# Patient Record
Sex: FEMALE | Race: ASIAN | NOT HISPANIC OR LATINO | Employment: FULL TIME | URBAN - METROPOLITAN AREA
[De-identification: names, ages, dates, MRNs, and addresses within clinical notes are randomized per-mention and may not be internally consistent; named-entity substitution may affect disease eponyms.]

---

## 2017-03-02 ENCOUNTER — OFFICE VISIT (OUTPATIENT)
Dept: PEDIATRICS | Facility: CLINIC | Age: 42
End: 2017-03-02
Payer: COMMERCIAL

## 2017-03-02 VITALS
HEART RATE: 68 BPM | HEIGHT: 63 IN | TEMPERATURE: 97.6 F | WEIGHT: 160.4 LBS | DIASTOLIC BLOOD PRESSURE: 65 MMHG | SYSTOLIC BLOOD PRESSURE: 120 MMHG | BODY MASS INDEX: 28.42 KG/M2 | OXYGEN SATURATION: 100 %

## 2017-03-02 DIAGNOSIS — M21.611 BILATERAL BUNIONS: ICD-10-CM

## 2017-03-02 DIAGNOSIS — L30.9 ECZEMA, UNSPECIFIED TYPE: Primary | ICD-10-CM

## 2017-03-02 DIAGNOSIS — L08.9 SUPERFICIAL SKIN INFECTION: ICD-10-CM

## 2017-03-02 DIAGNOSIS — M21.612 BILATERAL BUNIONS: ICD-10-CM

## 2017-03-02 PROCEDURE — 99213 OFFICE O/P EST LOW 20 MIN: CPT | Performed by: NURSE PRACTITIONER

## 2017-03-02 RX ORDER — CEPHALEXIN 500 MG/1
500 CAPSULE ORAL 2 TIMES DAILY
Qty: 14 CAPSULE | Refills: 0 | Status: SHIPPED | OUTPATIENT
Start: 2017-03-02 | End: 2018-01-05

## 2017-03-02 NOTE — LETTER
March 2, 2017      RE: Natasha Lee  [address] [facility] [address]       To whom it may concern:    Natasha Lee was seen in our clinic today.  Sincerely,      Sandy Garcia RN, CNP

## 2017-03-02 NOTE — NURSING NOTE
"Chief Complaint   Patient presents with     Eczema     on neck x 1 week       Initial /65 (BP Location: Right arm, Patient Position: Chair, Cuff Size: Adult Regular)  Pulse 68  Temp 97.6  F (36.4  C) (Temporal)  Ht 5' 2.5\" (1.588 m)  Wt 160 lb 6.4 oz (72.8 kg)  LMP 02/28/2017  SpO2 100%  Breastfeeding? No  BMI 28.87 kg/m2 Estimated body mass index is 28.87 kg/(m^2) as calculated from the following:    Height as of this encounter: 5' 2.5\" (1.588 m).    Weight as of this encounter: 160 lb 6.4 oz (72.8 kg).  Medication Reconciliation: complete      JESUSITA Gudino      "

## 2017-03-02 NOTE — PROGRESS NOTES
SUBJECTIVE:                                                    Natasha Lee is a 41 year old female who presents to clinic today for the following health issues:    Rash     Onset: 1 week    Description:   Location: left side of neck  Character: blotchy, flakey  Itching (Pruritis): YES    Progression of Symptoms:  same    Accompanying Signs & Symptoms:  Fever: no   Body aches or joint pain: no   Sore throat symptoms: no   Recent cold symptoms: no    History:   Previous similar rash: YES    Precipitating factors:   Exposure to similar rash: no   New exposures: None   Recent travel: no     Alleviating factors:  none     Therapies Tried and outcome: dry air makes it worst, kenalog, lotion  Saw about 2 months ago and we did antibiotic and steroid cream and it cleared  Then now spot has showed up on the left side of the neck   Tried moisterizer and the steroid cream but still will not go away     2. Has bunions on both feet left worse than right   Now having difficulty wearing shoes now    Problem list and histories reviewed & adjusted, as indicated.  Additional history: as documented    Patient Active Problem List   Diagnosis     Chronic hepatitis B (H)     Lupus (systemic lupus erythematosus) (H)     CARDIOVASCULAR SCREENING; LDL GOAL LESS THAN 160     Health Care Home     Dysmenorrhea     Menorrhagia     Allergic rhinitis     Other nonspecific finding on examination of urine     Urinary frequency     Vitamin B12 deficiency (non anemic)     Heart murmur     S/P laparoscopic cholecystectomy     Past Surgical History   Procedure Laterality Date     Cl aff surgical pathology  2007     Anesth,skin surgery,knee  1993, 1990     orthoscopic     C appendectomy  4/2008     Laparoscopic tubal ligation  12/15/2011     Procedure:LAPAROSCOPIC TUBAL LIGATION; Laparoscopic tubal ligation; Surgeon:AMY WYNN; Location:[facility] OR     Laparoscopic cholecystectomy N/A 5/18/2016     Procedure: LAPAROSCOPIC CHOLECYSTECTOMY;   Surgeon: Radha Cline MD;  Location:  OR       Social History   Substance Use Topics     Smoking status: Never Smoker     Smokeless tobacco: Never Used     Alcohol use No     Family History   Problem Relation Age of Onset     Unknown/Adopted Mother      Unknown/Adopted Father          Current Outpatient Prescriptions   Medication Sig Dispense Refill     DiphenhydrAMINE HCl (BENADRYL PO)        triamcinolone (KENALOG) 0.1 % cream Apply sparingly to affected area three times daily prn. 30 g 0     norethindrone-ethinyl estradiol (ORTHO-NOVUM 1-35 TAB,NORTREL 1-35 TAB) 1-35 MG-MCG per tablet Take 1 tablet by mouth daily 84 tablet 4     albuterol (PROAIR HFA, PROVENTIL HFA, VENTOLIN HFA) 108 (90 BASE) MCG/ACT inhaler Inhale 2 puffs into the lungs every 4 hours as needed for shortness of breath / dyspnea or wheezing 1 Inhaler 1     senna-docusate (SENOKOT-S;PERICOLACE) 8.6-50 MG per tablet Take 1-2 tablets by mouth 2 times daily 30 tablet 0     predniSONE (DELTASONE) 5 MG tablet 20 mg for 1 week, taper by 5 mg weekly 70 tablet 1     Cyanocobalamin (B-12) 1000 MCG TBCR Take 1,000 mcg by mouth daily 100 tablet 1     fluticasone (FLONASE) 50 MCG/ACT nasal spray Spray 2 sprays into both nostrils daily 16 g 3     clobetasol (TEMOVATE) 0.05 % cream Apply sparingly to affected area twice a week. 60 g 2     multivitamin, therapeutic with minerals (MULTI-VITAMIN) TABS Take 1 tablet by mouth daily       Cranberry 1000 MG CAPS Take 1 capsule by mouth 4 times daily       DiphenhydrAMINE HCl, Sleep, (UNISOM SLEEPGELS) 50 MG CAPS Take 50 mg by mouth At Bedtime       omeprazole 20 MG tablet Take 1 tablet by mouth daily. Take 30-60 minutes before a meal. 30 tablet 1     Allergies   Allergen Reactions     Carbamazepine      Fever, rash       Reviewed and updated as needed this visit by clinical staff  Tobacco  Allergies  Meds  Med Hx  Surg Hx  Fam Hx  Soc Hx      Reviewed and updated as needed this visit by Provider      "    ROS:  CONSTITUTIONAL:NEGATIVE for fever, chills, change in weight  ENT/MOUTH: NEGATIVE for ear, mouth and throat problems  RESP:NEGATIVE for significant cough or SOB  CV: NEGATIVE for chest pain, palpitations or peripheral edema  GI: NEGATIVE for nausea, poor appetite and vomiting  MUSCULOSKELETAL: NEGATIVE for significant arthralgias or myalgia  HEME/ALLERGY/IMMUNE: POSITIVE  for allergies and NEGATIVE for bleeding disorder    OBJECTIVE:                                                    /65 (BP Location: Right arm, Patient Position: Chair, Cuff Size: Adult Regular)  Pulse 68  Temp 97.6  F (36.4  C) (Temporal)  Ht 5' 2.5\" (1.588 m)  Wt 160 lb 6.4 oz (72.8 kg)  LMP 02/28/2017  SpO2 100%  Breastfeeding? No  BMI 28.87 kg/m2  Body mass index is 28.87 kg/(m^2).  GENERAL APPEARANCE: healthy, alert and no distress  HENT: ear canals and TM's normal and nose and mouth without ulcers or lesions  RESP: lungs clear to auscultation - no rales, rhonchi or wheezes  CV: regular rates and rhythm and no murmur, click or rub  MS: extremities normal- no gross deformities noted  Examination of the feet reveals   Inspection:  no swelling medial , midfoot  Tender::none  Non-tender:proximal 5th metatarsal, midshaft 5th metatarsal  FEET: Bilateral bunion deformity with bunionette.  Mild erythema laterally.  Range of Motion:flexion of toes:  full, extension of toes  full  SKIN: CHRONIC ECZEMATOUS RASH:  Rash 3 in size; Located left side of neck .  Has redness, irritation, some thickening, exoriation, with parched and flakey scaled appearance--consistent with chronic eczematous inflammation.  NEURO: mentation intact and speech normal  PSYCH: mentation appears normal and affect normal/bright    Diagnostic Test Results:  none      ASSESSMENT/PLAN:                                                      Natasha was seen today for eczema.    Diagnoses and all orders for this visit:    Eczema, unspecified type    Superficial skin infection " neck   -     cephALEXin (KEFLEX) 500 MG capsule; Take 1 capsule (500 mg) by mouth 2 times daily    Bilateral bunions  -     PODIATRY/FOOT & ANKLE SURGERY REFERRAL    PLAN:   Symptomatic therapy suggested:   Use Dove or Aveeno lotion or soap  Use Aquaphor, lubrider, cetaphil or eucerin lotion daily to two times a day  As needed   Use the steroid cream two times a day  On the dry patches until better and then use as needed after   2.  Orders Placed This Encounter   Medications     cephALEXin (KEFLEX) 500 MG capsule     Sig: Take 1 capsule (500 mg) by mouth 2 times daily     Dispense:  14 capsule     Refill:  0     Orders Placed This Encounter   Procedures     PODIATRY/FOOT & ANKLE SURGERY REFERRAL     3. Patient needs to follow up in if no improvement,or sooner if worsening of symptoms or other symptoms develop.        See Patient Instructions    ELIAS Guerra CNP  [facility]

## 2017-03-02 NOTE — PATIENT INSTRUCTIONS
It was a pleasure seeing you today at the [facility]. Thank you for allowing us to care for you today. We truly hope we provided you with the excellent service you deserve. Please let us know if there is anything else we can do for you so we can be sure you are leaving completley satisfied with your care experience.       General information about your clinic   Clinic Hours Lab Hours (Appointments are required)   Mon-Thurs: 7:30 AM - 7 PM Mon-Thurs: 7:30 AM - 7 PM   Fri: 7:30 AM - 5 PM Fri: 7:30 AM - 5 PM        After Hours Nurse Advise & Appts:  [facility] Advisors: [phone]  [facility] On Call: to make appointments anytime: [phone] On Call Physician: call [phone] and answering service will page the on call physician.        For urgent appointments, please call [phone] and ask for the triage nurse or your care team clinic nurse.  How to contact my care team:  [facility]: [email or web address]   Phone: [phone]   Fax: [phone]       [facility]:   Phone: [phone]  Hours: 8:00 AM - 6:00 PM  Medication Refills:  Call your pharmacy and they will forward the refill to us. Please allow 3 business days for your refills to be completed.       Normal or non-critical lab and imaging results will be communicated to you by MyChart, letter or phone within 7 days.  If you do not hear from us within 10 days, please call the clinic. If you have a critical or abnormal lab result, we will notify you by phone as soon as possible.       We now have PWIC (Pediatric Walk in Care)  Monday-Friday from 7:30-4. Simply walk in and be seen for your urgent needs like cough, fever, rash, diarrhea or vomiting, pink eye, UTI. No appointments needed. Ask one of the team for more information      -Your Care Team:    Dr. Johann Patino - Internal Medicine/Pediatrics   Dr. Jake Pierce - Family Medicine  Dr. Jamila Purcell - Pediatrics  Sandy Garcia CNP - Family Practice Nurse  Practitioner  Dr. Susan Hand - Pediatrics  Dr. Harpreet Ly - Internal Medicine        PLAN:   1.   Symptomatic therapy suggested:   Use Dove or Aveeno lotion or soap  Use Aquaphor, lubrider, cetaphil or eucerin lotion daily to two times a day  As needed   Use the steroid cream two times a day  On the dry patches until better and then use as needed after   2.  Orders Placed This Encounter   Medications     cephALEXin (KEFLEX) 500 MG capsule     Sig: Take 1 capsule (500 mg) by mouth 2 times daily     Dispense:  14 capsule     Refill:  0     Orders Placed This Encounter   Procedures     PODIATRY/FOOT & ANKLE SURGERY REFERRAL     3. Patient needs to follow up in if no improvement,or sooner if worsening of symptoms or other symptoms develop.

## 2017-03-02 NOTE — MR AVS SNAPSHOT
After Visit Summary   3/2/2017    Natasha Lee    MRN: [id number]           Patient Information     Date Of Birth          1975        Visit Information        Provider Department      3/2/2017 1:40 PM Sandy Garcia APRN CNP [facility]        Today's Diagnoses     Eczema, unspecified type    -  1    Superficial skin infection neck         Bilateral bunions          Care Instructions    It was a pleasure seeing you today at the [facility]. Thank you for allowing us to care for you today. We truly hope we provided you with the excellent service you deserve. Please let us know if there is anything else we can do for you so we can be sure you are leaving completley satisfied with your care experience.       General information about your clinic   Clinic Hours Lab Hours (Appointments are required)   Mon-Thurs: 7:30 AM - 7 PM Mon-Thurs: 7:30 AM - 7 PM   Fri: 7:30 AM - 5 PM Fri: 7:30 AM - 5 PM        After Hours Nurse Advise & Appts:  [facility] Nurse Advisors: [phone]  [facility] On Call: to make appointments anytime: [phone] On Call Physician: call [phone] and answering service will page the on call physician.        For urgent appointments, please call [phone] and ask for the triage nurse or your care team clinic nurse.  How to contact my care team:  MyChart: [email or web address]   Phone: [phone]   Fax: [phone]       [facility]:   Phone: [phone]  Hours: 8:00 AM - 6:00 PM  Medication Refills:  Call your pharmacy and they will forward the refill to us. Please allow 3 business days for your refills to be completed.       Normal or non-critical lab and imaging results will be communicated to you by MyChart, letter or phone within 7 days.  If you do not hear from us within 10 days, please call the clinic. If you have a critical or abnormal lab result, we will notify you by phone as soon as possible.        We now have PWIC (Pediatric Walk in Care)  Monday-Friday from 7:30-4. Simply walk in and be seen for your urgent needs like cough, fever, rash, diarrhea or vomiting, pink eye, UTI. No appointments needed. Ask one of the team for more information      -Your Care Team:    Dr. Johann Patino - Internal Medicine/Pediatrics   Dr. Jake Pierce - Family Medicine  Dr. Jamila Purcell - Pediatrics  Sandy Garcia CNP - Family Practice Nurse Practitioner  Dr. Susan Hand - Pediatrics  Dr. Harpreet Ly - Internal Medicine        PLAN:   1.   Symptomatic therapy suggested:   Use Dove or Aveeno lotion or soap  Use Aquaphor, lubrider, cetaphil or eucerin lotion daily to two times a day  As needed   Use the steroid cream two times a day  On the dry patches until better and then use as needed after   2.  Orders Placed This Encounter   Medications     cephALEXin (KEFLEX) 500 MG capsule     Sig: Take 1 capsule (500 mg) by mouth 2 times daily     Dispense:  14 capsule     Refill:  0     Orders Placed This Encounter   Procedures     PODIATRY/FOOT & ANKLE SURGERY REFERRAL     3. Patient needs to follow up in if no improvement,or sooner if worsening of symptoms or other symptoms develop.                Follow-ups after your visit        Additional Services     PODIATRY/FOOT & ANKLE SURGERY REFERRAL       Your provider has referred you to: [facility] [place] [facility]: [id number] [place] [address] Patient Clinic Line: [phone]     Please be aware that coverage of these services is subject to the terms and limitations of your health insurance plan.  Call member services at your health plan with any benefit or coverage questions.      Please bring the following to your appointment:  >>   Any x-rays, CTs or MRIs which have been performed.  Contact the facility where they were done to arrange for  prior to your scheduled appointment.    >>   List of current medications   >>   This referral request   >>   Any documents/labs  "given to you for this referral                  Who to contact     If you have questions or need follow up information about today's clinic visit or your schedule please contact [facility] directly at [phone].  Normal or non-critical lab and imaging results will be communicated to you by MyChart, letter or phone within 4 business days after the clinic has received the results. If you do not hear from us within 7 days, please contact the clinic through [facility] or phone. If you have a critical or abnormal lab result, we will notify you by phone as soon as possible.  Submit refill requests through [employer] or call your pharmacy and they will forward the refill request to us. Please allow 3 business days for your refill to be completed.          Additional Information About Your Visit        [employer] Information     [employer] gives you secure access to your electronic health record. If you see a primary care provider, you can also send messages to your care team and make appointments. If you have questions, please call your primary care clinic.  If you do not have a primary care provider, please call [phone] and they will assist you.      [employer] is an electronic gateway that provides easy, online access to your medical records. With [employer], you can request a clinic appointment, read your test results, renew a prescription or communicate with your care team.     To access your existing account, please contact your [facility] or call [phone] for assistance.        Care EveryWhere ID     This is your Care EveryWhere ID. This could be used by other organizations to access your [facility] medical records  [id number]        Your Vitals Were     Pulse Temperature Height Last Period Pulse Oximetry Breastfeeding?    68 97.6  F (36.4  C) (Temporal) 5' 2.5\" (1.588 m) 02/28/2017 100% No    BMI (Body Mass Index)                   28.87 kg/m2            Blood Pressure from " Last 3 Encounters:   03/02/17 120/65   12/19/16 120/82   10/11/16 110/70    Weight from Last 3 Encounters:   03/02/17 160 lb 6.4 oz (72.8 kg)   12/19/16 153 lb 14.4 oz (69.8 kg)   10/11/16 151 lb (68.5 kg)              We Performed the Following     PODIATRY/FOOT & ANKLE SURGERY REFERRAL          Today's Medication Changes          These changes are accurate as of: 3/2/17  2:12 PM.  If you have any questions, ask your nurse or doctor.               Start taking these medicines.        Dose/Directions    cephALEXin 500 MG capsule   Commonly known as:  KEFLEX   Used for:  Superficial skin infection   Started by:  Sandy Garcia APRN CNP        Dose:  500 mg   Take 1 capsule (500 mg) by mouth 2 times daily   Quantity:  14 capsule   Refills:  0            Where to get your medicines      These medications were sent to [facility] [address] [place] [address] [address] [facility] [address] [facility]  [address] [facility] [facility] [address]     Phone:  [phone]     cephALEXin 500 MG capsule                Primary Care Provider Office Phone # Fax #    ELIAS Guerra [phone]       [facility] [address]  [place] [address]        Thank you!     Thank you for choosing [facility]  for your care. Our goal is always to provide you with excellent care. Hearing back from our patients is one way we can continue to improve our services. Please take a few minutes to complete the written survey that you may receive in the mail after your visit with us. Thank you!             Your Updated Medication List - Protect others around you: Learn how to safely use, store and throw away your medicines at www.disposemymeds.org.          This list is accurate as of: 3/2/17  2:12 PM.  Always use your most recent med list.                   Brand Name Dispense Instructions for use    albuterol 108 (90 BASE) MCG/ACT Inhaler    PROAIR  HFA/PROVENTIL HFA/VENTOLIN HFA    1 Inhaler    Inhale 2 puffs into the lungs every 4 hours as needed for shortness of breath / dyspnea or wheezing       B-12 1000 MCG Tbcr     100 tablet    Take 1,000 mcg by mouth daily       BENADRYL PO          cephALEXin 500 MG capsule    KEFLEX    14 capsule    Take 1 capsule (500 mg) by mouth 2 times daily       clobetasol 0.05 % cream    TEMOVATE    60 g    Apply sparingly to affected area twice a week.       Cranberry 1000 MG Caps      Take 1 capsule by mouth 4 times daily       fluticasone 50 MCG/ACT spray    FLONASE    16 g    Spray 2 sprays into both nostrils daily       Multi-vitamin Tabs tablet      Take 1 tablet by mouth daily       norethindrone-ethinyl estradiol 1-35 MG-MCG per tablet    ORTHO-NOVUM 1-35 TAB,NORTREL 1-35 TAB    84 tablet    Take 1 tablet by mouth daily       omeprazole 20 MG tablet     30 tablet    Take 1 tablet by mouth daily. Take 30-60 minutes before a meal.       predniSONE 5 MG tablet    DELTASONE    70 tablet    20 mg for 1 week, taper by 5 mg weekly       senna-docusate 8.6-50 MG per tablet    SENOKOT-S;PERICOLACE    30 tablet    Take 1-2 tablets by mouth 2 times daily       triamcinolone 0.1 % cream    KENALOG    30 g    Apply sparingly to affected area three times daily prn.       UNISOM SLEEPGELS 50 MG Caps   Generic drug:  DiphenhydrAMINE HCl (Sleep)      Take 50 mg by mouth At Bedtime

## 2017-03-05 ENCOUNTER — MYC MEDICAL ADVICE (OUTPATIENT)
Dept: PEDIATRICS | Facility: CLINIC | Age: 42
End: 2017-03-05

## 2017-03-05 DIAGNOSIS — L30.9 ECZEMA, UNSPECIFIED TYPE: Primary | ICD-10-CM

## 2017-03-06 ENCOUNTER — OFFICE VISIT (OUTPATIENT)
Dept: PODIATRY | Facility: CLINIC | Age: 42
End: 2017-03-06
Attending: NURSE PRACTITIONER
Payer: COMMERCIAL

## 2017-03-06 VITALS
SYSTOLIC BLOOD PRESSURE: 104 MMHG | BODY MASS INDEX: 29.44 KG/M2 | DIASTOLIC BLOOD PRESSURE: 72 MMHG | HEART RATE: 73 BPM | OXYGEN SATURATION: 98 % | WEIGHT: 160 LBS | HEIGHT: 62 IN

## 2017-03-06 DIAGNOSIS — M20.12 HALLUX ABDUCTO VALGUS, LEFT: Primary | ICD-10-CM

## 2017-03-06 PROCEDURE — 99202 OFFICE O/P NEW SF 15 MIN: CPT | Performed by: PODIATRIST

## 2017-03-06 RX ORDER — BETAMETHASONE DIPROPIONATE 0.5 MG/G
CREAM TOPICAL
Qty: 45 G | Refills: 0 | Status: SHIPPED | OUTPATIENT
Start: 2017-03-06 | End: 2019-01-22

## 2017-03-06 ASSESSMENT — PAIN SCALES - GENERAL: PAINLEVEL: NO PAIN (0)

## 2017-03-06 NOTE — NURSING NOTE
"Natasha Lee's goals for this visit include: Evaluate bilateral bunsions.  She requests these members of her care team be copied on today's visit information: yes    PCP: Sandy Garcia    Referring Provider:  ELIAS Guerra [facility]  [address]  [place] [address]    Chief Complaint   Patient presents with     Consult     Bilateral bunions        Initial /72  Pulse 73  Ht 1.581 m (5' 2.25\")  Wt 72.6 kg (160 lb)  LMP 02/28/2017  SpO2 98%  BMI 29.03 kg/m2 Estimated body mass index is 29.03 kg/(m^2) as calculated from the following:    Height as of this encounter: 1.581 m (5' 2.25\").    Weight as of this encounter: 72.6 kg (160 lb).  Medication Reconciliation: complete    "

## 2017-03-06 NOTE — PATIENT INSTRUCTIONS
Dr. Jackie Kaur has recommended you see Dr. Blaine Flores or Dr. Brett Chavez at [facility].    Please call the number below to schedule.    [phone]    [address] [facility]                  Thanks for coming today.  Ortho[facility] Medicine Clinic  [address] [place] [address]    To schedule future appointments in Ortho Clinic, you may call [phone].    To schedule ordered imaging by your provider:   Call Central Imaging [phone]    To schedule an injection ordered by your provider:  Call Central Imaging Injection scheduling line: [phone]  [employer] available online at:  [email or web address]    Please call if any further questions or concerns ([phone]).  Clinic hours 8 am to 5 pm.    Return to clinic (call) if symptoms worsen or fail to improve.

## 2017-03-06 NOTE — MR AVS SNAPSHOT
After Visit Summary   3/6/2017    Natasha Lee    MRN: [id number]           Patient Information     Date Of Birth          1975        Visit Information        Provider Department      3/6/2017 3:00 PM Anatoly Mejia DPM [facility]        Today's Diagnoses     Hallux abducto valgus, left    -  1      Care Instructions    Dr. Jackie Kaur has recommended you see Dr. Blaine Flores or Dr. Brett Chavez at [facility].    Please call the number below to schedule.    [phone]    [address] [facility]                  Thanks for coming today.  Ortho/Sports Medicine Clinic  [address] [place] [address]    To schedule future appointments in Ortho Clinic, you may call [phone].    To schedule ordered imaging by your provider:   Call Central Imaging [phone]    To schedule an injection ordered by your provider:  Call Central Imaging Injection scheduling line: [phone]  [email or web address] available online at:  [email or web address]    Please call if any further questions or concerns ([phone]).  Clinic hours 8 am to 5 pm.    Return to clinic (call) if symptoms worsen or fail to improve.          Follow-ups after your visit        Who to contact     If you have questions or need follow up information about today's clinic visit or your schedule please contact [facility] directly at [phone].  Normal or non-critical lab and imaging results will be communicated to you by MyChart, letter or phone within 4 business days after the clinic has received the results. If you do not hear from us within 7 days, please contact the clinic through Mezeo Softwarehart or phone. If you have a critical or abnormal lab result, we will notify you by phone as soon as possible.  Submit refill requests through [email or web address] or call your pharmacy and they will forward the refill request to us. Please allow 3 business days for your refill to be  "completed.          Additional Information About Your Visit        WebEventsharINWEBTURE Limited Information     Imagistx gives you secure access to your electronic health record. If you see a primary care provider, you can also send messages to your care team and make appointments. If you have questions, please call your primary care clinic.  If you do not have a primary care provider, please call [phone] and they will assist you.      Imagistx is an electronic gateway that provides easy, online access to your medical records. With Imagistx, you can request a clinic appointment, read your test results, renew a prescription or communicate with your care team.     To access your existing account, please contact your [facility] or call [phone] for assistance.        Care EveryWhere ID     This is your Care EveryWhere ID. This could be used by other organizations to access your [place] medical records  [id number]        Your Vitals Were     Pulse Height Last Period Pulse Oximetry BMI (Body Mass Index)       73 1.581 m (5' 2.25\") 02/28/2017 98% 29.03 kg/m2        Blood Pressure from Last 3 Encounters:   03/06/17 104/72   03/02/17 120/65   12/19/16 120/82    Weight from Last 3 Encounters:   03/06/17 72.6 kg (160 lb)   03/02/17 72.8 kg (160 lb 6.4 oz)   12/19/16 69.8 kg (153 lb 14.4 oz)              Today, you had the following     No orders found for display       Primary Care Provider Office Phone # Fax #    Sandy ELIAS Mendez [facility] [phone]       [facility] [address]  [place] [address]        Thank you!     Thank you for choosing [facility]  for your care. Our goal is always to provide you with excellent care. Hearing back from our patients is one way we can continue to improve our services. Please take a few minutes to complete the written survey that you may receive in the mail after your visit with us. Thank you!             Your " Updated Medication List - Protect others around you: Learn how to safely use, store and throw away your medicines at www.disposemymeds.org.          This list is accurate as of: 3/6/17  3:09 PM.  Always use your most recent med list.                   Brand Name Dispense Instructions for use    albuterol 108 (90 BASE) MCG/ACT Inhaler    PROAIR HFA/PROVENTIL HFA/VENTOLIN HFA    1 Inhaler    Inhale 2 puffs into the lungs every 4 hours as needed for shortness of breath / dyspnea or wheezing       B-12 1000 MCG Tbcr     100 tablet    Take 1,000 mcg by mouth daily       BENADRYL PO          betamethasone dipropionate 0.05 % cream    DIPROSONE    45 g    Apply sparingly to affected area twice daily as needed.  Do not apply to face.       cephALEXin 500 MG capsule    KEFLEX    14 capsule    Take 1 capsule (500 mg) by mouth 2 times daily       clobetasol 0.05 % cream    TEMOVATE    60 g    Apply sparingly to affected area twice a week.       Cranberry 1000 MG Caps      Take 1 capsule by mouth 4 times daily       fluticasone 50 MCG/ACT spray    FLONASE    16 g    Spray 2 sprays into both nostrils daily       Multi-vitamin Tabs tablet      Take 1 tablet by mouth daily       norethindrone-ethinyl estradiol 1-35 MG-MCG per tablet    ORTHO-NOVUM 1-35 TAB,NORTREL 1-35 TAB    84 tablet    Take 1 tablet by mouth daily       omeprazole 20 MG tablet     30 tablet    Take 1 tablet by mouth daily. Take 30-60 minutes before a meal.       predniSONE 5 MG tablet    DELTASONE    70 tablet    20 mg for 1 week, taper by 5 mg weekly       senna-docusate 8.6-50 MG per tablet    SENOKOT-S;PERICOLACE    30 tablet    Take 1-2 tablets by mouth 2 times daily       triamcinolone 0.1 % cream    KENALOG    30 g    Apply sparingly to affected area three times daily prn.       UNISOM SLEEPGELS 50 MG Caps   Generic drug:  DiphenhydrAMINE HCl (Sleep)      Take 50 mg by mouth At Bedtime

## 2017-03-06 NOTE — PROGRESS NOTES
Past Medical History   Diagnosis Date     Diabetes (H)      Pre-Diabetic     Heart murmur 1997     Lupus (Systemic Lupus Erythematosus) 12/10/2009     Lupus (systemic lupus erythematosus) (H) 1988     Dr. Prather     Lupus (systemic lupus erythematosus) (H) 12/10/2009     Lupus (systemic lupus erythematosus) (H) 12/10/2009     Normal delivery 9/5/09     boy forceps     PONV (postoperative nausea and vomiting)      Mild Nausea postop     Patient Active Problem List   Diagnosis     Chronic hepatitis B (H)     Lupus (systemic lupus erythematosus) (H)     CARDIOVASCULAR SCREENING; LDL GOAL LESS THAN 160     Health Care Home     Dysmenorrhea     Menorrhagia     Allergic rhinitis     Other nonspecific finding on examination of urine     Urinary frequency     Vitamin B12 deficiency (non anemic)     Heart murmur     S/P laparoscopic cholecystectomy     Past Surgical History   Procedure Laterality Date     Cl aff surgical pathology  2007     Anesth,skin surgery,knee  1993, 1990     orthoscopic     C appendectomy  4/2008     Laparoscopic tubal ligation  12/15/2011     Procedure:LAPAROSCOPIC TUBAL LIGATION; Laparoscopic tubal ligation; Surgeon:AMY WYNN; Location: OR     Laparoscopic cholecystectomy N/A 5/18/2016     Procedure: LAPAROSCOPIC CHOLECYSTECTOMY;  Surgeon: Radha Cline MD;  Location:  OR     Social History     Social History     Marital status:      Spouse name: N/A     Number of children: 1     Years of education: N/A     Occupational History     addiction therapy [employer]     Social History Main Topics     Smoking status: Never Smoker     Smokeless tobacco: Never Used     Alcohol use No     Drug use: No     Sexual activity: Yes     Partners: Male     Birth control/ protection: Pill      Comment: tubal ligation, uses OCPs for menstrual and mood regulation     Other Topics Concern     Parent/Sibling W/ Cabg, Mi Or Angioplasty Before 65f 55m? 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  She presents from Sandy Garcia MD.  She relates that it has been present for about 2 years and gradually getting worse.  Relates no injuries.  The big toe is drifting over.  It does not really hurt except at the end of the day.  If her shoes are rubbing, it sometimes hurts.  It gets up to a pain of about 3/10.  She relates no treatment, no injuries.  She relates that the biggest problem she has is that she cannot fit into shoes.      OBJECTIVE:  DP and PT are 2/4 bilaterally.  She has laterally deviated hallux with dorsomedial first MPJ prominence, left greater than right.  Dorsally-contracted digits 2 through 5 bilaterally.  There is no erythema, no drainage, no odor, no calor bilaterally.  She has hyperkeratotic tissue buildup plantar medial hallux and first MPJ and 2 through 4 MPJs bilaterally.      ASSESSMENT:  Hallux abductovalgus, left.  Diagnosis and treatment options discussed with the patient.  She has hallux abductovalgus bilaterally, but the left one is the one that is bothering her.  I advised her on stretching, shoe gear.  Spenco over-the-counter insoles advised and use discussed with her.  Referral to Dr. Flores or Scott for surgical options given.   She will return to clinic to see me as needed.

## 2018-01-05 ENCOUNTER — OFFICE VISIT (OUTPATIENT)
Dept: PEDIATRICS | Facility: CLINIC | Age: 43
End: 2018-01-05
Payer: COMMERCIAL

## 2018-01-05 VITALS
SYSTOLIC BLOOD PRESSURE: 120 MMHG | HEART RATE: 73 BPM | TEMPERATURE: 96.8 F | OXYGEN SATURATION: 100 % | DIASTOLIC BLOOD PRESSURE: 80 MMHG

## 2018-01-05 DIAGNOSIS — K31.9 DYSPEPSIA AND DISORDER OF FUNCTION OF STOMACH: Primary | ICD-10-CM

## 2018-01-05 DIAGNOSIS — M21.612 BUNION, LEFT FOOT: ICD-10-CM

## 2018-01-05 DIAGNOSIS — R10.13 DYSPEPSIA AND DISORDER OF FUNCTION OF STOMACH: Primary | ICD-10-CM

## 2018-01-05 DIAGNOSIS — M32.9 SYSTEMIC LUPUS ERYTHEMATOSUS, UNSPECIFIED SLE TYPE, UNSPECIFIED ORGAN INVOLVEMENT STATUS (H): ICD-10-CM

## 2018-01-05 PROCEDURE — 86003 ALLG SPEC IGE CRUDE XTRC EA: CPT | Performed by: NURSE PRACTITIONER

## 2018-01-05 PROCEDURE — 83516 IMMUNOASSAY NONANTIBODY: CPT | Mod: 59 | Performed by: NURSE PRACTITIONER

## 2018-01-05 PROCEDURE — 36415 COLL VENOUS BLD VENIPUNCTURE: CPT | Performed by: NURSE PRACTITIONER

## 2018-01-05 PROCEDURE — 99214 OFFICE O/P EST MOD 30 MIN: CPT | Performed by: NURSE PRACTITIONER

## 2018-01-05 PROCEDURE — 83516 IMMUNOASSAY NONANTIBODY: CPT | Performed by: NURSE PRACTITIONER

## 2018-01-05 NOTE — LETTER
January 5, 2018      RE: Natasha Lee  [address] [facility] [address]       To whom it may concern:    Natasha Lee was seen in our clinic today. She had to be out of work on 1/3/2017 due to illness.     Sincerely,      Sandy Garcia RN, CNP

## 2018-01-05 NOTE — PATIENT INSTRUCTIONS
PLAN:   1.   Symptomatic therapy suggested: Continue current medications.  2.  Orders Placed This Encounter   Procedures     Allergy adult food panel     Tissue transglutaminase gerson IgA and IgG     RHEUMATOLOGY REFERRAL       3. Patient needs to follow up in if no improvement,or sooner if worsening of symptoms or other symptoms develop.  CONSULTATION/REFERRAL to Rheumatology( Dr. Zaman)  Will make referral to foot surgeon in future  Will follow up and/or notify patient of  results via My Chart to determine further need for followup      It was a pleasure seeing you today at the [facility]. Thank you for allowing us to care for you today. We truly hope we provided you with the excellent service you deserve. Please let us know if there is anything else we can do for you so we can be sure you are leaving completley satisfied with your care experience.       General information about your clinic   Clinic Hours Lab Hours (Appointments are required)   Mon-Thurs: 7:30 AM - 7 PM Mon-Thurs: 7:30 AM - 7 PM   Fri: 7:30 AM - 5 PM Fri: 7:30 AM - 5 PM        After Hours Nurse Advise & Appts:  [facility] Nurse Advisors: [phone]  [facility] On Call: to make appointments anytime: [phone] On Call Physician: call [phone] and answering service will page the on call physician.        For urgent appointments, please call [phone] and ask for the triage nurse or your care team clinic nurse.  How to contact my care team:  MyChart: [email or web address]   Phone: [phone]   Fax: [phone]       [facility]:   Phone: [phone]  Hours: 8:00 AM - 6:00 PM  Medication Refills:  Call your pharmacy and they will forward the refill to us. Please allow 3 business days for your refills to be completed.       Normal or non-critical lab and imaging results will be communicated to you by MyChart, letter or phone within 7 days.  If you do not hear from us within 10 days, please call the  clinic. If you have a critical or abnormal lab result, we will notify you by phone as soon as possible.       We now have PWIC (Pediatric Walk in Care)  Monday-Friday from 7:30-4. Simply walk in and be seen for your urgent needs like cough, fever, rash, diarrhea or vomiting, pink eye, UTI. No appointments needed. Ask one of the team for more information      -Your Care Team:    Dr. Johann Patino - Internal Medicine/Pediatrics   Dr. Jake Pierce - Family Medicine  Dr. Jamila Purcell - Pediatrics  Dr. Susan Hand - Pediatrics  Sandy Garcia CNP - Family Practice Nurse Practitioner

## 2018-01-05 NOTE — NURSING NOTE
"Chief Complaint   Patient presents with     Bunion     follow-up on left sided bunion     Allergy Consult     would like to be tested for celiac     Forms     would like to discuss about FMLA       Initial /80 (BP Location: Right arm, Patient Position: Sitting, Cuff Size: Adult Regular)  Pulse 73  Temp 96.8  F (36  C) (Temporal)  LMP 12/27/2017  SpO2 100%  Breastfeeding? No Estimated body mass index is 29.03 kg/(m^2) as calculated from the following:    Height as of 3/6/17: 5' 2.25\" (1.581 m).    Weight as of 3/6/17: 160 lb (72.6 kg).  Medication Reconciliation: complete      JESUSITA Gudino      "

## 2018-01-05 NOTE — Clinical Note
Ruben Kaur Shanna Jesus is here and having more discomfort with her foot. It looks like you referred her to Dr Argueta at [facility] but unfortunately was not the best experience there for her  Do you have any suggestions for someone else you would recommend to refer her to now that she is more symptomatic  Thanks  Sandy Garcia, NIDIA, APRN CNP

## 2018-01-05 NOTE — MR AVS SNAPSHOT
After Visit Summary   1/5/2018    Natasha Lee    MRN: [id number]           Patient Information     Date Of Birth          1975        Visit Information        Provider Department      1/5/2018 2:10 PM Sandy Garcia APRN CNP [facility]        Today's Diagnoses     Dyspepsia and disorder of function of stomach    -  1    Systemic lupus erythematosus, unspecified SLE type, unspecified organ involvement status (H)        Bunion, left foot          Care Instructions    PLAN:   1.   Symptomatic therapy suggested: Continue current medications.  2.  Orders Placed This Encounter   Procedures     Allergy adult food panel     Tissue transglutaminase gerson IgA and IgG     RHEUMATOLOGY REFERRAL       3. Patient needs to follow up in if no improvement,or sooner if worsening of symptoms or other symptoms develop.  CONSULTATION/REFERRAL to Rheumatology( Dr. Zaman)  Will make referral to foot surgeon in future  Will follow up and/or notify patient of  results via My Chart to determine further need for followup      It was a pleasure seeing you today at the [facility]. Thank you for allowing us to care for you today. We truly hope we provided you with the excellent service you deserve. Please let us know if there is anything else we can do for you so we can be sure you are leaving completley satisfied with your care experience.       General information about your clinic   Clinic Hours Lab Hours (Appointments are required)   Mon-Thurs: 7:30 AM - 7 PM Mon-Thurs: 7:30 AM - 7 PM   Fri: 7:30 AM - 5 PM Fri: 7:30 AM - 5 PM        After Hours Nurse Advise & Appts:  [facility] Nurse Advisors: [phone]  [facility] On Call: to make appointments anytime: [phone] On Call Physician: call [phone] and answering service will page the on call physician.        For urgent appointments, please call [phone] and ask for the triage nurse or your care team  clinic nurse.  How to contact my care team:  Kari: [email or web address]   Phone: [phone]   Fax: [phone]       [facility]:   Phone: [phone]  Hours: 8:00 AM - 6:00 PM  Medication Refills:  Call your pharmacy and they will forward the refill to us. Please allow 3 business days for your refills to be completed.       Normal or non-critical lab and imaging results will be communicated to you by MyChart, letter or phone within 7 days.  If you do not hear from us within 10 days, please call the clinic. If you have a critical or abnormal lab result, we will notify you by phone as soon as possible.       We now have PWIC (Pediatric Walk in Care)  Monday-Friday from 7:30-4. Simply walk in and be seen for your urgent needs like cough, fever, rash, diarrhea or vomiting, pink eye, UTI. No appointments needed. Ask one of the team for more information      -Your Care Team:    Dr. Johann Patino - Internal Medicine/Pediatrics   Dr. Jake Pierce - Family Medicine  Dr. Jamila Purcell - Pediatrics  Dr. Susan Hand - Pediatrics  Sandy Garcia CNP - Family Practice Nurse Practitioner                           Follow-ups after your visit        Additional Services     RHEUMATOLOGY REFERRAL       Your provider has referred you to: [facility]: [facility] [phone]   [email or web address]/    Please be aware that coverage of these services is subject to the terms and limitations of your health insurance plan.  Call member services at your health plan with any benefit or coverage questions.      Please bring the following with you to your appointment:    (1) Any X-Rays, CTs or MRIs which have been performed.  Contact the facility where they were done to arrange for  prior to your scheduled appointment.    (2) List of current medications   (3) This referral request   (4) Any documents/labs given to you for this referral                  Who to contact      If you have questions or need follow up information about today's clinic visit or your schedule please contact [facility] directly at [phone].  Normal or non-critical lab and imaging results will be communicated to you by SteadyFarehart, letter or phone within 4 business days after the clinic has received the results. If you do not hear from us within 7 days, please contact the clinic through SteadyFarehart or phone. If you have a critical or abnormal lab result, we will notify you by phone as soon as possible.  Submit refill requests through SmartCup or call your pharmacy and they will forward the refill request to us. Please allow 3 business days for your refill to be completed.          Additional Information About Your Visit        SteadyFareharPursuit Management Information     SmartCup gives you secure access to your electronic health record. If you see a primary care provider, you can also send messages to your care team and make appointments. If you have questions, please call your primary care clinic.  If you do not have a primary care provider, please call [phone] and they will assist you.      SmartCup is an electronic gateway that provides easy, online access to your medical records. With SmartCup, you can request a clinic appointment, read your test results, renew a prescription or communicate with your care team.     To access your existing account, please contact your [facility] or call [phone] for assistance.        Care EveryWhere ID     This is your Care EveryWhere ID. This could be used by other organizations to access your [place] medical records  [id number]        Your Vitals Were     Pulse Temperature Last Period Pulse Oximetry Breastfeeding?       73 96.8  F (36  C) (Temporal) 12/27/2017 100% No        Blood Pressure from Last 3 Encounters:   01/05/18 120/80   03/06/17 104/72   03/02/17 120/65    Weight from Last 3 Encounters:   03/06/17 160 lb (72.6 kg)   03/02/17  160 lb 6.4 oz (72.8 kg)   12/19/16 153 lb 14.4 oz (69.8 kg)              We Performed the Following     Allergy adult food panel     RHEUMATOLOGY REFERRAL     Tissue transglutaminase gerson IgA and IgG        Primary Care Provider Office Phone # Fax #    ELIAS Guerra [facility] [phone]       [address]  [place] [address]        Equal Access to Services     IDA MAHONEY : Hadii aad ku hadasho Soomaali, waaxda luqadaha, qaybta kaalmada adeegyada, waxay idiin hayaan adeeg kharash la'aan . So [facility] [phone].    ATENCIÓN: Si porfiriola espvincent, tiene a schaffer disposición servicios gratuitos de asistencia lingüística. Llame al [phone].    We comply with applicable federal civil rights laws and [place] laws. We do not discriminate on the basis of race, color, national origin, age, disability, sex, sexual orientation, or gender identity.            Thank you!     Thank you for choosing [facility]  for your care. Our goal is always to provide you with excellent care. Hearing back from our patients is one way we can continue to improve our services. Please take a few minutes to complete the written survey that you may receive in the mail after your visit with us. Thank you!             Your Updated Medication List - Protect others around you: Learn how to safely use, store and throw away your medicines at www.disposemymeds.org.          This list is accurate as of: 1/5/18  2:43 PM.  Always use your most recent med list.                   Brand Name Dispense Instructions for use Diagnosis    albuterol 108 (90 BASE) MCG/ACT Inhaler    PROAIR HFA/PROVENTIL HFA/VENTOLIN HFA    1 Inhaler    Inhale 2 puffs into the lungs every 4 hours as needed for shortness of breath / dyspnea or wheezing    Acute bronchitis, unspecified organism       B-12 1000 MCG Tbcr     100 tablet    Take 1,000 mcg by mouth daily    Vitamin B12 deficiency (non anemic)       BENADRYL PO            betamethasone dipropionate 0.05 % cream    DIPROSONE    45 g    Apply sparingly to affected area twice daily as needed.  Do not apply to face.    Eczema, unspecified type       clobetasol 0.05 % cream    TEMOVATE    60 g    Apply sparingly to affected area twice a week.    Vulvar discomfort       Cranberry 1000 MG Caps      Take 1 capsule by mouth 4 times daily    Lupus (systemic lupus erythematosus) (H), Chronic hepatitis B (H)       fluticasone 50 MCG/ACT spray    FLONASE    16 g    Spray 2 sprays into both nostrils daily    Allergic rhinitis, unspecified allergic rhinitis type       Multi-vitamin Tabs tablet      Take 1 tablet by mouth daily    Other abnormal glucose       norethindrone-ethinyl estradiol 1-35 MG-MCG per tablet    ORTHO-NOVUM 1-35 TAB,NORTREL 1-35 TAB    84 tablet    Take 1 tablet by mouth daily    Dysmenorrhea, Menorrhagia with regular cycle       omeprazole 20 MG tablet     30 tablet    Take 1 tablet by mouth daily. Take 30-60 minutes before a meal.    Abdominal pain, epigastric       predniSONE 5 MG tablet    DELTASONE    70 tablet    20 mg for 1 week, taper by 5 mg weekly    Lupus (systemic lupus erythematosus) (H)       senna-docusate 8.6-50 MG per tablet    SENOKOT-S;PERICOLACE    30 tablet    Take 1-2 tablets by mouth 2 times daily    Thickening of wall of gallbladder       triamcinolone 0.1 % cream    KENALOG    30 g    Apply sparingly to affected area three times daily prn.    Eczema, unspecified type       UNISOM SLEEPGELS 50 MG Caps   Generic drug:  DiphenhydrAMINE HCl (Sleep)      Take 50 mg by mouth At Bedtime

## 2018-01-09 LAB
CLAM IGE QN: <0.1 KU(A)/L
CODFISH IGE QN: <0.1 KU(A)/L
CORN IGE QN: <0.1 KU(A)/L
COW MILK IGE QN: <0.1 KU/L
EGG WHITE IGE QN: <0.1 KU(A)/L
PEANUT IGE QN: <0.1 KU(A)/L
SCALLOP IGE QN: <0.1 KU(A)/L
SHRIMP IGE QN: <0.1 KU(A)/L
SOYBEAN IGE QN: <0.1 KU(A)/L
WALNUT IGE QN: <0.1 KU(A)/L
WHEAT IGE QN: <0.1 KU(A)/L

## 2018-01-09 NOTE — PROGRESS NOTES
Ky Lee,    Attached are your test results.  Food allergy panel is negative    Please contact us if you have any questions.    Sandy Garcia, CNP

## 2018-01-10 LAB
TTG IGA SER-ACNC: 1 U/ML
TTG IGG SER-ACNC: 1 U/ML

## 2018-01-11 NOTE — PROGRESS NOTES
Ky Lee,    Attached are your test results.  Celiac screen is negative    Please contact us if you have any questions.    Sandy Garcia, CNP

## 2018-02-08 ENCOUNTER — OFFICE VISIT (OUTPATIENT)
Dept: OBGYN | Facility: CLINIC | Age: 43
End: 2018-02-08
Payer: COMMERCIAL

## 2018-02-08 VITALS
WEIGHT: 155.4 LBS | HEIGHT: 62 IN | DIASTOLIC BLOOD PRESSURE: 76 MMHG | SYSTOLIC BLOOD PRESSURE: 121 MMHG | OXYGEN SATURATION: 99 % | HEART RATE: 80 BPM | BODY MASS INDEX: 28.6 KG/M2

## 2018-02-08 DIAGNOSIS — N94.6 DYSMENORRHEA: ICD-10-CM

## 2018-02-08 DIAGNOSIS — Z01.419 ENCOUNTER FOR GYNECOLOGICAL EXAMINATION WITHOUT ABNORMAL FINDING: Primary | ICD-10-CM

## 2018-02-08 DIAGNOSIS — Z12.31 ENCOUNTER FOR SCREENING MAMMOGRAM FOR BREAST CANCER: ICD-10-CM

## 2018-02-08 DIAGNOSIS — N94.818 VULVAR DISCOMFORT: ICD-10-CM

## 2018-02-08 DIAGNOSIS — N92.0 MENORRHAGIA WITH REGULAR CYCLE: ICD-10-CM

## 2018-02-08 PROCEDURE — 99396 PREV VISIT EST AGE 40-64: CPT | Performed by: OBSTETRICS & GYNECOLOGY

## 2018-02-08 RX ORDER — CLOBETASOL PROPIONATE 0.5 MG/G
CREAM TOPICAL
Qty: 60 G | Refills: 2 | Status: CANCELLED | OUTPATIENT
Start: 2018-02-08

## 2018-02-08 NOTE — LETTER
[address] [address]  Phone: [phone]    02/08/18    Natasha Lee  [address] [place] [address]          To whom it may concern:      Natasha was seen in our office today.    Sincerely,      Alberto Duran MD

## 2018-02-08 NOTE — PROGRESS NOTES
Natasha Lee is a 42 year old female , who presents for annual exam.   No unusual bleeding, no incontinence, or unusual discharge.   She is currently using OCPs for menstrual regulation and for contraception.  She does not have any apparent contraindications to use.  She has not had any apparent complications with it's use.    Last cholesterol:   Recent Labs   Lab Test  02/15/16   0827  14   0716   CHOL  143  145   HDL  47*  49*   LDL  82  74   TRIG  71  110   CHOLHDLRATIO   --   3.0     Past Medical History:   Diagnosis Date     Diabetes (H)     Pre-Diabetic     Heart murmur      Lupus (Systemic Lupus Erythematosus) 12/10/2009     Lupus (systemic lupus erythematosus) (H)     Dr. Prather     Lupus (systemic lupus erythematosus) (H) 12/10/2009     Lupus (systemic lupus erythematosus) (H) 12/10/2009     Lupus (systemic lupus erythematosus) (H)      Normal delivery 09    boy forceps     PONV (postoperative nausea and vomiting)     Mild Nausea postop       Past Surgical History:   Procedure Laterality Date     ANESTH,SKIN SURGERY,KNEE  ,     orthoscopic     C APPENDECTOMY  2008     CHOLECYSTECTOMY       [facility]       LAPAROSCOPIC CHOLECYSTECTOMY N/A 2016    Procedure: LAPAROSCOPIC CHOLECYSTECTOMY;  Surgeon: Radha Cline MD;  Location: [facility]     LAPAROSCOPIC TUBAL LIGATION  12/15/2011    Procedure:LAPAROSCOPIC TUBAL LIGATION; Laparoscopic tubal ligation; Surgeon:AMY WYNN; Location:[facility] OR       Obstetric History       T0      L1     SAB0   TAB0   Ectopic0   Multiple0   Live Births1       # Outcome Date GA Lbr Juilo Cesar/2nd Weight Sex Delivery Anes PTL Lv   1 Para 09    M    PHILOMENA          Gyn History:  Gynecological History         Patient's last menstrual period was 2017.     no STD /no PID/no IUD      history of abnormal pap smear:  no  Last pap:   Lab Results   Component Value Date    PAP NIL 2016            Current Outpatient Prescriptions   Medication Sig Dispense Refill     betamethasone dipropionate (DIPROSONE) 0.05 % cream Apply sparingly to affected area twice daily as needed.  Do not apply to face. 45 g 0     DiphenhydrAMINE HCl (BENADRYL PO)        triamcinolone (KENALOG) 0.1 % cream Apply sparingly to affected area three times daily prn. 30 g 0     norethindrone-ethinyl estradiol (ORTHO-NOVUM 1-35 TAB,NORTREL 1-35 TAB) 1-35 MG-MCG per tablet Take 1 tablet by mouth daily 84 tablet 4     albuterol (PROAIR HFA, PROVENTIL HFA, VENTOLIN HFA) 108 (90 BASE) MCG/ACT inhaler Inhale 2 puffs into the lungs every 4 hours as needed for shortness of breath / dyspnea or wheezing 1 Inhaler 1     senna-docusate (SENOKOT-S;PERICOLACE) 8.6-50 MG per tablet Take 1-2 tablets by mouth 2 times daily 30 tablet 0     predniSONE (DELTASONE) 5 MG tablet 20 mg for 1 week, taper by 5 mg weekly 70 tablet 1     Cyanocobalamin (B-12) 1000 MCG TBCR Take 1,000 mcg by mouth daily 100 tablet 1     fluticasone (FLONASE) 50 MCG/ACT nasal spray Spray 2 sprays into both nostrils daily 16 g 3     clobetasol (TEMOVATE) 0.05 % cream Apply sparingly to affected area twice a week. 60 g 2     multivitamin, therapeutic with minerals (MULTI-VITAMIN) TABS Take 1 tablet by mouth daily       Cranberry 1000 MG CAPS Take 1 capsule by mouth 4 times daily       DiphenhydrAMINE HCl, Sleep, (UNISOM SLEEPGELS) 50 MG CAPS Take 50 mg by mouth At Bedtime       omeprazole 20 MG tablet Take 1 tablet by mouth daily. Take 30-60 minutes before a meal. 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 No      Service No     Blood Transfusions No     Caffeine Concern No     Occupational Exposure Yes     chemical dependency counselor in a [employer]     Hobby Hazards No     Sleep Concern No     Stress Concern No     Weight Concern No     Special Diet No     Exercise Yes     Bike Helmet Yes     Seat Belt Yes     Self-Exams Yes     Social History Narrative       Family History   Problem Relation Age of Onset     Unknown/Adopted Mother      Unknown/Adopted Father          ROS:  All negative except as above.      EXAM:  /76 (BP Location: Right arm, Cuff Size: Adult Regular)  Pulse 80  Ht 5' 1.95\" (1.574 m)  Wt 155 lb 6.4 oz (70.5 kg)  LMP 12/08/2017  SpO2 99%  Breastfeeding? No  BMI 28.47 kg/m2  General:  WNWD female, NAD  Alert  Oriented x 3  Gait:  Normal  Skin:  Normal skin turgor  Neurologic:  CN grossly intact, good sensation.    HEENT:  NC/AT, EOMI  Neck:  No masses palpated, symmetrical, carotids +2/4, no bruits heard  Heart:  RRR  Lungs:  Clear   Breasts:  Symmetrical, no dimpling noted, no masses palpated, no discharge expressed  Abdomen:  Non-tender, non-distended.  Vulva: No external lesions, normal hair distribution, no adenopathy  BUS:  Normal, no masses noted  Urethra:  No hypermobility noted  Urethral meatus:  No masses noted  Vagina: Moist, pink, no abnormal discharge, well rugated, no lesions  Cervix: Smooth, pink, no visible lesions  Uterus: Normal size, anteverted, non-tender, mobile  Ovaries: No mass, non-tender, mobile  Perianal:  No masses noted.    Extremities:  No clubbing, cyanosis, or edema      ASSESSMENT/PLAN   Annual examination   Schedule mammogram   Low fat diet, weight bearing exercises " and self breast exams on a monthly basis have been recommended.  I have discussed with patient the risks, benefits, medications, treatment options and modalities.   I have instructed the patient to call or schedule a follow-up appointment if any problems.

## 2018-02-08 NOTE — MR AVS SNAPSHOT
After Visit Summary   2/8/2018    Natasha Lee    MRN: [id number]           Patient Information     Date Of Birth          1975        Visit Information        Provider Department      2/8/2018 1:15 PM Alberto Duran MD [facility]        Today's Diagnoses     Encounter for screening mammogram for breast cancer    -  1    Dysmenorrhea        Menorrhagia with regular cycle        Vulvar discomfort           Follow-ups after your visit        Your next 10 appointments already scheduled     Mar 07, 2018  3:00 PM CST   [facility]    [address] [facility] [address]   [phone]              Future tests that were ordered for you today     Open Future Orders        Priority Expected Expires Ordered    *MA Screening Digital Bilateral Routine  2/8/2019 2/8/2018            Who to contact     If you have questions or need follow up information about today's clinic visit or your schedule please contact [facility] directly at [phone].  Normal or non-critical lab and imaging results will be communicated to you by [employer], letter or phone within 4 business days after the clinic has received the results. If you do not hear from us within 7 days, please contact the clinic through [employer] or phone. If you have a critical or abnormal lab result, we will notify you by phone as soon as possible.  Submit refill requests through [employer] or call your pharmacy and they will forward the refill request to us. Please allow 3 business days for your refill to be completed.          Additional Information About Your Visit        MyChart Information     [employer] gives you secure access to your electronic health record. If you see a primary care provider, you can also send messages to your care team and make appointments. If you have questions, please call your primary care clinic.  If  "you do not have a primary care provider, please call [phone] and they will assist you.        Care EveryWhere ID     This is your Care EveryWhere ID. This could be used by other organizations to access your [place] medical records  [id number]        Your Vitals Were     Pulse Height Last Period Pulse Oximetry Breastfeeding? BMI (Body Mass Index)    80 5' 1.95\" (1.574 m) 12/08/2017 99% No 28.47 kg/m2       Blood Pressure from Last 3 Encounters:   02/08/18 121/76   01/05/18 120/80   03/06/17 104/72    Weight from Last 3 Encounters:   02/08/18 155 lb 6.4 oz (70.5 kg)   03/06/17 160 lb (72.6 kg)   03/02/17 160 lb 6.4 oz (72.8 kg)                 Where to get your medicines      These medications were sent to [facility] [address] [place] [address] [address] [place] [address]  [address], [facility] [address]     Phone:  [phone]     norethindrone-ethinyl estradiol 1-35 MG-MCG per tablet          Primary Care Provider Office Phone # Fax #    Sandy ELIAS Mendez [facility] [phone]       [address]  [place] [address]        Equal Access to Services     [facility] : Hadii aad ku hadasho Soomaali, waaxda luqadaha, qaybta kaalmada adeegyada, miki forrest . So [facility] [phone].    ATENCIÓN: Si habla español, tiene a schaffer disposición servicios gratuitos de asistencia lingüística. Radha chance [phone].    We comply with applicable federal civil rights laws and [place] laws. We do not discriminate on the basis of race, color, national origin, age, disability, sex, sexual orientation, or gender identity.            Thank you!     Thank you for choosing [facility]  for your care. Our goal is always to provide you with excellent care. Hearing back from our patients is one way we can continue to improve our services. Please take a few minutes to complete the written survey that you may receive in the " mail after your visit with us. Thank you!             Your Updated Medication List - Protect others around you: Learn how to safely use, store and throw away your medicines at www.disposemymeds.org.          This list is accurate as of 2/8/18  1:42 PM.  Always use your most recent med list.                   Brand Name Dispense Instructions for use Diagnosis    albuterol 108 (90 BASE) MCG/ACT Inhaler    PROAIR HFA/PROVENTIL HFA/VENTOLIN HFA    1 Inhaler    Inhale 2 puffs into the lungs every 4 hours as needed for shortness of breath / dyspnea or wheezing    Acute bronchitis, unspecified organism       B-12 1000 MCG Tbcr     100 tablet    Take 1,000 mcg by mouth daily    Vitamin B12 deficiency (non anemic)       BENADRYL PO           betamethasone dipropionate 0.05 % cream    DIPROSONE    45 g    Apply sparingly to affected area twice daily as needed.  Do not apply to face.    Eczema, unspecified type       clobetasol 0.05 % cream    TEMOVATE    60 g    Apply sparingly to affected area twice a week.    Vulvar discomfort       Cranberry 1000 MG Caps      Take 1 capsule by mouth 4 times daily    Lupus (systemic lupus erythematosus) (H), Chronic hepatitis B (H)       fluticasone 50 MCG/ACT spray    FLONASE    16 g    Spray 2 sprays into both nostrils daily    Allergic rhinitis, unspecified allergic rhinitis type       Multi-vitamin Tabs tablet      Take 1 tablet by mouth daily    Other abnormal glucose       norethindrone-ethinyl estradiol 1-35 MG-MCG per tablet    ORTHO-NOVUM 1-35 TAB,NORTREL 1-35 TAB    84 tablet    Take 1 tablet by mouth daily    Dysmenorrhea, Menorrhagia with regular cycle       omeprazole 20 MG tablet     30 tablet    Take 1 tablet by mouth daily. Take 30-60 minutes before a meal.    Abdominal pain, epigastric       predniSONE 5 MG tablet    DELTASONE    70 tablet    20 mg for 1 week, taper by 5 mg weekly    Lupus (systemic lupus erythematosus) (H)       senna-docusate 8.6-50 MG per tablet     SENOKOT-S;PERICOLACE    30 tablet    Take 1-2 tablets by mouth 2 times daily    Thickening of wall of gallbladder       triamcinolone 0.1 % cream    KENALOG    30 g    Apply sparingly to affected area three times daily prn.    Eczema, unspecified type       UNISOM SLEEPGELS 50 MG Caps   Generic drug:  DiphenhydrAMINE HCl (Sleep)      Take 50 mg by mouth At Bedtime

## 2018-02-08 NOTE — NURSING NOTE
"Chief Complaint   Patient presents with     Physical     Annual Female       Initial /76 (BP Location: Right arm, Cuff Size: Adult Regular)  Pulse 80  Ht 5' 1.95\" (1.574 m)  Wt 155 lb 6.4 oz (70.5 kg)  LMP 12/08/2017  SpO2 99%  Breastfeeding? No  BMI 28.47 kg/m2 Estimated body mass index is 28.47 kg/(m^2) as calculated from the following:    Height as of this encounter: 5' 1.95\" (1.574 m).    Weight as of this encounter: 155 lb 6.4 oz (70.5 kg).  Medication Reconciliation: complete   RUPAL Merlos 2/8/2018         "

## 2018-02-09 ENCOUNTER — HEALTH MAINTENANCE LETTER (OUTPATIENT)
Age: 43
End: 2018-02-09

## 2018-03-07 ENCOUNTER — OFFICE VISIT (OUTPATIENT)
Dept: RHEUMATOLOGY | Facility: CLINIC | Age: 43
End: 2018-03-07
Attending: NURSE PRACTITIONER
Payer: COMMERCIAL

## 2018-03-07 VITALS — HEART RATE: 99 BPM | DIASTOLIC BLOOD PRESSURE: 72 MMHG | SYSTOLIC BLOOD PRESSURE: 126 MMHG | OXYGEN SATURATION: 100 %

## 2018-03-07 DIAGNOSIS — B18.1 CHRONIC HEPATITIS B (H): ICD-10-CM

## 2018-03-07 DIAGNOSIS — M32.9 SYSTEMIC LUPUS ERYTHEMATOSUS, UNSPECIFIED SLE TYPE, UNSPECIFIED ORGAN INVOLVEMENT STATUS (H): Primary | ICD-10-CM

## 2018-03-07 DIAGNOSIS — Z79.899 HIGH RISK MEDICATION USE: ICD-10-CM

## 2018-03-07 LAB
ALBUMIN UR-MCNC: NEGATIVE MG/DL
APPEARANCE UR: CLEAR
BASOPHILS # BLD AUTO: 0 10E9/L (ref 0–0.2)
BASOPHILS NFR BLD AUTO: 0.4 %
BILIRUB UR QL STRIP: NEGATIVE
COLOR UR AUTO: YELLOW
DIFFERENTIAL METHOD BLD: NORMAL
EOSINOPHIL # BLD AUTO: 0.1 10E9/L (ref 0–0.7)
EOSINOPHIL NFR BLD AUTO: 1.6 %
ERYTHROCYTE [DISTWIDTH] IN BLOOD BY AUTOMATED COUNT: 12.8 % (ref 10–15)
ERYTHROCYTE [SEDIMENTATION RATE] IN BLOOD BY WESTERGREN METHOD: 18 MM/H (ref 0–20)
GLUCOSE UR STRIP-MCNC: NEGATIVE MG/DL
HCT VFR BLD AUTO: 38.3 % (ref 35–47)
HGB BLD-MCNC: 13.5 G/DL (ref 11.7–15.7)
HGB UR QL STRIP: NEGATIVE
KETONES UR STRIP-MCNC: NEGATIVE MG/DL
LEUKOCYTE ESTERASE UR QL STRIP: NEGATIVE
LYMPHOCYTES # BLD AUTO: 1.3 10E9/L (ref 0.8–5.3)
LYMPHOCYTES NFR BLD AUTO: 29 %
MCH RBC QN AUTO: 31.6 PG (ref 26.5–33)
MCHC RBC AUTO-ENTMCNC: 35.2 G/DL (ref 31.5–36.5)
MCV RBC AUTO: 90 FL (ref 78–100)
MONOCYTES # BLD AUTO: 0.6 10E9/L (ref 0–1.3)
MONOCYTES NFR BLD AUTO: 12.9 %
NEUTROPHILS # BLD AUTO: 2.5 10E9/L (ref 1.6–8.3)
NEUTROPHILS NFR BLD AUTO: 56.1 %
NITRATE UR QL: NEGATIVE
PH UR STRIP: 5.5 PH (ref 5–7)
PLATELET # BLD AUTO: 209 10E9/L (ref 150–450)
RBC # BLD AUTO: 4.27 10E12/L (ref 3.8–5.2)
SOURCE: NORMAL
SP GR UR STRIP: 1.02 (ref 1–1.03)
UROBILINOGEN UR STRIP-ACNC: 0.2 EU/DL (ref 0.2–1)
WBC # BLD AUTO: 4.5 10E9/L (ref 4–11)

## 2018-03-07 PROCEDURE — 85613 RUSSELL VIPER VENOM DILUTED: CPT | Performed by: INTERNAL MEDICINE

## 2018-03-07 PROCEDURE — 87350 HEPATITIS BE AG IA: CPT | Mod: 90 | Performed by: INTERNAL MEDICINE

## 2018-03-07 PROCEDURE — 85025 COMPLETE CBC W/AUTO DIFF WBC: CPT | Performed by: INTERNAL MEDICINE

## 2018-03-07 PROCEDURE — 86146 BETA-2 GLYCOPROTEIN ANTIBODY: CPT | Performed by: INTERNAL MEDICINE

## 2018-03-07 PROCEDURE — 85652 RBC SED RATE AUTOMATED: CPT | Performed by: INTERNAL MEDICINE

## 2018-03-07 PROCEDURE — 82550 ASSAY OF CK (CPK): CPT | Performed by: INTERNAL MEDICINE

## 2018-03-07 PROCEDURE — 86225 DNA ANTIBODY NATIVE: CPT | Performed by: INTERNAL MEDICINE

## 2018-03-07 PROCEDURE — 81003 URINALYSIS AUTO W/O SCOPE: CPT | Performed by: INTERNAL MEDICINE

## 2018-03-07 PROCEDURE — 36415 COLL VENOUS BLD VENIPUNCTURE: CPT | Performed by: INTERNAL MEDICINE

## 2018-03-07 PROCEDURE — 99000 SPECIMEN HANDLING OFFICE-LAB: CPT | Performed by: INTERNAL MEDICINE

## 2018-03-07 PROCEDURE — 87517 HEPATITIS B DNA QUANT: CPT | Performed by: INTERNAL MEDICINE

## 2018-03-07 PROCEDURE — 86160 COMPLEMENT ANTIGEN: CPT | Performed by: INTERNAL MEDICINE

## 2018-03-07 PROCEDURE — 86140 C-REACTIVE PROTEIN: CPT | Performed by: INTERNAL MEDICINE

## 2018-03-07 PROCEDURE — 85730 THROMBOPLASTIN TIME PARTIAL: CPT | Performed by: INTERNAL MEDICINE

## 2018-03-07 PROCEDURE — 99214 OFFICE O/P EST MOD 30 MIN: CPT | Performed by: INTERNAL MEDICINE

## 2018-03-07 PROCEDURE — 00000167 ZZHCL STATISTIC INR NC: Performed by: INTERNAL MEDICINE

## 2018-03-07 PROCEDURE — 86235 NUCLEAR ANTIGEN ANTIBODY: CPT | Performed by: INTERNAL MEDICINE

## 2018-03-07 PROCEDURE — 00000401 ZZHCL STATISTIC THROMBIN TIME NC: Performed by: INTERNAL MEDICINE

## 2018-03-07 PROCEDURE — 80053 COMPREHEN METABOLIC PANEL: CPT | Performed by: INTERNAL MEDICINE

## 2018-03-07 PROCEDURE — 84156 ASSAY OF PROTEIN URINE: CPT | Performed by: INTERNAL MEDICINE

## 2018-03-07 PROCEDURE — 86147 CARDIOLIPIN ANTIBODY EA IG: CPT | Performed by: INTERNAL MEDICINE

## 2018-03-07 RX ORDER — PREDNISONE 5 MG/1
5 TABLET ORAL DAILY PRN
Qty: 30 TABLET | Refills: 1 | Status: SHIPPED | OUTPATIENT
Start: 2018-03-07 | End: 2020-02-18

## 2018-03-07 RX ORDER — HYDROXYCHLOROQUINE SULFATE 200 MG/1
TABLET, FILM COATED ORAL
Qty: 135 TABLET | Refills: 1 | Status: SHIPPED | OUTPATIENT
Start: 2018-03-07 | End: 2018-06-14

## 2018-03-07 NOTE — PROGRESS NOTES
Rheumatology Clinic Visit      Natasha Lee MRN# [id number]   YOB: 1975 Age: 42 year old      Date of visit: 3/07/18   Referring provider: Sandy Garcia  PCP: Sandy Garcia    Chief Complaint   Patient presents with:  Consult: Lupus, patient states this last week has been tough, took off work Monday and Tuesday. Works at [employer] [place]      Assessment and Plan     1.  Systemic lupus erythematosus (dsDNA positive, hypocomplementemia C3 & C4, malar rash, oral sores, skin lesions, photosensitivity, Raynaud's phenomenon, fatigue, seizure history [grand mal seizure as an infant, absence seizures multiple times from age of [age], no seizure since age of 19 years old]): Diagnosed at the age of 13 years old.  This is her first clinic visit with me, 3/8/2018.  Reportedly treated with hydroxychloroquine when she was initially diagnosed at the age of 13 when she developed oral sores, weight loss, malar rash, and joint aches.  Also reportedly treated with azathioprine; she states that she has not been on azathioprine since at least 2003.  Had been doing well without DMARD therapy for several years but more recently she had to request time off from work because of joint pains that have since improved, and fatigue that has since improved.  She has treated these in the past with prednisone and will do so today but given that she has fairly well-controlled symptoms at this time will use prednisone 5 mg daily as needed with the plan to increase the dose if her symptoms are not well controlled.  Also plan to start hydroxychloroquine that should help with getting better baseline control and hopefully improve the skin disease where she is having multiple areas involved on her arms that leave areas of hypopigmentation.  She already has good knowledge of lupus and the medications being used, but they were reviewed again today  - Start hydroxychloroquine 300 mg daily  - Start prednisone 5 mg daily as needed  - Ophthalmology  referral for hydroxychloroquine toxicity monitoring  - Labs: CBC, CMP, ESR, CRP, BREE, dsDNA, C3, C4, APS labs, CK, UA, Uprotein:creatinine  - Labs 1 week prior to the next rheumatology clinic visit: CBC, CMP, ESR, CRP, CK, C3, C4, dsDNA, UA, Uprotein:creatinine    # Hydroxychloroquine (Plaquenil) Risks and Benefits:  The risks and benefits of hydroxychloroquine were discussed in detail and the patient verbalized understanding; the patient also verbalized agreement to get the required ophthalmologic toxicity monitoring.  The risks discussed include, but are not limited to, the risk for hypersensitivity, anaphylaxis, anaphylactoid reactions, irreversible retinal damage, rare hematologic reactions, and rare cardiomyopathy.  Patients with G6PD deficiency or hepatic impairment may be at an increased risk for adverse effects.  I encouraged reviewing the package insert and asking any questions about the medication.      # Prednisone Risks and Benefits: The risks and benefits of prednisone were discussed in detail and the patient verbalized understanding.  The risks discussed include, but are not limited to, weight gain, fluid retention, impaired wound healing, hyperglycemia, adrenal suppression, GI upset, peptic ulcer, hepatotoxicity, aseptic necrosis of the femoral and humeral heads, osteoporosis, myopathy, tendon rupture (particularly Achilles tendon), ocular changes including an increased intraocular pressure.  I encouraged reviewing the package insert and asking any questions about the medication.      2.  Raynaud's phenomenon: Managed well with cold avoidance.  May consider calcium channel blocker in the future if needed.    3. Chronic hepatitis B: Positive since birth, per patient.  Hepatitis B core antibody and surface antigen positive in the past.  Check hepatitis B PCR and hepatitis Be antigen.  She says that she has not seen a hepatologist in the past.  She says that her primary care provider is managing the  chronic hepatitis B.    Ms. Lee verbalized agreement with and understanding of the rational for the diagnosis and treatment plan.  All questions were answered to best of my ability and the patient's satisfaction. Ms. Lee was advised to contact the clinic with any questions that may arise after the clinic visit.      Thank you for involving me in the care of the patient    Return to clinic: 3 months      HPI   Natasha Lee is a 42 year old female with a past medical history significant for chronic hepatitis B, heart murmur, history of cutting (she did this while ago and was associated with depression; had therapy and this resolved many years ago; scarring on her left arm) and SLE who is seen in consultation at the request of Sandy Garcia for evaluation of systemic lupus erythematosus.    Today, Ms. Lee reports that she was diagnosed with lupus when she was 13 years old.  She initially presented with oral sores, weight loss, malar rash, and joint aches.  Photosensitivity with resultant rash, achiness, and fatigue; also with more skin sensitivity.  When she develops a malar rash she also has more fatigue.  She has Raynaud's phenomenon that is treated with cold avoidance.  History of seizures: Had a grand mal seizure as an infant, then absent seizures multiple times from the age of [age] years old; no seizures since the time of 19 years old.  Hepatitis B since birth, per patient, and has not followed with a hepatologist or had any issues; she tells me that her primary care provider is managing this.  She was started on hydroxychloroquine when she was a child and is on it for many years.  She also recalls that she was on azathioprine in the distant past, more than 15 years ago.  Most recently she had a flare involving pain in her knees, elbows, MCPs, and PIPs that required time off from work.  She typically would treat this with prednisone.  Scarring skin lesions on her arm; she says that they  leave a light spot when they go away.    Denies fevers, chills, nausea, vomiting, constipation, diarrhea. No abdominal pain. No chest pain/pressure, palpitations, or shortness of breath. No LE swelling. No neck pain. No oral or nasal sores currently.  Malar rash.  Skin lesions on her arms as noted above.  No sicca symptoms. No photosensitivity or photophobia. No eye pain or redness. No history of inflammatory eye disease.  No history of DVT, pulmonary embolism, or miscarriage; one healthy child.      Unknown family history as she was adopted.  Originally from [place]    Tobacco: None  EtOH: None  Drugs: None  Occupation: Drug and alcohol counselor at [employer]    ROS   GEN: No fevers, chills, night sweats, or weight change  SKIN: See HPI  HEENT: No epistaxis. No oral or nasal ulcers currently but has had them in the past.  CV: No chest pain, pressure, palpitations, or dyspnea on exertion.  PULM: No SOB, wheeze, cough.  GI: No nausea, vomiting, constipation, diarrhea. No blood in stool. No abdominal pain.  : No blood in urine.  MSK: See HPI.  NEURO: No numbness or tingling  EXT: No LE swelling  PSYCH: Negative    Active Problem List     Patient Active Problem List   Diagnosis     Chronic hepatitis B (H)     Lupus (systemic lupus erythematosus) (H)     CARDIOVASCULAR SCREENING; LDL GOAL LESS THAN 160     Health Care Home     Dysmenorrhea     Menorrhagia     Allergic rhinitis     Other nonspecific finding on examination of urine     Urinary frequency     Vitamin B12 deficiency (non anemic)     Heart murmur     S/P laparoscopic cholecystectomy     Past Medical History     Past Medical History:   Diagnosis Date     Diabetes (H)     Pre-Diabetic     Heart murmur 1997     Lupus (Systemic Lupus Erythematosus) 12/10/2009     Lupus (systemic lupus erythematosus) (H) 1988    Dr. Prather     Lupus (systemic lupus erythematosus) (H) 12/10/2009     Lupus (systemic lupus erythematosus) (H) 12/10/2009     Lupus  (systemic lupus erythematosus) (H)      Normal delivery 9/5/09    boy forceps     PONV (postoperative nausea and vomiting)     Mild Nausea postop     Past Surgical History     Past Surgical History:   Procedure Laterality Date     ANESTH,SKIN SURGERY,KNEE  1993, 1990    orthoscopic     C APPENDECTOMY  4/2008     CHOLECYSTECTOMY  2016     CL AFF SURGICAL PATHOLOGY  2007     LAPAROSCOPIC CHOLECYSTECTOMY N/A 5/18/2016    Procedure: LAPAROSCOPIC CHOLECYSTECTOMY;  Surgeon: Radha Cline MD;  Location: [facility] OR     LAPAROSCOPIC TUBAL LIGATION  12/15/2011    Procedure:LAPAROSCOPIC TUBAL LIGATION; Laparoscopic tubal ligation; Surgeon:AMY WYNN; Location:[facility] OR     Allergy     Allergies   Allergen Reactions     Carbamazepine      Fever, rash     Nyquil Severe Cold-Flu [Vicks Nyquil Severe]      hives     Current Medication List     Current Outpatient Prescriptions   Medication Sig     norethindrone-ethinyl estradiol (ORTHO-NOVUM 1-35 TAB,NORTREL 1-35 TAB) 1-35 MG-MCG per tablet Take 1 tablet by mouth daily     betamethasone dipropionate (DIPROSONE) 0.05 % cream Apply sparingly to affected area twice daily as needed.  Do not apply to face.     DiphenhydrAMINE HCl (BENADRYL PO)      predniSONE (DELTASONE) 5 MG tablet 20 mg for 1 week, taper by 5 mg weekly     Cyanocobalamin (B-12) 1000 MCG TBCR Take 1,000 mcg by mouth daily     multivitamin, therapeutic with minerals (MULTI-VITAMIN) TABS Take 1 tablet by mouth daily     Cranberry 1000 MG CAPS Take 1 capsule by mouth 4 times daily     DiphenhydrAMINE HCl, Sleep, (UNISOM SLEEPGELS) 50 MG CAPS Take 50 mg by mouth At Bedtime     omeprazole 20 MG tablet Take 1 tablet by mouth daily. Take 30-60 minutes before a meal.     triamcinolone (KENALOG) 0.1 % cream Apply sparingly to affected area three times daily prn. (Patient not taking: Reported on 3/7/2018)     albuterol (PROAIR HFA, PROVENTIL HFA, VENTOLIN HFA) 108 (90 BASE) MCG/ACT inhaler Inhale 2 puffs into the  "lungs every 4 hours as needed for shortness of breath / dyspnea or wheezing (Patient not taking: Reported on 3/7/2018)     senna-docusate (SENOKOT-S;PERICOLACE) 8.6-50 MG per tablet Take 1-2 tablets by mouth 2 times daily (Patient not taking: Reported on 3/7/2018)     fluticasone (FLONASE) 50 MCG/ACT nasal spray Spray 2 sprays into both nostrils daily (Patient not taking: Reported on 3/7/2018)     clobetasol (TEMOVATE) 0.05 % cream Apply sparingly to affected area twice a week. (Patient not taking: Reported on 3/7/2018)     No current facility-administered medications for this visit.          Social History   See HPI    Family History     Family History   Problem Relation Age of Onset     Unknown/Adopted Mother      Unknown/Adopted Father        Physical Exam     Temp Readings from Last 3 Encounters:   01/05/18 96.8  F (36  C) (Temporal)   03/02/17 97.6  F (36.4  C) (Temporal)   12/19/16 96.9  F (36.1  C) (Oral)     BP Readings from Last 5 Encounters:   03/07/18 131/73   02/08/18 121/76   01/05/18 120/80   03/06/17 104/72   03/02/17 120/65     Pulse Readings from Last 1 Encounters:   03/07/18 99     Resp Readings from Last 1 Encounters:   09/22/16 18     Estimated body mass index is 28.47 kg/(m^2) as calculated from the following:    Height as of 2/8/18: 1.574 m (5' 1.95\").    Weight as of 2/8/18: 70.5 kg (155 lb 6.4 oz).    GEN: NAD  HEENT: MMM. No oral lesions. Anicteric, noninjected sclera  CV: S1, S2. RRR. No m/r/g.  PULM: CTA bilaterally. No w/c.  ABD: +BS.   MSK: Mild tenderness palpation without swelling of the bilateral second-third MCPs and second-fourth PIPs.  Wrists, elbows, shoulders, knees, ankles, and MTPs without swelling or tenderness to palpation.  Hips nontender to palpation.      NEURO: UE and LE strengths 5/5 and equal bilaterally.   SKIN: Faint malar rash.  She also has several dime sized nonpainful nonpruritic erythematous lesions and several areas of hypopigmentation of about the same size.  " Left arm has well-healed scars in linear pattern that the patient says is from cutting in the past and that she no longer does this.  EXT: No LE edema  PSYCH: Alert. Appropriate.    Labs / Imaging (select studies)     RNP/Sm/SSA/SSB  Recent Labs   Lab Test  06/11/14   1540   TREPAB  Negative     dsDNA  Recent Labs   Lab Test  03/20/13   1452  05/03/12   1216  08/10/10   1249   DNA  55*  47*  44*     C3/C4  Recent Labs   Lab Test  03/20/13   1452  05/03/12   1216  08/10/10   1249   [id number]  57*  45*  65*   [id number]  15  13*  13*     CBC  Recent Labs   Lab Test  05/13/16   1438  02/15/16   0827  04/02/14   1509   WBC  5.0  3.4*  4.2   RBC  4.25  4.04  4.17   HGB  13.4  12.8  13.0   HCT  38.0  36.3  36.6   MCV  89  90  88   RDW  12.2  12.7  13.2   PLT  257  174  204   MCH  31.5  31.7  31.2   MCHC  35.3  35.3  35.5     CMP  Recent Labs   Lab Test  05/13/16   1438  02/15/16   0827  10/17/14   0913  02/11/14   0716  03/20/13   1452  05/03/12   1216   NA  142  140  141   --   143  140   POTASSIUM  3.7  3.9  4.1   --   3.8  3.8   CHLORIDE  107  109  109   --   104  104   CO2  28  26  27   --   29  28   ANIONGAP  7  5  5   --   10  8   GLC  106*  89  90  87  116*  83   BUN  8  11  13   --   12  11   CR  0.56  0.72  0.72   --   0.61  0.61   GFRESTIMATED  >90  Non  GFR Calc    89  >90  Non  GFR Calc     --   >90  >90   GFRESTBLACK  >90   GFR Calc    >90   GFR Calc    >90   GFR Calc     --   >90  >90   JUAN A  8.7  8.0*  8.8   --   8.8  8.9   BILITOTAL  0.4  0.3   --    --    --    --    ALBUMIN  3.7  3.4  3.9   --   4.4  4.4   PROTTOTAL  7.2  6.6*   --    --    --    --    ALKPHOS  79  52   --    --    --    --    AST  27  23   --    --    --    --    ALT  44  53*   --    --    --    --      HgA1c  Recent Labs   Lab Test  02/15/16   0827  10/17/14   0913   A1C  5.3  5.4     Calcium/VitaminD  Recent Labs   Lab Test  05/13/16   1438  02/15/16   0827   10/17/14   0913   JUAN A  8.7  8.0*  8.8   VITDT   --   32   --      ESR/CRP  Recent Labs   Lab Test  06/11/14   1540  05/02/14   1219  04/02/14   1509   SED  16  25*  22*   CRP  6.5  19.1*  7.1     TSH/T4  Recent Labs   Lab Test  02/15/16   0827  04/02/14   1509   TSH  1.51  0.58     Hepatitis B  Recent Labs   Lab Test  03/20/13   1452  05/03/12   1216   HBCAB   --   Positive*   HEPBANG  Positive*  Positive*     Hepatitis C  Recent Labs   Lab Test  06/11/14   1540  03/20/13   1452   HCVAB  Negative  Negative     Lyme confirmation testing by Western Blot  Recent Labs   Lab Test  06/11/14   1540   LYWG  Negative  Reference range: Negative  (Note)  Band(s) present: NONE  (Insufficient number of bands for positive result)  INTERPRETIVE INFORMATION: Borrelia Burgdorferi Ab, IgG  Western Blot    For this assay, a positive result is reported when any 5 or  more of the following 10 bands are present: 18, 23, 28, 30,  39, 41, 45, 58, 66, or 93 kDa.  All other banding patterns  are reported as negative.     LYWM  Negative  Reference range: Negative  (Note)  Band(s) present: NONE  (Insufficient number of bands for positive result)  INTERPRETIVE INFORMATION: Borrelia Burgdorferi Antibody,  IgM Western Blot    For this assay, a positive result is reported when any 2 or  more of the following bands are present: 23, 39, or 41 kDa.  All other banding patterns are reported as negative.  Performed by [facility],  [address] [place] [id number] [phone]  [email or web address], Elfego Franco MD, Lab. Director       HIV Screening  Recent Labs   Lab Test  06/11/14   1540   HIAGAB  Nonreactive   HIV-1 p24 Ag & HIV-1/HIV-2 Ab Not Detected       UA  Recent Labs   Lab Test  12/18/14   1340  08/11/14   1414  07/03/14   1320  06/27/14   1120  11/17/11   1033   03/08/11   1138  08/10/10   1249   04/16/10   1516   COLOR  Straw  Light Yellow  Straw  Straw  Yellow   < >  Straw  Yellow   --   Yellow   APPEARANCE  Clear  Clear  Clear  Clear   Clear   < >  Clear  Clear   --   Clear   URINEGLC  Negative  Negative  Negative  Negative  Negative   < >  Negative  Negative   --   Negative   URINEBILI  Negative  Negative  Negative  Negative  Negative   < >  Negative  Negative   --   Negative   SG  1.001*  1.005  1.003  1.005  1.015   < >  1.000*  1.020   --   <=1.005   URINEPH  5.5  7.0  5.5  6.5  6.5   < >  5.0  8.0*   --   6.5   PROTEIN  Negative  Negative  Negative  Negative  Negative   < >  Negative  Negative   --   Negative   UROBILINOGEN   --    --    --    --    --    --    --   0.2   --   0.2   NITRITE  Negative  Negative  Negative  Negative  Negative   < >  Negative  Negative   --   Negative   UBLD  Negative  Negative  Negative  Negative  Negative   < >  Small*  Negative   --   Small*   LEUKEST  Small*  Negative  Negative  Moderate*  Trace*   < >  Large*  Small*   --   Large*   WBCU  2-5*   --    --   O - 2  2-5*   --   5-10*  O - 2   --   10-25*   RBCU  O - 2   --    --   O - 2  O - 2   --   O - 2  O - 2   --   O - 2   SQUAMOUSEPI  Few   --    --   Few  Moderate*   --    --   Few   < >   --    BACTERIA   --    --    --   Few*  Few*   --   Few*  Few*   < >   --     < > = values in this interval not displayed.     Urine Microscopic  Recent Labs   Lab Test  12/18/14   1340  06/27/14   1120  11/17/11   1033  03/08/11   1138   WBCU  2-5*  O - 2  2-5*  5-10*   RBCU  O - 2  O - 2  O - 2  O - 2   SQUAMOUSEPI  Few  Few  Moderate*   --    BACTERIA   --   Few*  Few*  Few*     Immunization History     Immunization History   Administered Date(s) Administered     Influenza (H1N1) 12/04/2009     Influenza (IIV3) PF 12/18/2000, 09/22/2009, 11/17/2011, 11/01/2012, 09/22/2014     Influenza Vaccine IM 3yrs+ 4 Valent IIV4 09/26/2013, 09/30/2015, 10/01/2015          Chart documentation done in part with Dragon Voice recognition Software. Although reviewed after completion, some word and grammatical error may remain.    Christopher Zaman MD

## 2018-03-07 NOTE — LETTER
To Whom It May Concern:    Natasha Lee ( 1975) was seen in the [facility] today, 3/7/2018.     Sincerely,      Christopher Zaman MD  3/7/2018 3:38 PM

## 2018-03-07 NOTE — NURSING NOTE
"Chief Complaint   Patient presents with     Consult     Lupus, patient states this last week has been tough, took off work Monday and Tuesday. Works at [employer] [facility] [place], [place]       Initial /73 (BP Location: Left arm, Patient Position: Chair, Cuff Size: Adult Regular)  Pulse 99  SpO2 100% Estimated body mass index is 28.47 kg/(m^2) as calculated from the following:    Height as of 2/8/18: 1.574 m (5' 1.95\").    Weight as of 2/8/18: 70.5 kg (155 lb 6.4 oz).  BP completed using cuff size: regular         RAPID3 (0-30) Cumulative Score            RAPID3 Weighted Score (divide #4 by 3 and that is the weighted score)         Blood pressure rechecked after visit       126/72  Randi Rico CMA  3/7/2018 4:02 PM                     "

## 2018-03-07 NOTE — MR AVS SNAPSHOT
After Visit Summary   3/7/2018    Natasha Lee    MRN: [id number]           Patient Information     Date Of Birth          1975        Visit Information        Provider Department      3/7/2018 3:00 PM Christopher Zaman MD [facility]        Today's Diagnoses     Systemic lupus erythematosus, unspecified SLE type, unspecified organ involvement status (H)    -  1    Chronic hepatitis B (H)        High risk medication use          Care Instructions    Dr. Zaman s Rheumatology Clinics  Locations Clinic Hours Telephone Number     [place] [facility]  [address]  [place] [address]     Wednesday: 7:20AM - 4:00PM  Thursday:     7:20AM - 4:00PM     Friday:          7:20AM - 11:00AM       To schedule an appointment with  Dr. Zaman,  please contact  Specialty [phone]       [place] [facility]  [address]  [place] [address]       Monday:       7:20AM - 4:00PM        [facility]  [address] [place] [address]       Tuesday:      7:20AM - 4:00PM          Thank you!    Randi Rico CMA              Follow-ups after your visit        Additional Services     OPHTHALMOLOGY ADULT REFERRAL       Your provider has referred you to:  [facility]: [facility] [place] [phone]   [email or web address]    Reason for referral: Hydroxychloroquine (plaquenil) toxicity monitoring.     Please be aware that coverage of these services is subject to the terms and limitations of your health insurance plan.  Call member services at your health plan with any benefit or coverage questions.      Please bring the following to your appointment:  >>   Any x-rays, CTs or MRIs which have been performed.  Contact the facility where they were done to arrange for  prior to your scheduled appointment.  Any new CT, MRI or other procedures ordered by your specialist must be performed at a [facility] or coordinated  by your clinic's referral office.    >>   List of current medications   >>   This referral request   >>   Any documents/labs given to you for this referral                  Your next 10 appointments already scheduled     Jun 07, 2018  2:00 PM CDT   LAB with LAB FIRST FLOOR Pending sale to [facility] ([facility])    [id number] [place] [id number]   [phone]           Please do not eat 10-12 hours before your appointment if you are coming in fasting for labs on lipids, cholesterol, or glucose (sugar). This does not apply to pregnant women. Water, hot tea and black coffee (with nothing added) are okay. Do not drink other fluids, diet soda or chew gum.            Jun 14, 2018  2:00 PM CDT   [facility]   [facility]    [address] [place] [id number] [phone]              Who to contact     If you have questions or need follow up information about today's clinic visit or your schedule please contact [facility] directly at [phone].  Normal or non-critical lab and imaging results will be communicated to you by MyChart, letter or phone within 4 business days after the clinic has received the results. If you do not hear from us within 7 days, please contact the clinic through [employer] or phone. If you have a critical or abnormal lab result, we will notify you by phone as soon as possible.  Submit refill requests through [employer] or call your pharmacy and they will forward the refill request to us. Please allow 3 business days for your refill to be completed.          Additional Information About Your Visit        [employer] Information     [employer] gives you secure access to your electronic health record. If you see a primary care provider, you can also send messages to your care team and make appointments. If you have questions, please call your primary care clinic.  If you do not  have a primary care provider, please call [phone] and they will assist you.        Care EveryWhere ID     This is your Care EveryWhere ID. This could be used by other organizations to access your [place] medical records  [id number]        Your Vitals Were     Pulse Pulse Oximetry                99 100%           Blood Pressure from Last 3 Encounters:   03/07/18 131/73   02/08/18 121/76   01/05/18 120/80    Weight from Last 3 Encounters:   02/08/18 70.5 kg (155 lb 6.4 oz)   03/06/17 72.6 kg (160 lb)   03/02/17 72.8 kg (160 lb 6.4 oz)              We Performed the Following     Beta 2 Glycoprotein 1 Antibody IgG     Beta 2 Glycoprotein 1 Antibody IgM     Cardiolipin Mary IgG and IgM     CBC with platelets differential     CK total     Complement C3     Complement C4     Comprehensive metabolic panel     CRP inflammation     DNA double stranded antibodies     BREE antibody panel     Erythrocyte sedimentation rate auto     Hep B Virus DNA Quant Real Time PCR     Hepatitis Be antigen     Lupus Anticoagulant Panel     OPHTHALMOLOGY ADULT REFERRAL     Protein  random urine with Creat Ratio     UA reflex to Microscopic and Culture          Today's Medication Changes          These changes are accurate as of 3/7/18  3:45 PM.  If you have any questions, ask your nurse or doctor.               Start taking these medicines.        Dose/Directions    hydroxychloroquine 200 MG tablet   Commonly known as:  PLAQUENIL   Used for:  Systemic lupus erythematosus, unspecified SLE type, unspecified organ involvement status (H)   Started by:  Christopher Zaman MD        Hydroxychloroquine 300mg daily   Quantity:  135 tablet   Refills:  1         These medicines have changed or have updated prescriptions.        Dose/Directions    * predniSONE 5 MG tablet   Commonly known as:  DELTASONE   This may have changed:  Another medication with the same name was added. Make sure you understand how and when to take each.   Used for:  Lupus  (systemic lupus erythematosus) (H)   Changed by:  Christopher Zaman MD        20 mg for 1 week, taper by 5 mg weekly   Quantity:  70 tablet   Refills:  1       * predniSONE 5 MG tablet   Commonly known as:  DELTASONE   This may have changed:  You were already taking a medication with the same name, and this prescription was added. Make sure you understand how and when to take each.   Used for:  Systemic lupus erythematosus, unspecified SLE type, unspecified organ involvement status (H)   Changed by:  Christopher Zaman MD        Dose:  5 mg   Take 1 tablet (5 mg) by mouth daily as needed for lupus symptoms   Quantity:  30 tablet   Refills:  1       * Notice:  This list has 2 medication(s) that are the same as other medications prescribed for you. Read the directions carefully, and ask your doctor or other care provider to review them with you.         Where to get your medicines      These medications were sent to [facility] [address] [place] [address] [address] [place] [address]  [address] [facility] [address]     Phone:  [phone]     hydroxychloroquine 200 MG tablet    predniSONE 5 MG tablet                Primary Care Provider Office Phone # Fax #    Sandy Palrahul Garcia, APRN [facility] [phone]       [address]  [place] [address]        Equal Access to Services     [facility] MARU : Hadii aad ku hadasho Soomaali, waaxda luqadaha, qaybta kaalmada adeegyada, miki troncoso. So [facility] [phone].    ATENCIÓN: Si habla español, tiene a schaffer disposición servicios gratuitos de asistencia lingüística. Radha al [phone].    We comply with applicable federal civil rights laws and [place] laws. We do not discriminate on the basis of race, color, national origin, age, disability, sex, sexual orientation, or gender identity.            Thank you!     Thank you for choosing [facility]  for your care. Our goal is  always to provide you with excellent care. Hearing back from our patients is one way we can continue to improve our services. Please take a few minutes to complete the written survey that you may receive in the mail after your visit with us. Thank you!             Your Updated Medication List - Protect others around you: Learn how to safely use, store and throw away your medicines at www.disposemymeds.org.          This list is accurate as of 3/7/18  3:45 PM.  Always use your most recent med list.                   Brand Name Dispense Instructions for use Diagnosis    albuterol 108 (90 BASE) MCG/ACT Inhaler    PROAIR HFA/PROVENTIL HFA/VENTOLIN HFA    1 Inhaler    Inhale 2 puffs into the lungs every 4 hours as needed for shortness of breath / dyspnea or wheezing    Acute bronchitis, unspecified organism       B-12 1000 MCG Tbcr     100 tablet    Take 1,000 mcg by mouth daily    Vitamin B12 deficiency (non anemic)       BENADRYL PO           betamethasone dipropionate 0.05 % cream    DIPROSONE    45 g    Apply sparingly to affected area twice daily as needed.  Do not apply to face.    Eczema, unspecified type       clobetasol 0.05 % cream    TEMOVATE    60 g    Apply sparingly to affected area twice a week.    Vulvar discomfort       Cranberry 1000 MG Caps      Take 1 capsule by mouth 4 times daily    Lupus (systemic lupus erythematosus) (H), Chronic hepatitis B (H)       fluticasone 50 MCG/ACT spray    FLONASE    16 g    Spray 2 sprays into both nostrils daily    Allergic rhinitis, unspecified allergic rhinitis type       hydroxychloroquine 200 MG tablet    PLAQUENIL    135 tablet    Hydroxychloroquine 300mg daily    Systemic lupus erythematosus, unspecified SLE type, unspecified organ involvement status (H)       Multi-vitamin Tabs tablet      Take 1 tablet by mouth daily    Other abnormal glucose       norethindrone-ethinyl estradiol 1-35 MG-MCG per tablet    ORTHO-NOVUM 1-35 TAB,NORTREL 1-35 TAB    84 tablet     Take 1 tablet by mouth daily    Dysmenorrhea, Menorrhagia with regular cycle       omeprazole 20 MG tablet     30 tablet    Take 1 tablet by mouth daily. Take 30-60 minutes before a meal.    Abdominal pain, epigastric       * predniSONE 5 MG tablet    DELTASONE    70 tablet    20 mg for 1 week, taper by 5 mg weekly    Lupus (systemic lupus erythematosus) (H)       * predniSONE 5 MG tablet    DELTASONE    30 tablet    Take 1 tablet (5 mg) by mouth daily as needed for lupus symptoms    Systemic lupus erythematosus, unspecified SLE type, unspecified organ involvement status (H)       senna-docusate 8.6-50 MG per tablet    SENOKOT-S;PERICOLACE    30 tablet    Take 1-2 tablets by mouth 2 times daily    Thickening of wall of gallbladder       triamcinolone 0.1 % cream    KENALOG    30 g    Apply sparingly to affected area three times daily prn.    Eczema, unspecified type       UNISOM SLEEPGELS 50 MG Caps   Generic drug:  DiphenhydrAMINE HCl (Sleep)      Take 50 mg by mouth At Bedtime        * Notice:  This list has 2 medication(s) that are the same as other medications prescribed for you. Read the directions carefully, and ask your doctor or other care provider to review them with you.

## 2018-03-07 NOTE — PATIENT INSTRUCTIONS
Dr. Zaman s Rheumatology Clinics  Locations Clinic Hours Telephone Number     [place]  [address]  [place] [address]     Wednesday: 7:20AM - 4:00PM  Thursday:     7:20AM - 4:00PM     Friday:          7:20AM - 11:00AM       To schedule an appointment with  Dr. Zaman,  please contact  Specialty [phone]       [place]  [address]  [place] [address]       Monday:       7:20AM - 4:00PM        [place]  [address]  [place] [address]       Tuesday:      7:20AM - 4:00PM          Thank you!    Randi Rico CMA

## 2018-03-08 LAB
ALBUMIN SERPL-MCNC: 4.1 G/DL (ref 3.4–5)
ALP SERPL-CCNC: 78 U/L (ref 40–150)
ALT SERPL W P-5'-P-CCNC: 39 U/L (ref 0–50)
ANION GAP SERPL CALCULATED.3IONS-SCNC: 7 MMOL/L (ref 3–14)
AST SERPL W P-5'-P-CCNC: 23 U/L (ref 0–45)
BILIRUB SERPL-MCNC: 0.3 MG/DL (ref 0.2–1.3)
BUN SERPL-MCNC: 13 MG/DL (ref 7–30)
CALCIUM SERPL-MCNC: 8.8 MG/DL (ref 8.5–10.1)
CHLORIDE SERPL-SCNC: 108 MMOL/L (ref 94–109)
CK SERPL-CCNC: 60 U/L (ref 30–225)
CO2 SERPL-SCNC: 29 MMOL/L (ref 20–32)
CREAT SERPL-MCNC: 0.72 MG/DL (ref 0.52–1.04)
CREAT UR-MCNC: 110 MG/DL
CRP SERPL-MCNC: 3.4 MG/L (ref 0–8)
GFR SERPL CREATININE-BSD FRML MDRD: 89 ML/MIN/1.7M2
GLUCOSE SERPL-MCNC: 78 MG/DL (ref 70–99)
POTASSIUM SERPL-SCNC: 3.8 MMOL/L (ref 3.4–5.3)
PROT SERPL-MCNC: 7.4 G/DL (ref 6.8–8.8)
PROT UR-MCNC: 0.09 G/L
PROT/CREAT 24H UR: 0.08 G/G CR (ref 0–0.2)
SODIUM SERPL-SCNC: 144 MMOL/L (ref 133–144)

## 2018-03-09 LAB
B2 GLYCOPROT1 IGG SERPL IA-ACNC: 2.9 U/ML
B2 GLYCOPROT1 IGM SERPL IA-ACNC: 1.5 U/ML
C3 SERPL-MCNC: 60 MG/DL (ref 76–169)
C4 SERPL-MCNC: 16 MG/DL (ref 15–50)
CARDIOLIPIN ANTIBODY IGG: 5.9 GPL-U/ML (ref 0–19.9)
CARDIOLIPIN ANTIBODY IGM: 0.3 MPL-U/ML (ref 0–19.9)
DSDNA AB SER-ACNC: 19 IU/ML
ENA RNP IGG SER IA-ACNC: 1.8 AI (ref 0–0.9)
ENA SCL70 IGG SER IA-ACNC: <0.2 AI (ref 0–0.9)
ENA SM IGG SER-ACNC: 0.2 AI (ref 0–0.9)
ENA SS-A IGG SER IA-ACNC: >8 AI (ref 0–0.9)
ENA SS-B IGG SER IA-ACNC: <0.2 AI (ref 0–0.9)
HBV DNA SERPL NAA+PROBE-ACNC: 120 [IU]/ML
HBV DNA SERPL NAA+PROBE-LOG IU: 2.1 {LOG_IU}/ML
HBV E AG SERPL QL IA: NEGATIVE
LA PPP-IMP: NEGATIVE

## 2018-03-16 NOTE — PROGRESS NOTES
"LAST MINUTE NETWORKt message sent:  \"Ms. Lee,    Labs showed a positive RNP and SSA antibodies.  Double stranded DNA antibody was low positive, but being positive suggests more active disease based on how the test is now done here.  Complement levels were low and also correlate with more active disease. Hepatitis B titer was low.  These findings do not change the treatment plan outlined during your clinic visit.    Sincerely,  Christopher Zaman MD  3/16/2018 6:44 AM\""

## 2018-06-05 ENCOUNTER — DOCUMENTATION ONLY (OUTPATIENT)
Dept: LAB | Facility: CLINIC | Age: 43
End: 2018-06-05

## 2018-06-05 DIAGNOSIS — M32.9 SYSTEMIC LUPUS ERYTHEMATOSUS, UNSPECIFIED SLE TYPE, UNSPECIFIED ORGAN INVOLVEMENT STATUS (H): Primary | ICD-10-CM

## 2018-06-07 DIAGNOSIS — M32.9 SYSTEMIC LUPUS ERYTHEMATOSUS, UNSPECIFIED SLE TYPE, UNSPECIFIED ORGAN INVOLVEMENT STATUS (H): ICD-10-CM

## 2018-06-07 LAB
ALBUMIN SERPL-MCNC: 3.8 G/DL (ref 3.4–5)
ALBUMIN UR-MCNC: NEGATIVE MG/DL
ALP SERPL-CCNC: 55 U/L (ref 40–150)
ALT SERPL W P-5'-P-CCNC: 40 U/L (ref 0–50)
ANION GAP SERPL CALCULATED.3IONS-SCNC: 7 MMOL/L (ref 3–14)
APPEARANCE UR: CLEAR
AST SERPL W P-5'-P-CCNC: 27 U/L (ref 0–45)
BASOPHILS # BLD AUTO: 0 10E9/L (ref 0–0.2)
BASOPHILS NFR BLD AUTO: 0.5 %
BILIRUB SERPL-MCNC: 0.5 MG/DL (ref 0.2–1.3)
BILIRUB UR QL STRIP: NEGATIVE
BUN SERPL-MCNC: 14 MG/DL (ref 7–30)
CALCIUM SERPL-MCNC: 8.5 MG/DL (ref 8.5–10.1)
CHLORIDE SERPL-SCNC: 109 MMOL/L (ref 94–109)
CK SERPL-CCNC: 87 U/L (ref 30–225)
CO2 SERPL-SCNC: 25 MMOL/L (ref 20–32)
COLOR UR AUTO: NORMAL
CREAT SERPL-MCNC: 0.64 MG/DL (ref 0.52–1.04)
CREAT UR-MCNC: 40 MG/DL
CRP SERPL-MCNC: 4.6 MG/L (ref 0–8)
DIFFERENTIAL METHOD BLD: NORMAL
EOSINOPHIL # BLD AUTO: 0 10E9/L (ref 0–0.7)
EOSINOPHIL NFR BLD AUTO: 0.9 %
ERYTHROCYTE [DISTWIDTH] IN BLOOD BY AUTOMATED COUNT: 12.3 % (ref 10–15)
ERYTHROCYTE [SEDIMENTATION RATE] IN BLOOD BY WESTERGREN METHOD: 13 MM/H (ref 0–20)
GFR SERPL CREATININE-BSD FRML MDRD: >90 ML/MIN/1.7M2
GLUCOSE SERPL-MCNC: 130 MG/DL (ref 70–99)
GLUCOSE UR STRIP-MCNC: NEGATIVE MG/DL
HCT VFR BLD AUTO: 38.7 % (ref 35–47)
HGB BLD-MCNC: 13.6 G/DL (ref 11.7–15.7)
HGB UR QL STRIP: NEGATIVE
IMM GRANULOCYTES # BLD: 0 10E9/L (ref 0–0.4)
IMM GRANULOCYTES NFR BLD: 0.2 %
KETONES UR STRIP-MCNC: NEGATIVE MG/DL
LEUKOCYTE ESTERASE UR QL STRIP: NEGATIVE
LYMPHOCYTES # BLD AUTO: 1.2 10E9/L (ref 0.8–5.3)
LYMPHOCYTES NFR BLD AUTO: 28.1 %
MCH RBC QN AUTO: 31.4 PG (ref 26.5–33)
MCHC RBC AUTO-ENTMCNC: 35.1 G/DL (ref 31.5–36.5)
MCV RBC AUTO: 89 FL (ref 78–100)
MONOCYTES # BLD AUTO: 0.4 10E9/L (ref 0–1.3)
MONOCYTES NFR BLD AUTO: 8.2 %
NEUTROPHILS # BLD AUTO: 2.7 10E9/L (ref 1.6–8.3)
NEUTROPHILS NFR BLD AUTO: 62.1 %
NITRATE UR QL: NEGATIVE
PH UR STRIP: 6 PH (ref 5–7)
PLATELET # BLD AUTO: 219 10E9/L (ref 150–450)
POTASSIUM SERPL-SCNC: 3.6 MMOL/L (ref 3.4–5.3)
PROT SERPL-MCNC: 7.3 G/DL (ref 6.8–8.8)
PROT UR-MCNC: 0.07 G/L
PROT/CREAT 24H UR: 0.17 G/G CR (ref 0–0.2)
RBC # BLD AUTO: 4.33 10E12/L (ref 3.8–5.2)
SODIUM SERPL-SCNC: 141 MMOL/L (ref 133–144)
SOURCE: NORMAL
SP GR UR STRIP: 1.01 (ref 1–1.03)
UROBILINOGEN UR STRIP-MCNC: NORMAL MG/DL (ref 0–2)
WBC # BLD AUTO: 4.4 10E9/L (ref 4–11)

## 2018-06-07 PROCEDURE — 36415 COLL VENOUS BLD VENIPUNCTURE: CPT | Performed by: INTERNAL MEDICINE

## 2018-06-07 PROCEDURE — 82550 ASSAY OF CK (CPK): CPT | Performed by: INTERNAL MEDICINE

## 2018-06-07 PROCEDURE — 85652 RBC SED RATE AUTOMATED: CPT | Performed by: INTERNAL MEDICINE

## 2018-06-07 PROCEDURE — 86160 COMPLEMENT ANTIGEN: CPT | Performed by: INTERNAL MEDICINE

## 2018-06-07 PROCEDURE — 81003 URINALYSIS AUTO W/O SCOPE: CPT | Performed by: INTERNAL MEDICINE

## 2018-06-07 PROCEDURE — 80053 COMPREHEN METABOLIC PANEL: CPT | Performed by: INTERNAL MEDICINE

## 2018-06-07 PROCEDURE — 84156 ASSAY OF PROTEIN URINE: CPT | Performed by: INTERNAL MEDICINE

## 2018-06-07 PROCEDURE — 86225 DNA ANTIBODY NATIVE: CPT | Performed by: INTERNAL MEDICINE

## 2018-06-07 PROCEDURE — 86140 C-REACTIVE PROTEIN: CPT | Performed by: INTERNAL MEDICINE

## 2018-06-07 PROCEDURE — 85025 COMPLETE CBC W/AUTO DIFF WBC: CPT | Performed by: INTERNAL MEDICINE

## 2018-06-08 LAB
C3 SERPL-MCNC: 59 MG/DL (ref 76–169)
C4 SERPL-MCNC: 16 MG/DL (ref 15–50)
DSDNA AB SER-ACNC: 15 IU/ML

## 2018-06-14 ENCOUNTER — OFFICE VISIT (OUTPATIENT)
Dept: RHEUMATOLOGY | Facility: CLINIC | Age: 43
End: 2018-06-14
Payer: COMMERCIAL

## 2018-06-14 VITALS
HEART RATE: 75 BPM | WEIGHT: 157.5 LBS | TEMPERATURE: 97 F | RESPIRATION RATE: 18 BRPM | OXYGEN SATURATION: 100 % | SYSTOLIC BLOOD PRESSURE: 114 MMHG | DIASTOLIC BLOOD PRESSURE: 75 MMHG | BODY MASS INDEX: 28.85 KG/M2

## 2018-06-14 DIAGNOSIS — R76.8 ANTI-RNP ANTIBODIES PRESENT: ICD-10-CM

## 2018-06-14 DIAGNOSIS — M32.9 SYSTEMIC LUPUS ERYTHEMATOSUS, UNSPECIFIED SLE TYPE, UNSPECIFIED ORGAN INVOLVEMENT STATUS (H): Primary | ICD-10-CM

## 2018-06-14 PROCEDURE — 99213 OFFICE O/P EST LOW 20 MIN: CPT | Performed by: INTERNAL MEDICINE

## 2018-06-14 RX ORDER — HYDROXYCHLOROQUINE SULFATE 200 MG/1
TABLET, FILM COATED ORAL
Qty: 135 TABLET | Refills: 3 | Status: SHIPPED | OUTPATIENT
Start: 2018-06-14 | End: 2019-11-26

## 2018-06-14 ASSESSMENT — PAIN SCALES - GENERAL: PAINLEVEL: NO PAIN (0)

## 2018-06-14 NOTE — MR AVS SNAPSHOT
After Visit Summary   6/14/2018    Natasha Lee    MRN: [id number]           Patient Information     Date Of Birth          1975        Visit Information        Provider Department      6/14/2018 2:00 PM Christopher Zaman MD [facility] [place]        Today's Diagnoses     Systemic lupus erythematosus, unspecified SLE type, unspecified organ involvement status (H)    -  1    Anti-RNP antibodies present          Care Instructions    Please call to schedule the echocardiogram for pulmonary hypertension:            [facility]:  [phone]          Follow-ups after your visit        Your next 10 appointments already scheduled     Jul 06, 2018 10:00 AM CDT   MyChart Eye Care New with Mauricio Carrizales, OD   [facility] ([facility]    [address] [place] [address] [phone]              Future tests that were ordered for you today     Open Future Orders        Priority Expected Expires Ordered    Echocardiogram Routine  6/14/2019 6/14/2018    Erythrocyte sedimentation rate auto Routine 12/7/2018 1/11/2019 6/14/2018    CBC with platelets differential Routine 12/7/2018 1/11/2019 6/14/2018    Comprehensive metabolic panel Routine 12/7/2018 1/11/2019 6/14/2018    Protein  random urine with Creat Ratio Routine 12/7/2018 1/11/2019 6/14/2018    UA reflex to Microscopic and Culture Routine 12/7/2018 1/11/2019 6/14/2018    DNA double stranded antibodies Routine 12/7/2018 1/11/2019 6/14/2018    CRP inflammation Routine 12/7/2018 1/11/2019 6/14/2018    Complement C4 Routine 12/7/2018 1/11/2019 6/14/2018    Complement C3 Routine 12/7/2018 1/11/2019 6/14/2018    CBC with platelets differential Routine 9/14/2018 10/5/2018 6/14/2018    Comprehensive metabolic panel Routine 9/14/2018 10/5/2018 6/14/2018    Protein  random urine with Creat Ratio Routine 9/14/2018 10/5/2018 6/14/2018    UA reflex to Microscopic and Culture Routine 9/14/2018  10/5/2018 6/14/2018            Who to contact     If you have questions or need follow up information about today's clinic visit or your schedule please contact [facility] directly at [phone].  Normal or non-critical lab and imaging results will be communicated to you by MyChart, letter or phone within 4 business days after the clinic has received the results. If you do not hear from us within 7 days, please contact the clinic through MyChart or phone. If you have a critical or abnormal lab result, we will notify you by phone as soon as possible.  Submit refill requests through [employer] or call your pharmacy and they will forward the refill request to us. Please allow 3 business days for your refill to be completed.          Additional Information About Your Visit        [employer] Information     [employer] gives you secure access to your electronic health record. If you see a primary care provider, you can also send messages to your care team and make appointments. If you have questions, please call your primary care clinic.  If you do not have a primary care provider, please call [phone] and they will assist you.        Care EveryWhere ID     This is your Care EveryWhere ID. This could be used by other organizations to access your [place] medical records  [id number]        Your Vitals Were     Pulse Temperature Respirations Pulse Oximetry BMI (Body Mass Index)       75 97  F (36.1  C) (Oral) 18 100% 28.85 kg/m2        Blood Pressure from Last 3 Encounters:   06/14/18 114/75   03/07/18 126/72   02/08/18 121/76    Weight from Last 3 Encounters:   06/14/18 71.4 kg (157 lb 8 oz)   02/08/18 70.5 kg (155 lb 6.4 oz)   03/06/17 72.6 kg (160 lb)                 Where to get your medicines      These medications were sent to [facility] - [address], [facility] [address]     Phone:  [phone]     hydroxychloroquine 200 MG  tablet          Primary Care Provider Office Phone # Fax #    Sandy Garcia, APRN [facility] [phone]       [address]  [place] [address]        Equal Access to Services     IDA MAHONEY : Hadii alexander ku gianlucao Socaityali, waaxda luqadaha, qaybta kaalmada adeegyada, miki barkleyn abad goodman lon troncoso. So [facility] [phone].    ATENCIÓN: Si habla español, tiene a schfafer disposición servicios gratuitos de asistencia lingüística. Llame al [phone].    We comply with applicable federal civil rights laws and [place] laws. We do not discriminate on the basis of race, color, national origin, age, disability, sex, sexual orientation, or gender identity.            Thank you!     Thank you for choosing [facility]  for your care. Our goal is always to provide you with excellent care. Hearing back from our patients is one way we can continue to improve our services. Please take a few minutes to complete the written survey that you may receive in the mail after your visit with us. Thank you!             Your Updated Medication List - Protect others around you: Learn how to safely use, store and throw away your medicines at www.disposemymeds.org.          This list is accurate as of 6/14/18  2:13 PM.  Always use your most recent med list.                   Brand Name Dispense Instructions for use Diagnosis    albuterol 108 (90 Base) MCG/ACT Inhaler    PROAIR HFA/PROVENTIL HFA/VENTOLIN HFA    1 Inhaler    Inhale 2 puffs into the lungs every 4 hours as needed for shortness of breath / dyspnea or wheezing    Acute bronchitis, unspecified organism       B-12 1000 MCG Tbcr     100 tablet    Take 1,000 mcg by mouth daily    Vitamin B12 deficiency (non anemic)       BENADRYL PO           betamethasone dipropionate 0.05 % cream    DIPROSONE    45 g    Apply sparingly to affected area twice daily as needed.  Do not apply to face.    Eczema, unspecified type       clobetasol 0.05 %  cream    TEMOVATE    60 g    Apply sparingly to affected area twice a week.    Vulvar discomfort       Cranberry 1000 MG Caps      Take 1 capsule by mouth 4 times daily    Lupus (systemic lupus erythematosus) (H), Chronic hepatitis B (H)       fluticasone 50 MCG/ACT spray    FLONASE    16 g    Spray 2 sprays into both nostrils daily    Allergic rhinitis, unspecified allergic rhinitis type       hydroxychloroquine 200 MG tablet    PLAQUENIL    135 tablet    Hydroxychloroquine 300mg daily    Systemic lupus erythematosus, unspecified SLE type, unspecified organ involvement status (H)       Multi-vitamin Tabs tablet      Take 1 tablet by mouth daily    Other abnormal glucose       norethindrone-ethinyl estradiol 1-35 MG-MCG per tablet    ORTHO-NOVUM 1-35 TAB,NORTREL 1-35 TAB    84 tablet    Take 1 tablet by mouth daily    Dysmenorrhea, Menorrhagia with regular cycle       omeprazole 20 MG tablet     30 tablet    Take 1 tablet by mouth daily. Take 30-60 minutes before a meal.    Abdominal pain, epigastric       * predniSONE 5 MG tablet    DELTASONE    70 tablet    20 mg for 1 week, taper by 5 mg weekly    Lupus (systemic lupus erythematosus) (H)       * predniSONE 5 MG tablet    DELTASONE    30 tablet    Take 1 tablet (5 mg) by mouth daily as needed for lupus symptoms    Systemic lupus erythematosus, unspecified SLE type, unspecified organ involvement status (H)       senna-docusate 8.6-50 MG per tablet    SENOKOT-S;PERICOLACE    30 tablet    Take 1-2 tablets by mouth 2 times daily    Thickening of wall of gallbladder       triamcinolone 0.1 % cream    KENALOG    30 g    Apply sparingly to affected area three times daily prn.    Eczema, unspecified type       UNISOM SLEEPGELS 50 MG Caps   Generic drug:  DiphenhydrAMINE HCl (Sleep)      Take 50 mg by mouth At Bedtime        * Notice:  This list has 2 medication(s) that are the same as other medications prescribed for you. Read the directions carefully, and ask your doctor  or other care provider to review them with you.

## 2018-06-14 NOTE — PROGRESS NOTES
Rheumatology Clinic Visit      Natasha Lee MRN# [id number]   YOB: 1975 Age: 43 year old      Date of visit: 6/14/18   PCP: Sandy Garcia    Chief Complaint   Patient presents with:  Systemic Lupus Erythematous      Assessment and Plan     1.  Systemic lupus erythematosus (dsDNA positive, RNP+, hypocomplementemia C3 & C4, malar rash, oral sores, skin lesions, photosensitivity, Raynaud's phenomenon, fatigue, seizure history [grand mal seizure as an infant, absence seizures multiple times from age of [age], no seizure since age of 19 years old]): Diagnosed at the age of 13 years old.  Established with me on 3/8/2018.  Reportedly treated with hydroxychloroquine when she was initially diagnosed at the age of 13 when she developed oral sores, weight loss, malar rash, and joint aches.  Also reportedly treated with azathioprine; she states that she has not been on azathioprine since at least 2003.  Had been doing well without DMARD therapy for several years but more recently she had to request time off from work because of joint pains that have since improved, and fatigue that has since improved.  She also had been having increased erythematous lesions on her arms and malar rash.  Hydroxychloroquine 300 mg daily was started and prednisone was prescribed as needed; she has noticed significant improvement.  No active arthritis and no active skin lesions; still with photosensitivity-rash with sun exposure.  Has not been using prednisone.    - Continue hydroxychloroquine 300 mg daily (ophthalmology exam pending)  - Continue prednisone 5 mg daily as needed; advised to use sparingly  - Labs in 3 months: CBC, CMP, UA, Uprotein:creatinine  - Labs in 6 months (1 week prior to f/u): CBC, CMP, ESR, CRP, CK, C3, C4, dsDNA, UA, Uprotein:creatinine  - Echocardiogram for pulmonary hypertension screening    2.  Raynaud's phenomenon: Managed well with cold avoidance.  May consider calcium channel blocker in the future  if needed.    3. Chronic hepatitis B: Positive since birth, per patient.  Hepatitis B core antibody and surface antigen positive in the past. Followed by her PCP.    4. Hyperglycemia: reviewed today. Advised weight loss and to follow with PCP for this issue.     Ms. Lee verbalized agreement with and understanding of the rational for the diagnosis and treatment plan.  All questions were answered to best of my ability and the patient's satisfaction. Ms. Lee was advised to contact the clinic with any questions that may arise after the clinic visit.      Thank you for involving me in the care of the patient    Return to clinic: 3 months      HPI   Natasha Lee is a 43 year old female with a past medical history significant for chronic hepatitis B, heart murmur, history of cutting (she did this while ago and was associated with depression; had therapy and this resolved many years ago; scarring on her left arm) and SLE who is seen for follow-up of lupus.     Today, she reports that she is doing better since starting hydroxychloroquine.  She is not using prednisone.  No joint pain.  No morning stiffness.  Has photosensitivity with a rash on her face if she is in the sun for too long.  Active erythematous lesions on her arms have not been present for a while now.      Denies fevers, chills, nausea, vomiting, constipation, diarrhea. No abdominal pain. No chest pain/pressure, palpitations, or shortness of breath. No LE swelling. No neck pain. No oral or nasal sores currently.  Malar rash.    No sicca symptoms. No eye pain or redness. No history of inflammatory eye disease.  No history of DVT, pulmonary embolism, or miscarriage; one healthy child.      Unknown family history as she was adopted.  Originally from [place]    Tobacco: None  EtOH: None  Drugs: None  Occupation: Drug and alcohol counselor at [employer]    ROS   GEN: No fevers, chills, night sweats, or weight change  SKIN: See  HPI  HEENT: No oral or nasal ulcers currently but has had them in the past.  CV: No chest pain, pressure, palpitations, or dyspnea on exertion.  PULM: No SOB, wheeze, cough.  GI: No nausea, vomiting, constipation, diarrhea. No blood in stool. No abdominal pain.  : No blood in urine.  MSK: See HPI.  NEURO: No numbness or tingling  EXT: No LE swelling  PSYCH: Negative    Active Problem List     Patient Active Problem List   Diagnosis     Chronic hepatitis B (H)     Lupus (systemic lupus erythematosus) (H)     CARDIOVASCULAR SCREENING; LDL GOAL LESS THAN 160     Health Care Home     Dysmenorrhea     Menorrhagia     Allergic rhinitis     Other nonspecific finding on examination of urine     Urinary frequency     Vitamin B12 deficiency (non anemic)     Heart murmur     S/P laparoscopic cholecystectomy     Past Medical History     Past Medical History:   Diagnosis Date     Diabetes (H)     Pre-Diabetic     Heart murmur 1997     Lupus (Systemic Lupus Erythematosus) 12/10/2009     Lupus (systemic lupus erythematosus) (H) 1988    Dr. Prather     Lupus (systemic lupus erythematosus) (H) 12/10/2009     Lupus (systemic lupus erythematosus) (H) 12/10/2009     Lupus (systemic lupus erythematosus) (H)      Normal delivery 9/5/09    boy forceps     PONV (postoperative nausea and vomiting)     Mild Nausea postop     Past Surgical History     Past Surgical History:   Procedure Laterality Date     ANESTH,SKIN SURGERY,KNEE  1993, 1990    orthoscopic     C APPENDECTOMY  4/2008     CHOLECYSTECTOMY  2016     CL AFF SURGICAL PATHOLOGY  2007     LAPAROSCOPIC CHOLECYSTECTOMY N/A 5/18/2016    Procedure: LAPAROSCOPIC CHOLECYSTECTOMY;  Surgeon: Radha Cline MD;  [facility]     LAPAROSCOPIC TUBAL LIGATION  12/15/2011    Procedure:LAPAROSCOPIC TUBAL LIGATION; Laparoscopic tubal ligation; Surgeon:MAY WYNN; Location:[facility] OR     Allergy     Allergies   Allergen Reactions     Carbamazepine      Fever, rash     Nyquil  Severe Cold-Flu [Vicks Nyquil Severe]      hives     Current Medication List     Current Outpatient Prescriptions   Medication Sig     albuterol (PROAIR HFA, PROVENTIL HFA, VENTOLIN HFA) 108 (90 BASE) MCG/ACT inhaler Inhale 2 puffs into the lungs every 4 hours as needed for shortness of breath / dyspnea or wheezing     betamethasone dipropionate (DIPROSONE) 0.05 % cream Apply sparingly to affected area twice daily as needed.  Do not apply to face.     Cranberry 1000 MG CAPS Take 1 capsule by mouth 4 times daily     Cyanocobalamin (B-12) 1000 MCG TBCR Take 1,000 mcg by mouth daily     DiphenhydrAMINE HCl, Sleep, (UNISOM SLEEPGELS) 50 MG CAPS Take 50 mg by mouth At Bedtime     hydroxychloroquine (PLAQUENIL) 200 MG tablet Hydroxychloroquine 300mg daily     multivitamin, therapeutic with minerals (MULTI-VITAMIN) TABS Take 1 tablet by mouth daily     norethindrone-ethinyl estradiol (ORTHO-NOVUM 1-35 TAB,NORTREL 1-35 TAB) 1-35 MG-MCG per tablet Take 1 tablet by mouth daily     omeprazole 20 MG tablet Take 1 tablet by mouth daily. Take 30-60 minutes before a meal.     clobetasol (TEMOVATE) 0.05 % cream Apply sparingly to affected area twice a week. (Patient not taking: Reported on 6/14/2018)     DiphenhydrAMINE HCl (BENADRYL PO)      fluticasone (FLONASE) 50 MCG/ACT nasal spray Spray 2 sprays into both nostrils daily (Patient not taking: Reported on 3/7/2018)     predniSONE (DELTASONE) 5 MG tablet Take 1 tablet (5 mg) by mouth daily as needed for lupus symptoms (Patient not taking: Reported on 6/14/2018)     predniSONE (DELTASONE) 5 MG tablet 20 mg for 1 week, taper by 5 mg weekly (Patient not taking: Reported on 6/14/2018)     senna-docusate (SENOKOT-S;PERICOLACE) 8.6-50 MG per tablet Take 1-2 tablets by mouth 2 times daily (Patient not taking: Reported on 3/7/2018)     triamcinolone (KENALOG) 0.1 % cream Apply sparingly to affected area three times daily prn. (Patient not taking: Reported on 3/7/2018)     No current  "facility-administered medications for this visit.          Social History   See HPI    Family History     Family History   Problem Relation Age of Onset     Unknown/Adopted Mother      Unknown/Adopted Father        Physical Exam     Temp Readings from Last 3 Encounters:   06/14/18 97  F (36.1  C) (Oral)   01/05/18 96.8  F (36  C) (Temporal)   03/02/17 97.6  F (36.4  C) (Temporal)     BP Readings from Last 5 Encounters:   06/14/18 114/75   03/07/18 126/72   02/08/18 121/76   01/05/18 120/80   03/06/17 104/72     Pulse Readings from Last 1 Encounters:   06/14/18 75     Resp Readings from Last 1 Encounters:   06/14/18 18     Estimated body mass index is 28.85 kg/(m^2) as calculated from the following:    Height as of 2/8/18: 1.574 m (5' 1.95\").    Weight as of this encounter: 71.4 kg (157 lb 8 oz).    GEN: NAD  HEENT: MMM. No oral lesions. Anicteric, noninjected sclera  CV: S1, S2. RRR. No m/r/g.  PULM: CTA bilaterally. No w/c.  MSK: MCPs, PIPs, wrists, elbows, shoulders, knees, and ankles without swelling or tenderness to palpation.  Negative MCP and MTP squeeze.  Hips nontender to palpation.      NEURO: UE and LE strengths 5/5 and equal bilaterally.   SKIN: No malar rash.  Several hypopigmented areas on the upper extremities but no erythematous lesions.   Left arm has well-healed scars in linear pattern that the patient says is from cutting in the past and that she no longer does this.  EXT: No LE edema  PSYCH: Alert. Appropriate.    Labs / Imaging (select studies)     RNP/Sm/SSA/SSB  Recent Labs   Lab Test  03/07/18   1548  06/11/14   1540   RNPIGG  1.8*   --    SMIGG  0.2   --    SSAIGG  >8.0*   --    SSBIGG  <0.2   --    SCLIGG  <0.2   --    TREPAB   --   Negative     dsDNA  Recent Labs   Lab Test  06/07/18   1347  03/07/18   1548  03/20/13   1452  05/03/12   1216  08/10/10   1249   DNA  15*  19*  55*  47*  44*     C3/C4  Recent Labs   Lab Test  06/07/18   1347  03/07/18   1548  03/20/13   1452  05/03/12   1216  " 08/10/10   1249   V1OHUJJ  59*  60*  57*  45*  65*   R3PQURK  16  16  15  13*  13*     Antiphospholipid Antibodies  Recent Labs   Lab Test  03/07/18   1548   B2GPG  2.9   B2GPM  1.5   CARDG  5.9   CARDM  0.3   LUPINT  Negative     CBC  Recent Labs   Lab Test  06/07/18   1347  03/07/18   1548  05/13/16   1438   WBC  4.4  4.5  5.0   RBC  4.33  4.27  4.25   HGB  13.6  13.5  13.4   HCT  38.7  38.3  38.0   MCV  89  90  89   RDW  12.3  12.8  12.2   PLT  219  209  257   MCH  31.4  31.6  31.5   MCHC  35.1  35.2  35.3   NEUTROPHIL  62.1  56.1   --    LYMPH  28.1  29.0   --    MONOCYTE  8.2  12.9   --    EOSINOPHIL  0.9  1.6   --    BASOPHIL  0.5  0.4   --    ANEU  2.7  2.5   --    ALYM  1.2  1.3   --    CALLIE  0.4  0.6   --    AEOS  0.0  0.1   --    ABAS  0.0  0.0   --      CMP  Recent Labs   Lab Test  06/07/18   1347  03/07/18   1548  05/13/16   1438  02/15/16   0827  10/17/14   0913  02/11/14   0716   NA  141  144  142  140  141   --    POTASSIUM  3.6  3.8  3.7  3.9  4.1   --    CHLORIDE  109  108  107  109  109   --    CO2  25  29  28  26  27   --    ANIONGAP  7  7  7  5  5   --    GLC  130*  78  106*  89  90  87   BUN  14  13  8  11  13   --    CR  0.64  0.72  0.56  0.72  0.72   --    GFRESTIMATED  >90  89  >90  Non  GFR Calc    89  >90  Non  GFR Calc     --    GFRESTBLACK  >90  >90  >90  African American GFR Calc    >90   GFR Calc    >90   GFR Calc     --    JUAN A  8.5  8.8  8.7  8.0*  8.8   --    BILITOTAL  0.5  0.3  0.4  0.3   --    --    ALBUMIN  3.8  4.1  3.7  3.4  3.9   --    PROTTOTAL  7.3  7.4  7.2  6.6*   --    --    ALKPHOS  55  78  79  52   --    --    AST  27  23  27  23   --    --    ALT  40  39  44  53*   --    --      HgA1c  Recent Labs   Lab Test  02/15/16   0827  10/17/14   0913   A1C  5.3  5.4     Calcium/VitaminD  Recent Labs   Lab Test  06/07/18   1347  03/07/18   1548  05/13/16   1438  02/15/16   0827   JUAN A  8.5  8.8  8.7  8.0*   VITDT   --     --    --   32     ESR/CRP  Recent Labs   Lab Test  06/07/18   1347  03/07/18   1548  06/11/14   1540   SED  13  18  16   CRP  4.6  3.4  6.5     CK/Aldolase  Recent Labs   Lab Test  06/07/18   1347  03/07/18   1548   CKT  87  60     TSH/T4  Recent Labs   Lab Test  02/15/16   0827  04/02/14   1509   TSH  1.51  0.58     Lipid Panel  Recent Labs   Lab Test  02/15/16   0827  02/11/14   0716   CHOL  143  145   TRIG  71  110   HDL  47*  49*   LDL  82  74   VLDL   --   22   CHOLHDLRATIO   --   3.0   NHDL  96   --      Hepatitis B  Recent Labs   Lab Test  03/07/18   1548  03/20/13   1452  05/03/12   1216   HBCAB   --    --   Positive*   HEPBANG   --   Positive*  Positive*   HBQLOG  2.1*   --    --      Hepatitis C  Recent Labs   Lab Test  06/11/14   1540  03/20/13   1452   HCVAB  Negative  Negative     Lyme confirmation testing by Western Blot  Recent Labs   Lab Test  06/11/14   1540   LYWG  Negative  Reference range: Negative  (Note)  Band(s) present: NONE  (Insufficient number of bands for positive result)  INTERPRETIVE INFORMATION: Borrelia Burgdorferi Ab, IgG  Western Blot    For this assay, a positive result is reported when any 5 or  more of the following 10 bands are present: 18, 23, 28, 30,  39, 41, 45, 58, 66, or 93 kDa.  All other banding patterns  are reported as negative.     LYWM  Negative  Reference range: Negative  (Note)  Band(s) present: NONE  (Insufficient number of bands for positive result)  INTERPRETIVE INFORMATION: Borrelia Burgdorferi Antibody,  IgM Western Blot    For this assay, a positive result is reported when any 2 or  more of the following bands are present: 23, 39, or 41 kDa.  All other banding patterns are reported as negative.  Performed by [facility],  [address] [place] [id number] [phone]  [email or web address] Elfego Franco MD, Lab. Director       HIV Screening  Recent Labs   Lab Test  06/11/14   1540   HIAGAB  Nonreactive   HIV-1 p24 Ag & HIV-1/HIV-2 Ab Not Detected        UA  Recent Labs   Lab Test  06/07/18   1347  03/07/18   1548  12/18/14   1340   06/27/14   1120  11/17/11   1033   03/08/11   1138  08/10/10   1249   04/16/10   1516   COLOR  Light Yellow  Yellow  Straw   < >  Straw  Yellow   < >  Straw  Yellow   --   Yellow   APPEARANCE  Clear  Clear  Clear   < >  Clear  Clear   < >  Clear  Clear   --   Clear   URINEGLC  Negative  Negative  Negative   < >  Negative  Negative   < >  Negative  Negative   --   Negative   URINEBILI  Negative  Negative  Negative   < >  Negative  Negative   < >  Negative  Negative   --   Negative   SG  1.007  1.025  1.001*   < >  1.005  1.015   < >  1.000*  1.020   --   <=1.005   URINEPH  6.0  5.5  5.5   < >  6.5  6.5   < >  5.0  8.0*   --   6.5   PROTEIN  Negative  Negative  Negative   < >  Negative  Negative   < >  Negative  Negative   --   Negative   UROBILINOGEN   --   0.2   --    --    --    --    --    --   0.2   --   0.2   NITRITE  Negative  Negative  Negative   < >  Negative  Negative   < >  Negative  Negative   --   Negative   UBLD  Negative  Negative  Negative   < >  Negative  Negative   < >  Small*  Negative   --   Small*   LEUKEST  Negative  Negative  Small*   < >  Moderate*  Trace*   < >  Large*  Small*   --   Large*   WBCU   --    --   2-5*   --   O - 2  2-5*   --   5-10*  O - 2   --   10-25*   RBCU   --    --   O - 2   --   O - 2  O - 2   --   O - 2  O - 2   --   O - 2   SQUAMOUSEPI   --    --   Few   --   Few  Moderate*   --    --   Few   < >   --    BACTERIA   --    --    --    --   Few*  Few*   --   Few*  Few*   < >   --     < > = values in this interval not displayed.     Urine Microscopic  Recent Labs   Lab Test  12/18/14   1340  06/27/14   1120  11/17/11   1033  03/08/11   1138   WBCU  2-5*  O - 2  2-5*  5-10*   RBCU  O - 2  O - 2  O - 2  O - 2   SQUAMOUSEPI  Few  Few  Moderate*   --    BACTERIA   --   Few*  Few*  Few*     Urine Protein  Recent Labs   Lab Test  06/07/18   1349  03/07/18   1548   UTP  0.07  0.09   UTPG  0.17  0.08    RR  40  110     Immunization History     Immunization History   Administered Date(s) Administered     Influenza (H1N1) 12/04/2009     Influenza (IIV3) PF 12/18/2000, 09/22/2009, 11/17/2011, 11/01/2012, 09/22/2014     Influenza Vaccine IM 3yrs+ 4 Valent IIV4 09/26/2013, 09/30/2015, 10/01/2015          Chart documentation done in part with Dragon Voice recognition Software. Although reviewed after completion, some word and grammatical error may remain.    Christopher Zaman MD

## 2018-06-14 NOTE — PATIENT INSTRUCTIONS
Please call to schedule the echocardiogram for pulmonary hypertension:            [facility]:  [phone]

## 2018-07-25 ENCOUNTER — OFFICE VISIT (OUTPATIENT)
Dept: PODIATRY | Facility: CLINIC | Age: 43
End: 2018-07-25
Payer: COMMERCIAL

## 2018-07-25 ENCOUNTER — RADIANT APPOINTMENT (OUTPATIENT)
Dept: GENERAL RADIOLOGY | Facility: CLINIC | Age: 43
End: 2018-07-25
Attending: PODIATRIST
Payer: COMMERCIAL

## 2018-07-25 VITALS — HEART RATE: 77 BPM | SYSTOLIC BLOOD PRESSURE: 126 MMHG | DIASTOLIC BLOOD PRESSURE: 77 MMHG | OXYGEN SATURATION: 100 %

## 2018-07-25 DIAGNOSIS — M21.619 BUNION OF GREAT TOE: ICD-10-CM

## 2018-07-25 DIAGNOSIS — M21.619 BUNION OF GREAT TOE: Primary | ICD-10-CM

## 2018-07-25 PROCEDURE — 73630 X-RAY EXAM OF FOOT: CPT | Mod: LT | Performed by: RADIOLOGY

## 2018-07-25 PROCEDURE — 99213 OFFICE O/P EST LOW 20 MIN: CPT | Performed by: PODIATRIST

## 2018-07-25 NOTE — LETTER
7/25/2018         RE: Natasha Lee  [address] [address]        Dear Colleague,    Thank you for referring your patient, Natasha Lee, to the [facility]. Please see a copy of my visit note below.    Date of Service: 7/25/2018    Chief Complaint:   Chief Complaint   Patient presents with     Left Foot - Bunion        HPI: Natasha is a 43 year old female who presents today for further evaluation of left foot bunion.  Nature: sharp/dull/aching    Location: left bunion    Duration: 2 years    Onset: gradual. No inciting trauma.     Course: worsening    Aggravating/alleviating factors: shoes and weightbearing are very aggravating. Rest and barefoot alleviates.     Previous Treatments: padding and splinting with no good results.       Review of Systems: No n/v/d/f/c/ns/sob/cp    PMH:   Past Medical History:   Diagnosis Date     Diabetes (H)     Pre-Diabetic     Heart murmur 1997     Lupus (Systemic Lupus Erythematosus) 12/10/2009     Lupus (systemic lupus erythematosus) (H) 1988    Dr. Prather     Lupus (systemic lupus erythematosus) (H) 12/10/2009     Lupus (systemic lupus erythematosus) (H) 12/10/2009     Lupus (systemic lupus erythematosus) (H)      Normal delivery 9/5/09    boy forceps     PONV (postoperative nausea and vomiting)     Mild Nausea postop       PSxH:   Past Surgical History:   Procedure Laterality Date     ANESTH,SKIN SURGERY,KNEE  1993, 1990    orthoscopic     C APPENDECTOMY  4/2008     CHOLECYSTECTOMY  2016     CL AFF SURGICAL PATHOLOGY  2007     LAPAROSCOPIC CHOLECYSTECTOMY N/A 5/18/2016    Procedure: LAPAROSCOPIC CHOLECYSTECTOMY;  Surgeon: Radha Cline MD;  Location: [facility] OR     LAPAROSCOPIC TUBAL LIGATION  12/15/2011    Procedure:LAPAROSCOPIC TUBAL LIGATION; Laparoscopic tubal ligation; Surgeon:AMY WYNN; Location:[facility] OR       Allergies: Carbamazepine and Nyquil severe cold-flu [vicks nyquil severe]    SH:   Social  History     Social History     Marital status:      Spouse name: N/A     Number of children: 1     Years of education: N/A     Occupational History     addiction therapy [employer]     Social History Main Topics     Smoking status: Never Smoker     Smokeless tobacco: Never Used     Alcohol use No     Drug use: No     Sexual activity: Yes     Partners: Male     Birth control/ protection: Pill      Comment: tubal ligation, uses OCPs for menstrual and mood regulation     Other Topics Concern     Parent/Sibling W/ Cabg, Mi Or Angioplasty Before 65f 55m? 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 CRT is 3 seconds. Positive pedal hair.   Gross sensation is diminished bilaterally. Subjective paresthesia on the medial left hallux.  Equinus is mild bilaterally. Laterally deviated hallux and HAV noted to the left foot. Deformity is tracking with some ability for reduction. Crepitus is noted with medial reduction and with dorsiflexion of the left 1st MTPJ. Erythema noted about the medial eminence. Hypermobile 1st ray noted with increased ROM in all planes of the left 1st ray. No pain with this. Hallux is slightly shorter than normal deviation and leaves the 2nd digit elongated and hammered. Hyperkeratotic lesion noted to the left 2nd digit PIPJ. HAV is not as severe on the right foot and the 1st ray is not as hypermobile.     Nails normal bilaterally. No open lesions are  noted.     left foot xrays indicated in 3 weightbearing views.    Hallux valgus noted with DESTINI of 15 and tibial sesamoid position of 7. HAA is 46. No acute processes noted.        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 She has exhausted conservative tx and requests surgical discussion.     Plan:  - Pt seen and evaluated.  - Xrays taken and discussed with her.   - I have recommended that she make a f/u with Dr. Chavez.   - See again PRN.              Again, thank you for allowing me to participate in the care of your patient.        Sincerely,        David Henriquez DPM

## 2018-07-25 NOTE — PROGRESS NOTES
Date of Service: 7/25/2018    Chief Complaint:   Chief Complaint   Patient presents with     Left Foot - Bunion        HPI: Natasha is a 43 year old female who presents today for further evaluation of left foot bunion.  Nature: sharp/dull/aching    Location: left bunion    Duration: 2 years    Onset: gradual. No inciting trauma.     Course: worsening    Aggravating/alleviating factors: shoes and weightbearing are very aggravating. Rest and barefoot alleviates.     Previous Treatments: padding and splinting with no good results.       Review of Systems: No n/v/d/f/c/ns/sob/cp    PMH:   Past Medical History:   Diagnosis Date     Diabetes (H)     Pre-Diabetic     Heart murmur 1997     Lupus (Systemic Lupus Erythematosus) 12/10/2009     Lupus (systemic lupus erythematosus) (H) 1988    Dr. Prather     Lupus (systemic lupus erythematosus) (H) 12/10/2009     Lupus (systemic lupus erythematosus) (H) 12/10/2009     Lupus (systemic lupus erythematosus) (H)      Normal delivery 9/5/09    boy forceps     PONV (postoperative nausea and vomiting)     Mild Nausea postop       PSxH:   Past Surgical History:   Procedure Laterality Date     ANESTH,SKIN SURGERY,KNEE  1993, 1990    orthoscopic     C APPENDECTOMY  4/2008     CHOLECYSTECTOMY  2016     CL AFF SURGICAL PATHOLOGY  2007     LAPAROSCOPIC CHOLECYSTECTOMY N/A 5/18/2016    Procedure: LAPAROSCOPIC CHOLECYSTECTOMY;  Surgeon: Radha Cline MD;  Location: [facility] OR     LAPAROSCOPIC TUBAL LIGATION  12/15/2011    Procedure:LAPAROSCOPIC TUBAL LIGATION; Laparoscopic tubal ligation; Surgeon:AMY WYNN; Location:[facility] OR       Allergies: Carbamazepine and Nyquil severe cold-flu [vicks nyquil severe]    SH:   Social History     Social History     Marital status:      Spouse name: N/A     Number of children: 1     Years of education: N/A     Occupational History     addiction therapy [employer]     Social History Main Topics     Smoking status: Never Smoker     Smokeless tobacco:  Never Used     Alcohol use No     Drug use: No     Sexual activity: Yes     Partners: Male     Birth control/ protection: Pill      Comment: tubal ligation, uses OCPs for menstrual and mood regulation     Other Topics Concern     Parent/Sibling W/ Cabg, Mi Or Angioplasty Before 65f 55m? No      Service No     Blood Transfusions No     Caffeine Concern No     Occupational Exposure Yes     chemical dependency counselor in a retirement     Hobby Hazards No     Sleep Concern No     Stress Concern No     Weight Concern No     Special Diet No     Exercise Yes     Bike Helmet Yes     Seat Belt Yes     Self-Exams Yes     Social History Narrative       FH:   Family History   Problem Relation Age of Onset     Unknown/Adopted Mother      Unknown/Adopted Father        Objective:  Data Unavailable 77 Data Unavailable 126/77 Data Unavailable 0 lbs 0 oz    PT and DP pulses are 2/4 bilaterally. CRT is 3 seconds. Positive pedal hair.   Gross sensation is diminished bilaterally. Subjective paresthesia on the medial left hallux.  Equinus is mild bilaterally. Laterally deviated hallux and HAV noted to the left foot. Deformity is tracking with some ability for reduction. Crepitus is noted with medial reduction and with dorsiflexion of the left 1st MTPJ. Erythema noted about the medial eminence. Hypermobile 1st ray noted with increased ROM in all planes of the left 1st ray. No pain with this. Hallux is slightly shorter than normal deviation and leaves the 2nd digit elongated and hammered. Hyperkeratotic lesion noted to the left 2nd digit PIPJ. HAV is not as severe on the right foot and the 1st ray is not as hypermobile.     Nails normal bilaterally. No open lesions are noted.     left foot xrays indicated in 3 weightbearing views.    Hallux valgus noted with DESTINI of 15 and tibial sesamoid position of 7. HAA is 46. No acute processes noted.        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  She has exhausted conservative tx and requests surgical discussion.     Plan:  - Pt seen and evaluated.  - Xrays taken and discussed with her.   - I have recommended that she make a f/u with Dr. Chavez.   - See again PRN.

## 2018-07-25 NOTE — PATIENT INSTRUCTIONS
Thanks for coming today.  Ortho/Sports Medicine Clinic  [address] [place] [address]    To schedule future appointments in Ortho Clinic, you may call [phone].    To schedule ordered imaging by your provider:   Call Central Imaging [phone]    To schedule an injection ordered by your provider:  Call Central Imaging Injection scheduling line: [phone]  [email or web address] available online at:  [email or web address]    Please call if any further questions or concerns ([phone]).  Clinic hours 8 am to 5 pm.    Return to clinic (call) if symptoms worsen or fail to improve.

## 2018-07-25 NOTE — MR AVS SNAPSHOT
After Visit Summary   2018    Natasha Lee    MRN: [id number]           Patient Information     Date Of Birth          1975        Visit Information        Provider Department      2018 2:40 PM David Henriquez DPM [facility]        Today's Diagnoses     Bunion of great toe    -  1      Care Instructions    Thanks for coming today.  Ortho/Sports Medicine Clinic  [address] [place] [address]    To schedule future appointments in Ortho Clinic, you may call [phone].    To schedule ordered imaging by your provider:   Call Central Imaging [phone]    To schedule an injection ordered by your provider:  Call Central Imaging Injection scheduling line: [phone]  [email or web address] available online at:  [email or web address]    Please call if any further questions or concerns ([phone]).  Clinic hours 8 am to 5 pm.    Return to clinic (call) if symptoms worsen or fail to improve.            Follow-ups after your visit        Additional Services     ORTHOPEDICS ADULT REFERRAL       Your provider has referred you to: [facility]: Orthopaedic Clinic [facility] [phone]   [email or web address]      Please be aware that coverage of these services is subject to the terms and limitations of your health insurance plan.  Call member services at your health plan with any benefit or coverage questions.      Please bring the following to your appointment:    >>   Any x-rays, CTs or MRIs which have been performed.  Contact the facility where they were done to arrange for  prior to your scheduled appointment.    >>   List of current medications   >>   This referral request   >>   Any documents/labs given to you for this referral                  Your next 10 appointments already scheduled     Aug 28, 2018  4:30 PM CDT   (Arrive by 4:15 PM)   NEW FOOT/ANKLE with Brett Chavez MD   [facility]  Orthopaedic Clinic ([facility])    [address]  4th Floor  [facility] [id number] [phone]            Dec 13, 2018  1:00 PM CST   Return Visit with Christopher Zaman MD   [facility] ([facility])    [address] [place] [address]  [place] [id number] [phone]              Who to contact     If you have questions or need follow up information about today's clinic visit or your schedule please contact [facility] directly at [phone].  Normal or non-critical lab and imaging results will be communicated to you by MyChart, letter or phone within 4 business days after the clinic has received the results. If you do not hear from us within 7 days, please contact the clinic through Micromusclehart or phone. If you have a critical or abnormal lab result, we will notify you by phone as soon as possible.  Submit refill requests through opendorse or call your pharmacy and they will forward the refill request to us. Please allow 3 business days for your refill to be completed.          Additional Information About Your Visit        MyChart Information     opendorse gives you secure access to your electronic health record. If you see a primary care provider, you can also send messages to your care team and make appointments. If you have questions, please call your primary care clinic.  If you do not have a primary care provider, please call [phone] and they will assist you.      opendorse is an electronic gateway that provides easy, online access to your medical records. With opendorse, you can request a clinic appointment, read your test results, renew a prescription or communicate with your care team.     To access your existing account, please contact your [facility] or call [phone] for assistance.        Care EveryWhere ID     This is your Care EveryWhere ID. This could be used by other organizations to access your  [facility] medical records  [id number]        Your Vitals Were     Pulse Pulse Oximetry                77 100%           Blood Pressure from Last 3 Encounters:   07/25/18 126/77   06/14/18 114/75   03/07/18 126/72    Weight from Last 3 Encounters:   06/14/18 71.4 kg (157 lb 8 oz)   02/08/18 70.5 kg (155 lb 6.4 oz)   03/06/17 72.6 kg (160 lb)              We Performed the Following     ORTHOPEDICS ADULT REFERRAL        Primary Care Provider Office Phone # Fax #    Sandy Garcia, APRN [phone]       [address]  [place] [address]        Equal Access to Services     IDA MAHONEY : Hadii aad ku hadasho Sojordan, waaxda luqadaha, qaybta kaalmada adeegyada, miki forrest . So [facility] [phone].    ATENCIÓN: Si habla español, tiene a schaffer disposición servicios gratuitos de asistencia lingüística. Llame al [phone].    We comply with applicable federal civil rights laws and [place] laws. We do not discriminate on the basis of race, color, national origin, age, disability, sex, sexual orientation, or gender identity.            Thank you!     Thank you for choosing [facility]  for your care. Our goal is always to provide you with excellent care. Hearing back from our patients is one way we can continue to improve our services. Please take a few minutes to complete the written survey that you may receive in the mail after your visit with us. Thank you!             Your Updated Medication List - Protect others around you: Learn how to safely use, store and throw away your medicines at www.disposemymeds.org.          This list is accurate as of 7/25/18 11:59 PM.  Always use your most recent med list.                   Brand Name Dispense Instructions for use Diagnosis    albuterol 108 (90 Base) MCG/ACT Inhaler    PROAIR HFA/PROVENTIL HFA/VENTOLIN HFA    1 Inhaler    Inhale 2 puffs into the lungs every 4 hours as needed for shortness of  breath / dyspnea or wheezing    Acute bronchitis, unspecified organism       B-12 1000 MCG Tbcr     100 tablet    Take 1,000 mcg by mouth daily    Vitamin B12 deficiency (non anemic)       BENADRYL PO           betamethasone dipropionate 0.05 % cream    DIPROSONE    45 g    Apply sparingly to affected area twice daily as needed.  Do not apply to face.    Eczema, unspecified type       clobetasol 0.05 % cream    TEMOVATE    60 g    Apply sparingly to affected area twice a week.    Vulvar discomfort       Cranberry 1000 MG Caps      Take 1 capsule by mouth 4 times daily    Lupus (systemic lupus erythematosus) (H), Chronic hepatitis B (H)       fluticasone 50 MCG/ACT spray    FLONASE    16 g    Spray 2 sprays into both nostrils daily    Allergic rhinitis, unspecified allergic rhinitis type       hydroxychloroquine 200 MG tablet    PLAQUENIL    135 tablet    Hydroxychloroquine 300mg daily    Systemic lupus erythematosus, unspecified SLE type, unspecified organ involvement status (H)       Multi-vitamin Tabs tablet      Take 1 tablet by mouth daily    Other abnormal glucose       norethindrone-ethinyl estradiol 1-35 MG-MCG per tablet    ORTHO-NOVUM 1-35 TAB,NORTREL 1-35 TAB    84 tablet    Take 1 tablet by mouth daily    Dysmenorrhea, Menorrhagia with regular cycle       omeprazole 20 MG tablet     30 tablet    Take 1 tablet by mouth daily. Take 30-60 minutes before a meal.    Abdominal pain, epigastric       * predniSONE 5 MG tablet    DELTASONE    70 tablet    20 mg for 1 week, taper by 5 mg weekly    Lupus (systemic lupus erythematosus) (H)       * predniSONE 5 MG tablet    DELTASONE    30 tablet    Take 1 tablet (5 mg) by mouth daily as needed for lupus symptoms    Systemic lupus erythematosus, unspecified SLE type, unspecified organ involvement status (H)       senna-docusate 8.6-50 MG per tablet    SENOKOT-S;PERICOLACE    30 tablet    Take 1-2 tablets by mouth 2 times daily    Thickening of wall of gallbladder        triamcinolone 0.1 % cream    KENALOG    30 g    Apply sparingly to affected area three times daily prn.    Eczema, unspecified type       UNISOM SLEEPGELS 50 MG Caps   Generic drug:  DiphenhydrAMINE HCl (Sleep)      Take 50 mg by mouth At Bedtime        * Notice:  This list has 2 medication(s) that are the same as other medications prescribed for you. Read the directions carefully, and ask your doctor or other care provider to review them with you.

## 2018-07-25 NOTE — NURSING NOTE
Natasha Lee's chief complaint for this visit includes:  Chief Complaint   Patient presents with     Left Foot - Bunion     PCP: Sandy Garcia    Referring Provider:  ELIAS Guerra CNP  [address]  [place] [address]    /77 (BP Location: Right arm)  Pulse 77  SpO2 100%  Data Unavailable     Do you need any medication refills at today's visit? No    Radha Kirby, CMA

## 2018-10-05 NOTE — TELEPHONE ENCOUNTER
FUTURE VISIT INFORMATION      FUTURE VISIT INFORMATION:    Date: 10/16/18    Time: 1000    Location: ORTHO  REFERRAL INFORMATION:    Referring provider:  DR VASQUEZ    Referring providers clinic:  ORTHO    Reason for visit/diagnosis  BUNION    RECORDS REQUESTED FROM:       Clinic name Comments Records Status Imaging Status                                         RECORDS STATUS    RECORDS IN CHART

## 2018-10-11 ENCOUNTER — OFFICE VISIT (OUTPATIENT)
Dept: PEDIATRICS | Facility: CLINIC | Age: 43
End: 2018-10-11
Payer: COMMERCIAL

## 2018-10-11 VITALS
WEIGHT: 164.6 LBS | TEMPERATURE: 97.8 F | HEART RATE: 78 BPM | OXYGEN SATURATION: 99 % | BODY MASS INDEX: 30.15 KG/M2 | SYSTOLIC BLOOD PRESSURE: 137 MMHG | DIASTOLIC BLOOD PRESSURE: 80 MMHG

## 2018-10-11 DIAGNOSIS — J01.00 ACUTE NON-RECURRENT MAXILLARY SINUSITIS: Primary | ICD-10-CM

## 2018-10-11 PROCEDURE — 99213 OFFICE O/P EST LOW 20 MIN: CPT | Performed by: NURSE PRACTITIONER

## 2018-10-11 NOTE — PATIENT INSTRUCTIONS
PLAN:   1.   Symptomatic therapy suggested: rest, apply heat to sinuses prn, use mist of vaporizer prn, OTC saline nasal spray as needed and call prn if symptoms persist or worsen.  2.  Orders Placed This Encounter   Medications     amoxicillin-clavulanate (AUGMENTIN) 875-125 MG per tablet     Sig: Take 1 tablet by mouth 2 times daily     Dispense:  20 tablet     Refill:  0     3. Patient needs to follow up in if no improvement,or sooner if worsening of symptoms or other symptoms develop.      It was a pleasure seeing you today at the [email or web address] - Primary Care. Thank you for allowing us to care for you today. We truly hope we provided you with the excellent service you deserve. Please let us know if there is anything else we can do for you so we can be sure you are leaving completley satisfied with your care experience.       General information about your clinic   Clinic Hours Lab Hours (Appointments are required)   Mon-Thurs: 7:30 AM - 7 PM Mon-Thurs: 7:30 AM - 7 PM   Fri: 7:30 AM - 5 PM Fri: 7:30 AM - 5 PM        After Hours Nurse Advise & Appts:  [facility] Nurse Advisors: [phone]  [facility] On Call: to make appointments anytime: [phone] On Call Physician: call [phone] and answering service will page the on call physician.        For urgent appointments, please call [phone] and ask for the triage nurse or your care team clinic nurse.  How to contact my care team:  Tomorrowishhart: [email or web address]   Phone: [phone]   Fax: [phone]       [facility]:   Phone: [phone]  Hours: 8:00 AM - 6:00 PM  Medication Refills:  Call your pharmacy and they will forward the refill to us. Please allow 3 business days for your refills to be completed.       Normal or non-critical lab and imaging results will be communicated to you by MyChart, letter or phone within 7 days.  If you do not hear from us within 10 days, please call the clinic. If you have a critical or abnormal lab  result, we will notify you by phone as soon as possible.       We now have PWIC (Pediatric Walk in Care)  Monday-Friday from 7:30-4. Simply walk in and be seen for your urgent needs like cough, fever, rash, diarrhea or vomiting, pink eye, UTI. No appointments needed. Ask one of the team for more information      -Your Care Team:    Dr. Johann Patino - Internal Medicine/Pediatrics   Dr. Jake Pierce - Family Medicine  Dr. Jamila Purecll - Pediatrics  Dr. Susan Hand - Pediatrics  Sandy Garcia CNP - Family Practice Nurse Practitioner

## 2018-10-11 NOTE — NURSING NOTE
"Chief Complaint   Patient presents with     Sinus Problem     sinus infection x 3 weeks       Initial /80 (BP Location: Right arm, Patient Position: Sitting, Cuff Size: Adult Regular)  Pulse 78  Temp 97.8  F (36.6  C) (Temporal)  Wt 164 lb 9.6 oz (74.7 kg)  LMP  (LMP Unknown)  SpO2 99%  Breastfeeding? No  BMI 30.15 kg/m2 Estimated body mass index is 30.15 kg/(m^2) as calculated from the following:    Height as of 2/8/18: 5' 1.95\" (1.574 m).    Weight as of this encounter: 164 lb 9.6 oz (74.7 kg).  Medication Reconciliation: complete      JESUSITA Gudino      "

## 2018-10-11 NOTE — PROGRESS NOTES
SUBJECTIVE:   Natasha Lee is a 43 year old female who presents to clinic today for the following health issues:    Acute Illness   Acute illness concerns: sinus infection  Onset: 3 weeks    Fever: no    Chills/Sweats: no    Headache (location?): YES    Sinus Pressure:YES- reddened, post-nasal drainage and facial pain    Conjunctivitis:  no    Ear Pain: YES: bilateral    Rhinorrhea: YES    Congestion: YES    Sore Throat: YES     Cough: no    Wheeze: no    Decreased Appetite: no    Nausea: no    Vomiting: no    Diarrhea:  no    Dysuria/Freq.: no    Fatigue/Achiness: no    Sick/Strep Exposure: no     Therapies Tried and outcome: claritin, nasal sprays, saline solution     Has been having symptoms for the last 3 weeks     Problem list and histories reviewed & adjusted, as indicated.  Additional history: as documented    Patient Active Problem List   Diagnosis     Chronic hepatitis B (H)     Lupus (systemic lupus erythematosus) (H)     CARDIOVASCULAR SCREENING; LDL GOAL LESS THAN 160     Health Care Home     Dysmenorrhea     Menorrhagia     Allergic rhinitis     Other nonspecific finding on examination of urine     Urinary frequency     Vitamin B12 deficiency (non anemic)     Heart murmur     S/P laparoscopic cholecystectomy     Past Surgical History:   Procedure Laterality Date     ANESTH,SKIN SURGERY,KNEE  1993, 1990    orthoscopic     C APPENDECTOMY  4/2008     CHOLECYSTECTOMY  2016     CL AFF SURGICAL PATHOLOGY  2007     LAPAROSCOPIC CHOLECYSTECTOMY N/A 5/18/2016    Procedure: LAPAROSCOPIC CHOLECYSTECTOMY;  Surgeon: Radha Cline MD;  Location:  OR     LAPAROSCOPIC TUBAL LIGATION  12/15/2011    Procedure:LAPAROSCOPIC TUBAL LIGATION; Laparoscopic tubal ligation; Surgeon:AMY WYNN; Location: OR       Social History   Substance Use Topics     Smoking status: Never Smoker     Smokeless tobacco: Never Used     Alcohol use No     Family History   Problem Relation Age of Onset      Unknown/Adopted Mother      Unknown/Adopted Father          Current Outpatient Prescriptions   Medication Sig Dispense Refill     betamethasone dipropionate (DIPROSONE) 0.05 % cream Apply sparingly to affected area twice daily as needed.  Do not apply to face. 45 g 0     Cranberry 1000 MG CAPS Take 1 capsule by mouth 4 times daily       Cyanocobalamin (B-12) 1000 MCG TBCR Take 1,000 mcg by mouth daily 100 tablet 1     DiphenhydrAMINE HCl (BENADRYL PO)        DiphenhydrAMINE HCl, Sleep, (UNISOM SLEEPGELS) 50 MG CAPS Take 50 mg by mouth At Bedtime       fluticasone (FLONASE) 50 MCG/ACT nasal spray Spray 2 sprays into both nostrils daily 16 g 3     hydroxychloroquine (PLAQUENIL) 200 MG tablet Hydroxychloroquine 300mg daily 135 tablet 3     multivitamin, therapeutic with minerals (MULTI-VITAMIN) TABS Take 1 tablet by mouth daily       norethindrone-ethinyl estradiol (ORTHO-NOVUM 1-35 TAB,NORTREL 1-35 TAB) 1-35 MG-MCG per tablet Take 1 tablet by mouth daily 84 tablet 4     omeprazole 20 MG tablet Take 1 tablet by mouth daily. Take 30-60 minutes before a meal. 30 tablet 1     predniSONE (DELTASONE) 5 MG tablet Take 1 tablet (5 mg) by mouth daily as needed for lupus symptoms 30 tablet 1     triamcinolone (KENALOG) 0.1 % cream Apply sparingly to affected area three times daily prn. (Patient not taking: Reported on 3/7/2018) 30 g 0     Allergies   Allergen Reactions     Carbamazepine      Fever, rash     Nyquil Severe Cold-Flu [Vicks Nyquil Severe]      hives     Labs reviewed in EPIC    Reviewed and updated as needed this visit by clinical staff  Tobacco  Allergies  Meds  Med Hx  Surg Hx  Fam Hx  Soc Hx      Reviewed and updated as needed this visit by Provider         ROS:  CONSTITUTIONAL:NEGATIVE for fever, chills, change in weight  ENT/MOUTH: POSITIVE for nasal congestion, postnasal drainage and sinus pressure and NEGATIVE for ear pain bilateral  RESP:NEGATIVE for significant cough or SOB  CV: NEGATIVE for chest  pain, palpitations or peripheral edema  GI: NEGATIVE for nausea, poor appetite and vomiting  MUSCULOSKELETAL: NEGATIVE for significant arthralgias or myalgia  HEME/ALLERGY/IMMUNE: NEGATIVE for bleeding problems and POSITIVE  for allergies    OBJECTIVE:     /80 (BP Location: Right arm, Patient Position: Sitting, Cuff Size: Adult Regular)  Pulse 78  Temp 97.8  F (36.6  C) (Temporal)  Wt 164 lb 9.6 oz (74.7 kg)  LMP  (LMP Unknown)  SpO2 99%  Breastfeeding? No  BMI 30.15 kg/m2  Body mass index is 30.15 kg/(m^2).  GENERAL: Patient is well nourished, well developed,in no apparent distress, well developed and well nourished, non-toxic and in no respiratory distress and acyanotic  mildly ill but alert and responsive  EYES:  Right conjunctiva is not injected and without discharge.  Left conjunctiva is not injected and without discharge.  EARS: negative findings: external ears normal to inspection and palpation, no mastoid process tenderness, positive findings: , Right TM: serous middle ear fluid, Left TM: serous middle ear fluid  NOSE: positive findings: mucosa swollen, pale, and boggy,  Sinus maxillary tenderness on bilaterally.  THROAT: normal.  NECK: supple with no adenopathy,   CARDIAC:NORMAL - regular rate and rhythm without murmur.  RESP: normal respiratory rate and rhythm, lungs clear to auscultation  unlabored respirations, no intercostal retractions or accessory muscle use  ABD: Abdomen soft, non-tender.  SKIN: Skin color, texture, turgor normal. No rashes or lesions.  MS: extremities normal- no gross deformities noted, gait normal and normal muscle tone        Diagnostic Test Results:  none     ASSESSMENT/PLAN:     Natasha was seen today for sinus problem.    Diagnoses and all orders for this visit:    Acute non-recurrent maxillary sinusitis  -     amoxicillin-clavulanate (AUGMENTIN) 875-125 MG per tablet; Take 1 tablet by mouth 2 times daily    PLAN:   1.   Symptomatic therapy suggested: rest, apply heat  to sinuses prn, use mist of vaporizer prn, OTC saline nasal spray as needed and call prn if symptoms persist or worsen.  2.  Orders Placed This Encounter   Medications     amoxicillin-clavulanate (AUGMENTIN) 875-125 MG per tablet     Sig: Take 1 tablet by mouth 2 times daily     Dispense:  20 tablet     Refill:  0     3. Patient needs to follow up in if no improvement,or sooner if worsening of symptoms or other symptoms develop.    See Patient Instructions    ELIAS Guerra CNP  [facility]

## 2018-10-11 NOTE — MR AVS SNAPSHOT
After Visit Summary   10/11/2018    Natasha Lee    MRN: [id number]           Patient Information     Date Of Birth          1975        Visit Information        Provider Department      10/11/2018 2:50 PM Sandy Garcia APRN CNP [facility]        Today's Diagnoses     Acute non-recurrent maxillary sinusitis    -  1      Care Instructions    PLAN:   1.   Symptomatic therapy suggested: rest, apply heat to sinuses prn, use mist of vaporizer prn, OTC saline nasal spray as needed and call prn if symptoms persist or worsen.  2.  Orders Placed This Encounter   Medications     amoxicillin-clavulanate (AUGMENTIN) 875-125 MG per tablet     Sig: Take 1 tablet by mouth 2 times daily     Dispense:  20 tablet     Refill:  0     3. Patient needs to follow up in if no improvement,or sooner if worsening of symptoms or other symptoms develop.      It was a pleasure seeing you today at the [facility]. Thank you for allowing us to care for you today. We truly hope we provided you with the excellent service you deserve. Please let us know if there is anything else we can do for you so we can be sure you are leaving completley satisfied with your care experience.       General information about your clinic   Clinic Hours Lab Hours (Appointments are required)   Mon-Thurs: 7:30 AM - 7 PM Mon-Thurs: 7:30 AM - 7 PM   Fri: 7:30 AM - 5 PM Fri: 7:30 AM - 5 PM        After Hours Nurse Advise & Appts:  [facility]: [phone]  [facility] On Call: to make appointments anytime: [phone] On Call Physician: call [phone] and answering service will page the on call physician.        For urgent appointments, please call [phone] and ask for the triage nurse or your care team clinic nurse.  How to contact my care team:  MyChart: [email or web address]   Phone: [phone]   Fax: [phone]       [facility]:   Phone:  [phone]  Hours: 8:00 AM - 6:00 PM  Medication Refills:  Call your pharmacy and they will forward the refill to us. Please allow 3 business days for your refills to be completed.       Normal or non-critical lab and imaging results will be communicated to you by MyChart, letter or phone within 7 days.  If you do not hear from us within 10 days, please call the clinic. If you have a critical or abnormal lab result, we will notify you by phone as soon as possible.       We now have PWIC (Pediatric Walk in Care)  Monday-Friday from 7:30-4. Simply walk in and be seen for your urgent needs like cough, fever, rash, diarrhea or vomiting, pink eye, UTI. No appointments needed. Ask one of the team for more information      -Your Care Team:    Dr. Johann Patino - Internal Medicine/Pediatrics   Dr. Jake Pierce - Family Medicine  Dr. Jamila Purcell - Pediatrics  Dr. Susan Hand - Pediatrics  Sandy Garcia CNP - Family Practice Nurse Practitioner                         Follow-ups after your visit        Your next 10 appointments already scheduled     Oct 16, 2018 10:00 AM CDT   (Arrive by 9:45 AM)   NEW FOOT/ANKLE with Brett Chavez MD   [facility] ([facility])    [address]  [address] [place] [facility] [address] [phone]            Dec 13, 2018  1:00 PM CST   Return Visit with Christopher Zaman MD   [facility] ([facility])    [address] [place] [id number] [phone]              Who to contact     If you have questions or need follow up information about today's clinic visit or your schedule please contact [facility] directly at [phone].  Normal or non-critical lab and imaging results will be communicated to you by MyChart, letter or phone within 4 business days after the clinic has received the results. If you do not hear from us within 7 days, please contact the clinic through MyChart or  phone. If you have a critical or abnormal lab result, we will notify you by phone as soon as possible.  Submit refill requests through [facility] or call your pharmacy and they will forward the refill request to us. Please allow 3 business days for your refill to be completed.          Additional Information About Your Visit        [facility] Information     [facility] gives you secure access to your electronic health record. If you see a primary care provider, you can also send messages to your care team and make appointments. If you have questions, please call your primary care clinic.  If you do not have a primary care provider, please call [phone] and they will assist you.      [facility] is an electronic gateway that provides easy, online access to your medical records. With [facility], you can request a clinic appointment, read your test results, renew a prescription or communicate with your care team.     To access your existing account, please contact your [facility] or call [phone] for assistance.        Care EveryWhere ID     This is your Care EveryWhere ID. This could be used by other organizations to access your [facility] medical records  [id number]        Your Vitals Were     Pulse Temperature Last Period Pulse Oximetry Breastfeeding? BMI (Body Mass Index)    78 97.8  F (36.6  C) (Temporal) (LMP Unknown) 99% No 30.15 kg/m2       Blood Pressure from Last 3 Encounters:   10/11/18 137/80   07/25/18 126/77   06/14/18 114/75    Weight from Last 3 Encounters:   10/11/18 164 lb 9.6 oz (74.7 kg)   06/14/18 157 lb 8 oz (71.4 kg)   02/08/18 155 lb 6.4 oz (70.5 kg)              Today, you had the following     No orders found for display         Today's Medication Changes          These changes are accurate as of 10/11/18  3:30 PM.  If you have any questions, ask your nurse or doctor.               Start taking these medicines.        Dose/Directions    amoxicillin-clavulanate 875-125 MG  per tablet   Commonly known as:  AUGMENTIN   Used for:  Acute non-recurrent maxillary sinusitis   Started by:  Sandy Garcia APRN CNP        Dose:  1 tablet   Take 1 tablet by mouth 2 times daily   Quantity:  20 tablet   Refills:  0            Where to get your medicines      These medications were sent to [facility] [address] [facility] [address] [place] [address]  [address], [facility] [address]     Phone:  [phone]     amoxicillin-clavulanate 875-125 MG per tablet                Primary Care Provider Office Phone # Fax #    ELIAS Guerra [phone]       [address]  [place] [address]        Equal Access to Services     [facility] : Hadii alexander max hadasho Soomaali, waaxda luqadaha, qaybta kaalmada adeegyada, miki sadlerin haytristan forrest . So [facility] [phone].    ATENCIÓN: Si habla español, tiene a schaffer disposición servicios gratuitos de asistencia lingüística. Llame al [phone].    We comply with applicable federal civil rights laws and [place] laws. We do not discriminate on the basis of race, color, national origin, age, disability, sex, sexual orientation, or gender identity.            Thank you!     Thank you for choosing [facility]  for your care. Our goal is always to provide you with excellent care. Hearing back from our patients is one way we can continue to improve our services. Please take a few minutes to complete the written survey that you may receive in the mail after your visit with us. Thank you!             Your Updated Medication List - Protect others around you: Learn how to safely use, store and throw away your medicines at www.disposemymeds.org.          This list is accurate as of 10/11/18  3:30 PM.  Always use your most recent med list.                   Brand Name Dispense Instructions for use Diagnosis    amoxicillin-clavulanate 875-125 MG per  tablet    AUGMENTIN    20 tablet    Take 1 tablet by mouth 2 times daily    Acute non-recurrent maxillary sinusitis       B-12 1000 MCG Tbcr     100 tablet    Take 1,000 mcg by mouth daily    Vitamin B12 deficiency (non anemic)       BENADRYL PO           betamethasone dipropionate 0.05 % cream    DIPROSONE    45 g    Apply sparingly to affected area twice daily as needed.  Do not apply to face.    Eczema, unspecified type       Cranberry 1000 MG Caps      Take 1 capsule by mouth 4 times daily    Lupus (systemic lupus erythematosus) (H), Chronic hepatitis B (H)       fluticasone 50 MCG/ACT spray    FLONASE    16 g    Spray 2 sprays into both nostrils daily    Allergic rhinitis, unspecified allergic rhinitis type       hydroxychloroquine 200 MG tablet    PLAQUENIL    135 tablet    Hydroxychloroquine 300mg daily    Systemic lupus erythematosus, unspecified SLE type, unspecified organ involvement status (H)       Multi-vitamin Tabs tablet      Take 1 tablet by mouth daily    Other abnormal glucose       norethindrone-ethinyl estradiol 1-35 MG-MCG per tablet    ORTHO-NOVUM 1-35 TAB,NORTREL 1-35 TAB    84 tablet    Take 1 tablet by mouth daily    Dysmenorrhea, Menorrhagia with regular cycle       omeprazole 20 MG tablet     30 tablet    Take 1 tablet by mouth daily. Take 30-60 minutes before a meal.    Abdominal pain, epigastric       predniSONE 5 MG tablet    DELTASONE    30 tablet    Take 1 tablet (5 mg) by mouth daily as needed for lupus symptoms    Systemic lupus erythematosus, unspecified SLE type, unspecified organ involvement status (H)       triamcinolone 0.1 % cream    KENALOG    30 g    Apply sparingly to affected area three times daily prn.    Eczema, unspecified type       UNISOM SLEEPGELS 50 MG Caps   Generic drug:  DiphenhydrAMINE HCl (Sleep)      Take 50 mg by mouth At Bedtime

## 2018-10-16 ENCOUNTER — DOCUMENTATION ONLY (OUTPATIENT)
Dept: ORTHOPEDICS | Facility: CLINIC | Age: 43
End: 2018-10-16

## 2018-10-16 ENCOUNTER — OFFICE VISIT (OUTPATIENT)
Dept: ORTHOPEDICS | Facility: CLINIC | Age: 43
End: 2018-10-16
Payer: COMMERCIAL

## 2018-10-16 ENCOUNTER — PRE VISIT (OUTPATIENT)
Dept: ORTHOPEDICS | Facility: CLINIC | Age: 43
End: 2018-10-16

## 2018-10-16 VITALS — WEIGHT: 164.5 LBS | BODY MASS INDEX: 30.27 KG/M2 | HEIGHT: 62 IN

## 2018-10-16 DIAGNOSIS — M25.572 PAIN IN JOINT, ANKLE AND FOOT, LEFT: Primary | ICD-10-CM

## 2018-10-16 ASSESSMENT — ENCOUNTER SYMPTOMS
DYSURIA: 1
FLANK PAIN: 0
POOR WOUND HEALING: 0
DIFFICULTY URINATING: 0
HEMATURIA: 0
SKIN CHANGES: 0
NAIL CHANGES: 0

## 2018-10-16 NOTE — NURSING NOTE
"Reason For Visit:   Chief Complaint   Patient presents with     Consult     bunion of the great toe left foot        Ht 1.581 m (5' 2.25\")  Wt 74.6 kg (164 lb 8 oz)  LMP  (LMP Unknown)  BMI 29.85 kg/m2    Pain Assessment  Patient Currently in Pain: Yes  0-10 Pain Scale: 7  Primary Pain Location: Foot  Pain Orientation: Left  Pain Descriptors: Throbbing  Alleviating Factors: Rest, NSAIDS  Aggravating Factors: Stairs, Standing, Walking    Keny Najera ATC  "

## 2018-10-16 NOTE — PROGRESS NOTES
Patient is scheduled for surgery with Dr. Chavez    Spoke or left message with: Patient    Date of Surgery: 2/13/19    Location: [facility]    Post ops: 2 weeks & 6 weeks    Pre-op with surgeon (if applicable): Complete    H&P: Patient to schedule with PCP    Additional imaging/appointments: N/A    Surgery packet: Received in clinic     Additional comments: N/A

## 2018-10-16 NOTE — PROGRESS NOTES
CHIEF COMPLAINT:  Left bunion deformity.      HISTORY OF PRESENT ILLNESS:  Ms. Lee is a 43-year-old female who presents for evaluation of her left foot.  The patient presents in the company of her .        Reports to have a history of pain and discomfort within the left bunion which has been present over the past couple of years.  Reports to have tried the use of Tylenol, activity modification, shoe modifications and, in spite of this, continues to have problems and difficulties.  Reports to be unable to do any dress shoes.  Reports also to have limitations for prolonged standing and walking.  She is afraid about how the winter is going to be given the fact that she will require to be in more constrained shoes for the cold weather.        Denies to have any problems on the right foot.  Reports to take care of her child as well as to have a regular workout routine.  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      PAST MEDICAL HISTORY:  Lupus, hepatitis B, among others.      PAST SURGICAL HISTORY:  Cholecystectomy.      DRUG ALLERGIES:  Reviewed today.      CURRENT MEDICATIONS:  Reviewed today.      PHYSICAL EXAMINATION:  On today's visit, she presents as a pleasant female in no apparent distress with a height of 5 feet 2 inches and a weight of 164 pounds.  Denies to have any constitutional symptoms.      On today's visit, she presents with full range of motion of the left ankle, hindfoot and midfoot joints.  CMS intact.  Skin intact.  Presents with a hypermobile first ray and a semi-correctable bunion deformity.  There is a pressure point along the eminence of the first MTP joint.      RADIOGRAPHIC EVALUATION:  Three views of the left foot were obtained today which were significant for showing a noncongruent bunion deformity without any significant arthritic changes.  Presents otherwise with a fairly unremarkable x-ray.      ASSESSMENT:  Left hallux valgus  deformity.      PLAN:  I discussed with the patient and her  that at this point from a surgical point of view the only thing we will be able to do will be a Lapidus procedure with a possible Akien osteotomy.  I discussed with her the most likely postoperative course and complications from undergoing such intervention, which include but are not limited to infection, bleeding, nerve damage, residual pain, nonunion and stiffness.      All questions were answered.  The patient was pleased with the discussion.  Patient will schedule surgery at the best of her convenience, which likely will be arranged for 01/2019.        The patient will not require another clinic appointment if she in fact schedules the surgery for 01/2019.      TT 30 minutes, CT 20 minutes

## 2018-10-16 NOTE — NURSING NOTE
Teaching Flowsheet   Relevant Diagnosis: left lapidus, possible akin osteotomy  Teaching Topic: preop     Person(s) involved in teaching:   Patient and      Motivation Level:  Asks Questions: Yes  Eager to Learn: Yes  Cooperative: Yes  Receptive (willing/able to accept information): Yes  Any cultural factors/Buddhist beliefs that may influence understanding or compliance? No       Patient and Family demonstrates understanding of the following:  Reason for the appointment, diagnosis and treatment plan: Yes  Knowledge of proper use of medications and conditions for which they are ordered (with special attention to potential side effects or drug interactions): Yes  Which situations necessitate calling provider and whom to contact: Yes       Teaching Concerns Addressed:        Proper use and care of soap (medical equip, care aids, etc.): Yes  Nutritional needs and diet plan: Yes  Pain management techniques: Yes  Wound Care: Yes  How and/when to access community resources: NA     Instructional Materials Used/Given: surgery packet reviewed with patient by RN.  They will obtain H&P with PCP.  We will set OR date for later in January.  She has no other questions or concerns at this time.

## 2018-10-16 NOTE — LETTER
10/16/2018       RE: Natasha Lee  [address] [address]     Dear Colleague,    Thank you for referring your patient, Natasha Lee, [facility]. Please see a copy of my visit note below.    CHIEF COMPLAINT:  Left bunion deformity.      HISTORY OF PRESENT ILLNESS:  Ms. Lee is a 43-year-old female who presents for evaluation of her left foot.  The patient presents in the company of her .        Reports to have a history of pain and discomfort within the left bunion which has been present over the past couple of years.  Reports to have tried the use of Tylenol, activity modification, shoe modifications and, in spite of this, continues to have problems and difficulties.  Reports to be unable to do any dress shoes.  Reports also to have limitations for prolonged standing and walking.  She is afraid about how the winter is going to be given the fact that she will require to be in more constrained shoes for the cold weather.        Denies to have any problems on the right foot.  Reports to take care of her child as well as to have a regular workout routine.  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      PAST MEDICAL HISTORY:  Lupus, hepatitis B, among others.      PAST SURGICAL HISTORY:  Cholecystectomy.      DRUG ALLERGIES:  Reviewed today.      CURRENT MEDICATIONS:  Reviewed today.      PHYSICAL EXAMINATION:  On today's visit, she presents as a pleasant female in no apparent distress with a height of 5 feet 2 inches and a weight of 164 pounds.  Denies to have any constitutional symptoms.      On today's visit, she presents with full range of motion of the left ankle, hindfoot and midfoot joints.  CMS intact.  Skin intact.  Presents with a hypermobile first ray and a semi-correctable bunion deformity.  There is a pressure point along the eminence of  the first MTP joint.      RADIOGRAPHIC EVALUATION:  Three views of the left foot were obtained today which were significant for showing a noncongruent bunion deformity without any significant arthritic changes.  Presents otherwise with a fairly unremarkable x-ray.      ASSESSMENT:  Left hallux valgus deformity.      PLAN:  I discussed with the patient and her  that at this point from a surgical point of view the only thing we will be able to do will be a Lapidus procedure with a possible Akien osteotomy.  I discussed with her the most likely postoperative course and complications from undergoing such intervention, which include but are not limited to infection, bleeding, nerve damage, residual pain, nonunion and stiffness.      All questions were answered.  The patient was pleased with the discussion.  Patient will schedule surgery at the best of her convenience, which likely will be arranged for 01/2019.        The patient will not require another clinic appointment if she in fact schedules the surgery for 01/2019.      TT 30 minutes, CT 20 minutes       Brett Chavez MD

## 2018-10-16 NOTE — MR AVS SNAPSHOT
After Visit Summary   10/16/2018    Natasha Lee    MRN: [id number]           Patient Information     Date Of Birth          1975        Visit Information        Provider Department      10/16/2018 10:00 AM Brett Chavez MD [facility]        Today's Diagnoses     Pain in joint, ankle and foot, left    -  1       Follow-ups after your visit        Your next 10 appointments already scheduled     Dec 13, 2018  1:00 PM CST   Return Visit with Christopher Zaman MD   [facility] ([facility])    [address] [facility] [address]   [phone]            Feb 13, 2019   Procedure with Brett Chavez MD   [facility] ([facility] [place])    [address] [place] [address]  5th [facility] [id number] [phone]           Located in the Clinics and Surgery Center at [address] [place] [address].   parking is very convenient and highly recommended.  is a $6 flat rate fee.  Both  and self parkers should enter the main arrival plaza from [facility]; parking attendants will direct you based on your parking preference.            Feb 26, 2019 10:30 AM CST   (Arrive by 10:15 AM)   POST-OP FOOT/ANKLE with Brett Chavez MD   [facility] ([facility])    [address] [place] [address]  4th [facility] [id number] [phone]              Who to contact     Please call your clinic at [phone] to:    Ask questions about your health    Make or cancel appointments    Discuss your medicines    Learn about your test results    Speak to your doctor            Additional Information About Your Visit        MyChart Information     21viaNett gives you secure access to your electronic health record. If you see a primary care provider, you can also send messages to your care team and make appointments. If you have questions, please  "call your primary care clinic.  If you do not have a primary care provider, please call [phone] and they will assist you.      Cantargia is an electronic gateway that provides easy, online access to your medical records. With Cantargia, you can request a clinic appointment, read your test results, renew a prescription or communicate with your care team.     To access your existing account, please contact your [facility] or call [phone] for assistance.        Care EveryWhere ID     This is your Care EveryWhere ID. This could be used by other organizations to access your [facility] medical records  [id number]        Your Vitals Were     Height Last Period BMI (Body Mass Index)             1.581 m (5' 2.25\") (LMP Unknown) 29.85 kg/m2          Blood Pressure from Last 3 Encounters:   10/11/18 137/80   07/25/18 126/77   06/14/18 114/75    Weight from Last 3 Encounters:   10/16/18 74.6 kg (164 lb 8 oz)   10/11/18 74.7 kg (164 lb 9.6 oz)   06/14/18 71.4 kg (157 lb 8 oz)              Today, you had the following     No orders found for display       Primary Care Provider Office Phone # Fax #    Sandy Thompson Jose, APRN [facility] [phone]       [address]  [place] [address]        Equal Access to Services     [facility] MARU : Hadii alexander ku hadasho Soomaali, waaxda luqadaha, qaybta kaalmada abadyada, miki forrest . So [facility] [phone].    ATENCIÓN: Si habla español, tiene a schaffer disposición servicios gratuitos de asistencia lingüística. Radha al [phone].    We comply with applicable federal civil rights laws and [place] laws. We do not discriminate on the basis of race, color, national origin, age, disability, sex, sexual orientation, or gender identity.            Thank you!     Thank you for choosing [facility]  for your care. Our goal is always to provide you with excellent care. Hearing back from our patients " is one way we can continue to improve our services. Please take a few minutes to complete the written survey that you may receive in the mail after your visit with us. Thank you!             Your Updated Medication List - Protect others around you: Learn how to safely use, store and throw away your medicines at www.disposemymeds.org.          This list is accurate as of 10/16/18 11:59 PM.  Always use your most recent med list.                   Brand Name Dispense Instructions for use Diagnosis    amoxicillin-clavulanate 875-125 MG per tablet    AUGMENTIN    20 tablet    Take 1 tablet by mouth 2 times daily    Acute non-recurrent maxillary sinusitis       B-12 1000 MCG Tbcr     100 tablet    Take 1,000 mcg by mouth daily    Vitamin B12 deficiency (non anemic)       BENADRYL PO           betamethasone dipropionate 0.05 % cream    DIPROSONE    45 g    Apply sparingly to affected area twice daily as needed.  Do not apply to face.    Eczema, unspecified type       Cranberry 1000 MG Caps      Take 1 capsule by mouth 4 times daily    Lupus (systemic lupus erythematosus) (H), Chronic hepatitis B (H)       fluticasone 50 MCG/ACT spray    FLONASE    16 g    Spray 2 sprays into both nostrils daily    Allergic rhinitis, unspecified allergic rhinitis type       hydroxychloroquine 200 MG tablet    PLAQUENIL    135 tablet    Hydroxychloroquine 300mg daily    Systemic lupus erythematosus, unspecified SLE type, unspecified organ involvement status (H)       Multi-vitamin Tabs tablet      Take 1 tablet by mouth daily    Other abnormal glucose       norethindrone-ethinyl estradiol 1-35 MG-MCG per tablet    ORTHO-NOVUM 1-35 TAB,NORTREL 1-35 TAB    84 tablet    Take 1 tablet by mouth daily    Dysmenorrhea, Menorrhagia with regular cycle       omeprazole 20 MG tablet     30 tablet    Take 1 tablet by mouth daily. Take 30-60 minutes before a meal.    Abdominal pain, epigastric       predniSONE 5 MG tablet    DELTASONE    30 tablet     Take 1 tablet (5 mg) by mouth daily as needed for lupus symptoms    Systemic lupus erythematosus, unspecified SLE type, unspecified organ involvement status (H)       triamcinolone 0.1 % cream    KENALOG    30 g    Apply sparingly to affected area three times daily prn.    Eczema, unspecified type       UNISOM SLEEPGELS 50 MG Caps   Generic drug:  DiphenhydrAMINE HCl (Sleep)      Take 50 mg by mouth At Bedtime

## 2018-11-16 ENCOUNTER — OFFICE VISIT (OUTPATIENT)
Dept: PEDIATRICS | Facility: CLINIC | Age: 43
End: 2018-11-16
Payer: COMMERCIAL

## 2018-11-16 VITALS
OXYGEN SATURATION: 97 % | BODY MASS INDEX: 29.28 KG/M2 | WEIGHT: 161.4 LBS | SYSTOLIC BLOOD PRESSURE: 110 MMHG | DIASTOLIC BLOOD PRESSURE: 60 MMHG | TEMPERATURE: 97.2 F | HEART RATE: 82 BPM

## 2018-11-16 DIAGNOSIS — B18.1 CHRONIC HEPATITIS B (H): ICD-10-CM

## 2018-11-16 DIAGNOSIS — Z11.4 SCREENING FOR HUMAN IMMUNODEFICIENCY VIRUS: ICD-10-CM

## 2018-11-16 DIAGNOSIS — L30.9 DERMATITIS: Primary | ICD-10-CM

## 2018-11-16 PROCEDURE — 86708 HEPATITIS A ANTIBODY: CPT | Performed by: NURSE PRACTITIONER

## 2018-11-16 PROCEDURE — 86803 HEPATITIS C AB TEST: CPT | Performed by: NURSE PRACTITIONER

## 2018-11-16 PROCEDURE — 87340 HEPATITIS B SURFACE AG IA: CPT | Performed by: NURSE PRACTITIONER

## 2018-11-16 PROCEDURE — 86706 HEP B SURFACE ANTIBODY: CPT | Performed by: NURSE PRACTITIONER

## 2018-11-16 PROCEDURE — 99213 OFFICE O/P EST LOW 20 MIN: CPT | Performed by: NURSE PRACTITIONER

## 2018-11-16 PROCEDURE — 36415 COLL VENOUS BLD VENIPUNCTURE: CPT | Performed by: NURSE PRACTITIONER

## 2018-11-16 PROCEDURE — 87341 HEP B SURFACE AG NEUTRLZJ IA: CPT | Performed by: NURSE PRACTITIONER

## 2018-11-16 PROCEDURE — 87389 HIV-1 AG W/HIV-1&-2 AB AG IA: CPT | Performed by: NURSE PRACTITIONER

## 2018-11-16 PROCEDURE — 86704 HEP B CORE ANTIBODY TOTAL: CPT | Performed by: NURSE PRACTITIONER

## 2018-11-16 NOTE — NURSING NOTE
"Chief Complaint   Patient presents with     Derm Problem     break out on the face, has been getting worst in the last 6 months       Initial /60 (BP Location: Right arm, Patient Position: Sitting, Cuff Size: Adult Regular)  Pulse 82  Temp 97.2  F (36.2  C) (Temporal)  Wt 161 lb 6.4 oz (73.2 kg)  LMP 11/15/2018  SpO2 97%  Breastfeeding? No  BMI 29.28 kg/m2 Estimated body mass index is 29.28 kg/(m^2) as calculated from the following:    Height as of 10/16/18: 5' 2.25\" (1.581 m).    Weight as of this encounter: 161 lb 6.4 oz (73.2 kg).  Medication Reconciliation: complete      JESUSITA Gudino      "

## 2018-11-16 NOTE — LETTER
November 16, 2018      RE: Natasha Lee  [address] [facility] [address]       To whom it may concern:    Natasha Lee was seen in our clinic today.       Sincerely,      Sandy Garcia RN, CNP

## 2018-11-16 NOTE — MR AVS SNAPSHOT
After Visit Summary   11/16/2018    Natasha Lee    MRN: [id number]           Patient Information     Date Of Birth          1975        Visit Information        Provider Department      11/16/2018 11:30 AM Sandy Garcia APRN CNP [facility]        Today's Diagnoses     Dermatitis    -  1    Screening for human immunodeficiency virus        Chronic hepatitis B (H)          Care Instructions    PLAN:   1.   Symptomatic therapy suggested: Continue current medications.  2.  Orders Placed This Encounter   Procedures     Hepatitis A Antibody IgG     Hepatitis B Surface Antibody     HEPATITIS B CORE ANTIBODY     HEPATITIS B SURFACE ANTIGEN     HEPATITS C ANTIBODY     HIV Antigen Antibody Combo     DERMATOLOGY REFERRAL       3. Patient needs to follow up in if no improvement,or sooner if worsening of symptoms or other symptoms develop.  CONSULTATION/REFERRAL to Dermatology  Will follow up and/or notify patient of  results via My Chart to determine further need for followup    It was a pleasure seeing you today at the [facility]. Thank you for allowing us to care for you today. We truly hope we provided you with the excellent service you deserve. Please let us know if there is anything else we can do for you so we can be sure you are leaving completley satisfied with your care experience.       General information about your clinic   Clinic Hours Lab Hours (Appointments are required)   Mon-Thurs: 7:30 AM - 7 PM Mon-Thurs: 7:30 AM - 7 PM   Fri: 7:30 AM - 5 PM Fri: 7:30 AM - 5 PM        After Hours Nurse Advise & Appts:  [facility] Nurse Advisors: [phone]  [facility] On Call: to make appointments anytime: [phone] On Call Physician: call [phone] and answering service will page the on call physician.        For urgent appointments, please call [phone] and ask for the triage nurse or your care team clinic nurse.  How to contact my  care team:  Kari: [email or web address]   Phone: [phone]   Fax: [phone]       [facility]:   Phone: [phone]  Hours: 8:00 AM - 6:00 PM  Medication Refills:  Call your pharmacy and they will forward the refill to us. Please allow 3 business days for your refills to be completed.       Normal or non-critical lab and imaging results will be communicated to you by MyChart, letter or phone within 7 days.  If you do not hear from us within 10 days, please call the clinic. If you have a critical or abnormal lab result, we will notify you by phone as soon as possible.       We now have PWIC (Pediatric Walk in Care)  Monday-Friday from 7:30-4. Simply walk in and be seen for your urgent needs like cough, fever, rash, diarrhea or vomiting, pink eye, UTI. No appointments needed. Ask one of the team for more information      -Your Care Team:    Dr. Johann Patino - Internal Medicine/Pediatrics   Dr. Jake Pierce - Family Medicine  Dr. Jamila Purcell - Pediatrics  Dr. Susan Hand - Pediatrics  Sandy Garcia CNP - Family Practice Nurse Practitioner                         Follow-ups after your visit        Additional Services     DERMATOLOGY REFERRAL       Your provider has referred you to: [facility]: [facility] [phone]   [email or web address]    Please be aware that coverage of these services is subject to the terms and limitations of your health insurance plan.  Call member services at your health plan with any benefit or coverage questions.      Please bring the following with you to your appointment:    (1) Any X-Rays, CTs or MRIs which have been performed.  Contact the facility where they were done to arrange for  prior to your scheduled appointment.    (2) List of current medications  (3) This referral request   (4) Any documents/labs given to you for this referral                  Your next 10 appointments  already scheduled     Dec 13, 2018  1:00 PM CST   [facility] Visit with Christopher [facility] MD   [facility]    [address] [facility] [address] [phone]            Feb 13, 2019   Procedure with Brett Chavez MD   [facility] ([facility] [place])    [address] [place] [address]  5th Floor  [facility] [id number] [phone]           Located in the Clinics and Surgery Center at [address] [place], [place] [id number].   parking is very convenient and highly recommended.  is a $6 flat rate fee.  Both  and self parkers should enter the main arrival plaza from [facility]; parking attendants will direct you based on your parking preference.            Feb 26, 2019 10:30 AM CST   (Arrive by 10:15 AM)   POST-OP FOOT/ANKLE with Brett Chavez MD   [facility] ([facility] [place])    [address] [place] [address]  4th [facility] [address] [phone]              Who to contact     If you have questions or need follow up information about today's clinic visit or your schedule please contact [facility] directly at [phone].  Normal or non-critical lab and imaging results will be communicated to you by Nan[employer] Skincarehart, letter or phone within 4 business days after the clinic has received the results. If you do not hear from us within 7 days, please contact the clinic through MyChart or phone. If you have a critical or abnormal lab result, we will notify you by phone as soon as possible.  Submit refill requests through [employer] or call your pharmacy and they will forward the refill request to us. Please allow 3 business days for your refill to be completed.          Additional Information About Your Visit        Animal Kingdom Information     [employer] gives you secure access to your electronic health record. If you see a primary care provider, you can also send  messages to your care team and make appointments. If you have questions, please call your primary care clinic.  If you do not have a primary care provider, please call [phone] and they will assist you.      [email or web address] is an electronic gateway that provides easy, online access to your medical records. With [email or web address], you can request a clinic appointment, read your test results, renew a prescription or communicate with your care team.     To access your existing account, please contact your [facility] or call [phone] for assistance.        Care EveryWhere ID     This is your Care EveryWhere ID. This could be used by other organizations to access your [facility] medical records  [id number]        Your Vitals Were     Pulse Temperature Last Period Pulse Oximetry Breastfeeding? BMI (Body Mass Index)    82 97.2  F (36.2  C) (Temporal) 11/15/2018 97% No 29.28 kg/m2       Blood Pressure from Last 3 Encounters:   11/16/18 110/60   10/11/18 137/80   07/25/18 126/77    Weight from Last 3 Encounters:   11/16/18 161 lb 6.4 oz (73.2 kg)   10/16/18 164 lb 8 oz (74.6 kg)   10/11/18 164 lb 9.6 oz (74.7 kg)              We Performed the Following     DERMATOLOGY REFERRAL     Hepatitis A Antibody IgG     HEPATITIS B CORE ANTIBODY     Hepatitis B Surface Antibody     HEPATITIS B SURFACE ANTIGEN     HEPATITS C ANTIBODY     HIV Antigen Antibody Combo        Primary Care Provider Office Phone # Fax #    Sandy Donna Garcia, APRN [phone]       [address]  [place] [address]        Equal Access to Services     [facility] MAHONEY : Hadii aad ku hadasho Soomaali, waaxda luqadaha, qaybta kaalmada adeegyada, miki forrest . So [facility] [phone].    ATENCIÓN: Si habla español, tiene a schaffer disposición servicios gratuitos de asistencia lingüística. Llame al [phone].    We comply with applicable federal civil rights laws and [place] laws. We do not  discriminate on the basis of race, color, national origin, age, disability, sex, sexual orientation, or gender identity.            Thank you!     Thank you for choosing [facility]  for your care. Our goal is always to provide you with excellent care. Hearing back from our patients is one way we can continue to improve our services. Please take a few minutes to complete the written survey that you may receive in the mail after your visit with us. Thank you!             Your Updated Medication List - Protect others around you: Learn how to safely use, store and throw away your medicines at www.disposemymeds.org.          This list is accurate as of 11/16/18 12:22 PM.  Always use your most recent med list.                   Brand Name Dispense Instructions for use Diagnosis    B-12 1000 MCG Tbcr     100 tablet    Take 1,000 mcg by mouth daily    Vitamin B12 deficiency (non anemic)       BENADRYL PO           betamethasone dipropionate 0.05 % cream    DIPROSONE    45 g    Apply sparingly to affected area twice daily as needed.  Do not apply to face.    Eczema, unspecified type       Cranberry 1000 MG Caps      Take 1 capsule by mouth 4 times daily    Lupus (systemic lupus erythematosus) (H), Chronic hepatitis B (H)       fluticasone 50 MCG/ACT spray    FLONASE    16 g    Spray 2 sprays into both nostrils daily    Allergic rhinitis, unspecified allergic rhinitis type       hydroxychloroquine 200 MG tablet    PLAQUENIL    135 tablet    Hydroxychloroquine 300mg daily    Systemic lupus erythematosus, unspecified SLE type, unspecified organ involvement status (H)       Multi-vitamin Tabs tablet      Take 1 tablet by mouth daily    Other abnormal glucose       norethindrone-ethinyl estradiol 1-35 MG-MCG per tablet    ORTHO-NOVUM 1-35 TAB,NORTREL 1-35 TAB    84 tablet    Take 1 tablet by mouth daily    Dysmenorrhea, Menorrhagia with regular cycle       omeprazole 20 MG tablet     30 tablet    Take 1 tablet  by mouth daily. Take 30-60 minutes before a meal.    Abdominal pain, epigastric       predniSONE 5 MG tablet    DELTASONE    30 tablet    Take 1 tablet (5 mg) by mouth daily as needed for lupus symptoms    Systemic lupus erythematosus, unspecified SLE type, unspecified organ involvement status (H)       triamcinolone 0.1 % cream    KENALOG    30 g    Apply sparingly to affected area three times daily prn.    Eczema, unspecified type       UNISOM SLEEPGELS 50 MG Caps   Generic drug:  DiphenhydrAMINE HCl (Sleep)      Take 50 mg by mouth At Bedtime

## 2018-11-16 NOTE — PROGRESS NOTES
SUBJECTIVE:   Natasha Lee is a 43 year old female who presents to clinic today for the following health issues:    1. Screening blood work for HIV and Hepatitis     Skin breakout   Onset: worst in the last 6 months    Description:   Location: face  Character: round, raised, red  Itching (Pruritis): no     Progression of Symptoms:  worsening    Accompanying Signs & Symptoms:  Fever: no   Body aches or joint pain: no   Sore throat symptoms: no   Recent cold symptoms: no     History:   Previous similar rash: no     Precipitating factors:   Exposure to similar rash: no   New exposures: None   Recent travel: no     Alleviating factors:  none    Therapies Tried and outcome: eczema cream, acne pads, vitamin C    Has a breakout on her face worsening in the last 6 months   Has eczema as well and Lupus and now we are not sure what it is related to   Will get it on her thighs but worse part is on jaw line on both sides of her face       Problem list and histories reviewed & adjusted, as indicated.  Additional history: as documented    Patient Active Problem List   Diagnosis     Chronic hepatitis B (H)     Lupus (systemic lupus erythematosus) (H)     CARDIOVASCULAR SCREENING; LDL GOAL LESS THAN 160     Health Care Home     Dysmenorrhea     Menorrhagia     Allergic rhinitis     Other nonspecific finding on examination of urine     Urinary frequency     Vitamin B12 deficiency (non anemic)     Heart murmur     S/P laparoscopic cholecystectomy     Past Surgical History:   Procedure Laterality Date     ANESTH,SKIN SURGERY,KNEE  1993, 1990    orthoscopic     C APPENDECTOMY  4/2008     CHOLECYSTECTOMY  2016     CL AFF SURGICAL PATHOLOGY  2007     LAPAROSCOPIC CHOLECYSTECTOMY N/A 5/18/2016    Procedure: LAPAROSCOPIC CHOLECYSTECTOMY;  Surgeon: Radha Cline MD;  Location: [facility] OR     LAPAROSCOPIC TUBAL LIGATION  12/15/2011    Procedure:LAPAROSCOPIC TUBAL LIGATION; Laparoscopic tubal ligation; Surgeon:AMY WYNN  MAMTA; Location: OR       Social History   Substance Use Topics     Smoking status: Never Smoker     Smokeless tobacco: Never Used     Alcohol use No     Family History   Problem Relation Age of Onset     Unknown/Adopted Mother      Unknown/Adopted Father          Current Outpatient Prescriptions   Medication Sig Dispense Refill     betamethasone dipropionate (DIPROSONE) 0.05 % cream Apply sparingly to affected area twice daily as needed.  Do not apply to face. 45 g 0     Cranberry 1000 MG CAPS Take 1 capsule by mouth 4 times daily       Cyanocobalamin (B-12) 1000 MCG TBCR Take 1,000 mcg by mouth daily 100 tablet 1     DiphenhydrAMINE HCl (BENADRYL PO)        DiphenhydrAMINE HCl, Sleep, (UNISOM SLEEPGELS) 50 MG CAPS Take 50 mg by mouth At Bedtime       fluticasone (FLONASE) 50 MCG/ACT nasal spray Spray 2 sprays into both nostrils daily 16 g 3     hydroxychloroquine (PLAQUENIL) 200 MG tablet Hydroxychloroquine 300mg daily 135 tablet 3     multivitamin, therapeutic with minerals (MULTI-VITAMIN) TABS Take 1 tablet by mouth daily       norethindrone-ethinyl estradiol (ORTHO-NOVUM 1-35 TAB,NORTREL 1-35 TAB) 1-35 MG-MCG per tablet Take 1 tablet by mouth daily 84 tablet 4     omeprazole 20 MG tablet Take 1 tablet by mouth daily. Take 30-60 minutes before a meal. 30 tablet 1     predniSONE (DELTASONE) 5 MG tablet Take 1 tablet (5 mg) by mouth daily as needed for lupus symptoms 30 tablet 1     triamcinolone (KENALOG) 0.1 % cream Apply sparingly to affected area three times daily prn. 30 g 0     Allergies   Allergen Reactions     Carbamazepine      Fever, rash     Nyquil Severe Cold-Flu [Vicks Nyquil Severe]      hives     Labs reviewed in EPIC    Reviewed and updated as needed this visit by clinical staff  Tobacco  Allergies  Meds  Med Hx  Surg Hx  Fam Hx  Soc Hx      Reviewed and updated as needed this visit by Provider         ROS:  Constitutional, HEENT, cardiovascular, pulmonary, gi and gu systems are negative,  except as otherwise noted.    OBJECTIVE:     /60 (BP Location: Right arm, Patient Position: Sitting, Cuff Size: Adult Regular)  Pulse 82  Temp 97.2  F (36.2  C) (Temporal)  Wt 161 lb 6.4 oz (73.2 kg)  LMP 11/15/2018  SpO2 97%  Breastfeeding? No  BMI 29.28 kg/m2  Body mass index is 29.28 kg/(m^2).  GENERAL APPEARANCE: alert, active and no distress  RESP: lungs clear to auscultation - no rales, rhonchi or wheezes  CV: regular rates and rhythm and no murmur, click or rub  MS: extremities normal- no gross deformities noted and peripheral pulses normal  SKIN: negatives: color normal, temperature normal, mobility and turgor normal, positives:   Papular appearing rash scattered on her face   PSYCH: mentation appears normal and affect normal/bright    Diagnostic Test Results:  Results for orders placed or performed in visit on 11/16/18   Hepatitis A Antibody IgG   Result Value Ref Range    Hepatitis A Antibody IgG Nonreactive NR^Nonreactive   Hepatitis B Surface Antibody   Result Value Ref Range    Hepatitis B Surface Antibody 1.76 <8.00 m[IU]/mL   HEPATITIS B CORE ANTIBODY   Result Value Ref Range    Hepatitis B Core Mary Reactive (AA) NR^Nonreactive   HEPATITIS B SURFACE ANTIGEN   Result Value Ref Range    Hep B Surface Agn Reactive (AA) NR^Nonreactive   HEPATITS C ANTIBODY   Result Value Ref Range    Hepatitis C Antibody Nonreactive NR^Nonreactive   HIV Antigen Antibody Combo   Result Value Ref Range    HIV Antigen Antibody Combo Nonreactive NR^Nonreactive           ASSESSMENT/PLAN:     Natasha was seen today for derm problem.    Diagnoses and all orders for this visit:    Dermatitis  -     DERMATOLOGY REFERRAL    Screening for human immunodeficiency virus  -     HIV Antigen Antibody Combo    Chronic hepatitis B (H)  -     Hepatitis A Antibody IgG  -     Hepatitis B Surface Antibody  -     HEPATITIS B CORE ANTIBODY  -     HEPATITIS B SURFACE ANTIGEN  -     HEPATITS C ANTIBODY    PLAN:   1.   Symptomatic therapy  suggested: Continue current medications.  2.  Orders Placed This Encounter   Procedures     Hepatitis A Antibody IgG     Hepatitis B Surface Antibody     HEPATITIS B CORE ANTIBODY     HEPATITIS B SURFACE ANTIGEN     HEPATITS C ANTIBODY     HIV Antigen Antibody Combo     DERMATOLOGY REFERRAL       3. Patient needs to follow up in if no improvement,or sooner if worsening of symptoms or other symptoms develop.  CONSULTATION/REFERRAL to Dermatology  Will follow up and/or notify patient of  results via My Chart to determine further need for followup      See Patient Instructions    ELIAS Guerra CNP  [facility]

## 2018-11-16 NOTE — PATIENT INSTRUCTIONS
PLAN:   1.   Symptomatic therapy suggested: Continue current medications.  2.  Orders Placed This Encounter   Procedures     Hepatitis A Antibody IgG     Hepatitis B Surface Antibody     HEPATITIS B CORE ANTIBODY     HEPATITIS B SURFACE ANTIGEN     HEPATITS C ANTIBODY     HIV Antigen Antibody Combo     DERMATOLOGY REFERRAL       3. Patient needs to follow up in if no improvement,or sooner if worsening of symptoms or other symptoms develop.  CONSULTATION/REFERRAL to Dermatology  Will follow up and/or notify patient of  results via My Chart to determine further need for followup    It was a pleasure seeing you today at the [facility]. Thank you for allowing us to care for you today. We truly hope we provided you with the excellent service you deserve. Please let us know if there is anything else we can do for you so we can be sure you are leaving completley satisfied with your care experience.       General information about your clinic   Clinic Hours Lab Hours (Appointments are required)   Mon-Thurs: 7:30 AM - 7 PM Mon-Thurs: 7:30 AM - 7 PM   Fri: 7:30 AM - 5 PM Fri: 7:30 AM - 5 PM        After Hours Nurse Advise & Appts:  [facility] Nurse Advisors: [phone]  [facility] On Call: to make appointments anytime: [phone] On Call Physician: call [phone] and answering service will page the on call physician.        For urgent appointments, please call [phone] and ask for the triage nurse or your care team clinic nurse.  How to contact my care team:  MyChart: [email or web address]   Phone: [phone]   Fax: [phone]       [facility]:   Phone: [phone]  Hours: 8:00 AM - 6:00 PM  Medication Refills:  Call your pharmacy and they will forward the refill to us. Please allow 3 business days for your refills to be completed.       Normal or non-critical lab and imaging results will be communicated to you by MyChart, letter or phone within 7 days.  If you do not hear  from us within 10 days, please call the clinic. If you have a critical or abnormal lab result, we will notify you by phone as soon as possible.       We now have PWIC (Pediatric Walk in Care)  Monday-Friday from 7:30-4. Simply walk in and be seen for your urgent needs like cough, fever, rash, diarrhea or vomiting, pink eye, UTI. No appointments needed. Ask one of the team for more information      -Your Care Team:    Dr. Johann Patino - Internal Medicine/Pediatrics   Dr. Jake Pierce - Family Medicine  Dr. Jamila Purcell - Pediatrics  Dr. Susan Hand - Pediatrics  Sandy Garcia CNP - Family Practice Nurse Practitioner

## 2018-11-19 LAB
HAV IGG SER QL IA: NONREACTIVE
HBV SURFACE AB SERPL IA-ACNC: 1.76 M[IU]/ML
HCV AB SERPL QL IA: NONREACTIVE
HIV 1+2 AB+HIV1 P24 AG SERPL QL IA: NONREACTIVE

## 2018-11-20 LAB
HBV CORE AB SERPL QL IA: REACTIVE
HBV SURFACE AG SERPL QL IA: REACTIVE

## 2018-11-21 NOTE — PROGRESS NOTES
Ky Lee,  Attached are your test results.  Labs are consistent with chronic hepatitis   The rest of normal    Please contact us if you have any questions.    Sandy Gracia, CNP

## 2019-01-21 ENCOUNTER — OFFICE VISIT (OUTPATIENT)
Dept: PEDIATRICS | Facility: CLINIC | Age: 44
End: 2019-01-21
Payer: COMMERCIAL

## 2019-01-21 VITALS
BODY MASS INDEX: 30.18 KG/M2 | HEIGHT: 62 IN | TEMPERATURE: 98.4 F | HEART RATE: 70 BPM | WEIGHT: 164 LBS | SYSTOLIC BLOOD PRESSURE: 125 MMHG | DIASTOLIC BLOOD PRESSURE: 80 MMHG | OXYGEN SATURATION: 100 %

## 2019-01-21 DIAGNOSIS — M21.612 BUNION, LEFT FOOT: ICD-10-CM

## 2019-01-21 DIAGNOSIS — Z01.818 PREOP GENERAL PHYSICAL EXAM: Primary | ICD-10-CM

## 2019-01-21 LAB
ANION GAP SERPL CALCULATED.3IONS-SCNC: 8 MMOL/L (ref 3–14)
BUN SERPL-MCNC: 9 MG/DL (ref 7–30)
CALCIUM SERPL-MCNC: 8.8 MG/DL (ref 8.5–10.1)
CHLORIDE SERPL-SCNC: 107 MMOL/L (ref 94–109)
CO2 SERPL-SCNC: 27 MMOL/L (ref 20–32)
CREAT SERPL-MCNC: 0.63 MG/DL (ref 0.52–1.04)
ERYTHROCYTE [DISTWIDTH] IN BLOOD BY AUTOMATED COUNT: 12.3 % (ref 10–15)
GFR SERPL CREATININE-BSD FRML MDRD: >90 ML/MIN/{1.73_M2}
GLUCOSE SERPL-MCNC: 106 MG/DL (ref 70–99)
HCT VFR BLD AUTO: 38.6 % (ref 35–47)
HGB BLD-MCNC: 13.4 G/DL (ref 11.7–15.7)
MCH RBC QN AUTO: 30.7 PG (ref 26.5–33)
MCHC RBC AUTO-ENTMCNC: 34.7 G/DL (ref 31.5–36.5)
MCV RBC AUTO: 88 FL (ref 78–100)
PLATELET # BLD AUTO: 224 10E9/L (ref 150–450)
POTASSIUM SERPL-SCNC: 3.5 MMOL/L (ref 3.4–5.3)
RBC # BLD AUTO: 4.37 10E12/L (ref 3.8–5.2)
SODIUM SERPL-SCNC: 142 MMOL/L (ref 133–144)
WBC # BLD AUTO: 4.3 10E9/L (ref 4–11)

## 2019-01-21 PROCEDURE — 99214 OFFICE O/P EST MOD 30 MIN: CPT | Performed by: NURSE PRACTITIONER

## 2019-01-21 PROCEDURE — 36415 COLL VENOUS BLD VENIPUNCTURE: CPT | Performed by: NURSE PRACTITIONER

## 2019-01-21 PROCEDURE — 85027 COMPLETE CBC AUTOMATED: CPT | Performed by: NURSE PRACTITIONER

## 2019-01-21 PROCEDURE — 80048 BASIC METABOLIC PNL TOTAL CA: CPT | Performed by: NURSE PRACTITIONER

## 2019-01-21 ASSESSMENT — MIFFLIN-ST. JEOR: SCORE: 1352.15

## 2019-01-21 NOTE — RESULT ENCOUNTER NOTE
Ky Lee,    Attached are your test results.  -Normal red blood cell (hgb) levels, normal white blood cell count and normal platelet levels.  -Kidney function is normal (Cr, GFR), Sodium is normal, Potassium is normal, Calcium is normal, Glucose is normal.    Please contact us if you have any questions.    Sandy Garcia, CNP

## 2019-01-21 NOTE — PROGRESS NOTES
[address] [address]  [phone]  Dept: [phone]    PRE-OP EVALUATION:  Today's date: 2019    Natasha Lee (: 1975) presents for pre-operative evaluation assessment as requested by Dr. Chavez.  She requires evaluation and anesthesia risk assessment prior to undergoing surgery/procedure for treatment of left foot.    Proposed Surgery/ Procedure: Left Lapidus, Possible Akin Osteotomy  Date of Surgery/ Procedure: 19  Time of Surgery/ Procedure: 8:00AM  Hospital/Surgical Facility: [facility]   Fax number for surgical facility:   Primary Physician: Sandy Garcia  Type of Anesthesia Anticipated: to be determined    Patient has a Health Care Directive or Living Will:  YES     1. NO - Do you have a history of heart attack, stroke, stent, bypass or surgery on an artery in the head, neck, heart or legs?  2. NO - Do you ever have any pain or discomfort in your chest?  3. NO - Do you have a history of  Heart Failure?  4. NO - Are you troubled by shortness of breath when: walking on the level, up a slight hill or at night?  5. NO - Do you currently have a cold, bronchitis or other respiratory infection?  6. NO - Do you have a cough, shortness of breath or wheezing?  7. NO - Do you sometimes get pains in the calves of your legs when you walk?  8. NO - Do you or anyone in your family have previous history of blood clots? WAS ADOPTED  9. NO - Do you or does anyone in your family have a serious bleeding problem such as prolonged bleeding following surgeries or cuts? WAS ADOPTED  10. YES - HAVE YOU EVER HAD PROBLEMS WITH ANEMIA OR BEEN TOLD TO TAKE IRON PILLS? Anemic during pregnancy only  11. NO - Have you had any abnormal blood loss such as black, tarry or bloody stools, or abnormal vaginal bleeding?  12. NO - Have you ever had a blood transfusion?  13. NO - Have you or any of your relatives ever had problems with anesthesia?  WAS ADOPTED  14. NO - Do you have sleep apnea, excessive snoring or daytime drowsiness?  15. NO - Do you have any prosthetic heart valves?  16. NO - Do you have prosthetic joints?  17. NO - Is there any chance that you may be pregnant?      HPI:     HPI related to upcoming procedure: surgery/procedure for treatment of left foot.    Proposed Surgery/ Procedure: Left Lapidus, Possible Akin Osteotomy  Date of Surgery/ Procedure: 2/13/19    See problem list for active medical problems.  Problems all longstanding and stable, except as noted/documented.  See ROS for pertinent symptoms related to these conditions.                                                                                                                                                          .    MEDICAL HISTORY:     Patient Active Problem List    Diagnosis Date Noted     Health Care Home 06/21/2011     Priority: High     X  DX V65.8 REPLACED WITH 12785 HEALTH CARE HOME (04/08/2013)       S/P laparoscopic cholecystectomy 05/18/2016     Priority: Medium     Heart murmur      Priority: Medium     Vitamin B12 deficiency (non anemic) 02/17/2016     Priority: Medium     Urinary frequency 10/13/2014     Priority: Medium     Other nonspecific finding on examination of urine 06/27/2014     Priority: Medium     Allergic rhinitis 01/06/2014     Priority: Medium     Problem list name updated by automated process. Provider to review       Dysmenorrhea 03/21/2013     Priority: Medium     Menorrhagia 03/21/2013     Priority: Medium     CARDIOVASCULAR SCREENING; LDL GOAL LESS THAN 160 10/31/2010     Priority: Medium     Lupus (systemic lupus erythematosus) (H) 12/10/2009     Priority: Medium     Chronic hepatitis B (H) 06/22/2009     Priority: Medium      Past Medical History:   Diagnosis Date     Diabetes (H)     Pre-Diabetic     Heart murmur 1997     Lupus (systemic lupus erythematosus) (H) 1988    Dr. Prather     Lupus (systemic lupus erythematosus) (H)  12/10/2009     Normal delivery 9/5/09    boy forceps     PONV (postoperative nausea and vomiting)     Mild Nausea postop     Past Surgical History:   Procedure Laterality Date     ANESTH,SKIN SURGERY,KNEE  1993, 1990    orthoscopic     C APPENDECTOMY  4/2008     CHOLECYSTECTOMY  2016     CL AFF SURGICAL PATHOLOGY  2007     LAPAROSCOPIC CHOLECYSTECTOMY N/A 5/18/2016    Procedure: LAPAROSCOPIC CHOLECYSTECTOMY;  Surgeon: Radha Cline MD;  Location: [facility] OR     LAPAROSCOPIC TUBAL LIGATION  12/15/2011    Procedure:LAPAROSCOPIC TUBAL LIGATION; Laparoscopic tubal ligation; Surgeon:AMY WYNN; Location:[facility] OR     Current Outpatient Medications   Medication Sig Dispense Refill     betamethasone dipropionate (DIPROSONE) 0.05 % cream Apply sparingly to affected area twice daily as needed.  Do not apply to face. 45 g 0     Cranberry 1000 MG CAPS Take 1 capsule by mouth 4 times daily       Cyanocobalamin (B-12) 1000 MCG TBCR Take 1,000 mcg by mouth daily 100 tablet 1     DiphenhydrAMINE HCl (BENADRYL PO)        DiphenhydrAMINE HCl, Sleep, (UNISOM SLEEPGELS) 50 MG CAPS Take 50 mg by mouth At Bedtime       fluticasone (FLONASE) 50 MCG/ACT nasal spray Spray 2 sprays into both nostrils daily 16 g 3     hydroxychloroquine (PLAQUENIL) 200 MG tablet Hydroxychloroquine 300mg daily 135 tablet 3     multivitamin, therapeutic with minerals (MULTI-VITAMIN) TABS Take 1 tablet by mouth daily       norethindrone-ethinyl estradiol (ORTHO-NOVUM 1-35 TAB,NORTREL 1-35 TAB) 1-35 MG-MCG per tablet Take 1 tablet by mouth daily 84 tablet 4     omeprazole 20 MG tablet Take 1 tablet by mouth daily. Take 30-60 minutes before a meal. (Patient taking differently: Take 20 mg by mouth as needed Take 30-60 minutes before a meal.) 30 tablet 1     predniSONE (DELTASONE) 5 MG tablet Take 1 tablet (5 mg) by mouth daily as needed for lupus symptoms (Patient taking differently: Take 5 mg by mouth as needed for lupus symptoms.) 30 tablet 1      "triamcinolone (KENALOG) 0.1 % cream Apply sparingly to affected area three times daily prn. (Patient taking differently: as needed Apply sparingly to affected area three times daily prn.) 30 g 0     OTC products: no recent use of OTC ASA, NSAIDS or Steroids and no use of herbal medications or other supplements    Allergies   Allergen Reactions     Carbamazepine      Fever, rash     Nyquil Severe Cold-Flu [Vicks Nyquil Severe]      hives      Latex Allergy: NO    Social History     Tobacco Use     Smoking status: Never Smoker     Smokeless tobacco: Never Used   Substance Use Topics     Alcohol use: No     History   Drug Use No       REVIEW OF SYSTEMS:   CONSTITUTIONAL: NEGATIVE for fever, chills, change in weight  INTEGUMENTARY/SKIN: NEGATIVE for rash   EYES: NEGATIVE for vision changes or irritation  ENT/MOUTH: NEGATIVE for ear, mouth and throat problems  RESP: NEGATIVE for significant cough or SOB  CV: NEGATIVE for chest pain, palpitations or peripheral edema  GI: NEGATIVE for nausea and vomiting  : NEGATIVE for frequency, dysuria, or hematuria  MUSCULOSKELETAL:POSITIVE  for joint pain foot pain  and NEGATIVE for joint swelling  and joint warmth   NEURO: NEGATIVE for weakness, dizziness or paresthesias  ENDOCRINE: NEGATIVE for temperature intolerance, skin/hair changes  HEME: NEGATIVE for bleeding problems  PSYCHIATRIC: NEGATIVE for changes in mood or affect    EXAM:   /80 (BP Location: Right arm, Patient Position: Sitting, Cuff Size: Adult Regular)   Pulse 70   Temp 98.4  F (36.9  C) (Temporal)   Ht 1.575 m (5' 2\")   Wt 74.4 kg (164 lb)   LMP 12/21/2018   SpO2 100%   Breastfeeding? No   BMI 30.00 kg/m      GENERAL APPEARANCE: healthy, alert and no distress     EYES: Eyes grossly normal to inspection and conjunctivae and sclerae normal     HENT: ear canals and TM's normal and nose and mouth without ulcers or lesions     NECK: no adenopathy     RESP: lungs clear to auscultation - no rales, rhonchi or " wheezes     CV: regular rates and rhythm and no murmur, click or rub     ABDOMEN: soft, nontender     MS: extremities normal- no gross deformities noted, no evidence of inflammation in joints, FROM in all extremities.     SKIN: no suspicious lesions or rashes     NEURO: Normal strength and tone, sensory exam grossly normal, mentation intact and speech normal     PSYCH: mentation appears normal. and affect normal/bright     LYMPHATICS: No cervical adenopathy    DIAGNOSTICS:   EKG: Not indicated due to non-vascular surgery and low risk of event (age <65 and without cardiac risk factors)  Results for orders placed or performed in visit on 01/21/19   Basic metabolic panel   Result Value Ref Range    Sodium 142 133 - 144 mmol/L    Potassium 3.5 3.4 - 5.3 mmol/L    Chloride 107 94 - 109 mmol/L    Carbon Dioxide 27 20 - 32 mmol/L    Anion Gap 8 3 - 14 mmol/L    Glucose 106 (H) 70 - 99 mg/dL    Urea Nitrogen 9 7 - 30 mg/dL    Creatinine 0.63 0.52 - 1.04 mg/dL    GFR Estimate >90 >60 mL/min/[1.73_m2]    GFR Estimate If Black >90 >60 mL/min/[1.73_m2]    Calcium 8.8 8.5 - 10.1 mg/dL   CBC with platelets   Result Value Ref Range    WBC 4.3 4.0 - 11.0 10e9/L    RBC Count 4.37 3.8 - 5.2 10e12/L    Hemoglobin 13.4 11.7 - 15.7 g/dL    Hematocrit 38.6 35.0 - 47.0 %    MCV 88 78 - 100 fl    MCH 30.7 26.5 - 33.0 pg    MCHC 34.7 31.5 - 36.5 g/dL    RDW 12.3 10.0 - 15.0 %    Platelet Count 224 150 - 450 10e9/L     IMPRESSION:   Reason for surgery/procedure: surgery/procedure for treatment of left foot.    Proposed Surgery/ Procedure: Left Lapidus, Possible Akin Osteotomy  Date of Surgery/ Procedure: 2/13/19    Diagnosis/reason for consult: preop evaluation     The proposed surgical procedure is considered INTERMEDIATE risk.    REVISED CARDIAC RISK INDEX  The patient has the following serious cardiovascular risks for perioperative complications such as (MI, PE, VFib and 3  AV Block):  No serious cardiac risks  INTERPRETATION: 1 risks:  Class II (low risk - 0.9% complication rate)    The patient has the following additional risks for perioperative complications:  The 10-year ASCVD risk score (Luz Maria HUNT Jr., et al., 2013) is: 0.5%    Values used to calculate the score:      Age: 43 years      Sex: Female      Is Non- : No      Diabetic: No      Tobacco smoker: No      Systolic Blood Pressure: 125 mmHg      Is BP treated: No      HDL Cholesterol: 47 mg/dL      Total Cholesterol: 143 mg/dL      ICD-10-CM    1. Preop general physical exam Z01.818 Basic metabolic panel     CBC with platelets   2. Bunion, left foot M21.612 Basic metabolic panel     CBC with platelets       RECOMMENDATIONS:       APPROVAL GIVEN to proceed with proposed procedure, without further diagnostic evaluation       Signed Electronically by: ELIAS Guerra CNP    Copy of this evaluation report is provided to requesting physician.    [facility] Preop Guidelines    Revised Cardiac Risk Index

## 2019-01-21 NOTE — PATIENT INSTRUCTIONS
Will follow up and/or notify patient of  results via My Chart to determine further need for followup      Before Your Surgery      Call your surgeon if there is any change in your health. This includes signs of a cold or flu (such as a sore throat, runny nose, cough, rash or fever).    Do not smoke, drink alcohol or take over the counter medicine (unless your surgeon or primary care doctor tells you to) for the 24 hours before and after surgery.    If you take prescribed drugs: Follow your doctor s orders about which medicines to take and which to stop until after surgery.    Eating and drinking prior to surgery: follow the instructions from your surgeon    Take a shower or bath the night before surgery. Use the soap your surgeon gave you to gently clean your skin. If you do not have soap from your surgeon, use your regular soap. Do not shave or scrub the surgery site.  Wear clean pajamas and have clean sheets on your bed.

## 2019-01-21 NOTE — NURSING NOTE
"Chief Complaint   Patient presents with     Pre-Op Exam     DOS:2/13/19       Initial /80 (BP Location: Right arm, Patient Position: Sitting, Cuff Size: Adult Regular)   Pulse 70   Temp 98.4  F (36.9  C) (Temporal)   Ht 1.575 m (5' 2\")   Wt 74.4 kg (164 lb)   LMP 12/21/2018   SpO2 100%   Breastfeeding? No   BMI 30.00 kg/m   Estimated body mass index is 30 kg/m  as calculated from the following:    Height as of this encounter: 1.575 m (5' 2\").    Weight as of this encounter: 74.4 kg (164 lb).  Medication Reconciliation: complete      JESUSITA Gudino      "

## 2019-01-22 ENCOUNTER — OFFICE VISIT (OUTPATIENT)
Dept: DERMATOLOGY | Facility: CLINIC | Age: 44
End: 2019-01-22
Attending: NURSE PRACTITIONER
Payer: COMMERCIAL

## 2019-01-22 DIAGNOSIS — L71.9 ROSACEA: Primary | ICD-10-CM

## 2019-01-22 DIAGNOSIS — L81.0 POST-INFLAMMATORY HYPERPIGMENTATION: ICD-10-CM

## 2019-01-22 PROCEDURE — 99202 OFFICE O/P NEW SF 15 MIN: CPT | Performed by: DERMATOLOGY

## 2019-01-22 ASSESSMENT — PAIN SCALES - GENERAL: PAINLEVEL: NO PAIN (0)

## 2019-01-22 NOTE — NURSING NOTE
Natasha Lee's goals for this visit include:   Chief Complaint   Patient presents with     Derm Problem     Pt states break outs on face. Have been there since Fall 2018. Pt has no hx of SC, unaware of family hx as pt is adopted.     She requests these members of her care team be copied on today's visit information:     PCP: Sandy Garcia    Referring Provider:  Sandy Garcia, APRN CNP  [address]  [place] [address]    There were no vitals taken for this visit.    Do you need any medication refills at today's visit? No    Keren Rosa LPN

## 2019-01-22 NOTE — LETTER
1/22/2019         RE: Natasha Lee  [address] [facility] [id number]        Dear Colleague,    Thank you for referring your patient, Natasha Lee, to the [facility]. Please see a copy of my visit note below.    [facility] Dermatology Note      Dermatology Problem List:  1. Relevant medical hx: SLE on plaquenil and prednisone, hepatitis B  2. Rosacea. current tx: metronidazole 0.75% cream, gentle skin care    Encounter Date: Jan 22, 2019    CC:  Chief Complaint   Patient presents with     Derm Problem     Pt states break outs on face. Have been there since Fall 2018. Pt has no hx of SC, unaware of family hx as pt is adopted.         History of Present Illness:  Ms. Natasha Lee is a 43 year old female who presents as a referral from Yamile Garcia for a rash around the face. She noticed it in the fall of 2018. She broke out with what appeared to look like a combination of eczema and acne mostly on the cheeks and jaw line. She went in to primary because it was not behaving like acne. Patient knows that she scars easily, so she has intentionally avoiding picking at spots. She currently uses micellar water to wash her face, just started two days ago, face seems to be tolerating better than washes in the past. For moisturizing, she uses an eczema honey balm and hyaluronic acid. Patient has lupus and was born with hepatitis B. Patient has no history of skin cancer. Patient is adopted, unknown family history. No other concerns addressed today.      Past Medical History:   Patient Active Problem List   Diagnosis     Chronic hepatitis B (H)     Lupus (systemic lupus erythematosus) (H)     CARDIOVASCULAR SCREENING; LDL GOAL LESS THAN 160     Health Care Home     Dysmenorrhea     Menorrhagia     Allergic rhinitis     Other nonspecific finding on examination of urine     Urinary frequency     Vitamin B12 deficiency (non anemic)     Heart murmur      S/P laparoscopic cholecystectomy     Past Medical History:   Diagnosis Date     Diabetes (H)     Pre-Diabetic     Heart murmur 1997     Lupus (systemic lupus erythematosus) (H) 1988    Dr. Prather     Lupus (systemic lupus erythematosus) (H) 12/10/2009     Normal delivery 9/5/09    boy forceps     PONV (postoperative nausea and vomiting)     Mild Nausea postop     Past Surgical History:   Procedure Laterality Date     ANESTH,SKIN SURGERY,KNEE  1993, 1990    orthoscopic     C APPENDECTOMY  4/2008     CHOLECYSTECTOMY  2016     CL AFF SURGICAL PATHOLOGY  2007     LAPAROSCOPIC CHOLECYSTECTOMY N/A 5/18/2016    Procedure: LAPAROSCOPIC CHOLECYSTECTOMY;  Surgeon: Radha Cline MD;  Location: [facility] OR     LAPAROSCOPIC TUBAL LIGATION  12/15/2011    Procedure:LAPAROSCOPIC TUBAL LIGATION; Laparoscopic tubal ligation; Surgeon:AMY WYNN; Location: OR       Social History:  Patient reports that  has never smoked. she has never used smokeless tobacco. She reports that she does not drink alcohol or use drugs. 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     Family History:  Family History   Problem Relation Age of Onset     Unknown/Adopted Mother      Unknown/Adopted Father        Medications:  Current Outpatient Medications   Medication Sig Dispense Refill     betamethasone dipropionate (DIPROSONE) 0.05 % cream Apply sparingly to affected area twice daily as needed.  Do not apply to face. 45 g 0     Cranberry 1000 MG CAPS Take 1 capsule by mouth 4 times daily       Cyanocobalamin (B-12) 1000 MCG TBCR Take 1,000 mcg by mouth daily 100 tablet 1     DiphenhydrAMINE HCl (BENADRYL PO)        DiphenhydrAMINE HCl, Sleep, (UNISOM SLEEPGELS) 50 MG CAPS Take 50 mg by mouth At Bedtime       fluticasone (FLONASE) 50 MCG/ACT nasal spray Spray 2 sprays into both nostrils daily 16 g 3     hydroxychloroquine (PLAQUENIL) 200 MG tablet Hydroxychloroquine 300mg daily 135 tablet 3     multivitamin, therapeutic  with minerals (MULTI-VITAMIN) TABS Take 1 tablet by mouth daily       norethindrone-ethinyl estradiol (ORTHO-NOVUM 1-35 TAB,NORTREL 1-35 TAB) 1-35 MG-MCG per tablet Take 1 tablet by mouth daily 84 tablet 4     omeprazole 20 MG tablet Take 1 tablet by mouth daily. Take 30-60 minutes before a meal. (Patient taking differently: Take 20 mg by mouth as needed Take 30-60 minutes before a meal.) 30 tablet 1     predniSONE (DELTASONE) 5 MG tablet Take 1 tablet (5 mg) by mouth daily as needed for lupus symptoms (Patient taking differently: Take 5 mg by mouth as needed for lupus symptoms.) 30 tablet 1     triamcinolone (KENALOG) 0.1 % cream Apply sparingly to affected area three times daily prn. (Patient taking differently: as needed Apply sparingly to affected area three times daily prn.) 30 g 0       Allergies   Allergen Reactions     Carbamazepine      Fever, rash     Nyquil Severe Cold-Flu [Vicks Nyquil Severe]      hives       Review of Systems:  -Constitutional: Patient is otherwise feeling well, in usual state of health.   -Skin: As above in HPI. No additional skin concerns.    Physical exam:  Vitals: There were no vitals taken for this visit.  GEN: This is a well developed, well-nourished female in no acute distress, in a pleasant mood.    SKIN: Focused examination of the face, neck, chest, b/l forearms, abdomen was performed.  - Inflammatory papule on the forehead, around 10-20 2-3 mm pink papules scattered on the cheeks  - background of telangiectasias on malar and zygomatic cheeks  - PIH scattered along the right jaw line  - Atrophic scars with surrounding hyperpigmentation on the arms.  - No other lesions of concern on areas examined.       Impression/Plan:  1. Atrophic scars with hypertrophic scarring on the b/l upper extremities. Common things being common, I suspect that these are scars from past excoriations in the area related to itch or primary skin dermatitis. I did consider malginant atrophic papulosis as  there is a form of this condition that is seen in patient with lupus. Would keep this in mind if patient develops any new similar lesions in the future. Patient reports these lesions are long standing today.     2. Acne rosacea. Discussed the pathogenesis of rosacea. Discussed that the skin can be more sensitive to over the counter products. Discussed the best first line treatment is with topicals. Could consider oral antibiotic if fails to resolve (favor doxy over minocycline due to lupus and liver disease history, but doxy could make sun sensitivity of lupus strikingly worse). I did consider a diagnosis of lupus given that patient has SLE and new facial eruption, but lesions today are not consistent with acute cutaneous lupus or discoid lupus.     Prescribed metronidazole 0.75% cream BID.     Recommended gentle skin care, handout provided.    Recommended Cetaphil face wash as a gentle liquid soap or dove bar unscented for sensitive skin.     Emphasized moisturizing the skin BID.     Advised patient to call for a follow up appointment if no improvement noticed in 3 months.     CC ELIAS Guerra CNP on close of this encounter.  Follow-up prn.     Staff Involved:  Scribe/Staff    Scribe Disclosure  I, Flavia Garcia, am serving as a scribe to document services personally performed by Dr. Yamile Funez MD, based on data collection and the provider's statements to me.     Provider Disclosure:   The documentation recorded by the scribe accurately reflects the services I personally performed and the decisions made by me.    Yamile Funez MD    Department of Dermatology  [facility]: Phone: [phone], Fax:[phone]  [facility]: Phone: [phone], Fax: [phone]              Again, thank you for allowing me to participate in the care of your patient.         Sincerely,        Yamile Funez MD

## 2019-01-22 NOTE — PATIENT INSTRUCTIONS
Gentle Skin Care  Below is a list of products our providers recommend for gentle skin care.  Moisturizers:    Lighter; Cetaphil Cream, CeraVe, Aveeno and Vanicream Light     Thicker; Aquaphor Ointment, Vaseline, Petrolium Jelly, Eucerin and Vanicream    Avoid Lotions (too thin)  Mild Cleansers:    Dove- Fragrance Free    CeraVe     Vanicream Cleansing Bar    Cetaphil Cleanser     Aquaphor 2 in1 Gentle Wash and Shampoo       Laundry Products:    All Free and Clear    Cheer Free    Generic Brands are okay as long as they are  Fragrance Free      Avoid fabric softeners  and dryer sheets   Sunscreens: SPF 30 or greater     Sunscreens that contain Zinc Oxide or Titanium Dioxide should be applied, these are physical blockers. Spray or  chemical  sunscreens should be avoided.        Shampoo and Conditioners:    Free and Clear by Vanicream    Aquaphor 2 in 1 Gentle Wash and Shampoo Oils:    Mineral Oil     Emu Oil     For some patients, coconut and sunflower seed oil      Generic Products are an okay substitute, but make sure they are fragrance free.  *Avoid product that have fragrance added to them. Organic does not mean  fragrance free.  In fact patients with sensitive skin can become quite irritated by organic products.     1. Daily bathing is recommended. Make sure you are applying a good moisturizer after bathing every time.  2. Use Moisturizing creams at least twice daily to the whole body.   3. Creams are more moisturizing than lotions  4. Products should be fragrance free- soaps, creams, detergents.   Care Plan:  1. Keep bathing and showering short, less than 15 minutes   2. Always use lukewarm warm when possible. AVOID very HOT or COLD water  3. DO NOT use bubble bath  4. Limit the use of soaps. Focus on the skin folds, face, armpits, groin and feet  5. Do NOT vigorously scrub when you cleanse your skin  6. After bathing, PAT your skin lightly with a towel. DO NOT rub or scrub when drying  7. ALWAYS apply a  moisturizer immediately after bathing. This helps to  lock in  the moisture.  8. Reapply moisturizing agents at least twice daily to your whole body  9. Do not use products such as powders, perfumes, or colognes on your skin  10. Avoid saunas and steam baths. This temperature is too HOT  11. Avoid tight or  scratchy  clothing such as wool  12. Always wash new clothing before wearing them for the first time  13. Sometimes a humidifier or vaporizer can be used at night can help the dry skin. Remember to keep it clean to avoid mold growth.

## 2019-01-22 NOTE — PROGRESS NOTES
[facility] Dermatology Note      Dermatology Problem List:  1. Relevant medical hx: SLE on plaquenil and prednisone, hepatitis B  2. Rosacea. current tx: metronidazole 0.75% cream, gentle skin care    Encounter Date: Jan 22, 2019    CC:  Chief Complaint   Patient presents with     Derm Problem     Pt states break outs on face. Have been there since Fall 2018. Pt has no hx of SC, unaware of family hx as pt is adopted.         History of Present Illness:  Ms. Natasha Lee is a 43 year old female who presents as a referral from Yamile Garcia for a rash around the face. She noticed it in the fall of 2018. She broke out with what appeared to look like a combination of eczema and acne mostly on the cheeks and jaw line. She went in to primary because it was not behaving like acne. Patient knows that she scars easily, so she has intentionally avoiding picking at spots. She currently uses micellar water to wash her face, just started two days ago, face seems to be tolerating better than washes in the past. For moisturizing, she uses an eczema honey balm and hyaluronic acid. Patient has lupus and was born with hepatitis B. Patient has no history of skin cancer. Patient is adopted, unknown family history. No other concerns addressed today.      Past Medical History:   Patient Active Problem List   Diagnosis     Chronic hepatitis B (H)     Lupus (systemic lupus erythematosus) (H)     CARDIOVASCULAR SCREENING; LDL GOAL LESS THAN 160     Health Care Home     Dysmenorrhea     Menorrhagia     Allergic rhinitis     Other nonspecific finding on examination of urine     Urinary frequency     Vitamin B12 deficiency (non anemic)     Heart murmur     S/P laparoscopic cholecystectomy     Past Medical History:   Diagnosis Date     Diabetes (H)     Pre-Diabetic     Heart murmur 1997     Lupus (systemic lupus erythematosus) (H) 1988    Dr. Prather     Lupus (systemic lupus erythematosus) (H) 12/10/2009     Normal  delivery 9/5/09    boy forceps     PONV (postoperative nausea and vomiting)     Mild Nausea postop     Past Surgical History:   Procedure Laterality Date     ANESTH,SKIN SURGERY,KNEE  1993, 1990    orthoscopic     C APPENDECTOMY  4/2008     CHOLECYSTECTOMY  2016     CL AFF SURGICAL PATHOLOGY  2007     LAPAROSCOPIC CHOLECYSTECTOMY N/A 5/18/2016    Procedure: LAPAROSCOPIC CHOLECYSTECTOMY;  Surgeon: Radha Cline MD;  Location: [facility] OR     LAPAROSCOPIC TUBAL LIGATION  12/15/2011    Procedure:LAPAROSCOPIC TUBAL LIGATION; Laparoscopic tubal ligation; Surgeon:AMY WYNN; Location:[facility] OR       Social History:  Patient reports that  has never smoked. she has never used smokeless tobacco. She reports that she does not drink alcohol or use drugs. 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     Family History:  Family History   Problem Relation Age of Onset     Unknown/Adopted Mother      Unknown/Adopted Father        Medications:  Current Outpatient Medications   Medication Sig Dispense Refill     betamethasone dipropionate (DIPROSONE) 0.05 % cream Apply sparingly to affected area twice daily as needed.  Do not apply to face. 45 g 0     Cranberry 1000 MG CAPS Take 1 capsule by mouth 4 times daily       Cyanocobalamin (B-12) 1000 MCG TBCR Take 1,000 mcg by mouth daily 100 tablet 1     DiphenhydrAMINE HCl (BENADRYL PO)        DiphenhydrAMINE HCl, Sleep, (UNISOM SLEEPGELS) 50 MG CAPS Take 50 mg by mouth At Bedtime       fluticasone (FLONASE) 50 MCG/ACT nasal spray Spray 2 sprays into both nostrils daily 16 g 3     hydroxychloroquine (PLAQUENIL) 200 MG tablet Hydroxychloroquine 300mg daily 135 tablet 3     multivitamin, therapeutic with minerals (MULTI-VITAMIN) TABS Take 1 tablet by mouth daily       norethindrone-ethinyl estradiol (ORTHO-NOVUM 1-35 TAB,NORTREL 1-35 TAB) 1-35 MG-MCG per tablet Take 1 tablet by mouth daily 84 tablet 4     omeprazole 20 MG tablet Take 1 tablet by mouth daily.  Take 30-60 minutes before a meal. (Patient taking differently: Take 20 mg by mouth as needed Take 30-60 minutes before a meal.) 30 tablet 1     predniSONE (DELTASONE) 5 MG tablet Take 1 tablet (5 mg) by mouth daily as needed for lupus symptoms (Patient taking differently: Take 5 mg by mouth as needed for lupus symptoms.) 30 tablet 1     triamcinolone (KENALOG) 0.1 % cream Apply sparingly to affected area three times daily prn. (Patient taking differently: as needed Apply sparingly to affected area three times daily prn.) 30 g 0       Allergies   Allergen Reactions     Carbamazepine      Fever, rash     Nyquil Severe Cold-Flu [Vicks Nyquil Severe]      hives       Review of Systems:  -Constitutional: Patient is otherwise feeling well, in usual state of health.   -Skin: As above in HPI. No additional skin concerns.    Physical exam:  Vitals: There were no vitals taken for this visit.  GEN: This is a well developed, well-nourished female in no acute distress, in a pleasant mood.    SKIN: Focused examination of the face, neck, chest, b/l forearms, abdomen was performed.  - Inflammatory papule on the forehead, around 10-20 2-3 mm pink papules scattered on the cheeks  - background of telangiectasias on malar and zygomatic cheeks  - PIH scattered along the right jaw line  - Atrophic scars with surrounding hyperpigmentation on the arms.  - No other lesions of concern on areas examined.       Impression/Plan:  1. Atrophic scars with hypertrophic scarring on the b/l upper extremities. Common things being common, I suspect that these are scars from past excoriations in the area related to itch or primary skin dermatitis. I did consider malginant atrophic papulosis as there is a form of this condition that is seen in patient with lupus. Would keep this in mind if patient develops any new similar lesions in the future. Patient reports these lesions are long standing today.     2. Acne rosacea. Discussed the pathogenesis of  rosacea. Discussed that the skin can be more sensitive to over the counter products. Discussed the best first line treatment is with topicals. Could consider oral antibiotic if fails to resolve (favor doxy over minocycline due to lupus and liver disease history, but doxy could make sun sensitivity of lupus strikingly worse). I did consider a diagnosis of lupus given that patient has SLE and new facial eruption, but lesions today are not consistent with acute cutaneous lupus or discoid lupus.     Prescribed metronidazole 0.75% cream BID.     Recommended gentle skin care, handout provided.    Recommended Cetaphil face wash as a gentle liquid soap or dove bar unscented for sensitive skin.     Emphasized moisturizing the skin BID.     Advised patient to call for a follow up appointment if no improvement noticed in 3 months.     CC ELIAS Guerra CNP on close of this encounter.  Follow-up prn.     Staff Involved:  Scribe/Staff    Scribe Disclosure  I, Flavia Garcia, am serving as a scribe to document services personally performed by Dr. Yamile Funez MD, based on data collection and the provider's statements to me.     Provider Disclosure:   The documentation recorded by the scribe accurately reflects the services I personally performed and the decisions made by me.    Yamile Funez MD    Department of Dermatology  [facility]: Phone: [phone], Fax:[phone]  [facility]: Phone: [phone], Fax: [phone]

## 2019-01-31 ENCOUNTER — MYC MEDICAL ADVICE (OUTPATIENT)
Dept: GASTROENTEROLOGY | Facility: CLINIC | Age: 44
End: 2019-01-31

## 2019-02-01 NOTE — TELEPHONE ENCOUNTER
Form held on MA desk to verify if covering providers are okay with filling out forms for patient or if it should wait until Yamile is back in clinic.    JESUSITA Gudino

## 2019-02-04 NOTE — TELEPHONE ENCOUNTER
If this is related to her surgery, form should be filled by the surgeon  Otherwise this can wait for Yamile

## 2019-02-04 NOTE — TELEPHONE ENCOUNTER
[facility] CLINICAL DOCUMENTATION    Form Documentation Form or Letter Request    Type or form/letter needing completion: [employer] Short Term Disability Claim Form  Provider: Sandy Garcia NP, APRN CNP  Has provider seen patient for office visit related to reason for form request? Does not know  Date form needed: not indicated  Once completed: Fax form to: [facility] fax# [phone]     Sandy Garcia NP, APRN CNP out of office until 2/7/19. Routed to onsite provider for review and orders.  Ambreen REHMAN, CMA

## 2019-02-04 NOTE — TELEPHONE ENCOUNTER
Patient returned call to clinic. She states [employer] already sent forms to surgeons office as well. Will place forms in shred. Patient states understanding and agrees to plan.  Ambreen REHMAN, CMA

## 2019-02-04 NOTE — TELEPHONE ENCOUNTER
Patient Contact    Attempt # 1    Was call answered?  No.  Left message on home number to return call to clinic re: forms.    Call Center: Verify with patient if FMLA/short term disability forms from [employer] related to foot surgery? If yes, patient needs to contact [employer] to have these forms sent to surgeons office.    Forms held on TC desk awaiting patient return call.  Ambreen REHMAN, CMA

## 2019-02-05 ENCOUNTER — DOCUMENTATION ONLY (OUTPATIENT)
Dept: CARE COORDINATION | Facility: CLINIC | Age: 44
End: 2019-02-05

## 2019-02-10 ENCOUNTER — ANESTHESIA EVENT (OUTPATIENT)
Dept: SURGERY | Facility: AMBULATORY SURGERY CENTER | Age: 44
End: 2019-02-10

## 2019-02-12 ASSESSMENT — ENCOUNTER SYMPTOMS: SEIZURES: 1

## 2019-02-13 ENCOUNTER — HOSPITAL ENCOUNTER (OUTPATIENT)
Facility: AMBULATORY SURGERY CENTER | Age: 44
End: 2019-02-13
Attending: ORTHOPAEDIC SURGERY
Payer: COMMERCIAL

## 2019-02-13 ENCOUNTER — ANESTHESIA (OUTPATIENT)
Dept: SURGERY | Facility: AMBULATORY SURGERY CENTER | Age: 44
End: 2019-02-13

## 2019-02-13 ENCOUNTER — TELEPHONE (OUTPATIENT)
Dept: ORTHOPEDICS | Facility: CLINIC | Age: 44
End: 2019-02-13

## 2019-02-13 ENCOUNTER — ANCILLARY PROCEDURE (OUTPATIENT)
Dept: RADIOLOGY | Facility: AMBULATORY SURGERY CENTER | Age: 44
End: 2019-02-13
Attending: ORTHOPAEDIC SURGERY
Payer: COMMERCIAL

## 2019-02-13 VITALS
HEIGHT: 62 IN | SYSTOLIC BLOOD PRESSURE: 126 MMHG | HEART RATE: 65 BPM | OXYGEN SATURATION: 100 % | BODY MASS INDEX: 29.44 KG/M2 | DIASTOLIC BLOOD PRESSURE: 75 MMHG | TEMPERATURE: 97.2 F | WEIGHT: 160 LBS | RESPIRATION RATE: 16 BRPM

## 2019-02-13 DIAGNOSIS — R52 PAIN: ICD-10-CM

## 2019-02-13 DIAGNOSIS — M79.672 LEFT FOOT PAIN: Primary | ICD-10-CM

## 2019-02-13 LAB
HCG UR QL: NEGATIVE
INTERNAL QC OK POCT: YES

## 2019-02-13 DEVICE — IMPLANTABLE DEVICE: Type: IMPLANTABLE DEVICE | Site: FOOT | Status: FUNCTIONAL

## 2019-02-13 DEVICE — IMP SCR ZIM CANC HEX 4.0X35X14MM PT: Type: IMPLANTABLE DEVICE | Site: FOOT | Status: FUNCTIONAL

## 2019-02-13 RX ORDER — GABAPENTIN 300 MG/1
300 CAPSULE ORAL ONCE
Status: COMPLETED | OUTPATIENT
Start: 2019-02-13 | End: 2019-02-13

## 2019-02-13 RX ORDER — HYDROCODONE BITARTRATE AND ACETAMINOPHEN 5; 325 MG/1; MG/1
1 TABLET ORAL
Status: DISCONTINUED | OUTPATIENT
Start: 2019-02-13 | End: 2019-02-14 | Stop reason: HOSPADM

## 2019-02-13 RX ORDER — HYDROXYZINE HYDROCHLORIDE 25 MG/1
25 TABLET, FILM COATED ORAL 3 TIMES DAILY PRN
Qty: 30 TABLET | Refills: 0 | Status: SHIPPED | OUTPATIENT
Start: 2019-02-13 | End: 2019-08-22

## 2019-02-13 RX ORDER — PROPOFOL 10 MG/ML
INJECTION, EMULSION INTRAVENOUS CONTINUOUS PRN
Status: DISCONTINUED | OUTPATIENT
Start: 2019-02-13 | End: 2019-02-13

## 2019-02-13 RX ORDER — HYDROCODONE BITARTRATE AND ACETAMINOPHEN 5; 325 MG/1; MG/1
1-2 TABLET ORAL EVERY 4 HOURS PRN
Qty: 20 TABLET | Refills: 0 | Status: SHIPPED | OUTPATIENT
Start: 2019-02-13 | End: 2019-02-16

## 2019-02-13 RX ORDER — SODIUM CHLORIDE, SODIUM LACTATE, POTASSIUM CHLORIDE, CALCIUM CHLORIDE 600; 310; 30; 20 MG/100ML; MG/100ML; MG/100ML; MG/100ML
INJECTION, SOLUTION INTRAVENOUS CONTINUOUS
Status: DISCONTINUED | OUTPATIENT
Start: 2019-02-13 | End: 2019-02-13 | Stop reason: HOSPADM

## 2019-02-13 RX ORDER — ACETAMINOPHEN 325 MG/1
975 TABLET ORAL ONCE
Status: DISCONTINUED | OUTPATIENT
Start: 2019-02-13 | End: 2019-02-13 | Stop reason: HOSPADM

## 2019-02-13 RX ORDER — NALOXONE HYDROCHLORIDE 0.4 MG/ML
.1-.4 INJECTION, SOLUTION INTRAMUSCULAR; INTRAVENOUS; SUBCUTANEOUS
Status: DISCONTINUED | OUTPATIENT
Start: 2019-02-13 | End: 2019-02-14 | Stop reason: HOSPADM

## 2019-02-13 RX ORDER — CEFAZOLIN SODIUM 2 G/50ML
2 SOLUTION INTRAVENOUS
Status: DISCONTINUED | OUTPATIENT
Start: 2019-02-13 | End: 2019-02-13 | Stop reason: HOSPADM

## 2019-02-13 RX ORDER — DEXAMETHASONE SODIUM PHOSPHATE 4 MG/ML
INJECTION, SOLUTION INTRA-ARTICULAR; INTRALESIONAL; INTRAMUSCULAR; INTRAVENOUS; SOFT TISSUE PRN
Status: DISCONTINUED | OUTPATIENT
Start: 2019-02-13 | End: 2019-02-13

## 2019-02-13 RX ORDER — GABAPENTIN 300 MG/1
300 CAPSULE ORAL ONCE
Status: DISCONTINUED | OUTPATIENT
Start: 2019-02-13 | End: 2019-02-13 | Stop reason: HOSPADM

## 2019-02-13 RX ORDER — ACETAMINOPHEN 325 MG/1
975 TABLET ORAL ONCE
Status: COMPLETED | OUTPATIENT
Start: 2019-02-13 | End: 2019-02-13

## 2019-02-13 RX ORDER — FLUMAZENIL 0.1 MG/ML
0.2 INJECTION, SOLUTION INTRAVENOUS
Status: DISCONTINUED | OUTPATIENT
Start: 2019-02-13 | End: 2019-02-13 | Stop reason: HOSPADM

## 2019-02-13 RX ORDER — HYDROXYZINE HYDROCHLORIDE 25 MG/1
25 TABLET, FILM COATED ORAL
Status: DISCONTINUED | OUTPATIENT
Start: 2019-02-13 | End: 2019-02-14 | Stop reason: HOSPADM

## 2019-02-13 RX ORDER — ONDANSETRON 2 MG/ML
INJECTION INTRAMUSCULAR; INTRAVENOUS PRN
Status: DISCONTINUED | OUTPATIENT
Start: 2019-02-13 | End: 2019-02-13

## 2019-02-13 RX ORDER — PROPOFOL 10 MG/ML
INJECTION, EMULSION INTRAVENOUS PRN
Status: DISCONTINUED | OUTPATIENT
Start: 2019-02-13 | End: 2019-02-13

## 2019-02-13 RX ORDER — CEFAZOLIN SODIUM 1 G/50ML
1 SOLUTION INTRAVENOUS SEE ADMIN INSTRUCTIONS
Status: DISCONTINUED | OUTPATIENT
Start: 2019-02-13 | End: 2019-02-13 | Stop reason: HOSPADM

## 2019-02-13 RX ORDER — BUPIVACAINE HYDROCHLORIDE 2.5 MG/ML
INJECTION, SOLUTION EPIDURAL; INFILTRATION; INTRACAUDAL PRN
Status: DISCONTINUED | OUTPATIENT
Start: 2019-02-13 | End: 2019-02-13

## 2019-02-13 RX ORDER — KETOROLAC TROMETHAMINE 30 MG/ML
INJECTION, SOLUTION INTRAMUSCULAR; INTRAVENOUS PRN
Status: DISCONTINUED | OUTPATIENT
Start: 2019-02-13 | End: 2019-02-13

## 2019-02-13 RX ORDER — FENTANYL CITRATE 50 UG/ML
25-50 INJECTION, SOLUTION INTRAMUSCULAR; INTRAVENOUS
Status: DISCONTINUED | OUTPATIENT
Start: 2019-02-13 | End: 2019-02-13 | Stop reason: HOSPADM

## 2019-02-13 RX ORDER — SODIUM CHLORIDE, SODIUM LACTATE, POTASSIUM CHLORIDE, CALCIUM CHLORIDE 600; 310; 30; 20 MG/100ML; MG/100ML; MG/100ML; MG/100ML
INJECTION, SOLUTION INTRAVENOUS CONTINUOUS
Status: DISCONTINUED | OUTPATIENT
Start: 2019-02-13 | End: 2019-02-14 | Stop reason: HOSPADM

## 2019-02-13 RX ORDER — LIDOCAINE 40 MG/G
CREAM TOPICAL
Status: DISCONTINUED | OUTPATIENT
Start: 2019-02-13 | End: 2019-02-13 | Stop reason: HOSPADM

## 2019-02-13 RX ORDER — CEFAZOLIN SODIUM 2 G/50ML
2 SOLUTION INTRAVENOUS
Status: COMPLETED | OUTPATIENT
Start: 2019-02-13 | End: 2019-02-13

## 2019-02-13 RX ORDER — NALOXONE HYDROCHLORIDE 0.4 MG/ML
.1-.4 INJECTION, SOLUTION INTRAMUSCULAR; INTRAVENOUS; SUBCUTANEOUS
Status: DISCONTINUED | OUTPATIENT
Start: 2019-02-13 | End: 2019-02-13 | Stop reason: HOSPADM

## 2019-02-13 RX ORDER — MEPERIDINE HYDROCHLORIDE 25 MG/ML
12.5 INJECTION INTRAMUSCULAR; INTRAVENOUS; SUBCUTANEOUS
Status: DISCONTINUED | OUTPATIENT
Start: 2019-02-13 | End: 2019-02-14 | Stop reason: HOSPADM

## 2019-02-13 RX ORDER — OXYCODONE HYDROCHLORIDE 5 MG/1
5 TABLET ORAL EVERY 4 HOURS PRN
Status: DISCONTINUED | OUTPATIENT
Start: 2019-02-13 | End: 2019-02-14 | Stop reason: HOSPADM

## 2019-02-13 RX ORDER — ONDANSETRON 4 MG/1
4-8 TABLET, ORALLY DISINTEGRATING ORAL EVERY 8 HOURS PRN
Qty: 4 TABLET | Refills: 0 | Status: SHIPPED | OUTPATIENT
Start: 2019-02-13 | End: 2019-02-20

## 2019-02-13 RX ORDER — ONDANSETRON 2 MG/ML
4 INJECTION INTRAMUSCULAR; INTRAVENOUS EVERY 30 MIN PRN
Status: DISCONTINUED | OUTPATIENT
Start: 2019-02-13 | End: 2019-02-14 | Stop reason: HOSPADM

## 2019-02-13 RX ORDER — ONDANSETRON 4 MG/1
4 TABLET, ORALLY DISINTEGRATING ORAL EVERY 30 MIN PRN
Status: DISCONTINUED | OUTPATIENT
Start: 2019-02-13 | End: 2019-02-14 | Stop reason: HOSPADM

## 2019-02-13 RX ORDER — HYDROMORPHONE HYDROCHLORIDE 1 MG/ML
.3-.5 INJECTION, SOLUTION INTRAMUSCULAR; INTRAVENOUS; SUBCUTANEOUS EVERY 10 MIN PRN
Status: DISCONTINUED | OUTPATIENT
Start: 2019-02-13 | End: 2019-02-14 | Stop reason: HOSPADM

## 2019-02-13 RX ADMIN — FENTANYL CITRATE 25 MCG: 50 INJECTION, SOLUTION INTRAMUSCULAR; INTRAVENOUS at 08:22

## 2019-02-13 RX ADMIN — KETOROLAC TROMETHAMINE 30 MG: 30 INJECTION, SOLUTION INTRAMUSCULAR; INTRAVENOUS at 08:51

## 2019-02-13 RX ADMIN — ONDANSETRON 4 MG: 2 INJECTION INTRAMUSCULAR; INTRAVENOUS at 08:20

## 2019-02-13 RX ADMIN — FENTANYL CITRATE 50 MCG: 50 INJECTION, SOLUTION INTRAMUSCULAR; INTRAVENOUS at 07:44

## 2019-02-13 RX ADMIN — DEXAMETHASONE SODIUM PHOSPHATE 4 MG: 4 INJECTION, SOLUTION INTRA-ARTICULAR; INTRALESIONAL; INTRAMUSCULAR; INTRAVENOUS; SOFT TISSUE at 08:20

## 2019-02-13 RX ADMIN — CEFAZOLIN SODIUM 2 G: 2 SOLUTION INTRAVENOUS at 08:15

## 2019-02-13 RX ADMIN — ACETAMINOPHEN 975 MG: 325 TABLET ORAL at 07:34

## 2019-02-13 RX ADMIN — PROPOFOL 200 MCG/KG/MIN: 10 INJECTION, EMULSION INTRAVENOUS at 08:16

## 2019-02-13 RX ADMIN — PROPOFOL 200 MG: 10 INJECTION, EMULSION INTRAVENOUS at 08:15

## 2019-02-13 RX ADMIN — SODIUM CHLORIDE, SODIUM LACTATE, POTASSIUM CHLORIDE, CALCIUM CHLORIDE: 600; 310; 30; 20 INJECTION, SOLUTION INTRAVENOUS at 07:35

## 2019-02-13 RX ADMIN — BUPIVACAINE HYDROCHLORIDE 20 ML: 2.5 INJECTION, SOLUTION EPIDURAL; INFILTRATION; INTRACAUDAL at 07:50

## 2019-02-13 RX ADMIN — FENTANYL CITRATE 25 MCG: 50 INJECTION, SOLUTION INTRAMUSCULAR; INTRAVENOUS at 08:15

## 2019-02-13 RX ADMIN — GABAPENTIN 300 MG: 300 CAPSULE ORAL at 07:35

## 2019-02-13 ASSESSMENT — LIFESTYLE VARIABLES: TOBACCO_USE: 0

## 2019-02-13 ASSESSMENT — MIFFLIN-ST. JEOR: SCORE: 1334.01

## 2019-02-13 NOTE — ANESTHESIA PREPROCEDURE EVALUATION
Anesthesia Pre-Procedure Evaluation    Patient: Natasha Lee   MRN:     [id number] Gender:   female   Age:    43 year old :      1975        Preoperative Diagnosis: Left Foot Bunion   Procedure(s):  Left Lapidus, Possible Akin Osteotomy     Past Medical History:   Diagnosis Date     Diabetes (H)     Pre-Diabetic     Heart murmur      Lupus (systemic lupus erythematosus) (H)     Dr. Prather     Lupus (systemic lupus erythematosus) (H) 12/10/2009     Normal delivery 09    boy forceps     Seizures (H)     When a child      Past Surgical History:   Procedure Laterality Date     ANESTH,SKIN SURGERY,KNEE  ,     orthoscopic     C APPENDECTOMY  2008     CHOLECYSTECTOMY       [facility] SURG[facility] PATHOLOGY       LAPAROSCOPIC CHOLECYSTECTOMY N/A 2016    Procedure: LAPAROSCOPIC CHOLECYSTECTOMY;  Surgeon: Radha Cline MD;  Location: [facility] OR     LAPAROSCOPIC TUBAL LIGATION  12/15/2011    Procedure:LAPAROSCOPIC TUBAL LIGATION; Laparoscopic tubal ligation; Surgeon:AMY WYNN; Location:[facility] OR          Anesthesia Evaluation     . Pt has had prior anesthetic. Type: General    No history of anesthetic complications          ROS/MED HX    ENT/Pulmonary:  - neg pulmonary ROS    (-) tobacco use   Neurologic:     (+)seizures last seizure: 20 yrs ago     Cardiovascular:     (+) ----. : . . . :. valvular problems/murmurs Heart murmur benign:.       METS/Exercise Tolerance:     Hematologic:  - neg hematologic  ROS       Musculoskeletal: Comment: Left Foot Bunion        GI/Hepatic:     (+) GERD (asymptomatic) Other, hepatitis type B, liver disease,       Renal/Genitourinary: Comment: Dysmenorrhea  Menorrhagia    Urinary frequency        Endo: Comment: Vitamin B12 deficiency (non anemic)    Not diabetic        Psychiatric:  - neg psychiatric ROS       Infectious Disease:  - neg infectious disease ROS       Malignancy:      - no malignancy   Other: Comment: Lupus (systemic lupus  "erythematosus) (H)    rash around the face - eczema and acne/ Acne rosacea   (+) No chance of pregnancy no H/O Chronic Pain,                       PHYSICAL EXAM:   Mental Status/Neuro: A/A/O   Airway: Facies: Feasible  Mallampati: I  Mouth/Opening: Full  TM distance: > 6 cm  Neck ROM: Full   Respiratory: Auscultation: CTAB     Resp. Rate: Normal     Resp. Effort: Normal      CV: Rhythm: Regular  Rate: Age appropriate  Heart: Murmur (soft)   Comments:      Dental: Normal                  Lab Results   Component Value Date    WBC 4.3 01/21/2019    HGB 13.4 01/21/2019    HCT 38.6 01/21/2019     01/21/2019    CRP 4.6 06/07/2018    SED 13 06/07/2018     01/21/2019    POTASSIUM 3.5 01/21/2019    CHLORIDE 107 01/21/2019    CO2 27 01/21/2019    BUN 9 01/21/2019    CR 0.63 01/21/2019     (H) 01/21/2019    JUAN A 8.8 01/21/2019    PHOS 4.0 10/17/2014    ALBUMIN 3.8 06/07/2018    PROTTOTAL 7.3 06/07/2018    ALT 40 06/07/2018    AST 27 06/07/2018    ALKPHOS 55 06/07/2018    BILITOTAL 0.5 06/07/2018    INR 0.8@ 09/08/2009    TSH 1.51 02/15/2016    HCG neg 05/18/2016    HCGS Negative 12/15/2011       Preop Vitals  BP Readings from Last 3 Encounters:   01/21/19 125/80   11/16/18 110/60   10/11/18 137/80    Pulse Readings from Last 3 Encounters:   01/21/19 70   11/16/18 82   10/11/18 78      Resp Readings from Last 3 Encounters:   06/14/18 18   09/22/16 18   07/22/16 16    SpO2 Readings from Last 3 Encounters:   01/21/19 100%   11/16/18 97%   10/11/18 99%      Temp Readings from Last 1 Encounters:   01/21/19 36.9  C (98.4  F) (Temporal)    Ht Readings from Last 1 Encounters:   01/21/19 1.575 m (5' 2\")      Wt Readings from Last 1 Encounters:   01/21/19 74.4 kg (164 lb)    Estimated body mass index is 30 kg/m  as calculated from the following:    Height as of 1/21/19: 1.575 m (5' 2\").    Weight as of 1/21/19: 74.4 kg (164 lb).     LDA:            Assessment:   ASA SCORE: 2    NPO Status: > 2 hours since completed " Clear Liquids; > 6 hours since completed Solid Foods   Documentation: H&P complete; Preop Testing complete; Consents complete   Proceeding: Proceed without further delay  Tobacco Use:  NO Active use of Tobacco/UNKNOWN Tobacco use status     Plan:   Anes. Type:  General; Regional     RA Details:  Pre Induction; SS; Exparel; FOR POSTOP PAIN CONTROL; L     RA-Location/Type: Nerve Block; Adductor canal   Pre-Induction: Midazolam IV; Opioid IV; Acetaminophen PO   Induction:  IV (Standard)   Airway: LMA   Access/Monitoring: PIV   Maintenance: Balanced   Emergence: Procedure Site   Logistics: Same Day Surgery     Postop Pain/Sedation Strategy:  Standard-Options: Opioids PRN; Block SS; Exparel     PONV Management:  Adult Risk Factors: Female, Non-Smoker, Postop Opioids  Prevention: Ondansetron; Dexamethasone     CONSENT: Direct conversation   Plan and risks discussed with: Patient   Blood Products: N/a       Comments for Plan/Consent:  42 yo Left Lapidus, Possible Akin Osteotomy (Left Ankle) under GETA/regional block                         Andrzej Munguia MD     I have personally seen and examined this patient. The above assessment and plan is the result of our shared decision making.    Blaine Dai MD    2/13/2019 6:34 AM

## 2019-02-13 NOTE — ANESTHESIA POSTPROCEDURE EVALUATION
Anesthesia POST Procedure Evaluation    Patient: Natasha Lee   MRN:     [id number] Gender:   female   Age:    43 year old :      1975        Preoperative Diagnosis: Left Foot Bunion   Procedure(s):  Left Lapidus with  Akin Osteotomy   Postop Comments: No value filed.       Anesthesia Type:  General, Regional    Reportable Event: NO     PAIN: Uncomplicated   Sign Out status: Comfortable, Well controlled pain     PONV: No PONV   Sign Out status:  No Nausea or Vomiting     Neuro/Psych: Uneventful perioperative course   Sign Out Status: Preoperative baseline; Age appropriate mentation     Airway/Resp.: Uneventful perioperative course   Sign Out Status: Non labored breathing, age appropriate RR; Resp. Status within EXPECTED Parameters     CV: Uneventful perioperative course   Sign Out status: Appropriate BP and perfusion indices; Appropriate HR/Rhythm     Disposition:   Sign Out in:  PACU  Disposition:  Phase II; Home  Recovery Course: Uneventful  Follow-Up: Not required           Last Anesthesia Record Vitals:  CRNA VITALS  2019 0844 - 2019 0944      2019             Pulse:  77    SpO2:  98 %    Resp Rate (set):  10          Last PACU/Preop Vitals:  Vitals:    19 0944 19 1000 19 1021   BP: 126/74 133/63 126/75   Pulse: 62 60 65   Resp:  15 16   Temp:  36.1  C (96.9  F) 36.2  C (97.2  F)   SpO2:  100% 100%         Electronically Signed By: Blaine Dai MD, 2019, 1:06 PM

## 2019-02-13 NOTE — ANESTHESIA PROCEDURE NOTES
Peripheral Nerve Block Procedure Note  Date/Time: 2/13/2019 7:50 AM    Staff:     Anesthesiologist:  Blaine Dai MD    Resident/CRNA:  Andrzej Munguia MD    Block performed by resident/CRNA in the presence of a teaching physician    Location: Pre-op  Procedure Start/Stop TImes:      2/13/2019 7:45 AM     2/13/2019 7:50 AM    patient identified, IV checked, site marked, risks and benefits discussed, informed consent, monitors and equipment checked, pre-op evaluation, at physician/surgeon's request and post-op pain management      Correct Patient: Yes      Correct Position: Yes      Correct Site: Yes      Correct Procedure: Yes      Correct Laterality:  Yes    Site Marked:  Yes  Procedure details:     Procedure:  Adductor canal    ASA:  2    Diagnosis:  Bunion foot    Laterality:  Left    Position:  Supine    Sterile Prep: chloraprep, mask and sterile gloves      Needle:  Short bevel and insulated    Needle gauge:  21    Needle length (inches):  4    Ultrasound: Yes      Ultrasound used to identify targeted nerve, plexus, or vascular structure and placed a needle adjacent to it      Permanent Image entered into patiient's record      Abnormal pain on injection: No      Blood Aspirated: No      Paresthesias:  No    Bleeding at site: No      Test dose negative for signs of intravascular injection: Yes      Bolus via:  Needle    Infusion Method:  Single Shot    Complications:  None  Assessment/Narrative:      Adductor canal total 20 ml:  Liposomal bupivacaine 1.3%  10 ml, bupivacaine 0.25% 10 ml

## 2019-02-13 NOTE — TELEPHONE ENCOUNTER
Called patient and left message asking if we could reschedule them for 12:30 so nasima could do the cast change since she is doing post ops and is Dr. Angella STEIN which makes it easier for someone to do the change. If they call back please schedule them for 12:30 on the nursing schedule

## 2019-02-13 NOTE — BRIEF OP NOTE
[facility] Brief Operative Note    Patient: Natasha Lee  : 1975  Date of Service: 2019 7:47 AM      Pre-operative diagnosis: Left Foot Bunion   Post-operative diagnosis Left Foot Bunion   Procedure: Procedure(s):  Left Lapidus with Azael Osteotomy   Surgeon: Dr. Brett Chavez MD   Assistants(s): Erin Parker PA-C   Estimated blood loss: 20 ml's    Specimens: None   Findings: None       Plan:  Same Day surgery discharge to home once criteria met.  Cast to remain on left  lower extremity and  NWB at all times.  Norco and Vistaril for pain.  Zofran for nausea   No dressing change on own.  Leave dressing on until 2 weeks follow up appointment.  F/U in clinic in 2 weeks    I was asked by Dr. Chavez to assist with surgery. I positioned and prepped the patient. I retracted soft tissue.   I suctioned fluids when needed. I provided traction for dissection. I helped to ligate blood vessels. I helped Dr. Chavez identify and protect important structures. The procedure was medically necessary for an assistant because Dr. Chavez needed the operative exposure and assistance that I provided. This allowed him to safely and efficiently operate. It was also important that I help ligate blood vessels to maintain hemostasis and reduce the bleeding risk. I helped with the closure of the operative incisions as well as helping with the boot/cast/splint.  The assistance that I provided reduced operative time which meant less general anesthetic for the patient. No qualified residents were available to assist.    Erin Parker PA-C

## 2019-02-13 NOTE — OP NOTE
Procedure Date: 02/13/2019      PREOPERATIVE DIAGNOSIS:  Left bunion deformity.      POSTOPERATIVE DIAGNOSIS:  Left bunion deformity.      PROCEDURES:     1.  Left foot Lapidus procedure.   2.  Left foot Akin osteotomy.      SURGEON:  Brett Chavez MD      ASSISTANT:  CAROLINE Nava Ms.'s help was required in order to help with positioning the patient, the surgery itself, holding retractors, closure of the wound and application of immobilization devices.  At time of surgery, there was no one available from an orthopedic trainee.      COMPLICATIONS:  None.      DRAINS:  None.      ESTIMATED BLOOD LOSS:  Less than 20 mL.      ANESTHESIA:  General endotracheal.      INDICATIONS:  Please refer to clinic notes for further details regarding indications for Ms. Lee's case.       PROCEDURE NOTE:  On 02/13/2019, the patient was taken to surgery.  Preoperative antibiotics were administered to the patient prior to arrival to the OR.      After successful induction of general endotracheal anesthesia, she was placed supine on the operating table.  The left lower extremity was prepped and draped in a sterile fashion.  After exsanguination by gravity, the tourniquet cuff was inflated to 300 mmHg on the proximal third of the left thigh.      The pause for the cause was performed according to our institution's policy, which confirmed laterality of the procedure.       Following this, we proceeded with an incision along the medial aspect of the 1st TMT joint, which was extended to the medial border of the 1st MTP joint as well.  Subcutaneous tissues were dissected.  We had exposure of the joint.  The 1st TMT joint was denuded from cartilaginous material, and eventually multiple perforations were created with a 2.5 mm drill bit.      A capsulotomy was performed along the medial aspect of the 1st MTP joint.  We proceeded with resection of the medial eminence with an oscillating saw, and proximal fixation  of a reduced 1st tarsometatarsal joint was performed with K-wires.  We confirmed the fluoroscopic examination to have excellent alignment of the 1st MTP joint, and therefore we proceeded with fixation with 2 screws from distal and dorsal to proximal and plantar across the 1st tarsometatarsal joint with excellent purchase.  This confirmed to have a pretty much completely well-reduced intermetatarsal angle between the 1st-2nd metatarsals.      Following this, we still confirmed to have a slight bunion deformity, and therefore we proceeded with an Azael osteotomy of the proximal phalanx.      Further dissection was carried on distally with an oscillating saw, and we proceeded with resection of the wedge from the medial cortex of the proximal phalanx, which required it to be reduced, and the screw was placed from medial and proximal to distal and lateral with excellent purchase and compression.      We confirmed with fluoroscopic examination to have excellent reduction of the joints and osteotomies as well as location of the hardware.  This was confirmed in 3 views of the left foot.  These images were sent to PACs for definitive documentation.      The tourniquet cuff was deflated.  Satisfactory hemostasis was accomplished.  The wound was closed in layers.  Sterile dressings were applied.  The patient was placed in a short leg cast and transferred in stable condition to PACU.      PLAN:  The patient will remain nonweightbearing x3 months.  She will return to clinic in 2 weeks for a wound check.  At that time, sutures will be removed if indicated, and she will proceed with application of a second short leg cast with a toe plate.  The patient then will return to clinic at 6 weeks from surgery.  At that time, 3 views of the left foot will be obtained, and based on those findings, further recommendation will be given to the patient.         SHAKIRA TILLEY MD             D: 02/13/2019   T: 02/13/2019   MT: ronal      Name:      GA GARCIA   MRN:      [id number]        Account:        [id number]   :      1975           Procedure Date: 2019      Document: [id number]

## 2019-02-13 NOTE — TELEPHONE ENCOUNTER
Patient called reporting red-flag symptom: Uncontrolled Bleeding, dressing is saturated, leaking out from under cast    Per the [facility] Red-Flag symptom list, patient was: instructed to hang up and dial 911, due to report of life-threatening symptoms.

## 2019-02-13 NOTE — ANESTHESIA PROCEDURE NOTES
Peripheral Nerve Block Procedure Note  Date/Time: 2/13/2019 7:45 AM    Staff:     Anesthesiologist:  Blaine Dai MD    Resident/CRNA:  Andrzej Munguia MD    Block performed by resident/CRNA in the presence of a teaching physician    Location: Pre-op  Procedure Start/Stop TImes:      2/13/2019 7:40 AM     2/13/2019 7:45 AM    patient identified, IV checked, site marked, risks and benefits discussed, informed consent, monitors and equipment checked, pre-op evaluation, at physician/surgeon's request and post-op pain management      Correct Patient: Yes      Correct Position: Yes      Correct Site: Yes      Correct Procedure: Yes      Correct Laterality:  Yes    Site Marked:  Yes  Procedure details:     Procedure:  Sciatic and Popliteal    ASA:  2    Diagnosis:  Bunion foot    Laterality:  Left    Position:  Supine    Sterile Prep: chloraprep, mask and sterile gloves      Needle:  Short bevel and insulated    Needle gauge:  21    Needle length (inches):  4    Ultrasound: Yes      Ultrasound used to identify targeted nerve, plexus, or vascular structure and placed a needle adjacent to it      Permanent Image entered into patiient's record      Abnormal pain on injection: No      Blood Aspirated: No      Paresthesias:  No    Bleeding at site: No      Test dose negative for signs of intravascular injection: Yes      Bolus via:  Needle    Infusion Method:  Single Shot    Complications:  None  Assessment/Narrative:     Injection made incrementally with aspirations every (mL):  5     Pop sciatic total 20 ml: liposomal bupivacaine 1.3%  10 ml,   Bupivacaine 0.25% 10 ml

## 2019-02-13 NOTE — ANESTHESIA CARE TRANSFER NOTE
Patient: Natasha Lee    Procedure(s):  Left Lapidus with  Akin Osteotomy    Diagnosis: Left Foot Bunion  Diagnosis Additional Information: No value filed.    Anesthesia Type:   No value filed.     Note:  Airway :Room Air  Patient transferred to:PACU  Comments: 122/70, 15, 96.4, 99%Handoff Report: Identifed the Patient, Identified the Reponsible Provider, Reviewed the pertinent medical history, Discussed the surgical course, Reviewed Intra-OP anesthesia mangement and issues during anesthesia, Set expectations for post-procedure period and Allowed opportunity for questions and acknowledgement of understanding      Vitals: (Last set prior to Anesthesia Care Transfer)    CRNA VITALS  2/13/2019 0844 - 2/13/2019 0921      2/13/2019             Pulse:  77    SpO2:  98 %    Resp Rate (set):  10                Electronically Signed By: ELIAS Reddy CRNA  February 13, 2019  9:21 AM

## 2019-02-13 NOTE — DISCHARGE INSTRUCTIONS
"[facility]  Home Care Following Anesthesia  For 24 hours after surgery:  1. Get plenty of rest.  A responsible adult must stay with you for at least 24 hours after you leave the surgery center.  2. Do not drive or use heavy equipment.  If you have weakness or tingling, don't drive or use heavy equipment until this feeling goes away.   3. Do not drink alcohol.   4. Avoid strenuous or risky activities.  Ask for help when climbing stairs.  5. You may feel lightheaded.  IF so, sit for a few minutes before standing.  Have someone help you get up.   6. If you have nausea (feel sick to your stomach): Drink only clear liquids such as apple juice, ginger ale, broth or 7-Up.  Rest may also help.  Be sure to drink enough fluids.  Move to a regular diet as you feel able.   7. You may have a slight fever.  Call the doctor if your fever is over 100 F (37.7 C) (taken under the tongue) or lasts longer than 24 hours.  8. You may have a dry mouth, a sore throat, muscle aches or trouble sleeping. These should go away after 24 hours.  9. Do not make important or legal decisions.        Today you received an Exparel block to numb the nerves near your surgery site.  This is a block using local anesthetic or \"numbing\" medication injected around the nerves to anesthetize or \"numb\" the area supplied by those nerves.  This block is injected into the muscle layer near your surgical site.  This medication may numb the location where you had surgery up to 72 hours.  If your surgical site is an arm or leg you should be careful with your affected limb, since it is possible to injure your limb without being aware of it due to the numbing.  Until full feeling returns, you should guard against bumping or hitting your limb, and avoid extreme hot or cold temperatures on the skin.  As the block wears off, the feeling will return as a tingling or prickly sensation near your surgical site.  You will experince more " discomfort from your incision as the feeling returns.  You may want to take a pain pill (a narcotic or Tylenol if this was prescribed by your surgeon) when you start to experience mild pain before the pain beomes more severe.  If your pain medications do not control your pain, you should notify your surgeon.    Tips for taking pain medications  To get the best pain relief possible, remember these points:    Take pain medications as directed, before pain becomes severe.    Pain medication can upset your stomach: taking it with food may help.    Constipation is a common side effect of pain medication. Drink plenty of  fluids.    Eat foods high in fiber. Take a stool softener if recommended by your doctor or pharmacist.    Do not drink alcohol, drive or operate machinery while taking pain medications.    Ask about other ways to control pain, such as with heat, ice or relaxation.    Tylenol/Acetaminophen Consumption  To help encourage the safe use of acetaminophen, the makers of TYLENOL  have lowered the maximum daily dose for single-ingredient Extra Strength TYLENOL  (acetaminophen) products sold in the [place] from 8 pills per day (4,000 mg) to 6 pills per day (3,000 mg). The dosing interval has also changed from 2 pills every 4-6 hours to 2 pills every 6 hours.    If you feel your pain relief is insufficient, you may take Tylenol/Acetaminophen in addition to your narcotic pain medication.     Be careful not to exceed 3,000 mg of Tylenol/Acetaminophen in a 24 hour period from all sources.    If you are taking extra strength Tylenol/acetaminophen (500 mg), the maximum dose is 6 tablets in 24 hours.    If you are taking regular strength acetaminophen (325 mg), the maximum dose is 9 tablets in 24 hours.    Call a doctor for any of the followin. Signs of infection (fever, growing tenderness at the surgery site, a large amount of drainage or bleeding, severe pain, foul-smelling drainage, redness, swelling).  2. It has  "been over 8 to 10 hours since surgery and you are still not able to urinate (pass water).  3. Headache for over 24 hours.  4. Numbness, tingling or weakness the day after surgery (if you had spinal anesthesia).  Your doctor is:       Dr. Brett Chavez, Orthopaedics: [phone]               Or dial [phone] and ask for the resident on call for:  Orthopaedics  For emergency care, call the:  [facility] Emergency Department: [phone] (TTY for hearing impaired: [phone])    Information about liposomal bupivacaine (Exparel)    What is Liposomal Bupivacaine?    Liposomal Bupivacaine is a numbing medication that can help you manage your pain after surgery.  This medication is similar to \"novacaine,\" which is often used by the dentist.  Liposomal bupivacaine is released slowly and can help control pain for up to 72 hours.    What is the purpose of Liposomal Bupivacaine?    To manage your pain after surgery    To help you sleep better, take deep breaths, walk more comfortable, and feel up to visiting with others    How is the procedure done?    Liposomal bupivacaine is a medication given by an injection.    It is usually given right before your surgery.  If this is the case, you will be awake or sedated, but you should experience minimal pain during the procedure.    For some people, the injection may be given at the very end of your surgery.  It all depends on the type of surgery and your situation.    The procedure usually takes about 5-15 minutes.  An ultrasound machine will help the anesthesiologist insert it in the right place or the surgeon will inject it under direct vision.     A needle is used to place the numbing medication under your skin.  It provides pain relief by numbing the tissue in the area where your surgeon will make the incision.    What can I expect?    You may experience numbness, tingling, or a feeling of heaviness around the area that was injected.    If you experience any of the follow " symptoms IMMEDIATELY CALL THE REGIONAL ANESTHESIA PAIN SERVICE:    Numbness or tingling occurs in areas other than around the injection site    Blurry vision    Ringing in your ears    A metallic taste in your mouth    PAGE: Dial [phone].  When prompted, enter the following 4-digit ID number:  [id number].  You will be prompted to enter your phone number; and then enter the # sign.  The clinician on call will call you back.    OR    CALL: Dial [phone].  Let the hospital  know that you are having a problem with a nerve block and that you would like to speak to the regional anesthesia pain service right away.    You should not receive any other type of numbing medication within 4 days after receiving liposomal bupivacaine unless your anesthesiologist approves.    Post Operative Instructions: Regional Anesthetic for Lower Extremity with Liposomal Bupivacaine  General Information:   Regional anesthesia is when local anesthetic or  numbing  medication is injected around the nerves to anesthetize or  numb  the area supplied by that set of nerves. It is a type of analgesia used to control pain and decreases the need for narcotics following surgery.    Types of Regional Blocks:  Femoral: A block injected into the groin area of the operative leg of a patient having thigh or knee surgery.  Adductor Canal: A block injected into the mid thigh of the operative leg of a patient having knee or ankle surgery.  Popliteal or Distal Sciatic: A block injected into the back of the knee of the operative leg of patients having foot or ankle surgery.   Ankle:  An anesthetic medication is injected into the ankle of the operative leg of a patient having foot or toe surgery.     Procedure:   The type of anesthesia your doctor used to numb your leg will usually not wear off for 24-48 hours, but may last as long as 72 hours. You should be careful during that period, since it is possible to injure your leg without being aware of the  injury. While your leg is numb you should:    Use crutches (minimal weight bearing until your motor and strength is completely back to normal)    Avoid striking or bumping your leg    Avoid extreme hot or cold    Discomfort:  You will have a tingling and prickly sensation in your leg as the feeling begins to return; you can also expect some discomfort. The amount of discomfort is unpredictable, but if you have more pain than can be controlled with pain medication, you should notify your physician.     Pain Medicine:   Begin taking your oral pain pills before bedtime and during the night to avoid a sudden onset of pain as part of the block wears off. Do not engage in drinking, driving, or hazardous occupations while taking pain medication.

## 2019-02-13 NOTE — TELEPHONE ENCOUNTER
Called patient back to see what was going on and if there was something I could do to help with symptoms requested her to call back although she might be on the phone with emergency services when I called

## 2019-02-14 ENCOUNTER — ALLIED HEALTH/NURSE VISIT (OUTPATIENT)
Dept: ORTHOPEDICS | Facility: CLINIC | Age: 44
End: 2019-02-14
Payer: COMMERCIAL

## 2019-02-14 DIAGNOSIS — Z98.890 S/P FOOT SURGERY, LEFT: Primary | ICD-10-CM

## 2019-02-14 NOTE — PROGRESS NOTES
Patient came in to the clinic today post Left Lapidus with  Azael Osteotomy as she bled through her cast. Her post op cast was removed and the foot was cleaned. A new cast was then applied.     Cast/splint application  Date/Time: 2/14/2019 1:07 PM  Performed by: Liz Quintanilla ATC  Authorized by: Brett Chavez MD     Consent:     Consent obtained:  Verbal    Consent given by:  Patient  Pre-procedure details:     Sensation:  Normal  Procedure details:     Laterality:  Left    Location:  Foot    Foot:  L foot    Cast type:  Short leg    Supplies:  Fiberglass  Post-procedure details:     Pain level:  0/10          Liz Quintanilla ATC

## 2019-02-18 ENCOUNTER — E-VISIT (OUTPATIENT)
Dept: PEDIATRICS | Facility: CLINIC | Age: 44
End: 2019-02-18
Payer: COMMERCIAL

## 2019-02-18 DIAGNOSIS — R30.0 DYSURIA: Primary | ICD-10-CM

## 2019-02-18 PROCEDURE — 99444 ZZC PHYSICIAN ONLINE EVALUATION & MANAGEMENT SERVICE: CPT | Performed by: NURSE PRACTITIONER

## 2019-02-19 ENCOUNTER — TELEPHONE (OUTPATIENT)
Dept: PEDIATRICS | Facility: CLINIC | Age: 44
End: 2019-02-19

## 2019-02-19 DIAGNOSIS — N30.00 ACUTE CYSTITIS WITHOUT HEMATURIA: ICD-10-CM

## 2019-02-19 DIAGNOSIS — R30.0 DYSURIA: ICD-10-CM

## 2019-02-19 DIAGNOSIS — R30.0 DYSURIA: Primary | ICD-10-CM

## 2019-02-19 LAB
BACTERIA #/AREA URNS HPF: ABNORMAL /HPF
NON-SQ EPI CELLS #/AREA URNS LPF: ABNORMAL /LPF
RBC #/AREA URNS AUTO: ABNORMAL /HPF
WBC #/AREA URNS AUTO: ABNORMAL /HPF

## 2019-02-19 PROCEDURE — 81015 MICROSCOPIC EXAM OF URINE: CPT | Performed by: NURSE PRACTITIONER

## 2019-02-19 PROCEDURE — 87086 URINE CULTURE/COLONY COUNT: CPT | Performed by: NURSE PRACTITIONER

## 2019-02-19 PROCEDURE — 87088 URINE BACTERIA CULTURE: CPT | Performed by: NURSE PRACTITIONER

## 2019-02-19 NOTE — TELEPHONE ENCOUNTER
[facility] Call Center    Phone Message    May a detailed message be left on voicemail: yes    Reason for Call: Symptoms or Concerns     If patient has red-flag symptoms, warm transfer to triage line    Current symptom or concern: possible UTI/burning sensation/frequency    Symptoms have been present for:  1 day(s)    Has patient previously been seen for this? No    By sylvia araya    Are there any new or worsening symptoms? Yes      Action Taken: Message routed to:  Primary Care p [phone]

## 2019-02-19 NOTE — RESULT ENCOUNTER NOTE
Ky Lee,    Attached are your test results.  -Urine is normal as no bacteria cells in urine  appearing except looks like some type of interfering agent  Have you been taking something over the counter for your urine?  I will send in a culture to make sure    Please contact us if you have any questions.    Sandy Garcia, CNP

## 2019-02-19 NOTE — TELEPHONE ENCOUNTER
Please see [employer] message. Routed to provider to review and advise.    China Osullivan RN   [facility]

## 2019-02-20 LAB
BACTERIA SPEC CULT: ABNORMAL
SPECIMEN SOURCE: ABNORMAL

## 2019-02-20 RX ORDER — CEPHALEXIN 500 MG/1
500 CAPSULE ORAL 2 TIMES DAILY
Qty: 14 CAPSULE | Refills: 0 | Status: SHIPPED | OUTPATIENT
Start: 2019-02-20 | End: 2019-08-22

## 2019-02-20 NOTE — RESULT ENCOUNTER NOTE
Ky Lee,    Attached are your test results.  -Urine culture is abnormal and grew out bacteria    I have sent a new antibiotic to your pharmacy at [facility].   Please contact us if you have any questions.    Sandy Garcia, CNP

## 2019-02-26 ENCOUNTER — OFFICE VISIT (OUTPATIENT)
Dept: ORTHOPEDICS | Facility: CLINIC | Age: 44
End: 2019-02-26
Payer: COMMERCIAL

## 2019-02-26 DIAGNOSIS — Z98.890 S/P FOOT SURGERY, LEFT: Primary | ICD-10-CM

## 2019-02-26 NOTE — LETTER
[facility]  [address] [address]        February 26, 2019    Regarding:  Natasha Lee  [address] [facility] [address]              To Whom It May Concern;    Natasha Lee can return to work with the following restrictions:   -No weightbearing on the left foot.  Please allow for Natasha to work in a seated position until she is reevaluated in our clinic on April 2, 2019.           Sincerely,        Brett Chavez MD

## 2019-02-26 NOTE — PROGRESS NOTES
Reason for visit:    Natasha Lee came in to the clinic for a two week post op check.    Her surgery was done 2/13/19 by Dr Chavez.  She had Left Lapidus with  Akin Osteotomy.     Assessment:    Natasha came into the clinic in a short leg cast Non-WB    Her cast was removed and the Surgical wounds were exposed and found to be well-healed; so the sutures were removed.    Plan:     She was placed in a short leg cast with a toe plate.  She was told to remain Non-WB     She has an appointment to see Dr. Chavez at that time Dr. Chavez will determine further restrictions.    She has our phone number and will call with questions or problems.        Cast/splint application  Date/Time: 2/26/2019 4:07 PM  Performed by: Liz Quintanilla ATC  Authorized by: Brett Chavez MD     Consent:     Consent obtained:  Verbal    Consent given by:  Patient  Pre-procedure details:     Sensation:  Normal  Procedure details:     Laterality:  Left    Location:  Leg    Leg:  L lower leg    Cast type:  Short leg (with a toe plate)    Supplies:  Fiberglass  Post-procedure details:     Sensation:  Normal    Patient tolerance of procedure:  Tolerated well, no immediate complications    Patient provided with cast or splint care instructions: Yes        Liz Quintanilla ATC

## 2019-02-27 ENCOUNTER — TELEPHONE (OUTPATIENT)
Dept: ORTHOPEDICS | Facility: CLINIC | Age: 44
End: 2019-02-27

## 2019-02-27 NOTE — TELEPHONE ENCOUNTER
[facility] Call Center    Phone Message    May a detailed message be left on voicemail: yes    Reason for Call: Form or Letter   Type or form/letter needing completion: Return to work form, they received the form with restriction but no return date.    Provider: Brett Mcmillan form needed: ASAP   Once completed: Contact [employer] with return date      Action Taken: Message routed to:  [facility]

## 2019-03-04 ENCOUNTER — TELEPHONE (OUTPATIENT)
Dept: ORTHOPEDICS | Facility: CLINIC | Age: 44
End: 2019-03-04

## 2019-03-04 NOTE — TELEPHONE ENCOUNTER
[facility] Call [facility]    Phone Message    May a detailed message be left on voicemail: yes    Reason for Call: Other: Del from [employer] insurance stated that they have received a Return to Work form for the Pt with restrictions. However, there was no return to work date on the letter/formed that they received. Please call Del back to clarify date or fax new form with date to: [phone], Attn: DBS.     Action Taken: Message routed to:  [facility] ([facility] Ortho Clinic

## 2019-03-09 ENCOUNTER — HEALTH MAINTENANCE LETTER (OUTPATIENT)
Age: 44
End: 2019-03-09

## 2019-03-26 NOTE — TELEPHONE ENCOUNTER
[facility] Call [facility]    Phone Message    May a detailed message be left on voicemail: yes    Reason for Call: Form or Letter   Type or form/letter needing completion: Clearance to return to work- [employer] Form - originally faxed on 2.25.19  Provider: Dr. Chavez  Date form needed: Claim will be closed if not received by 4.12.19  Once completed: Fax form to: [phone] attn Del or [employer]      Action Taken: Message routed to:  [facility]

## 2019-03-26 NOTE — TELEPHONE ENCOUNTER
Refaxed work letter. In comments of fax sheet included that pt will follow up on 4/2 with Dr. Chavez and another work letter will be sent at that time. Also, date of return is listed on top of page.    Faxed to [phone].    Shankar GREENFIELD ATC

## 2019-03-27 DIAGNOSIS — Z98.890 S/P FOOT SURGERY, LEFT: Primary | ICD-10-CM

## 2019-04-02 ENCOUNTER — ANCILLARY PROCEDURE (OUTPATIENT)
Dept: GENERAL RADIOLOGY | Facility: CLINIC | Age: 44
End: 2019-04-02
Attending: ORTHOPAEDIC SURGERY
Payer: COMMERCIAL

## 2019-04-02 ENCOUNTER — OFFICE VISIT (OUTPATIENT)
Dept: ORTHOPEDICS | Facility: CLINIC | Age: 44
End: 2019-04-02
Payer: COMMERCIAL

## 2019-04-02 DIAGNOSIS — Z98.890 S/P FOOT SURGERY, LEFT: ICD-10-CM

## 2019-04-02 DIAGNOSIS — M25.572 PAIN IN JOINT, ANKLE AND FOOT, LEFT: ICD-10-CM

## 2019-04-02 DIAGNOSIS — Z98.890 S/P FOOT SURGERY: Primary | ICD-10-CM

## 2019-04-02 LAB — RADIOLOGIST FLAGS: NORMAL

## 2019-04-02 NOTE — NURSING NOTE
Reason For Visit:   Chief Complaint   Patient presents with     Surgical Followup      Left Lapidus with  Akin Osteotomy, DOS: 2-.         There were no vitals taken for this visit.    Pain Assessment  Patient Currently in Pain: Yes  0-10 Pain Scale: 6  Primary Pain Location: Foot  Pain Descriptors: Aching, Sharp, Throbbing, Stabbing    Zafar Newby CMA

## 2019-04-02 NOTE — LETTER
4/2/2019       RE: Natasha Lee  [address] [facility] [address]     Dear Colleague,    Thank you for referring your patient, Natasha Lee, to the [facility]. Please see a copy of my visit note below.    CHIEF COMPLAINT:  Status post left foot Lapidus procedure with Azael osteotomy performed on 02/13/2019.      HISTORY OF PRESENT ILLNESS:  Ms. Lee presents today for further followup.  Reports to be doing well.  Reports to be compliant.  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      PHYSICAL EXAMINATION:  On today's visit, she presents with some diffuse swelling through the forefoot.  There is a well-healed surgical incision.  There is range of motion across the first MTP joint with approximately a dorsiflexion of 30-40 degrees, plantar flexion 20 degrees.  Alignment is excellent.      RADIOGRAPHIC EVALUATION:  Three views of the left foot were obtained today which were significant for showing excellent consolidation across the first tarsometatarsal joint as well as the proximal phalanx osteotomy.  Hardware is intact and in place.  Alignment is excellent.      ASSESSMENT:  Status post left foot Lapidus procedure with Azael osteotomy.      PLAN:  I discussed with the patient and her  that at this point we would like her to remain nonweightbearing for another month.  She will return to clinic at that time and 3 views of the foot will be obtained.  Based on those findings, further recommendations will be given to the patient.      In the meantime, the patient will proceed with some gentle physical therapy for range of motion exercises.      She was given also a prescription for work to remain on strict nonweightbearing for another 6 weeks.      All questions were answered.     Again, thank you for  allowing me to participate in the care of your patient.      Sincerely,    Brett Chavez MD

## 2019-04-02 NOTE — PROGRESS NOTES
CHIEF COMPLAINT:  Status post left foot Lapidus procedure with Azael osteotomy performed on 02/13/2019.      HISTORY OF PRESENT ILLNESS:  Ms. Lee presents today for further followup.  Reports to be doing well.  Reports to be compliant.  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      PHYSICAL EXAMINATION:  On today's visit, she presents with some diffuse swelling through the forefoot.  There is a well-healed surgical incision.  There is range of motion across the first MTP joint with approximately a dorsiflexion of 30-40 degrees, plantar flexion 20 degrees.  Alignment is excellent.      RADIOGRAPHIC EVALUATION:  Three views of the left foot were obtained today which were significant for showing excellent consolidation across the first tarsometatarsal joint as well as the proximal phalanx osteotomy.  Hardware is intact and in place.  Alignment is excellent.      ASSESSMENT:  Status post left foot Lapidus procedure with Azael osteotomy.      PLAN:  I discussed with the patient and her  that at this point we would like her to remain nonweightbearing for another month.  She will return to clinic at that time and 3 views of the foot will be obtained.  Based on those findings, further recommendations will be given to the patient.      In the meantime, the patient will proceed with some gentle physical therapy for range of motion exercises.      She was given also a prescription for work to remain on strict nonweightbearing for another 6 weeks.      All questions were answered.

## 2019-04-02 NOTE — LETTER
Return to Work  2019     Seen today: yes    Patient:  Natasha Lee  :   1975  MRN:     [id number]  Physician: BRETT TILLEY    Natasha Shanna Lee may return to work on Date: 4/3/2019.      Patient limitations:  100% no weight bearing. No walking/pressure on left foot.     Patient will be re-evaluated on 2019.       Electronically signed by Brett Tilley MD

## 2019-04-24 DIAGNOSIS — M79.672 LEFT FOOT PAIN: Primary | ICD-10-CM

## 2019-04-30 ENCOUNTER — OFFICE VISIT (OUTPATIENT)
Dept: ORTHOPEDICS | Facility: CLINIC | Age: 44
End: 2019-04-30
Payer: COMMERCIAL

## 2019-04-30 ENCOUNTER — ANCILLARY PROCEDURE (OUTPATIENT)
Dept: GENERAL RADIOLOGY | Facility: CLINIC | Age: 44
End: 2019-04-30
Attending: ORTHOPAEDIC SURGERY
Payer: COMMERCIAL

## 2019-04-30 DIAGNOSIS — M25.572 PAIN IN JOINT, ANKLE AND FOOT, LEFT: ICD-10-CM

## 2019-04-30 DIAGNOSIS — M79.672 LEFT FOOT PAIN: Primary | ICD-10-CM

## 2019-04-30 DIAGNOSIS — M79.672 LEFT FOOT PAIN: ICD-10-CM

## 2019-04-30 NOTE — NURSING NOTE
Reason For Visit:   Chief Complaint   Patient presents with     RECHECK     4 week follow up left foot s.p left lapidus with akin osteotomy DOS: 2/13/19       There were no vitals taken for this visit.    Pain Assessment  Patient Currently in Pain: Rafael Quintanilla, ATC

## 2019-04-30 NOTE — LETTER
4/30/2019       RE: Natasha Lee  [address] [facility] [address]     Dear Colleague,    Thank you for referring your patient, Natasha Lee, to the [facility]. Please see a copy of my visit note below.    CHIEF COMPLAINT:  Status post left Lapidus procedure with Azael osteotomy performed 03/13/2019.      HISTORY OF PRESENT ILLNESS:  Ms. Lee presents today for further followup.  Reports to be doing well.  Reports to have been doing some ambulation with minimum pain.      PHYSICAL EXAMINATION:  On today's visit, she presents with still some swelling across the forefoot area.  Presents with a very slight valgus deviation which I think is very much acceptable.  Range of motion of the first MTP joint is a combined 45 degrees of plantar and dorsiflexion.      RADIOGRAPHIC EVALUATION:  Three views of the left foot were obtained today which were significant for showing full consolidation across the arthrodesis site.  Hardware is intact and in place.  Alignment is excellent.      ASSESSMENT:  Status post left foot Lapidus procedure with Azael osteotomy.      PLAN:  I discussed with the patient and her  that we can proceed with weightbearing as tolerated with the use of the CAM Walker.  She can also proceed with physical therapy to gain some motion across the first MTP joint.      The patient will follow up in 6 weeks from now and at that time, 3 views of the left foot will be obtained.      The CAM Walker will be utilized for the majority of the ambulation between now and then.      All questions were answered.     Again, thank you for allowing me to participate in the care of your patient.      Sincerely,    Brett Chavez MD

## 2019-04-30 NOTE — LETTER
[facility]  [address] [id number] [phone]        April 30, 2019    Regarding:  Natasha Lee  [address] [facility] [address]              To Whom It May Concern;    This is to inform you that Natasha Lee is under my medical care.  She will return to work with the following restriction: She should return to walking and standing to work 5 hours per day for  6 weeks until her next clinic visit.        If you have any further questions or concerns, please call  [phone].      Sincerely,          Brett Chavez MD

## 2019-04-30 NOTE — PROGRESS NOTES
CHIEF COMPLAINT:  Status post left Lapidus procedure with Azael osteotomy performed 03/13/2019.      HISTORY OF PRESENT ILLNESS:  Ms. Lee presents today for further followup.  Reports to be doing well.  Reports to have been doing some ambulation with minimum pain.      PHYSICAL EXAMINATION:  On today's visit, she presents with still some swelling across the forefoot area.  Presents with a very slight valgus deviation which I think is very much acceptable.  Range of motion of the first MTP joint is a combined 45 degrees of plantar and dorsiflexion.      RADIOGRAPHIC EVALUATION:  Three views of the left foot were obtained today which were significant for showing full consolidation across the arthrodesis site.  Hardware is intact and in place.  Alignment is excellent.      ASSESSMENT:  Status post left foot Lapidus procedure with Azael osteotomy.      PLAN:  I discussed with the patient and her  that we can proceed with weightbearing as tolerated with the use of the CAM Walker.  She can also proceed with physical therapy to gain some motion across the first MTP joint.      The patient will follow up in 6 weeks from now and at that time, 3 views of the left foot will be obtained.      The CAM Walker will be utilized for the majority of the ambulation between now and then.      All questions were answered.

## 2019-06-04 ENCOUNTER — THERAPY VISIT (OUTPATIENT)
Dept: PHYSICAL THERAPY | Facility: CLINIC | Age: 44
End: 2019-06-04
Payer: COMMERCIAL

## 2019-06-04 DIAGNOSIS — M79.672 LEFT FOOT PAIN: ICD-10-CM

## 2019-06-04 PROCEDURE — 97161 PT EVAL LOW COMPLEX 20 MIN: CPT | Mod: GP | Performed by: PHYSICAL THERAPIST

## 2019-06-04 PROCEDURE — 97140 MANUAL THERAPY 1/> REGIONS: CPT | Mod: GP | Performed by: PHYSICAL THERAPIST

## 2019-06-04 PROCEDURE — 97110 THERAPEUTIC EXERCISES: CPT | Mod: GP | Performed by: PHYSICAL THERAPIST

## 2019-06-04 NOTE — PROGRESS NOTES
[facility] Initial Evaluation  Subjective:                                       Pertinent medical history includes:  Other.  Medical allergies: no.  Other surgeries include:  Orthopedic surgery.  Current medications:  Sleep medication and other.  Current occupation is CD Counselor.                                    Objective:  System    Physical Exam    General     ROS    Assessment/Plan:

## 2019-06-04 NOTE — PROGRESS NOTES
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 She underwent a successful left bunion surgery on 2/13/19. Dr. Chavez had ordered PT in April, but Natasha waited until today due to staffing issues at work. She is very happy with her outcome. 1/10 pain versus 8/10 prior to surgery. Natasha is out of the CAM, and wearing her regular shoes. She will see Dr. Chavez in 1 1/2 weeks. We will wait for further PT visits, if Dr. Chavez feels they are warranted.    The history is provided by the patient. No  was used.   Affected Side: left great toe.  Condition occurred with:  Insidious onset.  Condition occurred: for unknown reasons.  This is a new condition  Left Akin osteotomy on 2/13/19.    Patient reports pain:  Great toe.  Radiates to:  No radiation.  Quality: stiff. and is intermittent and reported as 1/10.  Associated symptoms:  Loss of motion/stiffness. Worse during: not significant.  Symptoms are exacerbated by weight bearing and relieved by rest.  Since onset symptoms are rapidly improving.        General health as reported by patient is good.                  Barriers include:  None as reported by patient.    Red flags:  None as reported by patient.                        Objective:    Gait:    Gait Type:  Normal   Weight Bearing Status:  WBAT   Assistive Devices:  None            Ankle/Foot Evaluation  ROM:  Prom wnl ankle: left first MPT - 50 degrees of DF, 40 degrees of flexion.            PALPATION: normal    EDEMA:   Left ankle edema present at: 1/2\"        Figure 8 left: 1/2\"                                                                                           Musculoskeletal:        Feet:        ROS    Assessment/Plan:    Patient is a 44 year old female with Left 1st MTP complaints.    Patient has the following significant findings with corresponding treatment plan. "                Diagnosis 1:  Left bunionectomy  Decreased ROM/flexibility - manual therapy, therapeutic exercise and home program    Therapy Evaluation Codes:   1) History comprised of:   Personal factors that impact the plan of care:      None.    Comorbidity factors that impact the plan of care are:      Lupus and Hep B.     Medications impacting care: Anti-inflammatory and Sleep.  2) Examination of Body Systems comprised of:   Body structures and functions that impact the plan of care:      Left 1st MTP.   Activity limitations that impact the plan of care are:      Walking.  3) Clinical presentation characteristics are:   Stable/Uncomplicated.  4) Decision-Making    Low complexity using standardized patient assessment instrument and/or measureable assessment of functional outcome.  Cumulative Therapy Evaluation is: Low complexity.    Previous and current functional limitations:  (See Goal Flow Sheet for this information)    Short term and Long term goals: (See Goal Flow Sheet for this information)     Communication ability:  Patient appears to be able to clearly communicate and understand verbal and written communication and follow directions correctly.  Treatment Explanation - The following has been discussed with the patient:   RX ordered/plan of care  Anticipated outcomes  Possible risks and side effects  Natasha returns to Dr. Chavez soon, may not need further PT.   Rehab potential is good.    Frequency:  1 x only possible  Duration:  Will wait for MD assessment.  Discharge Plan:  Achieve all LTG.  Independent in home treatment program.  Reach maximal therapeutic benefit.    Please refer to the daily flowsheet for treatment today, total treatment time and time spent performing 1:1 timed codes.

## 2019-06-05 DIAGNOSIS — M79.672 LEFT FOOT PAIN: Primary | ICD-10-CM

## 2019-06-11 ENCOUNTER — ANCILLARY PROCEDURE (OUTPATIENT)
Dept: GENERAL RADIOLOGY | Facility: CLINIC | Age: 44
End: 2019-06-11
Attending: ORTHOPAEDIC SURGERY
Payer: COMMERCIAL

## 2019-06-11 ENCOUNTER — OFFICE VISIT (OUTPATIENT)
Dept: ORTHOPEDICS | Facility: CLINIC | Age: 44
End: 2019-06-11
Payer: COMMERCIAL

## 2019-06-11 DIAGNOSIS — M79.672 LEFT FOOT PAIN: ICD-10-CM

## 2019-06-11 DIAGNOSIS — M25.572 PAIN IN JOINT, ANKLE AND FOOT, LEFT: Primary | ICD-10-CM

## 2019-06-11 NOTE — PROGRESS NOTES
CHIEF COMPLAINT:  Status post left foot Lapidus procedure with Azael osteotomy performed on 02/13/2019.      HISTORY OF PRESENT ILLNESS:  Ms. Lee presents today for further followup.  Denies to have any problems or complaints.  Reports to be doing quite well.  Reports to be very pleased with the function of her left foot.      PHYSICAL EXAMINATION:  On today's visit, she presents with a range of motion of the first MTP joint of approximately 50 degrees of dorsiflexion with 30 degrees of plantar flexion.  Alignment is excellent.  There is still some diffuse swelling through the forefoot area.  There is a well-healed surgical incision.      RADIOGRAPHIC EVALUATION:  Three views of the left foot were obtained today which were significant for showing what seems to be a solid fusion across the first tarsometatarsal joint as well as the proximal phalanx osteotomy.  Hardware is intact and in place.  Alignment is excellent.      ASSESSMENT:  Status post left foot corrective bunion surgery for a Lapidus procedure.      PLAN:  I discussed with the patient that she is making excellent progress.  The patient will proceed with activity as tolerated.  She has no activity restrictions.      The patient also has been discharged from physical therapy, which I think is reasonable.      The patient will follow up on a p.r.n. basis.  All questions were answered.      TT 15 minutes, CT 10 minutes.

## 2019-06-11 NOTE — NURSING NOTE
Reason For Visit:   Chief Complaint   Patient presents with     RECHECK     Left Lapidus with  Akin Osteotomy DOS: 2/13/19       There were no vitals taken for this visit.    Pain Assessment  Patient Currently in Pain: Rafael Quintanilla, ATC

## 2019-06-11 NOTE — LETTER
6/11/2019       RE: Natasha Lee  [address] [address]     Dear Colleague,    Thank you for referring your patient, Natasha Lee, to the [facility]. Please see a copy of my visit note below.    CHIEF COMPLAINT:  Status post left foot Lapidus procedure with Azael osteotomy performed on 02/13/2019.      HISTORY OF PRESENT ILLNESS:  Ms. Lee presents today for further followup.  Denies to have any problems or complaints.  Reports to be doing quite well.  Reports to be very pleased with the function of her left foot.      PHYSICAL EXAMINATION:  On today's visit, she presents with a range of motion of the first MTP joint of approximately 50 degrees of dorsiflexion with 30 degrees of plantar flexion.  Alignment is excellent.  There is still some diffuse swelling through the forefoot area.  There is a well-healed surgical incision.      RADIOGRAPHIC EVALUATION:  Three views of the left foot were obtained today which were significant for showing what seems to be a solid fusion across the first tarsometatarsal joint as well as the proximal phalanx osteotomy.  Hardware is intact and in place.  Alignment is excellent.      ASSESSMENT:  Status post left foot corrective bunion surgery for a Lapidus procedure.      PLAN:  I discussed with the patient that she is making excellent progress.  The patient will proceed with activity as tolerated.  She has no activity restrictions.      The patient also has been discharged from physical therapy, which I think is reasonable.      The patient will follow up on a p.r.n. basis.  All questions were answered.      TT 15 minutes, CT 10 minutes.         Again, thank you for allowing me to participate in the care of your patient.      Sincerely,    Brett Chavez MD

## 2019-06-11 NOTE — LETTER
[facility]  [address] [id number] [phone]        June 11, 2019    Regarding:  Natasha Lee  [address] [address]              To Whom It May Concern;     This is to inform you that Natasha Lee is under my medical care.  She will be released with no restrictions starting  06-12-19.    If you have any further questions or concerns, please call me at [phone].      Sincerely,            Brett Chavez MD

## 2019-06-25 ENCOUNTER — OFFICE VISIT (OUTPATIENT)
Dept: DERMATOLOGY | Facility: CLINIC | Age: 44
End: 2019-06-25
Payer: COMMERCIAL

## 2019-06-25 DIAGNOSIS — L71.9 ACNE ROSACEA: Primary | ICD-10-CM

## 2019-06-25 PROCEDURE — 99213 OFFICE O/P EST LOW 20 MIN: CPT | Performed by: DERMATOLOGY

## 2019-06-25 RX ORDER — DOXYCYCLINE 100 MG/1
100 CAPSULE ORAL 2 TIMES DAILY
Qty: 60 CAPSULE | Refills: 2 | Status: SHIPPED | OUTPATIENT
Start: 2019-06-25 | End: 2020-01-15

## 2019-06-25 RX ORDER — PERMETHRIN 50 MG/G
CREAM TOPICAL
Qty: 60 G | Refills: 11 | Status: SHIPPED | OUTPATIENT
Start: 2019-06-25 | End: 2019-08-22

## 2019-06-25 ASSESSMENT — PAIN SCALES - GENERAL: PAINLEVEL: NO PAIN (0)

## 2019-06-25 NOTE — NURSING NOTE
@Natasha Lee's goals for this visit include:   Chief Complaint   Patient presents with     Rosacea     Currently using metro cream and clinique moisturizer and organic gentle face wash - improvment in redness but not breakouts       She requests these members of her care team be copied on today's visit information: No    PCP: Sandy Garcia    Referring Provider:  No referring provider defined for this encounter.    There were no vitals taken for this visit.    Do you need any medication refills at today's visit? NO    Savannah Joya CMA

## 2019-06-25 NOTE — PROGRESS NOTES
[facility] Dermatology Note    Dermatology Problem List:  1. Relevant medical hx: SLE on plaquenil and prednisone, hepatitis B  2. Rosacea, severe and granulomatous appearing  -current tx: metronidazole 0.75% cream, gentle skin care, doxycycline 100 mg BID (x3 months), permethrin 5% cream     Encounter Date: Jun 25, 2019    CC:  Chief Complaint   Patient presents with     Rosacea     Currently using metro cream and clinique moisturizer         History of Present Illness:  Ms. Natasha Lee is a 44 year old female who presents as a follow up for rosacea. She was last seen 1/22/201 and treated with metronidazole cream and given instructions for gentle skin care. She has noticed an improvement in the redness, but note the number of pimple bumps. She is currently diligently using the metrocream along with a Clinique moisturizer and an organic face wash. No other concerns addressed today.      Past Medical History:   Patient Active Problem List   Diagnosis     Chronic hepatitis B (H)     Lupus (systemic lupus erythematosus) (H)     CARDIOVASCULAR SCREENING; LDL GOAL LESS THAN 160     Health Care Home     Dysmenorrhea     Menorrhagia     Allergic rhinitis     Other nonspecific finding on examination of urine     Urinary frequency     Vitamin B12 deficiency (non anemic)     Heart murmur     S/P laparoscopic cholecystectomy     Left foot pain     Past Medical History:   Diagnosis Date     Diabetes (H)     Pre-Diabetic     Heart murmur 1997     Lupus (systemic lupus erythematosus) (H) 1988    Dr. Prather     Lupus (systemic lupus erythematosus) (H) 12/10/2009     Normal delivery 9/5/09    boy forceps     Seizures (H)     When a child     Past Surgical History:   Procedure Laterality Date     ANESTH,SKIN SURGERY,KNEE  1993, 1990    orthoscopic     C APPENDECTOMY  4/2008     CHOLECYSTECTOMY  2016     CL AFF SURGICAL PATHOLOGY  2007     LAPAROSCOPIC CHOLECYSTECTOMY N/A 5/18/2016    Procedure:  LAPAROSCOPIC CHOLECYSTECTOMY;  Surgeon: Radha Cline MD;  [facility]     LAPAROSCOPIC TUBAL LIGATION  12/15/2011    Procedure:LAPAROSCOPIC TUBAL LIGATION; Laparoscopic tubal ligation; Surgeon:AMY WYNN; Location:[facility] OR     OSTEOTOMY FOOT Left 2/13/2019    Procedure: Left Lapidus with  Azael Osteotomy;  Surgeon: Brett Chavez MD;  Location: [facility] OR       Social History:  Patient reports that she has never smoked. She has never used smokeless tobacco. She reports that she drinks alcohol. She reports that she does not use drugs. 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 Patient has 1 child.     Family History:  Family History   Adopted: Yes   Problem Relation Age of Onset     Unknown/Adopted Mother      Unknown/Adopted Father        Medications:  Current Outpatient Medications   Medication Sig Dispense Refill     Cranberry 1000 MG CAPS Take 1 capsule by mouth 4 times daily       Cyanocobalamin (B-12) 1000 MCG TBCR Take 1,000 mcg by mouth daily 100 tablet 1     DiphenhydrAMINE HCl (BENADRYL PO)        DiphenhydrAMINE HCl, Sleep, (UNISOM SLEEPGELS) 50 MG CAPS Take 50 mg by mouth At Bedtime       fluticasone (FLONASE) 50 MCG/ACT nasal spray Spray 2 sprays into both nostrils daily 16 g 3     hydroxychloroquine (PLAQUENIL) 200 MG tablet Hydroxychloroquine 300mg daily 135 tablet 3     metroNIDAZOLE (METROCREAM) 0.75 % external cream Apply topically 2 times daily 45 g 11     multivitamin, therapeutic with minerals (MULTI-VITAMIN) TABS Take 1 tablet by mouth daily       norethindrone-ethinyl estradiol (ORTHO-NOVUM 1-35 TAB,NORTREL 1-35 TAB) 1-35 MG-MCG per tablet Take 1 tablet by mouth daily 84 tablet 4     omeprazole 20 MG tablet Take 1 tablet by mouth daily. Take 30-60 minutes before a meal. (Patient not taking: Reported on 4/30/2019) 30 tablet 1     order for DME [employer]out Walker. Patient can use for 4 months (Patient not taking: Reported on 6/11/2019) 1 Units 0      predniSONE (DELTASONE) 5 MG tablet Take 1 tablet (5 mg) by mouth daily as needed for lupus symptoms 30 tablet 1     triamcinolone (KENALOG) 0.1 % cream Apply sparingly to affected area three times daily prn. (Patient not taking: Reported on 1/22/2019) 30 g 0       Allergies   Allergen Reactions     Carbamazepine      Fever, rash       Review of Systems:  -Constitutional: Patient is otherwise feeling well, in usual state of health.   -Skin: As above in HPI. No additional skin concerns.    Physical exam:  Vitals: There were no vitals taken for this visit.  GEN: This is a well developed, well-nourished female in no acute distress, in a pleasant mood.    SKIN: Focused examination of the face, neck, chest, b/l forearms, abdomen was performed.  - 20 pink indurated deep cystic lesions scattered mostly on the cheeks and forehead  - skin overall is dry and slightly rough in texture  - No other lesions of concern on areas examined.       Impression/Plan:  1. Acne rosacea, severe, granulomatous in appearance. Failed to improve with metrocream and gentle skin care. Patient interested in treatment with maximum benefit at this point. Discussed treatment with a short course of doxycycline along wth the metrocream and permethrin cream to reduce the inflammation and inflammatory papules.     Continue metronidazole 0.75% cream once daily.     Prescribed permethrin 5% cream to be used once daily.     Prescribed doxycycline 100 mg BID for 3 months. Reviewed the potential side effects of this medication, handout provided. Discussed that she should not get pregnant while on this medication. She noted understanding.    If failure to improve, next step would be oral ivermectin or low dose isotretinoin.    Follow-up 3 months for rosacea follow up.     Staff Involved:  Scribe/Staff    Scribe Disclosure  I, Flavia Garcia, am serving as a scribe to document services personally performed by Dr. Yamile Funez MD, based on data collection and  the provider's statements to me.     Provider Disclosure:   The documentation recorded by the scribe accurately reflects the services I personally performed and the decisions made by me.    Yamile Funez MD    Department of Dermatology  [facility]: Phone: [phone], Fax:[phone]  [facility]: Phone: [phone], Fax: [phone]

## 2019-06-25 NOTE — LETTER
6/25/2019         RE: Natasha Lee  [address] [address]        Dear Colleague,    Thank you for referring your patient, Natasha Lee, to the [facility]. Please see a copy of my visit note below.    [facility] Dermatology Note    Dermatology Problem List:  1. Relevant medical hx: SLE on plaquenil and prednisone, hepatitis B  2. Rosacea, severe and granulomatous appearing  -current tx: metronidazole 0.75% cream, gentle skin care, doxycycline 100 mg BID (x3 months), permethrin 5% cream     Encounter Date: Jun 25, 2019    CC:  Chief Complaint   Patient presents with     Rosacea     Currently using metro cream and clinique moisturizer         History of Present Illness:  Ms. Natasha Lee is a 44 year old female who presents as a follow up for rosacea. She was last seen 1/22/201 and treated with metronidazole cream and given instructions for gentle skin care. She has noticed an improvement in the redness, but note the number of pimple bumps. She is currently diligently using the metrocream along with a Clinique moisturizer and an organic face wash. No other concerns addressed today.      Past Medical History:   Patient Active Problem List   Diagnosis     Chronic hepatitis B (H)     Lupus (systemic lupus erythematosus) (H)     CARDIOVASCULAR SCREENING; LDL GOAL LESS THAN 160     Health Care Home     Dysmenorrhea     Menorrhagia     Allergic rhinitis     Other nonspecific finding on examination of urine     Urinary frequency     Vitamin B12 deficiency (non anemic)     Heart murmur     S/P laparoscopic cholecystectomy     Left foot pain     Past Medical History:   Diagnosis Date     Diabetes (H)     Pre-Diabetic     Heart murmur 1997     Lupus (systemic lupus erythematosus) (H) 1988    Dr. Prather     Lupus (systemic lupus erythematosus) (H) 12/10/2009     Normal delivery 9/5/09    boy forceps     Seizures (H)     When a child      Past Surgical History:   Procedure Laterality Date     ANESTH,SKIN SURGERY,KNEE  1993, 1990    orthoscopic     C APPENDECTOMY  4/2008     CHOLECYSTECTOMY  2016     CL AFF SURGICAL PATHOLOGY  2007     LAPAROSCOPIC CHOLECYSTECTOMY N/A 5/18/2016    Procedure: LAPAROSCOPIC CHOLECYSTECTOMY;  Surgeon: Radha Cline MD;  [facility]     LAPAROSCOPIC TUBAL LIGATION  12/15/2011    Procedure:LAPAROSCOPIC TUBAL LIGATION; Laparoscopic tubal ligation; Surgeon:AMY WYNN; Location:[facility] OR     OSTEOTOMY FOOT Left 2/13/2019    Procedure: Left Lapidus with  Azael Osteotomy;  Surgeon: Brett Chavez MD;  Location: [facility] OR       Social History:  Patient reports that she has never smoked. She has never used smokeless tobacco. She reports that she drinks alcohol. She reports that she does not use drugs. 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     Family History:  Family History   Adopted: Yes   Problem Relation Age of Onset     Unknown/Adopted Mother      Unknown/Adopted Father        Medications:  Current Outpatient Medications   Medication Sig Dispense Refill     Cranberry 1000 MG CAPS Take 1 capsule by mouth 4 times daily       Cyanocobalamin (B-12) 1000 MCG TBCR Take 1,000 mcg by mouth daily 100 tablet 1     DiphenhydrAMINE HCl (BENADRYL PO)        DiphenhydrAMINE HCl, Sleep, (UNISOM SLEEPGELS) 50 MG CAPS Take 50 mg by mouth At Bedtime       fluticasone (FLONASE) 50 MCG/ACT nasal spray Spray 2 sprays into both nostrils daily 16 g 3     hydroxychloroquine (PLAQUENIL) 200 MG tablet Hydroxychloroquine 300mg daily 135 tablet 3     metroNIDAZOLE (METROCREAM) 0.75 % external cream Apply topically 2 times daily 45 g 11     multivitamin, therapeutic with minerals (MULTI-VITAMIN) TABS Take 1 tablet by mouth daily       norethindrone-ethinyl estradiol (ORTHO-NOVUM 1-35 TAB,NORTREL 1-35 TAB) 1-35 MG-MCG per tablet Take 1 tablet by mouth daily 84 tablet 4      omeprazole 20 MG tablet Take 1 tablet by mouth daily. Take 30-60 minutes before a meal. (Patient not taking: Reported on 4/30/2019) 30 tablet 1     order for DME Roll-A-Bout Walker. Patient can use for 4 months (Patient not taking: Reported on 6/11/2019) 1 Units 0     predniSONE (DELTASONE) 5 MG tablet Take 1 tablet (5 mg) by mouth daily as needed for lupus symptoms 30 tablet 1     triamcinolone (KENALOG) 0.1 % cream Apply sparingly to affected area three times daily prn. (Patient not taking: Reported on 1/22/2019) 30 g 0       Allergies   Allergen Reactions     Carbamazepine      Fever, rash       Review of Systems:  -Constitutional: Patient is otherwise feeling well, in usual state of health.   -Skin: As above in HPI. No additional skin concerns.    Physical exam:  Vitals: There were no vitals taken for this visit.  GEN: This is a well developed, well-nourished female in no acute distress, in a pleasant mood.    SKIN: Focused examination of the face, neck, chest, b/l forearms, abdomen was performed.  - 20 pink indurated deep cystic lesions scattered mostly on the cheeks and forehead  - skin overall is dry and slightly rough in texture  - No other lesions of concern on areas examined.       Impression/Plan:  1. Acne rosacea, severe, granulomatous in appearance. Failed to improve with metrocream and gentle skin care. Patient interested in treatment with maximum benefit at this point. Discussed treatment with a short course of doxycycline along wth the metrocream and permethrin cream to reduce the inflammation and inflammatory papules.     Continue metronidazole 0.75% cream once daily.     Prescribed permethrin 5% cream to be used once daily.     Prescribed doxycycline 100 mg BID for 3 months. Reviewed the potential side effects of this medication, handout provided. Discussed that she should not get pregnant while on this medication. She noted understanding.    If failure to improve, next step would be oral  ivermectin or low dose isotretinoin.    Follow-up 3 months for rosacea follow up.     Staff Involved:  Scribe/Staff    Scribe Disclosure  I, Flavia Garcia, am serving as a scribe to document services personally performed by Dr. Yamile Funez MD, based on data collection and the provider's statements to me.     Provider Disclosure:   The documentation recorded by the scribe accurately reflects the services I personally performed and the decisions made by me.    Yamile Funez MD    Department of Dermatology  [facility]: Phone: [phone], Fax:[phone]  [facility]: Phone: [phone], Fax: [phone]                  Again, thank you for allowing me to participate in the care of your patient.        Sincerely,        Yamile Funez MD

## 2019-06-25 NOTE — PATIENT INSTRUCTIONS
Doxycycline     1.Doxycycline treats and prevents infections and may be used to treat acne.   2.Do not use it if you had an allergic reaction to doxycycline or another tetracycline antibiotic, or if you are pregnant or breastfeeding.  3. If you miss a dose, take a dose as soon as you remember. If it is almost time for your next dose, wait until then and take a regular dose. Do not take extra medicine to make up for a missed dose.   4 . Some foods and medicines can affect the medication. Tell your doctor if you are using any of the following: bismuth subsalicylate, isotretinoin, acitretin, medications that contain aluminum, calcium, or iron (such an antacid or vitamin supplement)  5. This medicine may cause birth defects if being used during pregnancy.  Use two forms of birth control to keep from getting pregnant.   6. Tell your doctor if you had stomach surgery or if you have a history of yeast infections.   7. This medicine may cause the following problems (stop the medication if you experience these symptoms and call your doctor):  Permanent change in tooth color (in children younger than 8 years old)   Increased pressure inside the head   Yeast infection   Diarrhea    This medicine may make your skin more sun sensitive and increase sun burns. Wear sunscreen and do not tan.     Allergic reaction with itching, hives, facial swelling, throat swelling, chest tightness, trouble breathing     Blistering, peeling, red skin rash     Burning, pain, or irritation in your upper stomach or throat     Joint pain, fever, rash, and unusual tiredness or weakness     Severe headache, dizziness, or vision changes  8. Call your doctor if your symptoms worsen or do not improve  Who should I call with questions?    [facility]: [phone]     [facility]: [phone]    For urgent needs outside of business hours call the [facility] at [phone] and ask for the  dermatology resident on call

## 2019-07-17 PROBLEM — M79.672 LEFT FOOT PAIN: Status: RESOLVED | Noted: 2019-06-04 | Resolved: 2019-06-04

## 2019-08-06 ENCOUNTER — MYC MEDICAL ADVICE (OUTPATIENT)
Dept: DERMATOLOGY | Facility: CLINIC | Age: 44
End: 2019-08-06

## 2019-08-09 ENCOUNTER — OFFICE VISIT (OUTPATIENT)
Dept: PEDIATRICS | Facility: CLINIC | Age: 44
End: 2019-08-09
Payer: COMMERCIAL

## 2019-08-09 VITALS
SYSTOLIC BLOOD PRESSURE: 136 MMHG | HEART RATE: 89 BPM | WEIGHT: 168.3 LBS | BODY MASS INDEX: 30.78 KG/M2 | TEMPERATURE: 99.7 F | DIASTOLIC BLOOD PRESSURE: 87 MMHG | OXYGEN SATURATION: 100 %

## 2019-08-09 DIAGNOSIS — J01.00 ACUTE NON-RECURRENT MAXILLARY SINUSITIS: Primary | ICD-10-CM

## 2019-08-09 DIAGNOSIS — J30.9 ALLERGIC RHINITIS, UNSPECIFIED SEASONALITY, UNSPECIFIED TRIGGER: ICD-10-CM

## 2019-08-09 PROCEDURE — 99213 OFFICE O/P EST LOW 20 MIN: CPT | Performed by: NURSE PRACTITIONER

## 2019-08-09 NOTE — PROGRESS NOTES
Subjective     Natasha Lee is a 44 year old female who presents to clinic today for the following health issues:    HPI   RESPIRATORY SYMPTOMS      Duration: 2 weeks    Description  nasal congestion, facial pain/pressure, ear pain bilateral and headache    Severity: moderate    Accompanying signs and symptoms: None    History (predisposing factors):  History of sinus infections    Precipitating or alleviating factors: None    Therapies tried and outcome:  Benadryl, Claritin, Flonase, saline spray     Has recurrent issues with sinuses and has been fighting allergies but now has persistent congestion       Patient Active Problem List   Diagnosis     Chronic hepatitis B (H)     Lupus (systemic lupus erythematosus) (H)     CARDIOVASCULAR SCREENING; LDL GOAL LESS THAN 160     Health Care Home     Dysmenorrhea     Menorrhagia     Allergic rhinitis     Other nonspecific finding on examination of urine     Urinary frequency     Vitamin B12 deficiency (non anemic)     Heart murmur     S/P laparoscopic cholecystectomy     Past Surgical History:   Procedure Laterality Date     ANESTH,SKIN SURGERY,KNEE  1993, 1990    orthoscopic     C APPENDECTOMY  4/2008     CHOLECYSTECTOMY  2016     CL AFF SURGICAL PATHOLOGY  2007     LAPAROSCOPIC CHOLECYSTECTOMY N/A 5/18/2016    Procedure: LAPAROSCOPIC CHOLECYSTECTOMY;  Surgeon: Radha Cline MD;  [facility]: [facility]     LAPAROSCOPIC TUBAL LIGATION  12/15/2011    Procedure:LAPAROSCOPIC TUBAL LIGATION; Laparoscopic tubal ligation; Surgeon:AMY WYNN; Location: OR     OSTEOTOMY FOOT Left 2/13/2019    Procedure: Left Lapidus with  Azael Osteotomy;  Surgeon: Brett Chavez MD;  Location: [facility] OR       Social History     Tobacco Use     Smoking status: Never Smoker     Smokeless tobacco: Never Used   Substance Use Topics     Alcohol use: Yes     Comment: Occasional     Family History   Adopted: Yes   Problem Relation Age of Onset     Unknown/Adopted Mother       Unknown/Adopted Father          Current Outpatient Medications   Medication Sig Dispense Refill     Cranberry 1000 MG CAPS Take 1 capsule by mouth 4 times daily       Cyanocobalamin (B-12) 1000 MCG TBCR Take 1,000 mcg by mouth daily 100 tablet 1     DiphenhydrAMINE HCl (BENADRYL PO)        DiphenhydrAMINE HCl, Sleep, (UNISOM SLEEPGELS) 50 MG CAPS Take 50 mg by mouth At Bedtime       doxycycline monohydrate (MONODOX) 100 MG capsule Take 1 capsule (100 mg) by mouth 2 times daily 60 capsule 2     fluticasone (FLONASE) 50 MCG/ACT nasal spray Spray 2 sprays into both nostrils daily 16 g 3     hydroxychloroquine (PLAQUENIL) 200 MG tablet Hydroxychloroquine 300mg daily 135 tablet 3     metroNIDAZOLE (METROCREAM) 0.75 % external cream Apply topically 2 times daily 45 g 11     norethindrone-ethinyl estradiol (ORTHO-NOVUM 1-35 TAB,NORTREL 1-35 TAB) 1-35 MG-MCG per tablet Take 1 tablet by mouth daily 84 tablet 4     permethrin (ELIMITE) 5 % external cream Apply thin layer to the face once daily. 60 g 11     predniSONE (DELTASONE) 5 MG tablet Take 1 tablet (5 mg) by mouth daily as needed for lupus symptoms 30 tablet 1     triamcinolone (KENALOG) 0.1 % cream Apply sparingly to affected area three times daily prn. 30 g 0     multivitamin, therapeutic with minerals (MULTI-VITAMIN) TABS Take 1 tablet by mouth daily       omeprazole 20 MG tablet Take 1 tablet by mouth daily. Take 30-60 minutes before a meal. (Patient not taking: Reported on 8/9/2019) 30 tablet 1     order for [facility] [employer]. Patient can use for 4 months (Patient not taking: Reported on 8/9/2019) 1 Units 0     Allergies   Allergen Reactions     Carbamazepine      Fever, rash     BP Readings from Last 3 Encounters:   08/09/19 136/87   02/13/19 126/75   01/21/19 125/80    Wt Readings from Last 3 Encounters:   08/09/19 76.3 kg (168 lb 4.8 oz)   02/13/19 72.6 kg (160 lb)   01/21/19 74.4 kg (164 lb)            Reviewed and updated as needed this visit by  Provider         Review of Systems   ROS COMP: CONSTITUTIONAL:POSITIVE  for chills and NEGATIVE  for fever   ENT/MOUTH: POSITIVE for earache bilateral, Hx sinus infections, nasal congestion, postnasal drainage and sinus pressure and NEGATIVE for tooth pain  RESP:NEGATIVE for significant cough or SOB  CV: NEGATIVE for chest pain/chest pressure  GI: NEGATIVE for nausea and vomiting  MUSCULOSKELETAL: NEGATIVE for significant arthralgias or myalgia  HEME/ALLERGY/IMMUNE: POSITIVE  for allergies and NEGATIVE for anemia and bleeding disorder      Objective    /87 (BP Location: Right arm, Patient Position: Sitting, Cuff Size: Adult Regular)   Pulse 89   Temp 99.7  F (37.6  C) (Temporal)   Wt 76.3 kg (168 lb 4.8 oz)   SpO2 100%   BMI 30.78 kg/m    Body mass index is 30.78 kg/m .  Physical Exam   GENERAL: Patient is well nourished, well developed,in no apparent distress, non-toxic, in no respiratory distress and acyanotic, alert, cooperative and well hydrated  mildly ill but alert and responsive  EYES:  Right conjunctiva is not injected and without discharge.  Left conjunctiva is not injected and without discharge.  EARS: negative findings: canals clear, no mastoid process tenderness, positive findings: , Right TM: serous middle ear fluid, Left TM: serous middle ear fluid  NOSE: positive findings: mucosa erythematous and swollen,  Sinus maxillary tenderness on bilaterally.  THROAT: normal.  NECK: supple with no adenopathy,   CARDIAC:NORMAL - regular rate and rhythm without murmur.  RESP: normal respiratory rate and rhythm, lungs clear to auscultation  unlabored respirations, no intercostal retractions or accessory muscle use  ABD: Abdomen soft, non-tender.  SKIN: Skin color, texture, turgor normal. No rashes or lesions.  MS: extremities normal- no gross deformities noted, gait normal and normal muscle tone  Diagnostic Test Results:  Labs reviewed in Epic        Assessment & Plan     Natasha was seen today for nose  "problem.    Diagnoses and all orders for this visit:    Acute non-recurrent maxillary sinusitis  -     amoxicillin-clavulanate (AUGMENTIN) 875-125 MG tablet; Take 1 tablet by mouth 2 times daily    Allergic rhinitis, unspecified seasonality, unspecified trigger  -     ALLERGY/ASTHMA ADULT REFERRAL    See Patient Instructions  Patient Instructions     PLAN:   1.   Symptomatic therapy suggested: rest, apply heat to sinuses prn, use mist of vaporizer prn, OTC saline nasal spray as needed and call prn if symptoms persist or worsen.  2.  Orders Placed This Encounter   Medications     amoxicillin-clavulanate (AUGMENTIN) 875-125 MG tablet     Sig: Take 1 tablet by mouth 2 times daily     Dispense:  20 tablet     Refill:  0     Orders Placed This Encounter   Procedures     ALLERGY/ASTHMA ADULT REFERRAL     3. Patient needs to follow up in if no improvement,or sooner if worsening of symptoms or other symptoms develop.  CONSULTATION/REFERRAL to allergist     BMI:   Estimated body mass index is 30.78 kg/m  as calculated from the following:    Height as of 2/13/19: 1.575 m (5' 2\").    Weight as of this encounter: 76.3 kg (168 lb 4.8 oz).   Weight management plan: Discussed healthy diet and exercise guidelines      No follow-ups on file.    Sandy Garcia, ELIAS CNP  [facility]        "

## 2019-08-09 NOTE — PATIENT INSTRUCTIONS
PLAN:   1.   Symptomatic therapy suggested: rest, apply heat to sinuses prn, use mist of vaporizer prn, OTC saline nasal spray as needed and call prn if symptoms persist or worsen.  2.  Orders Placed This Encounter   Medications     amoxicillin-clavulanate (AUGMENTIN) 875-125 MG tablet     Sig: Take 1 tablet by mouth 2 times daily     Dispense:  20 tablet     Refill:  0     Orders Placed This Encounter   Procedures     ALLERGY/ASTHMA ADULT REFERRAL     3. Patient needs to follow up in if no improvement,or sooner if worsening of symptoms or other symptoms develop.  CONSULTATION/REFERRAL to allergist

## 2019-08-22 ENCOUNTER — ANCILLARY PROCEDURE (OUTPATIENT)
Dept: MAMMOGRAPHY | Facility: CLINIC | Age: 44
End: 2019-08-22
Attending: OBSTETRICS & GYNECOLOGY
Payer: COMMERCIAL

## 2019-08-22 ENCOUNTER — OFFICE VISIT (OUTPATIENT)
Dept: OBGYN | Facility: CLINIC | Age: 44
End: 2019-08-22
Payer: COMMERCIAL

## 2019-08-22 VITALS
SYSTOLIC BLOOD PRESSURE: 130 MMHG | WEIGHT: 165.1 LBS | HEIGHT: 62 IN | BODY MASS INDEX: 30.38 KG/M2 | OXYGEN SATURATION: 98 % | DIASTOLIC BLOOD PRESSURE: 80 MMHG | HEART RATE: 84 BPM

## 2019-08-22 DIAGNOSIS — N92.0 MENORRHAGIA WITH REGULAR CYCLE: ICD-10-CM

## 2019-08-22 DIAGNOSIS — Z01.419 ENCOUNTER FOR GYNECOLOGICAL EXAMINATION WITHOUT ABNORMAL FINDING: Primary | ICD-10-CM

## 2019-08-22 DIAGNOSIS — N94.6 DYSMENORRHEA: ICD-10-CM

## 2019-08-22 DIAGNOSIS — Z12.31 VISIT FOR SCREENING MAMMOGRAM: ICD-10-CM

## 2019-08-22 DIAGNOSIS — Z12.4 PAP SMEAR FOR CERVICAL CANCER SCREENING: ICD-10-CM

## 2019-08-22 PROCEDURE — 77067 SCR MAMMO BI INCL CAD: CPT | Performed by: RADIOLOGY

## 2019-08-22 PROCEDURE — 87624 HPV HI-RISK TYP POOLED RSLT: CPT | Performed by: OBSTETRICS & GYNECOLOGY

## 2019-08-22 PROCEDURE — 99396 PREV VISIT EST AGE 40-64: CPT | Performed by: OBSTETRICS & GYNECOLOGY

## 2019-08-22 PROCEDURE — G0145 SCR C/V CYTO,THINLAYER,RESCR: HCPCS | Performed by: OBSTETRICS & GYNECOLOGY

## 2019-08-22 ASSESSMENT — MIFFLIN-ST. JEOR: SCORE: 1349.76

## 2019-08-22 NOTE — PROGRESS NOTES
Natasha Lee is a 44 year old female , who presents for annual exam.   No unusual bleeding, no incontinence, or unusual discharge.   She is currently using OCPs for contraception.  She does not have any apparent contraindications to use.  She has not had any apparent complications with it's use.  Last cholesterol:   Recent Labs   Lab Test 02/15/16  0827 14  0716   CHOL 143 145   HDL 47* 49*   LDL 82 74   TRIG 71 110   CHOLHDLRATIO  --  3.0     Past Medical History:   Diagnosis Date     Diabetes (H)     Pre-Diabetic     Heart murmur      Lupus (systemic lupus erythematosus) (H)     Dr. Prather     Lupus (systemic lupus erythematosus) (H) 12/10/2009     Normal delivery 09    boy forceps     Seizures (H)     When a child       Past Surgical History:   Procedure Laterality Date     ANESTH,SKIN SURGERY,KNEE  ,     orthoscopic     C APPENDECTOMY  2008     CHOLECYSTECTOMY       CL AFF SURGICAL PATHOLOGY       LAPAROSCOPIC CHOLECYSTECTOMY N/A 2016    Procedure: LAPAROSCOPIC CHOLECYSTECTOMY;  Surgeon: Radha Cline MD;  [facility]     LAPAROSCOPIC TUBAL LIGATION  12/15/2011    Procedure:LAPAROSCOPIC TUBAL LIGATION; Laparoscopic tubal ligation; Surgeon:AMY WYNN; Location:[facility] OR     OSTEOTOMY FOOT Left 2019    Procedure: Left Lapidus with  Azael Osteotomy;  Surgeon: Brett Chavez MD;  [facility]: [facility]       OB History    Para Term  AB Living   1 1 0 0 0 1   SAB TAB Ectopic Multiple Live Births   0 0 0 0 1      # Outcome Date GA Lbr Julio Cesar/2nd Weight Sex Delivery Anes PTL Lv   1 Para 09    M    PHILOMENA       Gyn History:  Gynecological History         Patient's last menstrual period was 2019.     no STD /no PID/no IUD      history of abnormal pap smear:  no  Last pap:   Lab Results   Component Value Date    PAP NIL 2016           Current Outpatient Medications   Medication Sig Dispense Refill     Cranberry  1000 MG CAPS Take 1 capsule by mouth 4 times daily       Cyanocobalamin (B-12) 1000 MCG TBCR Take 1,000 mcg by mouth daily 100 tablet 1     DiphenhydrAMINE HCl (BENADRYL PO)        doxycycline monohydrate (MONODOX) 100 MG capsule Take 1 capsule (100 mg) by mouth 2 times daily 60 capsule 2     fluticasone (FLONASE) 50 MCG/ACT nasal spray Spray 2 sprays into both nostrils daily 16 g 3     hydroxychloroquine (PLAQUENIL) 200 MG tablet Hydroxychloroquine 300mg daily 135 tablet 3     metroNIDAZOLE (METROCREAM) 0.75 % external cream Apply topically 2 times daily 45 g 11     norethindrone-ethinyl estradiol (ORTHO-NOVUM 1-35 TAB,NORTREL 1-35 TAB) 1-35 MG-MCG per tablet Take 1 tablet by mouth daily 84 tablet 4     triamcinolone (KENALOG) 0.1 % cream Apply sparingly to affected area three times daily prn. 30 g 0     DiphenhydrAMINE HCl, Sleep, (UNISOM SLEEPGELS) 50 MG CAPS Take 50 mg by mouth At Bedtime       multivitamin, therapeutic with minerals (MULTI-VITAMIN) TABS Take 1 tablet by mouth daily       omeprazole 20 MG tablet Take 1 tablet by mouth daily. Take 30-60 minutes before a meal. (Patient not taking: Reported on 8/9/2019) 30 tablet 1     predniSONE (DELTASONE) 5 MG tablet Take 1 tablet (5 mg) by mouth daily as needed for lupus symptoms 30 tablet 1       Allergies   Allergen Reactions     Carbamazepine      Fever, rash       Social History     Socioeconomic History     Marital status:      Spouse name: Not on file     Number of children: 1     Years of education: Not on file     Highest education level: Not on file   Occupational History     Occupation: addiction therapy     Employer: [employer] [place]   Social Needs     Financial resource strain: Not on file     Food insecurity:     Worry: Not on file     Inability: Not on file     Transportation needs:     Medical: Not on file     Non-medical: Not on file   Tobacco Use     Smoking status: Never Smoker     Smokeless tobacco: Never Used   Substance and Sexual Activity      "Alcohol use: Yes     Comment: Occasional     Drug use: No     Sexual activity: Yes     Partners: Male     Birth control/protection: Pill     Comment: tubal ligation, uses OCPs for menstrual and mood regulation   Lifestyle     Physical activity:     Days per week: Not on file     Minutes per session: Not on file     Stress: Not on file   Relationships     Social connections:     Talks on phone: Not on file     Gets together: Not on file     Attends Spiritism service: Not on file     Active member of club or organization: Not on file     Attends meetings of clubs or organizations: Not on file     Relationship status: Not on file     Intimate partner violence:     Fear of current or ex partner: Not on file     Emotionally abused: Not on file     Physically abused: Not on file     Forced sexual activity: Not on file   Other Topics Concern     Parent/sibling w/ CABG, MI or angioplasty before 65F 55M? No      Service No     Blood Transfusions No     Caffeine Concern No     Occupational Exposure Yes     Comment: chemical dependency counselor in a [employer]     Hobby Hazards No     Sleep Concern No     Stress Concern No     Weight Concern No     Special Diet No     Back Care Not Asked     Exercise Yes     Bike Helmet Yes     Seat Belt Yes     Self-Exams Yes   Social History Narrative     Not on file       Family History   Adopted: Yes   Problem Relation Age of Onset     Unknown/Adopted Mother      Unknown/Adopted Father          ROS:  All negative except as above.      EXAM:  /80 (BP Location: Right arm, Cuff Size: Adult Regular)   Pulse 84   Ht 1.571 m (5' 1.85\")   Wt 74.9 kg (165 lb 1.6 oz)   LMP 08/19/2019   SpO2 98%   BMI 30.34 kg/m    General:  WNWD female, NAD  Alert  Oriented x 3  Gait:  Normal  Skin:  Normal skin turgor  Neurologic:  CN grossly intact, good sensation.    HEENT:  NC/AT, EOMI  Neck:  No masses palpated, symmetrical, carotids +2/4, no bruits heard  Heart:  RRR  Lungs:  Clear   Breasts: "  Symmetrical, no dimpling noted, no masses palpated, no discharge expressed  Abdomen:  Non-tender, non-distended.  Vulva: No external lesions, normal hair distribution, no adenopathy  BUS:  Normal, no masses noted  Urethra:  No hypermobility noted  Urethral meatus:  No masses noted  Vagina: Moist, pink, no abnormal discharge, well rugated, no lesions  Cervix: Smooth, pink, no visible lesions  Uterus: Normal size, anteverted, non-tender, mobile  Ovaries: No mass, non-tender, mobile  Perianal:  No masses noted.    Extremities:  No clubbing, cyanosis, or edema      ASSESSMENT/PLAN   Annual examination with pap smear   OCP prescription sent to pharmacy.   Schedule for the mammogram.  Low fat diet, weight bearing exercises and self breast exams on a monthly basis have been recommended.  I have discussed with patient the risks, benefits, medications, treatment options and modalities.   I have instructed the patient to call or schedule a follow-up appointment if any problems.

## 2019-08-23 ENCOUNTER — MYC MEDICAL ADVICE (OUTPATIENT)
Dept: PEDIATRICS | Facility: CLINIC | Age: 44
End: 2019-08-23

## 2019-08-23 DIAGNOSIS — J01.00 ACUTE NON-RECURRENT MAXILLARY SINUSITIS: Primary | ICD-10-CM

## 2019-08-23 RX ORDER — AMOXICILLIN AND CLAVULANATE POTASSIUM 500; 125 MG/1; MG/1
1 TABLET, FILM COATED ORAL 2 TIMES DAILY
Qty: 20 TABLET | Refills: 0 | Status: SHIPPED | OUTPATIENT
Start: 2019-08-23 | End: 2019-09-09

## 2019-08-26 LAB
COPATH REPORT: NORMAL
PAP: NORMAL

## 2019-08-28 LAB
FINAL DIAGNOSIS: NORMAL
HPV HR 12 DNA CVX QL NAA+PROBE: NEGATIVE
HPV16 DNA SPEC QL NAA+PROBE: NEGATIVE
HPV18 DNA SPEC QL NAA+PROBE: NEGATIVE
SPECIMEN DESCRIPTION: NORMAL
SPECIMEN SOURCE CVX/VAG CYTO: NORMAL

## 2019-09-09 ENCOUNTER — OFFICE VISIT (OUTPATIENT)
Dept: PEDIATRICS | Facility: CLINIC | Age: 44
End: 2019-09-09
Payer: COMMERCIAL

## 2019-09-09 VITALS
OXYGEN SATURATION: 98 % | DIASTOLIC BLOOD PRESSURE: 70 MMHG | HEART RATE: 73 BPM | WEIGHT: 163.5 LBS | TEMPERATURE: 96.2 F | SYSTOLIC BLOOD PRESSURE: 100 MMHG | BODY MASS INDEX: 30.05 KG/M2

## 2019-09-09 DIAGNOSIS — J32.9 CHRONIC CONGESTION OF PARANASAL SINUS: Primary | ICD-10-CM

## 2019-09-09 DIAGNOSIS — J30.9 ALLERGIC RHINITIS, UNSPECIFIED SEASONALITY, UNSPECIFIED TRIGGER: ICD-10-CM

## 2019-09-09 PROCEDURE — 99213 OFFICE O/P EST LOW 20 MIN: CPT | Performed by: NURSE PRACTITIONER

## 2019-09-09 RX ORDER — MONTELUKAST SODIUM 10 MG/1
10 TABLET ORAL AT BEDTIME
Qty: 30 TABLET | Refills: 1 | Status: SHIPPED | OUTPATIENT
Start: 2019-09-09 | End: 2020-06-17

## 2019-09-09 NOTE — PROGRESS NOTES
Subjective     Natasha Lee is a 44 year old female who presents to clinic today for the following health issues:    HPI   Acute Illness   Acute illness concerns: cough  Onset: 3 weeks    Fever: no    Chills/Sweats: no    Headache (location?): no    Sinus Pressure:no    Conjunctivitis:  no    Ear Pain: no    Rhinorrhea: YES    Congestion: YES    Sore Throat: YES     Cough: YES-productive of clear sputum    Wheeze: no    Decreased Appetite: no    Nausea: no    Vomiting: no    Diarrhea:  no    Dysuria/Freq.: no    Fatigue/Achiness: no    Sick/Strep Exposure: YES     Therapies Tried and outcome: allegra, claritin, flonase, saline, Augmentin   Has just finished the Augmentin this last week  Having a lot of drainage.   No fever or chills   Clearing her throat all the time     Patient Active Problem List   Diagnosis     Chronic hepatitis B (H)     Lupus (systemic lupus erythematosus) (H)     CARDIOVASCULAR SCREENING; LDL GOAL LESS THAN 160     Health Care Home     Dysmenorrhea     Menorrhagia     Allergic rhinitis     Other nonspecific finding on examination of urine     Urinary frequency     Vitamin B12 deficiency (non anemic)     Heart murmur     S/P laparoscopic cholecystectomy     Past Surgical History:   Procedure Laterality Date     ANESTH,SKIN SURGERY,KNEE  1993, 1990    orthoscopic     C APPENDECTOMY  4/2008     CHOLECYSTECTOMY  2016     CL AFF SURGICAL PATHOLOGY  2007     LAPAROSCOPIC CHOLECYSTECTOMY N/A 5/18/2016    Procedure: LAPAROSCOPIC CHOLECYSTECTOMY;  Surgeon: Radha Cline MD;  Location:  OR     LAPAROSCOPIC TUBAL LIGATION  12/15/2011    Procedure:LAPAROSCOPIC TUBAL LIGATION; Laparoscopic tubal ligation; Surgeon:AMY WYNN; Location: OR     OSTEOTOMY FOOT Left 2/13/2019    Procedure: Left Lapidus with  Azael Osteotomy;  Surgeon: Brett Chavez MD;  Location:  OR       Social History     Tobacco Use     Smoking status: Never Smoker     Smokeless tobacco: Never Used    Substance Use Topics     Alcohol use: Yes     Comment: Occasional     Family History   Adopted: Yes   Problem Relation Age of Onset     Unknown/Adopted Mother      Unknown/Adopted Father          Current Outpatient Medications   Medication Sig Dispense Refill     Cranberry 1000 MG CAPS Take 1 capsule by mouth 4 times daily       Cyanocobalamin (B-12) 1000 MCG TBCR Take 1,000 mcg by mouth daily 100 tablet 1     DiphenhydrAMINE HCl (BENADRYL PO)        DiphenhydrAMINE HCl, Sleep, (UNISOM SLEEPGELS) 50 MG CAPS Take 50 mg by mouth At Bedtime       doxycycline monohydrate (MONODOX) 100 MG capsule Take 1 capsule (100 mg) by mouth 2 times daily 60 capsule 2     fluticasone (FLONASE) 50 MCG/ACT nasal spray Spray 2 sprays into both nostrils daily 16 g 3     hydroxychloroquine (PLAQUENIL) 200 MG tablet Hydroxychloroquine 300mg daily 135 tablet 3     metroNIDAZOLE (METROCREAM) 0.75 % external cream Apply topically 2 times daily 45 g 11     multivitamin, therapeutic with minerals (MULTI-VITAMIN) TABS Take 1 tablet by mouth daily       norethindrone-ethinyl estradiol (ORTHO-NOVUM 1-35 TAB,NORTREL 1-35 TAB) 1-35 MG-MCG tablet Take 1 tablet by mouth daily 84 tablet 4     omeprazole 20 MG tablet Take 1 tablet by mouth daily. Take 30-60 minutes before a meal. 30 tablet 1     predniSONE (DELTASONE) 5 MG tablet Take 1 tablet (5 mg) by mouth daily as needed for lupus symptoms 30 tablet 1     triamcinolone (KENALOG) 0.1 % cream Apply sparingly to affected area three times daily prn. 30 g 0     Allergies   Allergen Reactions     Carbamazepine      Fever, rash     Raccoon-Related Products      BP Readings from Last 3 Encounters:   09/09/19 100/70   08/22/19 130/80   08/09/19 136/87    Wt Readings from Last 3 Encounters:   09/09/19 74.2 kg (163 lb 8 oz)   08/22/19 74.9 kg (165 lb 1.6 oz)   08/09/19 76.3 kg (168 lb 4.8 oz)           Reviewed and updated as needed this visit by Provider         Review of Systems   ROS COMP:  CONSTITUTIONAL:NEGATIVE for fever, chills, change in weight  ENT/MOUTH: POSITIVE for ear pain bilateral, nasal congestion, postnasal drainage and sinus pressure and NEGATIVE for epistaxis and fever  RESP:POSITIVE for cough-non productive and NEGATIVE for SOB/dyspnea and wheezing  CV: NEGATIVE for chest pain/chest pressure  GI: NEGATIVE for heartburn or reflux  HEME/ALLERGY/IMMUNE: POSITIVE  for allergies and NEGATIVE for anemia      Objective    /70 (BP Location: Right arm, Patient Position: Sitting, Cuff Size: Adult Regular)   Pulse 73   Temp 96.2  F (35.7  C) (Temporal)   Wt 74.2 kg (163 lb 8 oz)   LMP 08/19/2019   SpO2 98%   Breastfeeding? No   BMI 30.05 kg/m    Body mass index is 30.05 kg/m .  Physical Exam   GENERAL APPEARANCE: alert, active and no distress  bilateral TM fluid noted, throat normal without erythema or exudate, maxillary  sinus tender, post nasal drip noted and nasal mucosa congested  NECK: no adenopathy  RESP: lungs clear to auscultation - no rales, rhonchi or wheezes  CV: regular rates and rhythm  MS: extremities normal- no gross deformities noted  SKIN: Skin color, texture, turgor normal.   NEURO: Normal strength and tone, mentation intact and speech normal  PSYCH: mentation appears normal and affect normal/bright    Diagnostic Test Results:  Recent Results (from the past 744 hour(s))   CT Sinus w/o Contrast    Narrative    CT SINUS W/O CONTRAST 9/10/2019 4:50 PM    Provided History: Sinusitis, chronic, possible surgery; Chronic  congestion of paranasal sinus  ICD-10: Chronic congestion of paranasal sinus     Comparison: None     Technique:  Using thin collimation multidetector helical acquisition  technique, axial, coronal, and sagittal thin section CT images were  reconstructed through the paranasal sinuses. Images were reviewed in  bone and soft tissue windows.    Findings:   Maxillary sinuses: Mucous retention cyst on the left..  Sphenoid sinus: clear.  Frontal sinus:  clear.  Ethmoid air cells: clear.    The ostiomeatal units appear patent bilaterally. The bony walls of the  paranasal sinuses are intact.    Normal retromaxillary and pterygopalatine fat.    The adenoid tonsils in the nasopharynx are unremarkable.      Impression    Impression:   No evidence of acute sinusitis.    FLAQUITO GUERRERO MD             Assessment & Plan     Natasha was seen today for cough.    Diagnoses and all orders for this visit:    Chronic congestion of paranasal sinus  -     CT Sinus w/o Contrast; Future    Allergic rhinitis, unspecified seasonality, unspecified trigger  -     montelukast (SINGULAIR) 10 MG tablet; Take 1 tablet (10 mg) by mouth At Bedtime         See Patient Instructions  Patient Instructions     PLAN:   1.   Symptomatic therapy suggested: will try add on Singulair.  push fluids, use vaporizer or mist needed  and use saline nasal spray as needed as needed,Zyrtec, Claritin or Allegra  (or generic equivalents)    2.  Orders Placed This Encounter   Medications     montelukast (SINGULAIR) 10 MG tablet     Sig: Take 1 tablet (10 mg) by mouth At Bedtime     Dispense:  30 tablet     Refill:  1     Orders Placed This Encounter   Procedures     CT Sinus w/o Contrast       3. Patient needs to follow up in if no improvement,or sooner if worsening of symptoms or other symptoms develop.  FURTHER TESTING:       - CT sinus  CONSULTATION/REFERRAL to allergist   Your provider has referred you to: [facility] [phone]   [email or web address]    Please be aware that coverage of these services is subject to the terms and limitations of your health insurance plan.  Call member services at your health plan with any benefit or coverage questions.    Will follow up and/or notify patient of  results via My Chart to determine further need for followup        No follow-ups on file.    Sandy Garcia, ELIAS CNP  [facility]

## 2019-09-09 NOTE — NURSING NOTE
"Chief Complaint   Patient presents with     Cough     cough x 3 weeks, might be due to allergies       Initial /70 (BP Location: Right arm, Patient Position: Sitting, Cuff Size: Adult Regular)   Pulse 73   Temp 96.2  F (35.7  C) (Temporal)   Wt 74.2 kg (163 lb 8 oz)   LMP 08/19/2019   SpO2 98%   Breastfeeding? No   BMI 30.05 kg/m   Estimated body mass index is 30.05 kg/m  as calculated from the following:    Height as of 8/22/19: 1.571 m (5' 1.85\").    Weight as of this encounter: 74.2 kg (163 lb 8 oz).  Medication Reconciliation: complete      JESUSITA Gudino      "

## 2019-09-09 NOTE — LETTER
September 9, 2019      RE: Natasha Lee  [address] [id number]       To whom it may concern:    Natasha Lee was seen in our clinic today. She  may return to work with the following: No working or lifting restrictions on or about 9/10/19.    Sincerely,      Sandy Garcia RN, CNP

## 2019-09-09 NOTE — PATIENT INSTRUCTIONS
PLAN:   1.   Symptomatic therapy suggested: will try add on Singulair.  push fluids, use vaporizer or mist needed  and use saline nasal spray as needed as needed,Zyrtec, Claritin or Allegra  (or generic equivalents)    2.  Orders Placed This Encounter   Medications     montelukast (SINGULAIR) 10 MG tablet     Sig: Take 1 tablet (10 mg) by mouth At Bedtime     Dispense:  30 tablet     Refill:  1     Orders Placed This Encounter   Procedures     CT Sinus w/o Contrast       3. Patient needs to follow up in if no improvement,or sooner if worsening of symptoms or other symptoms develop.  FURTHER TESTING:       - CT sinus  CONSULTATION/REFERRAL to allergist   Your provider has referred you to: N: [facility] [place] [phone]   [email or web address]    Please be aware that coverage of these services is subject to the terms and limitations of your health insurance plan.  Call member services at your health plan with any benefit or coverage questions.    Will follow up and/or notify patient of  results via My Chart to determine further need for followup

## 2019-09-10 ENCOUNTER — ANCILLARY PROCEDURE (OUTPATIENT)
Dept: CT IMAGING | Facility: CLINIC | Age: 44
End: 2019-09-10
Attending: NURSE PRACTITIONER
Payer: COMMERCIAL

## 2019-09-10 DIAGNOSIS — J32.9 CHRONIC CONGESTION OF PARANASAL SINUS: ICD-10-CM

## 2019-09-10 PROCEDURE — 70486 CT MAXILLOFACIAL W/O DYE: CPT | Performed by: RADIOLOGY

## 2019-09-11 NOTE — RESULT ENCOUNTER NOTE
Ky Lee,    Attached are your test results.  CT of sinuses are negative except for mucous retention cyst   I think we need to have allergist see what they think now    Please contact us if you have any questions.    Sandy Garcia, CNP

## 2019-09-18 ENCOUNTER — TRANSFERRED RECORDS (OUTPATIENT)
Dept: HEALTH INFORMATION MANAGEMENT | Facility: CLINIC | Age: 44
End: 2019-09-18

## 2019-09-28 ENCOUNTER — HEALTH MAINTENANCE LETTER (OUTPATIENT)
Age: 44
End: 2019-09-28

## 2019-10-03 ENCOUNTER — OFFICE VISIT (OUTPATIENT)
Dept: DERMATOLOGY | Facility: CLINIC | Age: 44
End: 2019-10-03
Payer: COMMERCIAL

## 2019-10-03 DIAGNOSIS — L71.9 ACNE ROSACEA: Primary | ICD-10-CM

## 2019-10-03 DIAGNOSIS — Z79.899 MEDICATION MANAGEMENT: ICD-10-CM

## 2019-10-03 PROCEDURE — 99213 OFFICE O/P EST LOW 20 MIN: CPT | Performed by: DERMATOLOGY

## 2019-10-03 RX ORDER — IVERMECTIN 3 MG/1
TABLET ORAL
Qty: 15 TABLET | Refills: 0 | Status: SHIPPED | OUTPATIENT
Start: 2019-10-03 | End: 2020-06-17

## 2019-10-03 ASSESSMENT — PAIN SCALES - GENERAL: PAINLEVEL: NO PAIN (0)

## 2019-10-03 NOTE — LETTER
10/3/2019         RE: Natasha Lee  [address] [id number]        Dear Colleague,    Thank you for referring your patient, Natasha Lee, to the [facility]. Please see a copy of my visit note below.    [facility] Dermatology Note    Dermatology Problem List:  1. Relevant medical hx: SLE on plaquenil and prednisone, hepatitis B  2. Rosacea, severe and granulomatous appearing  -current tx: oral ivermectin 15 mg monthly x3 months, metronidazole 0.75% cream, permethrin 5% cream, gentle skin care  -s/p doxycycline 100 mg BID (x3 months - improved substantially in first month then went back to baseline)    Encounter Date: Oct 3, 2019    CC:  Chief Complaint   Patient presents with     Rosacea     had improvement in the beginning but it now is coming back     History of Present Illness:  Ms. Natasha Lee is a 44 year old female who presents as a follow up for rosacea. She was last seen 6/25/19 when she was treated with metronidazole 0.75% cream, doxy 100 mg BID and permethrin 5% cream. Today she reports that she initially had improvement of the condition in the first month but then started to flare again. She had to temporarily stop her doxy due to an illness and rosacea really flared at that point. She still uses her topicals diligently. She eliminated some triggering foods from her diet such as spicy foods but has not noticed much change. Her eyes are dry, always have been. She has seen an optometrist previously to discuss her dry eyes, but was told to use otc drops. She has not spent an increased amount of time the sun recently. No other concerns addressed today.      Past Medical History:   Patient Active Problem List   Diagnosis     Chronic hepatitis B (H)     Lupus (systemic lupus erythematosus) (H)     CARDIOVASCULAR SCREENING; LDL GOAL LESS THAN 160     Health Care Home     Dysmenorrhea     Menorrhagia     Allergic rhinitis      Other nonspecific finding on examination of urine     Urinary frequency     Vitamin B12 deficiency (non anemic)     Heart murmur     S/P laparoscopic cholecystectomy     Cervical cancer screening     Past Medical History:   Diagnosis Date     Diabetes (H)     Pre-Diabetic     Heart murmur 1997     Lupus (systemic lupus erythematosus) (H) 1988    Dr. Prather     Lupus (systemic lupus erythematosus) (H) 12/10/2009     Normal delivery 9/5/09    boy forceps     Seizures (H)     When a child     Past Surgical History:   Procedure Laterality Date     ANESTH,SKIN SURGERY,KNEE  1993, 1990    orthoscopic     C APPENDECTOMY  4/2008     CHOLECYSTECTOMY  2016     CL AFF SURGICAL PATHOLOGY  2007     LAPAROSCOPIC CHOLECYSTECTOMY N/A 5/18/2016    Procedure: LAPAROSCOPIC CHOLECYSTECTOMY;  Surgeon: Radha Cline MD;  [facility]: [facility]     LAPAROSCOPIC TUBAL LIGATION  12/15/2011    Procedure:LAPAROSCOPIC TUBAL LIGATION; Laparoscopic tubal ligation; Surgeon:AMY WYNN; Location:[facility] OR     OSTEOTOMY FOOT Left 2/13/2019    Procedure: Left Lapidus with  Azael Osteotomy;  Surgeon: Brett Chavez MD;  Location:  OR       Social History:  Patient reports that she has never smoked. She has never used smokeless tobacco. She reports current alcohol use. She reports that she does not use drugs. 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 Patient has 1 child. She has had her tubes tied - no plans for future pregnancies.     Family History:  Family History   Adopted: Yes   Problem Relation Age of Onset     Unknown/Adopted Mother      Unknown/Adopted Father        Medications:  Current Outpatient Medications   Medication Sig Dispense Refill     Cranberry 1000 MG CAPS Take 1 capsule by mouth 4 times daily       Cyanocobalamin (B-12) 1000 MCG TBCR Take 1,000 mcg by mouth daily 100 tablet 1     DiphenhydrAMINE HCl (BENADRYL PO)        DiphenhydrAMINE HCl, Sleep, (UNISOM SLEEPGELS) 50 MG CAPS Take 50  mg by mouth At Bedtime       doxycycline monohydrate (MONODOX) 100 MG capsule Take 1 capsule (100 mg) by mouth 2 times daily 60 capsule 2     fluticasone (FLONASE) 50 MCG/ACT nasal spray Spray 2 sprays into both nostrils daily 16 g 3     hydroxychloroquine (PLAQUENIL) 200 MG tablet Hydroxychloroquine 300mg daily 135 tablet 3     metroNIDAZOLE (METROCREAM) 0.75 % external cream Apply topically 2 times daily 45 g 11     montelukast (SINGULAIR) 10 MG tablet Take 1 tablet (10 mg) by mouth At Bedtime 30 tablet 1     multivitamin, therapeutic with minerals (MULTI-VITAMIN) TABS Take 1 tablet by mouth daily       norethindrone-ethinyl estradiol (ORTHO-NOVUM 1-35 TAB,NORTREL 1-35 TAB) 1-35 MG-MCG tablet Take 1 tablet by mouth daily 84 tablet 4     omeprazole 20 MG tablet Take 1 tablet by mouth daily. Take 30-60 minutes before a meal. 30 tablet 1     predniSONE (DELTASONE) 5 MG tablet Take 1 tablet (5 mg) by mouth daily as needed for lupus symptoms 30 tablet 1     triamcinolone (KENALOG) 0.1 % cream Apply sparingly to affected area three times daily prn. 30 g 0       Allergies   Allergen Reactions     Carbamazepine      Fever, rash     [place]-Related Products        Review of Systems:  -Constitutional: Patient is otherwise feeling well, in usual state of health.   -Skin: As above in HPI. No additional skin concerns.    Physical exam:  Vitals: There were no vitals taken for this visit.  GEN: This is a well developed, well-nourished female in no acute distress, in a pleasant mood.   SKIN: Acne exam, which includes the face, neck, upper central chest, and upper central back was performed.  - On the medial cheeks and chin there are edematous pink plaques and papules  - No other lesions of concern on areas examined.       Impression/Plan:  1. Acne rosacea, severe, granulomatous in appearance. Failed to improve with metrocream, doxycycline, permethrin cream, and gentle skin care. Discussed treatment with oral ivermectin. The  associated side effects of these treatments were discussed.     Stop doxy.    Continue metronidazole 0.75% cream once daily and permethrin 5% cream once daily.     Prescribed oral ivermectin 15 mg monthly x3 months for this 73 kg patient. Reviewed the potential side effects of this medication, patient noted understanding.    If failure to improve, next step would be a biopsy to confirm rosacea (versus cutaneous lupus form) followed by low dose isotretinoin.    Follow-up 3 months for rosacea, earlier for new or changing lesions.     Staff Involved:  Scribe/Staff    Scribe Disclosure  I, Kimberly Rico, am serving as a scribe to document services personally performed by Dr. Yamile Funez MD, based on data collection and the provider's statements to me.     Provider Disclosure:   The documentation recorded by the scribe accurately reflects the services I personally performed and the decisions made by me.    Yamile Funez MD    Department of Dermatology  [facility]: Phone: [phone], Fax:[phone]  [facility]: Phone: [phone], Fax: [phone]              Again, thank you for allowing me to participate in the care of your patient.        Sincerely,        Yamile Funez MD

## 2019-10-03 NOTE — NURSING NOTE
@Natasha Lee's goals for this visit include:   Chief Complaint   Patient presents with     Rosacea     had improvement in the beginning but it now is coming back     .  She requests these members of her care team be copied on today's visit information: NO    PCP: Sandy Garcia    Referring Provider:  No referring provider defined for this encounter.    There were no vitals taken for this visit.    Do you need any medication refills at today's visit? NO    Savannah Joya CMA

## 2019-10-03 NOTE — LETTER
10/3/2019        To Whom it May Concern,       Please, excuse Natasha from work today. This patient was seen in clinic at the [facility].     Sincerely,        Department of Dermatology  [facility]  Phone([facility]): [phone]  Phone([facility]): [phone]

## 2019-10-03 NOTE — PROGRESS NOTES
[facility] Dermatology Note    Dermatology Problem List:  1. Relevant medical hx: SLE on plaquenil and prednisone, hepatitis B  2. Rosacea, severe and granulomatous appearing  -current tx: oral ivermectin 15 mg monthly x3 months, metronidazole 0.75% cream, permethrin 5% cream, gentle skin care  -s/p doxycycline 100 mg BID (x3 months - improved substantially in first month then went back to baseline)    Encounter Date: Oct 3, 2019    CC:  Chief Complaint   Patient presents with     Rosacea     had improvement in the beginning but it now is coming back     History of Present Illness:  Ms. Natasha Lee is a 44 year old female who presents as a follow up for rosacea. She was last seen 6/25/19 when she was treated with metronidazole 0.75% cream, doxy 100 mg BID and permethrin 5% cream. Today she reports that she initially had improvement of the condition in the first month but then started to flare again. She had to temporarily stop her doxy due to an illness and rosacea really flared at that point. She still uses her topicals diligently. She eliminated some triggering foods from her diet such as spicy foods but has not noticed much change. Her eyes are dry, always have been. She has seen an optometrist previously to discuss her dry eyes, but was told to use otc drops. She has not spent an increased amount of time the sun recently. No other concerns addressed today.      Past Medical History:   Patient Active Problem List   Diagnosis     Chronic hepatitis B (H)     Lupus (systemic lupus erythematosus) (H)     CARDIOVASCULAR SCREENING; LDL GOAL LESS THAN 160     Health Care Home     Dysmenorrhea     Menorrhagia     Allergic rhinitis     Other nonspecific finding on examination of urine     Urinary frequency     Vitamin B12 deficiency (non anemic)     Heart murmur     S/P laparoscopic cholecystectomy     Cervical cancer screening     Past Medical History:   Diagnosis Date     Diabetes (H)      Pre-Diabetic     Heart murmur 1997     Lupus (systemic lupus erythematosus) (H) 1988    Dr. Prather     Lupus (systemic lupus erythematosus) (H) 12/10/2009     Normal delivery 9/5/09    boy forceps     Seizures (H)     When a child     Past Surgical History:   Procedure Laterality Date     ANESTH,SKIN SURGERY,KNEE  1993, 1990    orthoscopic     C APPENDECTOMY  4/2008     CHOLECYSTECTOMY  2016     CL AFF SURGICAL PATHOLOGY  2007     LAPAROSCOPIC CHOLECYSTECTOMY N/A 5/18/2016    Procedure: LAPAROSCOPIC CHOLECYSTECTOMY;  Surgeon: Radha Cline MD;  Location: [facility] OR     LAPAROSCOPIC TUBAL LIGATION  12/15/2011    Procedure:LAPAROSCOPIC TUBAL LIGATION; Laparoscopic tubal ligation; Surgeon:AMY WYNN; Location:[facility] OR     OSTEOTOMY FOOT Left 2/13/2019    Procedure: Left Lapidus with  Azael Osteotomy;  Surgeon: Brett Chavez MD;  Location: [facility] OR       Social History:  Patient reports that she has never smoked. She has never used smokeless tobacco. She reports current alcohol use. She reports that she does not use drugs. 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 Patient has 1 child. She has had her tubes tied - no plans for future pregnancies.     Family History:  Family History   Adopted: Yes   Problem Relation Age of Onset     Unknown/Adopted Mother      Unknown/Adopted Father        Medications:  Current Outpatient Medications   Medication Sig Dispense Refill     Cranberry 1000 MG CAPS Take 1 capsule by mouth 4 times daily       Cyanocobalamin (B-12) 1000 MCG TBCR Take 1,000 mcg by mouth daily 100 tablet 1     DiphenhydrAMINE HCl (BENADRYL PO)        DiphenhydrAMINE HCl, Sleep, (UNISOM SLEEPGELS) 50 MG CAPS Take 50 mg by mouth At Bedtime       doxycycline monohydrate (MONODOX) 100 MG capsule Take 1 capsule (100 mg) by mouth 2 times daily 60 capsule 2     fluticasone (FLONASE) 50 MCG/ACT nasal spray Spray 2 sprays into both nostrils daily 16 g 3     hydroxychloroquine  (PLAQUENIL) 200 MG tablet Hydroxychloroquine 300mg daily 135 tablet 3     metroNIDAZOLE (METROCREAM) 0.75 % external cream Apply topically 2 times daily 45 g 11     montelukast (SINGULAIR) 10 MG tablet Take 1 tablet (10 mg) by mouth At Bedtime 30 tablet 1     multivitamin, therapeutic with minerals (MULTI-VITAMIN) TABS Take 1 tablet by mouth daily       norethindrone-ethinyl estradiol (ORTHO-NOVUM 1-35 TAB,NORTREL 1-35 TAB) 1-35 MG-MCG tablet Take 1 tablet by mouth daily 84 tablet 4     omeprazole 20 MG tablet Take 1 tablet by mouth daily. Take 30-60 minutes before a meal. 30 tablet 1     predniSONE (DELTASONE) 5 MG tablet Take 1 tablet (5 mg) by mouth daily as needed for lupus symptoms 30 tablet 1     triamcinolone (KENALOG) 0.1 % cream Apply sparingly to affected area three times daily prn. 30 g 0       Allergies   Allergen Reactions     Carbamazepine      Fever, rash     [place]-Related Products        Review of Systems:  -Constitutional: Patient is otherwise feeling well, in usual state of health.   -Skin: As above in HPI. No additional skin concerns.    Physical exam:  Vitals: There were no vitals taken for this visit.  GEN: This is a well developed, well-nourished female in no acute distress, in a pleasant mood.   SKIN: Acne exam, which includes the face, neck, upper central chest, and upper central back was performed.  - On the medial cheeks and chin there are edematous pink plaques and papules  - No other lesions of concern on areas examined.       Impression/Plan:  1. Acne rosacea, severe, granulomatous in appearance. Failed to improve with metrocream, doxycycline, permethrin cream, and gentle skin care. Discussed treatment with oral ivermectin. The associated side effects of these treatments were discussed.     Stop doxy.    Continue metronidazole 0.75% cream once daily and permethrin 5% cream once daily.     Prescribed oral ivermectin 15 mg monthly x3 months for this 73 kg patient. Reviewed the potential  side effects of this medication, patient noted understanding.    If failure to improve, next step would be a biopsy to confirm rosacea (versus cutaneous lupus form) followed by low dose isotretinoin.    Follow-up 3 months for rosacea, earlier for new or changing lesions.     Staff Involved:  Scribe/Staff    Scribe Disclosure  I, Kimberly Rico, am serving as a scribe to document services personally performed by Dr. Yamile Funez MD, based on data collection and the provider's statements to me.     Provider Disclosure:   The documentation recorded by the scribe accurately reflects the services I personally performed and the decisions made by me.    Yamiel Funez MD    Department of Dermatology  [facility]: Phone: [phone], Fax:[phone]  [facility]: Phone: [phone], Fax: [phone]

## 2019-11-21 ENCOUNTER — TELEPHONE (OUTPATIENT)
Dept: RHEUMATOLOGY | Facility: CLINIC | Age: 44
End: 2019-11-21

## 2019-11-21 DIAGNOSIS — M32.9 SYSTEMIC LUPUS ERYTHEMATOSUS, UNSPECIFIED SLE TYPE, UNSPECIFIED ORGAN INVOLVEMENT STATUS (H): ICD-10-CM

## 2019-11-21 DIAGNOSIS — M32.9 SYSTEMIC LUPUS ERYTHEMATOSUS, UNSPECIFIED SLE TYPE, UNSPECIFIED ORGAN INVOLVEMENT STATUS (H): Primary | ICD-10-CM

## 2019-11-21 LAB
ALBUMIN SERPL-MCNC: 3.7 G/DL (ref 3.4–5)
ALP SERPL-CCNC: 85 U/L (ref 40–150)
ALT SERPL W P-5'-P-CCNC: 44 U/L (ref 0–50)
ANION GAP SERPL CALCULATED.3IONS-SCNC: 2 MMOL/L (ref 3–14)
AST SERPL W P-5'-P-CCNC: 26 U/L (ref 0–45)
BASOPHILS # BLD AUTO: 0 10E9/L (ref 0–0.2)
BASOPHILS NFR BLD AUTO: 0.5 %
BILIRUB SERPL-MCNC: 0.2 MG/DL (ref 0.2–1.3)
BUN SERPL-MCNC: 10 MG/DL (ref 7–30)
CALCIUM SERPL-MCNC: 8.8 MG/DL (ref 8.5–10.1)
CHLORIDE SERPL-SCNC: 112 MMOL/L (ref 94–109)
CO2 SERPL-SCNC: 27 MMOL/L (ref 20–32)
CREAT SERPL-MCNC: 0.64 MG/DL (ref 0.52–1.04)
CRP SERPL-MCNC: 5.4 MG/L (ref 0–8)
DIFFERENTIAL METHOD BLD: NORMAL
EOSINOPHIL # BLD AUTO: 0.1 10E9/L (ref 0–0.7)
EOSINOPHIL NFR BLD AUTO: 1.4 %
ERYTHROCYTE [DISTWIDTH] IN BLOOD BY AUTOMATED COUNT: 12.4 % (ref 10–15)
ERYTHROCYTE [SEDIMENTATION RATE] IN BLOOD BY WESTERGREN METHOD: 19 MM/H (ref 0–20)
GFR SERPL CREATININE-BSD FRML MDRD: >90 ML/MIN/{1.73_M2}
GLUCOSE SERPL-MCNC: 130 MG/DL (ref 70–99)
HCT VFR BLD AUTO: 36.8 % (ref 35–47)
HGB BLD-MCNC: 12.6 G/DL (ref 11.7–15.7)
IMM GRANULOCYTES # BLD: 0 10E9/L (ref 0–0.4)
IMM GRANULOCYTES NFR BLD: 0.2 %
LYMPHOCYTES # BLD AUTO: 1.2 10E9/L (ref 0.8–5.3)
LYMPHOCYTES NFR BLD AUTO: 28.3 %
MCH RBC QN AUTO: 30.9 PG (ref 26.5–33)
MCHC RBC AUTO-ENTMCNC: 34.2 G/DL (ref 31.5–36.5)
MCV RBC AUTO: 90 FL (ref 78–100)
MONOCYTES # BLD AUTO: 0.5 10E9/L (ref 0–1.3)
MONOCYTES NFR BLD AUTO: 10.5 %
NEUTROPHILS # BLD AUTO: 2.5 10E9/L (ref 1.6–8.3)
NEUTROPHILS NFR BLD AUTO: 59.1 %
PLATELET # BLD AUTO: 200 10E9/L (ref 150–450)
POTASSIUM SERPL-SCNC: 3.6 MMOL/L (ref 3.4–5.3)
PROT SERPL-MCNC: 6.8 G/DL (ref 6.8–8.8)
RBC # BLD AUTO: 4.08 10E12/L (ref 3.8–5.2)
SODIUM SERPL-SCNC: 141 MMOL/L (ref 133–144)
WBC # BLD AUTO: 4.3 10E9/L (ref 4–11)

## 2019-11-21 PROCEDURE — 86160 COMPLEMENT ANTIGEN: CPT | Performed by: INTERNAL MEDICINE

## 2019-11-21 PROCEDURE — 85652 RBC SED RATE AUTOMATED: CPT | Performed by: INTERNAL MEDICINE

## 2019-11-21 PROCEDURE — 86140 C-REACTIVE PROTEIN: CPT | Performed by: INTERNAL MEDICINE

## 2019-11-21 PROCEDURE — 80053 COMPREHEN METABOLIC PANEL: CPT | Performed by: INTERNAL MEDICINE

## 2019-11-21 PROCEDURE — 86160 COMPLEMENT ANTIGEN: CPT | Mod: 91 | Performed by: INTERNAL MEDICINE

## 2019-11-21 PROCEDURE — 85025 COMPLETE CBC W/AUTO DIFF WBC: CPT | Performed by: INTERNAL MEDICINE

## 2019-11-21 PROCEDURE — 86225 DNA ANTIBODY NATIVE: CPT | Performed by: INTERNAL MEDICINE

## 2019-11-21 PROCEDURE — 36415 COLL VENOUS BLD VENIPUNCTURE: CPT | Performed by: INTERNAL MEDICINE

## 2019-11-21 NOTE — TELEPHONE ENCOUNTER
Orders placed. [facility] lab notified (Sammi had already left, notified his colleague)    Christopher aZman MD  11/21/2019 4:42 PM

## 2019-11-21 NOTE — TELEPHONE ENCOUNTER
Reason for call:  Other   Patient called regarding (reason for call): orders  Additional comments: Sammi from [facility] is calling because she needs lab orders put in, Pt had labs drawn today at [facility] without the orders,  please call back with any questions    Phone number to reach patient:  Other phone number:  [phone]    Best Time:  any    Can we leave a detailed message on this number?  YES

## 2019-11-22 LAB
C3 SERPL-MCNC: 56 MG/DL (ref 76–169)
C4 SERPL-MCNC: 16 MG/DL (ref 15–50)
DSDNA AB SER-ACNC: 12 IU/ML

## 2019-11-26 ENCOUNTER — OFFICE VISIT (OUTPATIENT)
Dept: RHEUMATOLOGY | Facility: CLINIC | Age: 44
End: 2019-11-26
Payer: COMMERCIAL

## 2019-11-26 VITALS
HEIGHT: 62 IN | SYSTOLIC BLOOD PRESSURE: 132 MMHG | BODY MASS INDEX: 29.3 KG/M2 | OXYGEN SATURATION: 100 % | WEIGHT: 159.2 LBS | HEART RATE: 74 BPM | DIASTOLIC BLOOD PRESSURE: 72 MMHG

## 2019-11-26 DIAGNOSIS — M32.9 SYSTEMIC LUPUS ERYTHEMATOSUS, UNSPECIFIED SLE TYPE, UNSPECIFIED ORGAN INVOLVEMENT STATUS (H): Primary | ICD-10-CM

## 2019-11-26 PROCEDURE — 99213 OFFICE O/P EST LOW 20 MIN: CPT | Performed by: INTERNAL MEDICINE

## 2019-11-26 RX ORDER — HYDROXYCHLOROQUINE SULFATE 200 MG/1
200 TABLET, FILM COATED ORAL DAILY
Qty: 90 TABLET | Refills: 3 | Status: SHIPPED | OUTPATIENT
Start: 2019-11-26 | End: 2021-04-28

## 2019-11-26 ASSESSMENT — MIFFLIN-ST. JEOR: SCORE: 1323

## 2019-11-26 NOTE — PROGRESS NOTES
Rheumatology Clinic Visit      Natasha Lee MRN# [id number]   YOB: 1975 Age: 44 year old      Date of visit: 11/26/19   PCP: Sandy Garcia    Chief Complaint   Patient presents with:  Systemic Lupus Erythematous: patient is doing fine      Assessment and Plan     1.  Systemic lupus erythematosus (dsDNA positive, RNP+, hypocomplementemia C3 & C4, malar rash, oral sores, skin lesions, photosensitivity, Raynaud's phenomenon, fatigue, seizure history [grand mal seizure as an infant, absence seizures multiple times from age of [age], no seizure since age of 19 years old]): Diagnosed at the age of 13 years old.  Established with me on 3/8/2018.  Reportedly treated with hydroxychloroquine when she was initially diagnosed at the age of 13 when she developed oral sores, weight loss, malar rash, and joint aches.  Also reportedly treated with azathioprine; she states that she has not been on azathioprine since at least 2003.  Had been doing well without DMARD therapy for several years but more recently she had to request time off from work because of joint pains that have since improved, and fatigue that has since improved.  She also had been having increased erythematous lesions on her arms and malar rash.  Hydroxychloroquine 300 mg daily was started and prednisone was prescribed as needed; she has noticed significant improvement.  No active arthritis and no active skin lesions; still with photosensitivity-rash with sun exposure.  Today she reports that she has missed several months of hydroxychloroquine because she has not gotten it refilled; she has an ophthalmology exam pending in January.  We discussed the benefits of being on hydroxychloroquine.  We discussed the importance of the eye exam.  Restart hydroxychloroquine at a lower dose of 200 mg daily.  - Restart hydroxychloroquine at the lower dose of 200 mg daily (ophthalmology exam pending)  - Labs in 6 months: CBC, CMP, UA, Uprotein:creatinine  -  Labs in 12 months (1 week prior to f/u): CBC, CMP, ESR, CRP, CK, C3, C4, dsDNA, UA, Uprotein:creatinine    # Hydroxychloroquine (Plaquenil) Risks and Benefits:  The risks and benefits of hydroxychloroquine were discussed in detail and the patient verbalized understanding; the patient also verbalized agreement to get the required ophthalmologic toxicity monitoring.  The risks discussed include, but are not limited to, the risk for hypersensitivity, anaphylaxis, anaphylactoid reactions, irreversible retinal damage, rare hematologic reactions, and rare cardiomyopathy.  Patients with G6PD deficiency or hepatic impairment may be at an increased risk for adverse effects.  I encouraged reviewing the package insert and asking any questions about the medication.       2.  Raynaud's phenomenon: Managed well with cold avoidance.  May consider calcium channel blocker in the future if needed.    3. Chronic hepatitis B: Positive since birth, per patient.  Hepatitis B core antibody and surface antigen positive in the past. Followed by her PCP.    4. Hyperglycemia:  Advised weight loss and to follow with PCP for this issue.     5.  Vaccinations: Vaccinations reviewed with Ms. Lee.  Risks and benefits of vaccinations were discussed.   CDC stance on shingrix when on moderate to high immunosuppression was reviewed.   I explained that Shingrix is used off label when under 50 years old and that the safety and efficacy of the vaccine has not been tested in people younger than 50 years old.   - Influenza: encouraged yearly vaccination  - [id number]: she will consider  - [id number]: she will consider  - Shingrix: Advised receiving; patient is going to check on insurance coverage before determining if it is going to be received    Ms. Lee verbalized agreement with and understanding of the rational for the diagnosis and treatment plan.  All questions were answered to best of my ability and the patient's satisfaction. Ms.  Jesus was advised to contact the clinic with any questions that may arise after the clinic visit.      Thank you for involving me in the care of the patient    Return to clinic: 12 months      HPI   Natasha Lee is a 44 year old female with a past medical history significant for chronic hepatitis B, heart murmur, history of cutting (she did this while ago and was associated with depression; had therapy and this resolved many years ago; scarring on her left arm) and SLE who is seen for follow-up of lupus.     Today, she reports that since last seen she has been doing well.  No joint pain.  No morning stiffness.  No rash.  She was diagnosed with rosacea but that has not been an issue recently.  She had bunion surgery on her left first toe.  Not using prednisone.  Since she had surgery on her toe she has yet to  her new prescription for Plaquenil.  She has Plaquenil toxicity monitoring eye exam scheduled in January at the [facility] location.      Denies fevers, chills, nausea, vomiting, constipation, diarrhea. No abdominal pain. No chest pain/pressure, palpitations, or shortness of breath. No LE swelling. No neck pain. No oral or nasal sores currently.  Malar rash hx; no rash now.    No sicca symptoms. No eye pain or redness. No history of inflammatory eye disease.  No history of DVT, pulmonary embolism, or miscarriage; one healthy child.      Unknown family history as she was adopted.      Tobacco: None  EtOH: None  Drugs: None  Occupation: Drug and alcohol counselor at [employer]    ROS   GEN: No fevers, chills, night sweats, or weight change  SKIN: See HPI  HEENT: No oral or nasal ulcers currently but has had them in the past.  CV: No chest pain, pressure, palpitations, or dyspnea on exertion.  PULM: No SOB, wheeze, cough.  GI: No nausea, vomiting, constipation, diarrhea. No blood in stool. No abdominal pain.  : No blood in urine.  MSK: See HPI.  NEURO: No numbness  or tingling  EXT: No LE swelling  PSYCH: Negative    Active Problem List     Patient Active Problem List   Diagnosis     Chronic hepatitis B (H)     Lupus (systemic lupus erythematosus) (H)     CARDIOVASCULAR SCREENING; LDL GOAL LESS THAN 160     Health Care Home     Dysmenorrhea     Menorrhagia     Allergic rhinitis     Other nonspecific finding on examination of urine     Urinary frequency     Vitamin B12 deficiency (non anemic)     Heart murmur     S/P laparoscopic cholecystectomy     Cervical cancer screening     Past Medical History     Past Medical History:   Diagnosis Date     Diabetes (H)     Pre-Diabetic     Heart murmur 1997     Lupus (systemic lupus erythematosus) (H) 1988    Dr. Prather     Lupus (systemic lupus erythematosus) (H) 12/10/2009     Normal delivery 9/5/09    boy forceps     Seizures (H)     When a child     Past Surgical History     Past Surgical History:   Procedure Laterality Date     ANESTH,SKIN SURGERY,KNEE  1993, 1990    orthoscopic     C APPENDECTOMY  4/2008     CHOLECYSTECTOMY  2016     CL AFF SURGICAL PATHOLOGY  2007     LAPAROSCOPIC CHOLECYSTECTOMY N/A 5/18/2016    Procedure: LAPAROSCOPIC CHOLECYSTECTOMY;  Surgeon: Radha Cline MD;  Location: [facility] OR     LAPAROSCOPIC TUBAL LIGATION  12/15/2011    Procedure:LAPAROSCOPIC TUBAL LIGATION; Laparoscopic tubal ligation; Surgeon:AMY WYNN; Location:[facility] OR     OSTEOTOMY FOOT Left 2/13/2019    Procedure: Left Lapidus with  Azael Osteotomy;  Surgeon: Brett Chavez MD;  Location: [facility] OR     Allergy     Allergies   Allergen Reactions     Carbamazepine      Fever, rash     [facility]-Related Products      Current Medication List     Current Outpatient Medications   Medication Sig     Cranberry 1000 MG CAPS Take 1 capsule by mouth 4 times daily     Cyanocobalamin (B-12) 1000 MCG TBCR Take 1,000 mcg by mouth daily     DiphenhydrAMINE HCl (BENADRYL PO)      DiphenhydrAMINE HCl, Sleep, (UNISOM SLEEPGELS) 50 MG CAPS Take 50 mg by  mouth At Bedtime     doxycycline monohydrate (MONODOX) 100 MG capsule Take 1 capsule (100 mg) by mouth 2 times daily     fluticasone (FLONASE) 50 MCG/ACT nasal spray Spray 2 sprays into both nostrils daily     ivermectin (STROMECTOL) 3 MG TABS tablet Take 15mg (5 pills) once by mouth, repeat monthly x3 months total.     metroNIDAZOLE (METROCREAM) 0.75 % external cream Apply topically 2 times daily     montelukast (SINGULAIR) 10 MG tablet Take 1 tablet (10 mg) by mouth At Bedtime     multivitamin, therapeutic with minerals (MULTI-VITAMIN) TABS Take 1 tablet by mouth daily     norethindrone-ethinyl estradiol (ORTHO-NOVUM 1-35 TAB,NORTREL 1-35 TAB) 1-35 MG-MCG tablet Take 1 tablet by mouth daily     omeprazole 20 MG tablet Take 1 tablet by mouth daily. Take 30-60 minutes before a meal.     triamcinolone (KENALOG) 0.1 % cream Apply sparingly to affected area three times daily prn.     hydroxychloroquine (PLAQUENIL) 200 MG tablet Hydroxychloroquine 300mg daily (Patient not taking: Reported on 11/26/2019)     predniSONE (DELTASONE) 5 MG tablet Take 1 tablet (5 mg) by mouth daily as needed for lupus symptoms (Patient taking differently: Take 5 mg by mouth daily as needed for lupus symptoms. PRN)     No current facility-administered medications for this visit.          Social History   See HPI    Family History     Family History   Adopted: Yes   Problem Relation Age of Onset     Unknown/Adopted Mother      Unknown/Adopted Father        Physical Exam     Temp Readings from Last 3 Encounters:   09/09/19 96.2  F (35.7  C) (Temporal)   08/09/19 99.7  F (37.6  C) (Temporal)   02/13/19 97.2  F (36.2  C) (Temporal)     BP Readings from Last 5 Encounters:   11/26/19 (!) 149/72   09/09/19 100/70   08/22/19 130/80   08/09/19 136/87   02/13/19 126/75     Pulse Readings from Last 1 Encounters:   11/26/19 74     Resp Readings from Last 1 Encounters:   02/13/19 16     Estimated body mass index is 29.26 kg/m  as calculated from the  "following:    Height as of this encounter: 1.571 m (5' 1.85\").    Weight as of this encounter: 72.2 kg (159 lb 3.2 oz).    GEN: NAD  HEENT: MMM. No oral lesions. Anicteric, noninjected sclera  CV: S1, S2. RRR. No m/r/g.  PULM: CTA bilaterally. No w/c.  MSK: MCPs, PIPs, DIPs, wrists, elbows, shoulders, knees, and ankles without swelling or tenderness to palpation.  Negative MCP and MTP squeeze.  Hips nontender to palpation.      NEURO: UE and LE strengths 5/5 and equal bilaterally.   SKIN: No malar rash.  Several hypopigmented areas on the upper extremities but no erythematous lesions.   Left arm has well-healed scars in linear pattern that the patient says is from cutting in the past and that she no longer does this.  EXT: No LE edema  PSYCH: Alert. Appropriate.    Labs / Imaging (select studies)     RNP/Sm/SSA/SSB  Recent Labs   Lab Test 03/07/18  1548 06/11/14  1540   RNPIGG 1.8*  --    SMIGG 0.2  --    SSAIGG >8.0*  --    SSBIGG <0.2  --    SCLIGG <0.2  --    TREPAB  --  Negative     dsDNA  Recent Labs   Lab Test 11/21/19  1536 06/07/18  1347 03/07/18  1548 03/20/13  1452 05/03/12  1216   DNA 12* 15* 19* 55* 47*     C3/C4  Recent Labs   Lab Test 11/21/19  1536 06/07/18  1347 03/07/18  1548 03/20/13  1452 05/03/12  1216   [id number] 56* 59* 60* 57* 45*   [id number] 16 16 16 15 13*     Antiphospholipid Antibodies  Recent Labs   Lab Test 03/07/18  1548   B2GPG 2.9   B2GPM 1.5   CARDG 5.9   CARDM 0.3   LUPINT Negative     CBC  Recent Labs   Lab Test 11/21/19  1536 01/21/19  1700 06/07/18  1347 03/07/18  1548   WBC 4.3 4.3 4.4 4.5   RBC 4.08 4.37 4.33 4.27   HGB 12.6 13.4 13.6 13.5   HCT 36.8 38.6 38.7 38.3   MCV 90 88 89 90   RDW 12.4 12.3 12.3 12.8    224 219 209   MCH 30.9 30.7 31.4 31.6   MCHC 34.2 34.7 35.1 35.2   NEUTROPHIL 59.1  --  62.1 56.1   LYMPH 28.3  --  28.1 29.0   MONOCYTE 10.5  --  8.2 12.9   EOSINOPHIL 1.4  --  0.9 1.6   BASOPHIL 0.5  --  0.5 0.4   ANEU 2.5  --  2.7 2.5   ALYM 1.2  --  1.2 1.3   CALLIE " 0.5  --  0.4 0.6   AEOS 0.1  --  0.0 0.1   ABAS 0.0  --  0.0 0.0     CMP  Recent Labs   Lab Test 11/21/19  1536 01/21/19  1700 06/07/18  1347 03/07/18  1548 05/13/16  1438 02/15/16  0827    142 141 144 142 140   POTASSIUM 3.6 3.5 3.6 3.8 3.7 3.9   CHLORIDE 112* 107 109 108 107 109   CO2 27 27 25 29 28 26   ANIONGAP 2* 8 7 7 7 5   * 106* 130* 78 106* 89   BUN 10 9 14 13 8 11   CR 0.64 0.63 0.64 0.72 0.56 0.72   GFRESTIMATED >90 >90 >90 89 >90  Non  GFR Calc   89   GFRESTBLACK >90 >90 >90 >90 >90   GFR Calc   >90   GFR Calc     JUAN A 8.8 8.8 8.5 8.8 8.7 8.0*   BILITOTAL 0.2  --  0.5 0.3 0.4 0.3   ALBUMIN 3.7  --  3.8 4.1 3.7 3.4   PROTTOTAL 6.8  --  7.3 7.4 7.2 6.6*   ALKPHOS 85  --  55 78 79 52   AST 26  --  27 23 27 23   ALT 44  --  40 39 44 53*     HgA1c  Recent Labs   Lab Test 02/15/16  0827 10/17/14  0913   A1C 5.3 5.4     Calcium/VitaminD  Recent Labs   Lab Test 11/21/19  1536 01/21/19  1700 06/07/18  1347  02/15/16  0827   JUAN A 8.8 8.8 8.5   < > 8.0*   VITDT  --   --   --   --  32    < > = values in this interval not displayed.     ESR/CRP  Recent Labs   Lab Test 11/21/19  1536 06/07/18  1347 03/07/18  1548   SED 19 13 18   CRP 5.4 4.6 3.4     CK/Aldolase  Recent Labs   Lab Test 06/07/18  1347 03/07/18  1548   CKT 87 60     TSH/T4  Recent Labs   Lab Test 02/15/16  0827 04/02/14  1509   TSH 1.51 0.58     Hepatitis B  Recent Labs   Lab Test 11/16/18  1249 03/07/18  1548 03/20/13  1452 05/03/12  1216   AUSAB 1.76  --   --   --    HBCAB Reactive*  --   --  Positive*   HEPBANG Reactive*  --  Positive* Positive*   HBQLOG  --  2.1*  --   --      Hepatitis C  Recent Labs   Lab Test 11/16/18  1249 06/11/14  1540 03/20/13  1452   HCVAB Nonreactive Negative Negative     Lyme confirmation testing by Western Blot  Recent Labs   Lab Test 06/11/14  1540   LYWG Negative  Reference range: Negative  (Note)  Band(s) present: NONE  (Insufficient number of bands for positive  result)  INTERPRETIVE INFORMATION: Borrelia Burgdorferi Ab, IgG  Western Blot    For this assay, a positive result is reported when any 5 or  more of the following 10 bands are present: 18, 23, 28, 30,  39, 41, 45, 58, 66, or 93 kDa.  All other banding patterns  are reported as negative.     LYW Negative  Reference range: Negative  (Note)  Band(s) present: NONE  (Insufficient number of bands for positive result)  INTERPRETIVE INFORMATION: Borrelia Burgdorferi Antibody,  IgM Western Blot    For this assay, a positive result is reported when any 2 or  more of the following bands are present: 23, 39, or 41 kDa.  All other banding patterns are reported as negative.  Performed by [facility],  [address] [place] [id number] [phone]  [email or web address], Elfego Franco MD, Lab. Director       HIV Screening  Recent Labs   Lab Test 11/16/18  1249 06/11/14  1540   HIAGAB Nonreactive Nonreactive   HIV-1 p24 Ag & HIV-1/HIV-2 Ab Not Detected       UA  Recent Labs   Lab Test 02/19/19  1300 06/07/18  1347 03/07/18  1548 12/18/14  1340  06/27/14  1120 11/17/11  1033   COLOR  --  Light Yellow Yellow Straw   < > Straw Yellow   APPEARANCE  --  Clear Clear Clear   < > Clear Clear   URINEGLC  --  Negative Negative Negative   < > Negative Negative   URINEBILI  --  Negative Negative Negative   < > Negative Negative   SG  --  1.007 1.025 1.001*   < > 1.005 1.015   URINEPH  --  6.0 5.5 5.5   < > 6.5 6.5   PROTEIN  --  Negative Negative Negative   < > Negative Negative   UROBILINOGEN  --   --  0.2  --   --   --   --    NITRITE  --  Negative Negative Negative   < > Negative Negative   UBLD  --  Negative Negative Negative   < > Negative Negative   LEUKEST  --  Negative Negative Small*   < > Moderate* Trace*   WBCU 0 - 5  --   --  2-5*  --  O - 2 2-5*   RBCU O - 2  --   --  O - 2  --  O - 2 O - 2   SQUAMOUSEPI Few  --   --  Few  --  Few Moderate*   BACTERIA Few*  --   --   --   --  Few* Few*    < > = values in this interval not  displayed.     Urine Microscopic  Recent Labs   Lab Test 02/19/19  1300 12/18/14  1340 06/27/14  1120 11/17/11  1033   WBCU 0 - 5 2-5* O - 2 2-5*   RBCU O - 2 O - 2 O - 2 O - 2   SQUAMOUSEPI Few Few Few Moderate*   BACTERIA Few*  --  Few* Few*     Urine Protein  Recent Labs   Lab Test 06/07/18  1349 03/07/18  1548   UTP 0.07 0.09   UTPG 0.17 0.08   UCRR 40 110     [facility] from Oct 2019 reviewed and is sufficient for CTD monitoring purposes    Immunization History     Immunization History   Administered Date(s) Administered     Influenza (H1N1) 12/04/2009     Influenza (IIV3) PF 12/18/2000, 09/22/2009, 11/17/2011, 11/01/2012, 09/22/2014     Influenza Vaccine IM > 6 months Valent IIV4 09/26/2013, 09/30/2015, 10/01/2015, 11/02/2018          Chart documentation done in part with Dragon Voice recognition Software. Although reviewed after completion, some word and grammatical error may remain.    Christopher Zaman MD

## 2019-11-26 NOTE — LETTER
To whom it may concern:    Natasha Lee is followed at the [facility] and was seen today, 11/26/2019.  Please excuse her for this appointment.    Sincerely,      Christopher Zaman MD  11/26/2019 1:29 PM

## 2019-11-26 NOTE — PATIENT INSTRUCTIONS
Check on insurance coverage for the shingles vaccine (Shingrix)    Rheumatology    Dr. Christopher ELLINGTON [facility] in [place]   (Monday)  [address]  [place] [address]       [facility] in [place]   (Tuesday)  [address] [place] [address]    [facility] in [place]   (Wed., Thurs., and Friday)  [address] [place] [address]    Phone number: [phone]  Thank you for choosing [facility].  Randi Rico, [facility]

## 2019-11-26 NOTE — NURSING NOTE
Natasha to follow up with Primary Care provider regarding elevated blood pressure.  Blood pressure rechecked after visit    132/72  Randi Rico CMA Rheumatology  11/26/2019 1:44 PM                                RAPID3 (0-30) Cumulative Score  0          RAPID3 Weighted Score (divide #4 by 3 and that is the weighted score)  0       Randi Rico CMA Rheumatology  11/26/2019 1:16 PM

## 2019-12-15 ENCOUNTER — MYC MEDICAL ADVICE (OUTPATIENT)
Dept: DERMATOLOGY | Facility: CLINIC | Age: 44
End: 2019-12-15

## 2019-12-16 NOTE — TELEPHONE ENCOUNTER
Please have Natasha send me pictures.     Yamile Funez MD    Department of Dermatology  [facility]: Phone: [phone], Fax:[phone]  [facility]: Phone: [phone], Fax: [phone]

## 2019-12-17 NOTE — TELEPHONE ENCOUNTER
Skin looks very inflamed. This very well could be rosacea, but I think it's time we do a skin biopsy to make sure none of this is lupus. See if we can get patient in with me or another provider soon.    Yamile Funez MD    Department of Dermatology  [facility]: Phone: [phone], Fax:[phone]  [facility]: Phone: [phone], Fax: [phone]

## 2019-12-17 NOTE — TELEPHONE ENCOUNTER
Spoke to pt regarding note below. Pt scheduled with Dr. Weathers on January 6 for punch biopsy per Dr. Funez.  Liz Ansari LPN

## 2020-01-06 ENCOUNTER — OFFICE VISIT (OUTPATIENT)
Dept: DERMATOLOGY | Facility: CLINIC | Age: 45
End: 2020-01-06
Payer: COMMERCIAL

## 2020-01-06 DIAGNOSIS — R21 RASH AND OTHER NONSPECIFIC SKIN ERUPTION: Primary | ICD-10-CM

## 2020-01-06 PROCEDURE — 11104 PUNCH BX SKIN SINGLE LESION: CPT | Performed by: DERMATOLOGY

## 2020-01-06 PROCEDURE — 88305 TISSUE EXAM BY PATHOLOGIST: CPT | Mod: TC | Performed by: DERMATOLOGY

## 2020-01-06 ASSESSMENT — PAIN SCALES - GENERAL: PAINLEVEL: NO PAIN (0)

## 2020-01-06 NOTE — NURSING NOTE
@Natasha Lee's goals for this visit include:   Chief Complaint   Patient presents with     Derm Problem     biopsy for poosible lupus on face and neck       She requests these members of her care team be copied on today's visit information: NO    PCP: Sandy Garcia    Referring Provider:  No referring provider defined for this encounter.    There were no vitals taken for this visit.    Do you need any medication refills at today's visit? NO    Savannah Joya CMA

## 2020-01-06 NOTE — PATIENT INSTRUCTIONS

## 2020-01-06 NOTE — LETTER
1/6/2020         RE: Natasha Lee  [address] [address]        Dear Colleague,    Thank you for referring your patient, Natasha Lee, to the [facility]. Please see a copy of my visit note below.    [facility] Dermatology Note    Dermatology Problem List:  1. Relevant medical hx: SLE on plaquenil and prednisone, hepatitis B  2. Rosacea, severe and granulomatous appearing  -previous tx: oral ivermectin 15 mg monthly x3 months, metronidazole 0.75% cream, permethrin 5% cream, gentle skin care  -s/p doxycycline 100 mg BID (x3 months - improved substantially in first month then went back to baseline)  - bx 1/6/2020 pending results    Encounter Date: Jan 6, 2020    CC:  Chief Complaint   Patient presents with     Derm Problem     biopsy for poosible lupus on face and neck     History of Present Illness:  Ms. Natasha Lee is a 44 year old female who presents as a follow up for rosacea. She was last seen 10/3/19 when she continued on metronidazole 0.75%, permethrin 5%, and started on oral ivermectin. The patient was advised to return for a follow up for a biopsy if she does not see improvement. At today's visit, she has continued to use her prescribed topicals, but does not note any improvement. The patient believes she may have lupus and would like to have a biopsy today.  No other concerns addressed today.    Past Medical History:   Patient Active Problem List   Diagnosis     Chronic hepatitis B (H)     Lupus (systemic lupus erythematosus) (H)     CARDIOVASCULAR SCREENING; LDL GOAL LESS THAN 160     Health Care Home     Dysmenorrhea     Menorrhagia     Allergic rhinitis     Other nonspecific finding on examination of urine     Urinary frequency     Vitamin B12 deficiency (non anemic)     Heart murmur     S/P laparoscopic cholecystectomy     Cervical cancer screening     Past Medical History:   Diagnosis Date     Diabetes (H)      Pre-Diabetic     Heart murmur 1997     Lupus (systemic lupus erythematosus) (H) 1988    Dr. Parther     Lupus (systemic lupus erythematosus) (H) 12/10/2009     Normal delivery 9/5/09    boy forceps     Seizures (H)     When a child     Past Surgical History:   Procedure Laterality Date     ANESTH,SKIN SURGERY,KNEE  1993, 1990    orthoscopic     C APPENDECTOMY  4/2008     CHOLECYSTECTOMY  2016     CL AFF SURGICAL PATHOLOGY  2007     LAPAROSCOPIC CHOLECYSTECTOMY N/A 5/18/2016    Procedure: LAPAROSCOPIC CHOLECYSTECTOMY;  Surgeon: Radha Cline MD;  [facility]     LAPAROSCOPIC TUBAL LIGATION  12/15/2011    Procedure:LAPAROSCOPIC TUBAL LIGATION; Laparoscopic tubal ligation; Surgeon:AMY WYNN; Location:[facility] OR     OSTEOTOMY FOOT Left 2/13/2019    Procedure: Left Lapidus with  Azael Osteotomy;  Surgeon: Brett Chavez MD;  [facility] OR       Social History:  Patient reports that she has never smoked. She has never used smokeless tobacco. She reports current alcohol use. She reports that she does not use drugs. 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 Patient has 1 child. She has had her tubes tied - no plans for future pregnancies.     Family History:  Family History   Adopted: Yes   Problem Relation Age of Onset     Unknown/Adopted Mother      Unknown/Adopted Father        Medications:  Current Outpatient Medications   Medication Sig Dispense Refill     Cranberry 1000 MG CAPS Take 1 capsule by mouth 4 times daily       Cyanocobalamin (B-12) 1000 MCG TBCR Take 1,000 mcg by mouth daily 100 tablet 1     DiphenhydrAMINE HCl (BENADRYL PO)        DiphenhydrAMINE HCl, Sleep, (UNISOM SLEEPGELS) 50 MG CAPS Take 50 mg by mouth At Bedtime       doxycycline monohydrate (MONODOX) 100 MG capsule Take 1 capsule (100 mg) by mouth 2 times daily 60 capsule 2     fluticasone (FLONASE) 50 MCG/ACT nasal spray Spray 2 sprays into both nostrils daily 16 g 3     hydroxychloroquine  (PLAQUENIL) 200 MG tablet Take 1 tablet (200 mg) by mouth daily 90 tablet 3     ivermectin (STROMECTOL) 3 MG TABS tablet Take 15mg (5 pills) once by mouth, repeat monthly x3 months total. 15 tablet 0     metroNIDAZOLE (METROCREAM) 0.75 % external cream Apply topically 2 times daily 45 g 11     montelukast (SINGULAIR) 10 MG tablet Take 1 tablet (10 mg) by mouth At Bedtime 30 tablet 1     multivitamin, therapeutic with minerals (MULTI-VITAMIN) TABS Take 1 tablet by mouth daily       norethindrone-ethinyl estradiol (ORTHO-NOVUM 1-35 TAB,NORTREL 1-35 TAB) 1-35 MG-MCG tablet Take 1 tablet by mouth daily 84 tablet 4     omeprazole 20 MG tablet Take 1 tablet by mouth daily. Take 30-60 minutes before a meal. 30 tablet 1     predniSONE (DELTASONE) 5 MG tablet Take 1 tablet (5 mg) by mouth daily as needed for lupus symptoms (Patient taking differently: Take 5 mg by mouth daily as needed for lupus symptoms. PRN) 30 tablet 1     triamcinolone (KENALOG) 0.1 % cream Apply sparingly to affected area three times daily prn. 30 g 0       Allergies   Allergen Reactions     Carbamazepine      Fever, rash     [place]-Related Products        Review of Systems:  -Constitutional: Patient is otherwise feeling well, in usual state of health.   -Skin: As above in HPI. No additional skin concerns.    Physical exam:  Vitals: There were no vitals taken for this visit.  GEN: This is a well developed, well-nourished female in no acute distress, in a pleasant mood.   SKIN: Focused examination of the face was performed  - On the medial cheeks, nose, chin, and neck there are edematous pink plaques and papules some with minimal overlying scale.   - No other lesions of concern on areas examined.         Impression/Plan:    1. Papules, bilateral cheeks and neck. Failed to improve with metrocream, doxycycline, permethrin cream, metronidazole, and gentle skin care. The patient elects to have a punch biopsy for diagnostic confirmation. Differential includes  granulomatous rosacea, lupus miliaria, sarcoidosis, versus cutaneous manifestation of her underlying SLE such as a tumid lupus or less likely discoid lupus.       Punch biopsy:  After discussion of benefits and risks with informed consent, the area was cleaned and anesthetized. A 3 mm punch biopsy was performed, the site sutured to approximate the epidermal edges and achieve hemostasis, and a white petroleum jelly/VaselineTM and a bandage was applied to the wound.  Explicit verbal and written wound care instructions were provided.  The patient left the Dermatology Clinic in good condition.     Photos taken for clinical surveillance    CC Dr. Kent on close of this encounter.  Follow-up pending biopsy results.     Staff Involved:  Scribe/Staff    Scribe Disclosure  I, Laney Amador, am serving as a scribe to document services personally performed by Dr. Lyle Weathers MD, based on data collection and the provider's statements to me.    Provider Disclosure:   The documentation recorded by the scribe accurately reflects the services I personally performed and the decisions made by me.    Lyle Weathers MD    Department of Dermatology  [facility]: Phone: [phone], Fax:[phone]  [facility]: Phone: [phone], Fax: [phone]              Again, thank you for allowing me to participate in the care of your patient.        Sincerely,        Lyle Weathers MD

## 2020-01-06 NOTE — PROGRESS NOTES
[facility] Dermatology Note    Dermatology Problem List:  1. Relevant medical hx: SLE on plaquenil and prednisone, hepatitis B  2. Rosacea, severe and granulomatous appearing  -previous tx: oral ivermectin 15 mg monthly x3 months, metronidazole 0.75% cream, permethrin 5% cream, gentle skin care  -s/p doxycycline 100 mg BID (x3 months - improved substantially in first month then went back to baseline)  - bx 1/6/2020 pending results    Encounter Date: Jan 6, 2020    CC:  Chief Complaint   Patient presents with     Derm Problem     biopsy for poosible lupus on face and neck     History of Present Illness:  Ms. Natasha Lee is a 44 year old female who presents as a follow up for rosacea. She was last seen 10/3/19 when she continued on metronidazole 0.75%, permethrin 5%, and started on oral ivermectin. The patient was advised to return for a follow up for a biopsy if she does not see improvement. At today's visit, she has continued to use her prescribed topicals, but does not note any improvement. The patient believes she may have lupus and would like to have a biopsy today.  No other concerns addressed today.    Past Medical History:   Patient Active Problem List   Diagnosis     Chronic hepatitis B (H)     Lupus (systemic lupus erythematosus) (H)     CARDIOVASCULAR SCREENING; LDL GOAL LESS THAN 160     Health Care Home     Dysmenorrhea     Menorrhagia     Allergic rhinitis     Other nonspecific finding on examination of urine     Urinary frequency     Vitamin B12 deficiency (non anemic)     Heart murmur     S/P laparoscopic cholecystectomy     Cervical cancer screening     Past Medical History:   Diagnosis Date     Diabetes (H)     Pre-Diabetic     Heart murmur 1997     Lupus (systemic lupus erythematosus) (H) 1988    Dr. Prather     Lupus (systemic lupus erythematosus) (H) 12/10/2009     Normal delivery 9/5/09    boy forceps     Seizures (H)     When a child     Past Surgical History:    Procedure Laterality Date     ANESTH,SKIN SURGERY,KNEE  1993, 1990    orthoscopic     C APPENDECTOMY  4/2008     CHOLECYSTECTOMY  2016     CL AFF SURGICAL PATHOLOGY  2007     LAPAROSCOPIC CHOLECYSTECTOMY N/A 5/18/2016    Procedure: LAPAROSCOPIC CHOLECYSTECTOMY;  Surgeon: Radha Cline MD;  [facility]     LAPAROSCOPIC TUBAL LIGATION  12/15/2011    Procedure:LAPAROSCOPIC TUBAL LIGATION; Laparoscopic tubal ligation; Surgeon:AMY WYNN; Location:[facility]     OSTEOTOMY FOOT Left 2/13/2019    Procedure: Left Lapidus with  Azael Osteotomy;  Surgeon: Brett Chavez MD;  [facility] OR       Social History:  Patient reports that she has never smoked. She has never used smokeless tobacco. She reports current alcohol use. She reports that she does not use drugs. 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 She has had her tubes tied - no plans for future pregnancies.     Family History:  Family History   Adopted: Yes   Problem Relation Age of Onset     Unknown/Adopted Mother      Unknown/Adopted Father        Medications:  Current Outpatient Medications   Medication Sig Dispense Refill     Cranberry 1000 MG CAPS Take 1 capsule by mouth 4 times daily       Cyanocobalamin (B-12) 1000 MCG TBCR Take 1,000 mcg by mouth daily 100 tablet 1     DiphenhydrAMINE HCl (BENADRYL PO)        DiphenhydrAMINE HCl, Sleep, (UNISOM SLEEPGELS) 50 MG CAPS Take 50 mg by mouth At Bedtime       doxycycline monohydrate (MONODOX) 100 MG capsule Take 1 capsule (100 mg) by mouth 2 times daily 60 capsule 2     fluticasone (FLONASE) 50 MCG/ACT nasal spray Spray 2 sprays into both nostrils daily 16 g 3     hydroxychloroquine (PLAQUENIL) 200 MG tablet Take 1 tablet (200 mg) by mouth daily 90 tablet 3     ivermectin (STROMECTOL) 3 MG TABS tablet Take 15mg (5 pills) once by mouth, repeat monthly x3 months total. 15 tablet 0     metroNIDAZOLE (METROCREAM) 0.75 % external cream Apply  topically 2 times daily 45 g 11     montelukast (SINGULAIR) 10 MG tablet Take 1 tablet (10 mg) by mouth At Bedtime 30 tablet 1     multivitamin, therapeutic with minerals (MULTI-VITAMIN) TABS Take 1 tablet by mouth daily       norethindrone-ethinyl estradiol (ORTHO-NOVUM 1-35 TAB,NORTREL 1-35 TAB) 1-35 MG-MCG tablet Take 1 tablet by mouth daily 84 tablet 4     omeprazole 20 MG tablet Take 1 tablet by mouth daily. Take 30-60 minutes before a meal. 30 tablet 1     predniSONE (DELTASONE) 5 MG tablet Take 1 tablet (5 mg) by mouth daily as needed for lupus symptoms (Patient taking differently: Take 5 mg by mouth daily as needed for lupus symptoms. PRN) 30 tablet 1     triamcinolone (KENALOG) 0.1 % cream Apply sparingly to affected area three times daily prn. 30 g 0       Allergies   Allergen Reactions     Carbamazepine      Fever, rash     [place]-Related Products        Review of Systems:  -Constitutional: Patient is otherwise feeling well, in usual state of health.   -Skin: As above in HPI. No additional skin concerns.    Physical exam:  Vitals: There were no vitals taken for this visit.  GEN: This is a well developed, well-nourished female in no acute distress, in a pleasant mood.   SKIN: Focused examination of the face was performed  - On the medial cheeks, nose, chin, and neck there are edematous pink plaques and papules some with minimal overlying scale.   - No other lesions of concern on areas examined.         Impression/Plan:    1. Papules, bilateral cheeks and neck. Failed to improve with metrocream, doxycycline, permethrin cream, metronidazole, and gentle skin care. The patient elects to have a punch biopsy for diagnostic confirmation. Differential includes granulomatous rosacea, lupus miliaria, sarcoidosis, versus cutaneous manifestation of her underlying SLE such as a tumid lupus or less likely discoid lupus.       Punch biopsy:  After discussion of benefits and risks with informed consent, the area was cleaned  and anesthetized. A 3 mm punch biopsy was performed, the site sutured to approximate the epidermal edges and achieve hemostasis, and a white petroleum jelly/VaselineTM and a bandage was applied to the wound.  Explicit verbal and written wound care instructions were provided.  The patient left the Dermatology Clinic in good condition.     Photos taken for clinical surveillance    CC Dr. Kent on close of this encounter.  Follow-up pending biopsy results.     Staff Involved:  Scribe/Staff    Scribe Disclosure  I, Laney Amador, am serving as a scribe to document services personally performed by Dr. Lyle Weathers MD, based on data collection and the provider's statements to me.    Provider Disclosure:   The documentation recorded by the scribe accurately reflects the services I personally performed and the decisions made by me.    Lyle Weathers MD    Department of Dermatology  [facility]: Phone: [phone], Fax:[phone]  [facility]: Phone: [phone], Fax: [phone]

## 2020-01-08 LAB — COPATH REPORT: NORMAL

## 2020-01-09 ENCOUNTER — TELEPHONE (OUTPATIENT)
Dept: DERMATOLOGY | Facility: CLINIC | Age: 45
End: 2020-01-09

## 2020-01-09 DIAGNOSIS — L93.0 LUPUS ERYTHEMATOSUS TUMIDUS: Primary | ICD-10-CM

## 2020-01-09 RX ORDER — TACROLIMUS 1 MG/G
OINTMENT TOPICAL
Qty: 60 G | Refills: 3 | Status: SHIPPED | OUTPATIENT
Start: 2020-01-09 | End: 2020-12-17

## 2020-01-09 NOTE — TELEPHONE ENCOUNTER
Notes recorded by Mi Markham RN on 1/9/2020 at 11:03 AM CST  I spoke with Moira and notified her of the results.  She verbalized understanding and agreed with the plan.  She will keep her appt with Dr. Funez as scheduled.  Mi Markham RN    ------    Notes recorded by Mi Markham RN on 1/9/2020 at 9:13 AM CST  I left a message for patient to call [facility].  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     - I sent over a prescription for a topical medication, called protopic 0.1% ointment, that may help get rid of the spots. This was sent to the [facility]. This medication is different than a topical steroid in that it is safe to use on thinner skin like the face, so no worries about skin thinning or atrophy.     - Give you recently restarted your plaquenil medication, it's possible the spots on the face may improve in the next few months. If they don't improve, we may have to coordinate adjustments to your immunosuppressive regimen with your rheumatologist.     - We should make a follow-up appointment. You can either keep your appointment with Dr. Kent for next week, or we can arrange a follow-up with me in the next 3-4 weeks. Either is fine!    I've CC'ed Dr. Kent and Dr. Zaman to this message as FYI so they are aware.     Lyle Weathers MD    Department of Dermatology  [facility]: Phone: [phone], Fax:[phone]  [facility]: Phone: [phone], Fax: [phone]   Dermatological  path order and indications   Order: [id number]   Status:  Final result   Visible to patient:  Yes (MyChart) Dx:  Rash and other nonspecific skin eruption   Component 3d ago   Copath Report Patient Name: GA GARCIA   MR#: [id number]   Specimen #:    Collected: 1/6/2020   Received: 1/7/2020   Reported: 1/8/2020 18:43   Ordering Phy(s): CHE TERRY     For improved result formatting, select 'View Enhanced Report Format' under    Linked Documents section.     SPECIMEN(S):   Punch biopsy, right cheek     FINAL DIAGNOSIS:   Skin, right cheek:   - Features consistent with cutaneous lupus erythematosus - (see comment)

## 2020-01-09 NOTE — RESULT ENCOUNTER NOTE
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     - I sent over a prescription for a topical medication, called protopic 0.1% ointment, that may help get rid of the spots. This was sent to the [facility]. This medication is different than a topical steroid in that it is safe to use on thinner skin like the face, so no worries about skin thinning or atrophy.     - Give you recently restarted your plaquenil medication, it's possible the spots on the face may improve in the next few months. If they don't improve, we may have to coordinate adjustments to your immunosuppressive regimen with your rheumatologist.     - We should make a follow-up appointment. You can either keep your appointment with Dr. Kent for next week, or we can arrange a follow-up with me in the next 3-4 weeks. Either is fine!    I've CC'ed Dr. Kent and Dr. Zaman to this message as FYI so they are aware.     Lyle Weathers MD    Department of Dermatology  [facility]: Phone: [phone], Fax:[phone]  [facility]: Phone: [phone], Fax: [phone]

## 2020-01-15 ENCOUNTER — OFFICE VISIT (OUTPATIENT)
Dept: DERMATOLOGY | Facility: CLINIC | Age: 45
End: 2020-01-15
Payer: COMMERCIAL

## 2020-01-15 DIAGNOSIS — L93.0 LUPUS ERYTHEMATOSUS TUMIDUS: Primary | ICD-10-CM

## 2020-01-15 PROCEDURE — 99213 OFFICE O/P EST LOW 20 MIN: CPT | Performed by: DERMATOLOGY

## 2020-01-15 NOTE — LETTER
1/15/2020         RE: Natasha Lee  [address] [facility] [address]        Dear Colleague,    Thank you for referring your patient, Natasha Lee, to the [facility]. Please see a copy of my visit note below.    [facility] Dermatology Note    Dermatology Problem List:  1. Relevant medical hx: SLE on plaquenil and prednisone, chronic hepatitis B  2. Tumid lupus +/- granulomatous rosacea  - Previously treated as severe and granulomatous appearing rosacea, interestingly did improve with oral doxycycline  - Biopsy 1/6/2020 consistent with cutaneous lupus erythematosus  - Current t/x: Protopic BID, metronidazole 0.75% cream, plaquenil (managed by rheum)  -previous tx: oral ivermectin 15 mg monthly x3 months,  permethrin 5% cream, gentle skin care, doxycycline 100 mg BID (x3 months - improved substantially in first month then went back to baseline)    Encounter Date: Reji 15, 2020    CC:  Chief Complaint   Patient presents with     Derm Problem     tumid lupus     History of Present Illness:  Ms. Natasha Lee is a 44 year old female who presents as a follow up for cutaneous lupus erythematosus. She was last seen on 1/6/20 by Dr. Weathers when a punch biopsy was preformed from her right cheek. The pathology was consistent with cutaneous lupus erythematosus (tumid lupus). In Nov-Dec, she recently resumed taking plaquenil for SLE. For tumid lupus, protopic was prescribed but she is waiting for a prior authorization at this time so has not started. Today she reports that her neck is improved overall despite no active treatment to the area outside plaquenil. Face remains about the same. No other concerns addressed today.    Past Medical History:   Patient Active Problem List   Diagnosis     Chronic hepatitis B (H)     Lupus (systemic lupus erythematosus) (H)     CARDIOVASCULAR SCREENING; LDL GOAL LESS THAN 160     Health Care Home     Dysmenorrhea      Menorrhagia     Allergic rhinitis     Other nonspecific finding on examination of urine     Urinary frequency     Vitamin B12 deficiency (non anemic)     Heart murmur     S/P laparoscopic cholecystectomy     Cervical cancer screening     Past Medical History:   Diagnosis Date     Diabetes (H)     Pre-Diabetic     Heart murmur 1997     Lupus (systemic lupus erythematosus) (H) 1988    Dr. Prather     Lupus (systemic lupus erythematosus) (H) 12/10/2009     Normal delivery 9/5/09    boy forceps     Seizures (H)     When a child     Past Surgical History:   Procedure Laterality Date     ANESTH,SKIN SURGERY,KNEE  1993, 1990    orthoscopic     C APPENDECTOMY  4/2008     CHOLECYSTECTOMY  2016     CL AFF SURGICAL PATHOLOGY  2007     LAPAROSCOPIC CHOLECYSTECTOMY N/A 5/18/2016    Procedure: LAPAROSCOPIC CHOLECYSTECTOMY;  Surgeon: Rahda Cline MD;  [facility]     LAPAROSCOPIC TUBAL LIGATION  12/15/2011    Procedure:LAPAROSCOPIC TUBAL LIGATION; Laparoscopic tubal ligation; Surgeon:AMY WYNN; Location:[facility] OR     OSTEOTOMY FOOT Left 2/13/2019    Procedure: Left Lapidus with  Azael Osteotomy;  Surgeon: Brett Chavez MD;  Location:  OR     Social History:  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 Patient has 1 child. She has had her tubes tied - no plans for future pregnancies.   Reviewed and left in chart for clinician convenience.     Family History:  Unknown - patient is adopted.  Reviewed and left in chart for clinician convenience.       Medications:  Current Outpatient Medications   Medication Sig Dispense Refill     Cranberry 1000 MG CAPS Take 1 capsule by mouth 4 times daily       Cyanocobalamin (B-12) 1000 MCG TBCR Take 1,000 mcg by mouth daily 100 tablet 1     DiphenhydrAMINE HCl (BENADRYL PO)        DiphenhydrAMINE HCl, Sleep, (UNISOM SLEEPGELS) 50 MG CAPS Take 50 mg by mouth At Bedtime       fluticasone (FLONASE) 50 MCG/ACT nasal spray Spray 2 sprays into both  nostrils daily 16 g 3     hydroxychloroquine (PLAQUENIL) 200 MG tablet Take 1 tablet (200 mg) by mouth daily 90 tablet 3     ivermectin (STROMECTOL) 3 MG TABS tablet Take 15mg (5 pills) once by mouth, repeat monthly x3 months total. 15 tablet 0     metroNIDAZOLE (METROCREAM) 0.75 % external cream Apply topically 2 times daily 45 g 11     montelukast (SINGULAIR) 10 MG tablet Take 1 tablet (10 mg) by mouth At Bedtime 30 tablet 1     multivitamin, therapeutic with minerals (MULTI-VITAMIN) TABS Take 1 tablet by mouth daily       norethindrone-ethinyl estradiol (ORTHO-NOVUM 1-35 TAB,NORTREL 1-35 TAB) 1-35 MG-MCG tablet Take 1 tablet by mouth daily 84 tablet 4     omeprazole 20 MG tablet Take 1 tablet by mouth daily. Take 30-60 minutes before a meal. 30 tablet 1     predniSONE (DELTASONE) 5 MG tablet Take 1 tablet (5 mg) by mouth daily as needed for lupus symptoms (Patient taking differently: Take 5 mg by mouth daily as needed for lupus symptoms. PRN) 30 tablet 1     tacrolimus (PROTOPIC) 0.1 % external ointment Apply twice daily to rash on face until resolved. 60 g 3     triamcinolone (KENALOG) 0.1 % cream Apply sparingly to affected area three times daily prn. 30 g 0       Allergies   Allergen Reactions     Carbamazepine      Fever, rash     [place]-Related Products        Review of Systems:  -Constitutional: Patient is otherwise feeling well, in usual state of health.   -Skin: As above in HPI. No additional skin concerns.    Physical exam:  Vitals: There were no vitals taken for this visit.  GEN: This is a well developed, well-nourished female in no acute distress, in a pleasant mood.   SKIN: Focused examination of the face was performed  - Flat top edematous pink plaques with someone geometric borders on the medial cheeks, nose and chin. Less so on the neck today. Forearms clear.   - No other lesions of concern on areas examined.     Impression/Plan:    1. 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 Biopsy confirmed tumid lupus  on 1/6/2020. Patient recently restarted treatment with Plaquenil which is being managed by Dr. Zaman of Rheumatology. Will continue treatment for rosacea as there is a likely granulomatous component but hard to say for sure at this point. Currently awaiting a prior authorization for Protopic, this is the best treatment option given her neck and facial involvement and risk of steroid atrophy on the face.   - Advised patient to continue to apply metronidazole 0.75% cream daily and protopic twice a day once the prior authorization goes through. Will consider HC 2.5% cream if protopic not approved.   - Continue to follow with rheumatology for treatment with plaquenil   - Will follow-up in 1 month to monitor her progress    Follow-up in 1 month for tumid lupus.      Staff Involved:  Scribe/Staff    Scribe Disclosure  I, Kimberly Rico, am serving as a scribe to document services personally performed by Dr. Yamile Funez MD, based on data collection and the provider's statements to me.     Provider Disclosure:   The documentation recorded by the scribe accurately reflects the services I personally performed and the decisions made by me.    Yamile Funez MD    Department of Dermatology  [facility]: Phone: [phone], Fax:[phone]  [facility]: Phone: [phone], Fax: [phone]              Again, thank you for allowing me to participate in the care of your patient.        Sincerely,        Yamile Funez MD

## 2020-01-15 NOTE — PROGRESS NOTES
[facility] Dermatology Note    Dermatology Problem List:  1. Relevant medical hx: SLE on plaquenil and prednisone, chronic hepatitis B  2. Tumid lupus +/- granulomatous rosacea  - Previously treated as severe and granulomatous appearing rosacea, interestingly did improve with oral doxycycline  - Biopsy 1/6/2020 consistent with cutaneous lupus erythematosus  - Current t/x: Protopic BID, metronidazole 0.75% cream, plaquenil (managed by rheum)  -previous tx: oral ivermectin 15 mg monthly x3 months,  permethrin 5% cream, gentle skin care, doxycycline 100 mg BID (x3 months - improved substantially in first month then went back to baseline)    Encounter Date: Reji 15, 2020    CC:  Chief Complaint   Patient presents with     Derm Problem     tumid lupus     History of Present Illness:  Ms. Natasha Lee is a 44 year old female who presents as a follow up for cutaneous lupus erythematosus. She was last seen on 1/6/20 by Dr. Weathers when a punch biopsy was preformed from her right cheek. The pathology was consistent with cutaneous lupus erythematosus (tumid lupus). In Nov-Dec, she recently resumed taking plaquenil for SLE. For tumid lupus, protopic was prescribed but she is waiting for a prior authorization at this time so has not started. Today she reports that her neck is improved overall despite no active treatment to the area outside plaquenil. Face remains about the same. No other concerns addressed today.    Past Medical History:   Patient Active Problem List   Diagnosis     Chronic hepatitis B (H)     Lupus (systemic lupus erythematosus) (H)     CARDIOVASCULAR SCREENING; LDL GOAL LESS THAN 160     Health Care Home     Dysmenorrhea     Menorrhagia     Allergic rhinitis     Other nonspecific finding on examination of urine     Urinary frequency     Vitamin B12 deficiency (non anemic)     Heart murmur     S/P laparoscopic cholecystectomy     Cervical cancer screening     Past Medical History:    Diagnosis Date     Diabetes (H)     Pre-Diabetic     Heart murmur 1997     Lupus (systemic lupus erythematosus) (H) 1988    Dr. Prather     Lupus (systemic lupus erythematosus) (H) 12/10/2009     Normal delivery 9/5/09    boy forceps     Seizures (H)     When a child     Past Surgical History:   Procedure Laterality Date     ANESTH,SKIN SURGERY,KNEE  1993, 1990    orthoscopic     C APPENDECTOMY  4/2008     CHOLECYSTECTOMY  2016     CL AFF SURGICAL PATHOLOGY  2007     LAPAROSCOPIC CHOLECYSTECTOMY N/A 5/18/2016    Procedure: LAPAROSCOPIC CHOLECYSTECTOMY;  Surgeon: Radha Cline MD;  [facility]     LAPAROSCOPIC TUBAL LIGATION  12/15/2011    Procedure:LAPAROSCOPIC TUBAL LIGATION; Laparoscopic tubal ligation; Surgeon:AMY WYNN; Location:[facility] OR     OSTEOTOMY FOOT Left 2/13/2019    Procedure: Left Lapidus with  Azael Osteotomy;  Surgeon: Brett Chavez MD;  Location:  OR     Social History:  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 She has had her tubes tied - no plans for future pregnancies.   Reviewed and left in chart for clinician convenience.     Family History:  Unknown - patient is adopted.  Reviewed and left in chart for clinician convenience.       Medications:  Current Outpatient Medications   Medication Sig Dispense Refill     Cranberry 1000 MG CAPS Take 1 capsule by mouth 4 times daily       Cyanocobalamin (B-12) 1000 MCG TBCR Take 1,000 mcg by mouth daily 100 tablet 1     DiphenhydrAMINE HCl (BENADRYL PO)        DiphenhydrAMINE HCl, Sleep, (UNISOM SLEEPGELS) 50 MG CAPS Take 50 mg by mouth At Bedtime       fluticasone (FLONASE) 50 MCG/ACT nasal spray Spray 2 sprays into both nostrils daily 16 g 3     hydroxychloroquine (PLAQUENIL) 200 MG tablet Take 1 tablet (200 mg) by mouth daily 90 tablet 3     ivermectin (STROMECTOL) 3 MG TABS tablet Take 15mg (5 pills) once by mouth, repeat monthly x3 months total. 15 tablet 0      metroNIDAZOLE (METROCREAM) 0.75 % external cream Apply topically 2 times daily 45 g 11     montelukast (SINGULAIR) 10 MG tablet Take 1 tablet (10 mg) by mouth At Bedtime 30 tablet 1     multivitamin, therapeutic with minerals (MULTI-VITAMIN) TABS Take 1 tablet by mouth daily       norethindrone-ethinyl estradiol (ORTHO-NOVUM 1-35 TAB,NORTREL 1-35 TAB) 1-35 MG-MCG tablet Take 1 tablet by mouth daily 84 tablet 4     omeprazole 20 MG tablet Take 1 tablet by mouth daily. Take 30-60 minutes before a meal. 30 tablet 1     predniSONE (DELTASONE) 5 MG tablet Take 1 tablet (5 mg) by mouth daily as needed for lupus symptoms (Patient taking differently: Take 5 mg by mouth daily as needed for lupus symptoms. PRN) 30 tablet 1     tacrolimus (PROTOPIC) 0.1 % external ointment Apply twice daily to rash on face until resolved. 60 g 3     triamcinolone (KENALOG) 0.1 % cream Apply sparingly to affected area three times daily prn. 30 g 0       Allergies   Allergen Reactions     Carbamazepine      Fever, rash     [place]-Related Products        Review of Systems:  -Constitutional: Patient is otherwise feeling well, in usual state of health.   -Skin: As above in HPI. No additional skin concerns.    Physical exam:  Vitals: There were no vitals taken for this visit.  GEN: This is a well developed, well-nourished female in no acute distress, in a pleasant mood.   SKIN: Focused examination of the face was performed  - Flat top edematous pink plaques with someone geometric borders on the medial cheeks, nose and chin. Less so on the neck today. Forearms clear.   - No other lesions of concern on areas examined.     Impression/Plan:    1. 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 Patient recently restarted treatment with Plaquenil which is being managed by Dr. Zaman of Rheumatology. Will continue treatment for rosacea as there is a likely granulomatous component but hard to say for sure at this point.  Currently awaiting a prior authorization for Protopic, this is the best treatment option given her neck and facial involvement and risk of steroid atrophy on the face.   - Advised patient to continue to apply metronidazole 0.75% cream daily and protopic twice a day once the prior authorization goes through. Will consider HC 2.5% cream if protopic not approved.   - Continue to follow with rheumatology for treatment with plaquenil   - Will follow-up in 1 month to monitor her progress    Follow-up in 1 month for tumid lupus.      Staff Involved:  Scribe/Staff    Scribe Disclosure  I, Kimberly Rico, am serving as a scribe to document services personally performed by Dr. Yamile Funez MD, based on data collection and the provider's statements to me.     Provider Disclosure:   The documentation recorded by the scribe accurately reflects the services I personally performed and the decisions made by me.    Yamile Funez MD    Department of Dermatology  [facility]: Phone: [phone], Fax:[phone]  [facility]: Phone: [phone], Fax: [phone]

## 2020-01-15 NOTE — NURSING NOTE
Natasha Lee's goals for this visit include:   Chief Complaint   Patient presents with     Derm Problem     tumid lupus       She requests these members of her care team be copied on today's visit information: no    PCP: Sandy Garcia    Referring Provider:  No referring provider defined for this encounter.    There were no vitals taken for this visit.    Do you need any medication refills at today's visit? No    Caryn Garcia LPN

## 2020-01-29 ENCOUNTER — OFFICE VISIT (OUTPATIENT)
Dept: OPTOMETRY | Facility: CLINIC | Age: 45
End: 2020-01-29
Payer: COMMERCIAL

## 2020-01-29 DIAGNOSIS — M32.9 SYSTEMIC LUPUS ERYTHEMATOSUS, UNSPECIFIED SLE TYPE, UNSPECIFIED ORGAN INVOLVEMENT STATUS (H): Primary | ICD-10-CM

## 2020-01-29 DIAGNOSIS — Z79.899 LONG-TERM USE OF PLAQUENIL: ICD-10-CM

## 2020-01-29 DIAGNOSIS — H04.123 DRY EYE SYNDROME OF BOTH EYES: ICD-10-CM

## 2020-01-29 DIAGNOSIS — H52.13 MYOPIA OF BOTH EYES: ICD-10-CM

## 2020-01-29 DIAGNOSIS — L71.9 ROSACEA: ICD-10-CM

## 2020-01-29 PROCEDURE — 92004 COMPRE OPH EXAM NEW PT 1/>: CPT | Performed by: OPTOMETRIST

## 2020-01-29 PROCEDURE — 92015 DETERMINE REFRACTIVE STATE: CPT | Performed by: OPTOMETRIST

## 2020-01-29 ASSESSMENT — REFRACTION_MANIFEST
OS_SPHERE: -2.00
OD_SPHERE: -2.25
OS_AXIS: 032
OD_CYLINDER: SPHERE
OS_CYLINDER: +0.50

## 2020-01-29 ASSESSMENT — REFRACTION_WEARINGRX
OD_SPHERE: -2.00
OS_SPHERE: -2.00
OD_SPHERE: -2.00
OS_CYLINDER: +0.25
OS_SPHERE: -2.00
OS_AXIS: 032
OS_AXIS: 032
SPECS_TYPE: SVL
OD_CYLINDER: SPHERE
OS_CYLINDER: +0.25

## 2020-01-29 ASSESSMENT — TONOMETRY
OD_IOP_MMHG: 18
IOP_METHOD: TONOPEN
OS_IOP_MMHG: 15

## 2020-01-29 ASSESSMENT — CUP TO DISC RATIO
OS_RATIO: 0.3
OD_RATIO: 0.3

## 2020-01-29 ASSESSMENT — CONF VISUAL FIELD
OD_NORMAL: 1
OS_NORMAL: 1

## 2020-01-29 ASSESSMENT — VISUAL ACUITY
OS_SC: 20/150
OD_CC: 20/30
METHOD: SNELLEN - LINEAR
OS_CC: 20/40
CORRECTION_TYPE: GLASSES
OD_CC: 20/60
OD_SC: 20/150
OD_CC+: -1
OS_CC: 20/30

## 2020-01-29 ASSESSMENT — SLIT LAMP EXAM - LIDS
COMMENTS: MEIBOMIAN GLAND DYSFUNCTION, COLLARETTES
COMMENTS: MEIBOMIAN GLAND DYSFUNCTION, COLLARETTES

## 2020-01-29 ASSESSMENT — EXTERNAL EXAM - RIGHT EYE: OD_EXAM: NORMAL

## 2020-01-29 ASSESSMENT — EXTERNAL EXAM - LEFT EYE: OS_EXAM: NORMAL

## 2020-01-29 NOTE — PROGRESS NOTES
Chief Complaint   Patient presents with     Annual Eye Exam        Patient has lupus and rosacea     Accompanied by   Last Eye Exam: 8-  Dilated Previously: Yes    What are you currently using to see?  glasses       Distance Vision Acuity: Noticed gradual change in both eyes    Near Vision Acuity: Satisfied with vision while reading  with glasses    Eye Comfort: dry  Do you use eye drops? : Yes: otc visine  Occupation or Hobbies: CD counselor     Judy Quintana Optometric Assistant, A.B.O[employer]          Medical, surgical and family histories reviewed and updated 1/29/2020.       OBJECTIVE: See Ophthalmology exam    ASSESSMENT:    ICD-10-CM    1. Systemic lupus erythematosus, unspecified SLE type, unspecified organ involvement status (H) M32.9    2. Long-term use of Plaquenil Z79.899    3. Rosacea L71.9    4. Myopia of both eyes H52.13    5. Dry eye syndrome of both eyes H04.123       PLAN:     Patient Instructions   Return for Plaquenil testing- OCT and visual field.    Visine or Clear Eyes can irritate the eyes and cause a rebound effect where the eyes become more red and you end up using more drops.  The only way to stop this is to discontinue the drops.  It is normal that your eyes may become more red the first few weeks after discontinuing the drops.    OTC Lumify as needed to take the redness out.    Refresh tears(or other artificial tear) 1 drop 2-4x daily.  Heat to the eyes daily for 10-15 minutes nightly with warm washcloth or reusable gel masks from the pharmacy or  [place] heat masks can be purchased at [employer].  Ocusoft Oust foam cleanser or Blephadex wipes to cleanse eyelids at night.  This can be purchased on [employer].com.    Return in 1 year for a complete eye exam or sooner if needed.    Mauricio Carrizales, OD

## 2020-01-29 NOTE — PATIENT INSTRUCTIONS
Return for Plaquenil testing- OCT and visual field.    Visine or Clear Eyes can irritate the eyes and cause a rebound effect where the eyes become more red and you end up using more drops.  The only way to stop this is to discontinue the drops.  It is normal that your eyes may become more red the first few weeks after discontinuing the drops.    OTC Lumify as needed to take the redness out.    Refresh tears(or other artificial tear) 1 drop 2-4x daily.  Heat to the eyes daily for 10-15 minutes nightly with warm washcloth or reusable gel masks from the pharmacy or  [place] heat masks can be purchased at Amazon.  Ocusoft Oust foam cleanser or Blephadex wipes to cleanse eyelids at night.  This can be purchased on [employer].com.    Return in 1 year for a complete eye exam or sooner if needed.    Mauricio Carrizales, OD    The affects of the dilating drops last for 4- 6 hours.  You will be more sensitive to light and vision will be blurry up close.  Mydriatic sunglasses were given if needed.    Use one drop of artificial tears both eyes 3-4 x daily.  Continue to use the drops regardless if your eyes are comfortable.  Artificial tears work best as a preventative and not as well after your eyes are starting to bother you.  It may take 4- 6 weeks of using the drops before you notice improvement.  If after that time you are still having problems schedule an appointment for an evaluation and discussion of different treatments such as Restasis or Xiidra.  Dry eyes are a chronic condition and you may have more symptoms at certain times of the year.    Excess tearing can be due to the right tears not working properly or a blockage in the tear drainage system.  You can try using artificial tears 1 drop right eye 3-4 x day.  If the excess tearing is bothersome after 3-4 weeks and that doesn't help we can send you for further testing on the puncta which allows the tears to drain.  This would entail a referral to our oculo plastic specialist at  the [facility].    Brands of drops that are recommended are:    Systane Complete  Systane Ultra  Systane Balance  Refresh Advanced Optive  Refresh Relieva  Blink    If you are using drops more than 4 x day or have sensitivities to preservatives I recommend non preserved artificial tears.  These come in 1 use vials.  They can be used every 1-2 hours.    Brands of drops that are recommended are:    Systane- preservative free  Refresh-  preservative free  Blink- preservative free    Gels or ointment can be used at night.    Brands recommended are:    Systane Gel  Refresh Gel  Blink Gel  Genteal Gel    Systane night time (ointment)  Refresh Celluvisc  Refresh PM (ointment)      Visine, Clear Eyes or Murine (drops that get the red out) can irritate the eyes and cause a rebound effect where the eyes become more red and you end up using more drops.  Avoid drops containing tetrahydrozoline, naphazoline, phenylephrine, oxymetazoline.      OTC Lumify is a newer product that gives immediate redness relief with out the rebound effect.  Use as needed to take the redness out.    Artificial tears may be used with other drops (such as allergy, glaucoma, antibiotics) around the same time.  Be sure to wait 5 minutes in between drops.    Heat to the eyelids can also improve your symptoms of dry eyes.  [place] heat masks can be purchased at Amazon to be used daily for 10-15 minutes.  Other options are gel masks that can be put in the microwave and purchased at most pharmacies.    Ocusoft Oust foam cleanser to cleanse eyelids at night.  This can be purchased on [employer].com. Other options are Blephadex eyelid wipes- cleanse eyelids nightly.  These can be purchased on [employer] or Ocusoft Hypochlor- spray solution onto cotton pad.  Close eyes and gently apply to eyelids and eyelashes using side to side motion.  Use morning and evening. This can be purchased at [employer].      Optometry Providers       Clinic Locations                                  Telephone Number   Dr. Shalini Gottlieb    [facility]   [place]   [place] [phone]     [place] [facility] Hours:                [place] [facility] Hours:       [facility] Hours:   [address]   [address]     [address] [place] [address]   [place] [address]    [place] [address]  Phone: [phone]                    Phone: [phone]     Phone: [phone]                      Monday 8:00-7:00                          Monday 8:00-7:00                          Monday 8:00-7:00              Tuesday 8:00-6:00                          Tuesday 8:00-7:00                          Tuesday 8:00-7:00              Wednesday 8:00-6:00                  Wednesday 8:00-7:00                   Wednesday 8:00-7:00      Thursday 8:00-6:00                        Thursday 8:00-7:00                         Thursday 8:00-7:00            Friday 8:00-5:00                              Friday 8:00-5:00                              Friday 8:00-5:00    [facility] Hours:   [address] [place], [place] [id number] [phone]    Monday 8:00-7:00  Tuesday 8:00-7:00  Wednesday 8:00-7:00  Thursday 8:00-7:00  Friday 8:00-5:00  Please log on to [email or web address] to order your contact lenses.  The link is found on the [facility] page.  As always, Thank you for trusting us with your health care needs!

## 2020-01-29 NOTE — LETTER
January 29, 2020      Natasha Lee  [address] [address]        To Whom It May Concern:    Natasha Lee  was seen on 1/29/2020 for an eye exam.      Sincerely,            Mauricio Carrizales, OD  [facility]  [address] [place] [address]  Tel- [phone]  Vbs-[phone]

## 2020-02-18 ENCOUNTER — OFFICE VISIT (OUTPATIENT)
Dept: DERMATOLOGY | Facility: CLINIC | Age: 45
End: 2020-02-18
Payer: COMMERCIAL

## 2020-02-18 DIAGNOSIS — D23.9 ACQUIRED DIGITAL FIBROKERATOMA: ICD-10-CM

## 2020-02-18 DIAGNOSIS — L93.0 LUPUS ERYTHEMATOSUS TUMIDUS: Primary | ICD-10-CM

## 2020-02-18 PROCEDURE — 99213 OFFICE O/P EST LOW 20 MIN: CPT | Performed by: DERMATOLOGY

## 2020-02-18 ASSESSMENT — PAIN SCALES - GENERAL: PAINLEVEL: NO PAIN (0)

## 2020-02-18 NOTE — NURSING NOTE
@Natasha Lee's goals for this visit include:   Chief Complaint   Patient presents with     Derm Problem     tumid lupus follow up - using protopic - seeing improvement       She requests these members of her care team be copied on today's visit information: NO    PCP: Sandy Garcia    Referring Provider:  No referring provider defined for this encounter.    There were no vitals taken for this visit.    Do you need any medication refills at today's visit? NO    Savannah Joya, FLORENCIO

## 2020-02-18 NOTE — PROGRESS NOTES
[facility] Dermatology Note    Dermatology Problem List:  1. Relevant medical hx: SLE on plaquenil and prednisone, chronic hepatitis B  2. Tumid lupus +/- granulomatous rosacea  - Previously treated as severe and granulomatous appearing rosacea, interestingly did improve with oral doxycycline. 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   - Biopsy 1/6/2020 consistent with cutaneous lupus erythematosus  - Current t/x: Protopic 0.1% ointment BID, plaquenil (managed by rheum)  -previous tx: metronidazole 0.75% cream, oral ivermectin 15 mg monthly x3 months,  permethrin 5% cream, gentle skin care, doxycycline 100 mg BID (x3 months - improved substantially in first month then went back to baseline)    Encounter Date: Feb 18, 2020    CC:  Chief Complaint   Patient presents with     Derm Problem     tumid lupus follow up - using protopic - seeing improvement     History of Present Illness:  Ms. Natasha Lee is a 44 year old female who presents as a follow up for cutaneous lupus erythematosus. She was last seen on 1/22/20 when she was treated with metronidazole 0.75% cream daily, Protopic BID once approved by insurance, and plaquenil (managed by rheumatology). On 1/22/20 patient's prior authorization was approved for Protopic. Today she reports that she has been noticing improvement. Skin lesions are asymptomatic. She recently saw an ophthalmologist who wanted her to follow-up soon for dry eyes and additional testing (likely visual field). She has been treating her eyes with a warm wash cloth and intermittent eye drops. She is diligent with her sunscreens, usually uses [employer] brand or [employer]. No other concerns addressed today.    Past Medical History:   Patient Active Problem List   Diagnosis     Chronic hepatitis B (H)     Lupus (systemic lupus erythematosus) (H)     CARDIOVASCULAR SCREENING; LDL GOAL LESS THAN 160     Health Care Home     Dysmenorrhea     Menorrhagia     Allergic rhinitis      Other nonspecific finding on examination of urine     Urinary frequency     Vitamin B12 deficiency (non anemic)     Heart murmur     S/P laparoscopic cholecystectomy     Cervical cancer screening     Past Medical History:   Diagnosis Date     Diabetes (H)     Pre-Diabetic     Heart murmur 1997     Lupus (systemic lupus erythematosus) (H) 1988    Dr. Prather     Lupus (systemic lupus erythematosus) (H) 12/10/2009     Normal delivery 9/5/09    boy forceps     Seizures (H)     When a child     Past Surgical History:   Procedure Laterality Date     ANESTH,SKIN SURGERY,KNEE  1993, 1990    orthoscopic     C APPENDECTOMY  4/2008     CHOLECYSTECTOMY  2016     CL AFF SURGICAL PATHOLOGY  2007     LAPAROSCOPIC CHOLECYSTECTOMY N/A 5/18/2016    Procedure: LAPAROSCOPIC CHOLECYSTECTOMY;  Surgeon: Radha Cline MD;  Location: [facility] OR     LAPAROSCOPIC TUBAL LIGATION  12/15/2011    Procedure:LAPAROSCOPIC TUBAL LIGATION; Laparoscopic tubal ligation; Surgeon:AMY WYNN; Location:[facility] OR     OSTEOTOMY FOOT Left 2/13/2019    Procedure: Left Lapidus with  Azael Osteotomy;  Surgeon: Brett Chavez MD;  Location:  OR     Social History:  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 Patient has 1 child. She has had her tubes tied - no plans for future pregnancies.   Reviewed and left in chart for clinician convenience.     Family History:  Unknown - patient is adopted.  Reviewed and left in chart for clinician convenience.     Medications:  Current Outpatient Medications   Medication Sig Dispense Refill     Cranberry 1000 MG CAPS Take 1 capsule by mouth 4 times daily       Cyanocobalamin (B-12) 1000 MCG TBCR Take 1,000 mcg by mouth daily 100 tablet 1     DiphenhydrAMINE HCl (BENADRYL PO)        DiphenhydrAMINE HCl, Sleep, (UNISOM SLEEPGELS) 50 MG CAPS Take 50 mg by mouth At Bedtime       fluticasone (FLONASE) 50 MCG/ACT nasal spray Spray 2 sprays into both nostrils daily 16 g 3      hydroxychloroquine (PLAQUENIL) 200 MG tablet Take 1 tablet (200 mg) by mouth daily 90 tablet 3     ivermectin (STROMECTOL) 3 MG TABS tablet Take 15mg (5 pills) once by mouth, repeat monthly x3 months total. 15 tablet 0     montelukast (SINGULAIR) 10 MG tablet Take 1 tablet (10 mg) by mouth At Bedtime 30 tablet 1     multivitamin, therapeutic with minerals (MULTI-VITAMIN) TABS Take 1 tablet by mouth daily       norethindrone-ethinyl estradiol (ORTHO-NOVUM 1-35 TAB,NORTREL 1-35 TAB) 1-35 MG-MCG tablet Take 1 tablet by mouth daily 84 tablet 4     predniSONE (DELTASONE) 5 MG tablet Take 1 tablet (5 mg) by mouth daily as needed for lupus symptoms (Patient taking differently: Take 5 mg by mouth daily as needed for lupus symptoms. PRN) 30 tablet 1     tacrolimus (PROTOPIC) 0.1 % external ointment Apply twice daily to rash on face until resolved. 60 g 3     triamcinolone (KENALOG) 0.1 % cream Apply sparingly to affected area three times daily prn. 30 g 0       Allergies   Allergen Reactions     Carbamazepine      Fever, rash     [place]-Related Products        Review of Systems:  -Constitutional: Patient is otherwise feeling well, in usual state of health.   -Skin: As above in HPI. No additional skin concerns.    Physical exam:  Vitals: There were no vitals taken for this visit.  GEN: This is a well developed, well-nourished female in no acute distress, in a pleasant mood.   SKIN: Focused examination of the face was performed  - Flattening of previous flat top edematous pink papules on the neck and cheeks . Areas of PIH of past lesions on the neck and cheeks.    - 6 mm papule on the right 3rd finger laterally with no thrombosed capillaries and dotted blood vessels centrally. Clinically is most consistent with digital acquired fibro keratokma  - No other lesions of concern on areas examined.     Impression/Plan:    1. 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 This is the most likely diagnosis. Interstitial granulomatous  dermatitis possible, but would expect multiple distal skin lesions for this. Reassured patient of benign appearance today. No intervention required.     2. 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 Patient currently treating with plaquenil which is being managed by Dr. Zaman of Rheumatology. Recommended continued ophthalmologist follow-up for visual field checks and for possible ocular rosacea. We will continue topical protopic, this is the best treatment option given her neck and facial involvement and risk of steroid atrophy on the face. This is helpful for both lupus and rosacea.   - Advised patient to continue to apply protopic 0.1% ointment BID   - Continue to follow with rheumatology for treatment with plaquenil and ophthalmology for appropriate screening exams.   - Recommended [employer] sunscreen, UV Clear with titanium dioxide.   - Will follow-up in 3 months to monitor her progress    Follow-up in 3 months for tumid lupus.      Staff Involved:  Scribe/Staff    Scribe Disclosure  I, Kimberly Rico, am serving as a scribe to document services personally performed by Dr. Yamile Funez MD, based on data collection and the provider's statements to me.     Provider Disclosure:   The documentation recorded by the scribe accurately reflects the services I personally performed and the decisions made by me.    Yamile Funez MD    Department of Dermatology  [facility]: Phone: [phone], Fax:[phone]  [facility]: Phone: [phone], Fax: [phone]

## 2020-02-18 NOTE — LETTER
2/18/2020         RE: Natasha Lee  [address] [address]        Dear Colleague,    Thank you for referring your patient, Natasha Lee, to the [facility]. Please see a copy of my visit note below.    [facility] Dermatology Note    Dermatology Problem List:  1. Relevant medical hx: SLE on plaquenil and prednisone, chronic hepatitis B  2. Tumid lupus +/- granulomatous rosacea  - Previously treated as severe and granulomatous appearing rosacea, interestingly did improve with oral doxycycline. 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   - Biopsy 1/6/2020 consistent with cutaneous lupus erythematosus  - Current t/x: Protopic 0.1% ointment BID, plaquenil (managed by rheum)  -previous tx: metronidazole 0.75% cream, oral ivermectin 15 mg monthly x3 months,  permethrin 5% cream, gentle skin care, doxycycline 100 mg BID (x3 months - improved substantially in first month then went back to baseline)    Encounter Date: Feb 18, 2020    CC:  Chief Complaint   Patient presents with     Derm Problem     tumid lupus follow up - using protopic - seeing improvement     History of Present Illness:  Ms. Natasha Lee is a 44 year old female who presents as a follow up for cutaneous lupus erythematosus. She was last seen on 1/22/20 when she was treated with metronidazole 0.75% cream daily, Protopic BID once approved by insurance, and plaquenil (managed by rheumatology). On 1/22/20 patient's prior authorization was approved for Protopic. Today she reports that she has been noticing improvement. Skin lesions are asymptomatic. She recently saw an ophthalmologist who wanted her to follow-up soon for dry eyes and additional testing (likely visual field). She has been treating her eyes with a warm wash cloth and intermittent eye drops. She is diligent with her sunscreens, usually uses [employer] brand or [employer]. No other concerns addressed  today.    Past Medical History:   Patient Active Problem List   Diagnosis     Chronic hepatitis B (H)     Lupus (systemic lupus erythematosus) (H)     CARDIOVASCULAR SCREENING; LDL GOAL LESS THAN 160     Health Care Home     Dysmenorrhea     Menorrhagia     Allergic rhinitis     Other nonspecific finding on examination of urine     Urinary frequency     Vitamin B12 deficiency (non anemic)     Heart murmur     S/P laparoscopic cholecystectomy     Cervical cancer screening     Past Medical History:   Diagnosis Date     Diabetes (H)     Pre-Diabetic     Heart murmur 1997     Lupus (systemic lupus erythematosus) (H) 1988    Dr. Prather     Lupus (systemic lupus erythematosus) (H) 12/10/2009     Normal delivery 9/5/09    boy forceps     Seizures (H)     When a child     Past Surgical History:   Procedure Laterality Date     ANESTH,SKIN SURGERY,KNEE  1993, 1990    orthoscopic     C APPENDECTOMY  4/2008     CHOLECYSTECTOMY  2016     CL AFF SURGICAL PATHOLOGY  2007     LAPAROSCOPIC CHOLECYSTECTOMY N/A 5/18/2016    Procedure: LAPAROSCOPIC CHOLECYSTECTOMY;  Surgeon: Radha Cline MD;  Location: [facility] OR     LAPAROSCOPIC TUBAL LIGATION  12/15/2011    Procedure:LAPAROSCOPIC TUBAL LIGATION; Laparoscopic tubal ligation; Surgeon:AMY WYNN; Location: OR     OSTEOTOMY FOOT Left 2/13/2019    Procedure: Left Lapidus with  Azael Osteotomy;  Surgeon: Brett Chavez MD;  Location:  OR     Social History:  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 Patient has 1 child. She has had her tubes tied - no plans for future pregnancies.   Reviewed and left in chart for clinician convenience.     Family History:  Unknown - patient is adopted.  Reviewed and left in chart for clinician convenience.     Medications:  Current Outpatient Medications   Medication Sig Dispense Refill     Cranberry 1000 MG CAPS Take 1 capsule by mouth 4 times daily       Cyanocobalamin (B-12) 1000 MCG TBCR Take 1,000  mcg by mouth daily 100 tablet 1     DiphenhydrAMINE HCl (BENADRYL PO)        DiphenhydrAMINE HCl, Sleep, (UNISOM SLEEPGELS) 50 MG CAPS Take 50 mg by mouth At Bedtime       fluticasone (FLONASE) 50 MCG/ACT nasal spray Spray 2 sprays into both nostrils daily 16 g 3     hydroxychloroquine (PLAQUENIL) 200 MG tablet Take 1 tablet (200 mg) by mouth daily 90 tablet 3     ivermectin (STROMECTOL) 3 MG TABS tablet Take 15mg (5 pills) once by mouth, repeat monthly x3 months total. 15 tablet 0     montelukast (SINGULAIR) 10 MG tablet Take 1 tablet (10 mg) by mouth At Bedtime 30 tablet 1     multivitamin, therapeutic with minerals (MULTI-VITAMIN) TABS Take 1 tablet by mouth daily       norethindrone-ethinyl estradiol (ORTHO-NOVUM 1-35 TAB,NORTREL 1-35 TAB) 1-35 MG-MCG tablet Take 1 tablet by mouth daily 84 tablet 4     predniSONE (DELTASONE) 5 MG tablet Take 1 tablet (5 mg) by mouth daily as needed for lupus symptoms (Patient taking differently: Take 5 mg by mouth daily as needed for lupus symptoms. PRN) 30 tablet 1     tacrolimus (PROTOPIC) 0.1 % external ointment Apply twice daily to rash on face until resolved. 60 g 3     triamcinolone (KENALOG) 0.1 % cream Apply sparingly to affected area three times daily prn. 30 g 0       Allergies   Allergen Reactions     Carbamazepine      Fever, rash     [place]-Related Products        Review of Systems:  -Constitutional: Patient is otherwise feeling well, in usual state of health.   -Skin: As above in HPI. No additional skin concerns.    Physical exam:  Vitals: There were no vitals taken for this visit.  GEN: This is a well developed, well-nourished female in no acute distress, in a pleasant mood.   SKIN: Focused examination of the face was performed  - Flattening of previous flat top edematous pink papules on the neck and cheeks . Areas of PIH of past lesions on the neck and cheeks.    - 6 mm papule on the right 3rd finger laterally with no thrombosed capillaries and dotted blood vessels  centrally. Clinically is most consistent with digital acquired fibro keratokma  - No other lesions of concern on areas examined.     Impression/Plan:    1. 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 This is the most likely diagnosis. Interstitial granulomatous dermatitis possible, but would expect multiple distal skin lesions for this. Reassured patient of benign appearance today. No intervention required.     2. 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 Patient currently treating with plaquenil which is being managed by Dr. Zaman of Rheumatology. Recommended continued ophthalmologist follow-up for visual field checks and for possible ocular rosacea. We will continue topical protopic, this is the best treatment option given her neck and facial involvement and risk of steroid atrophy on the face. This is helpful for both lupus and rosacea.   - Advised patient to continue to apply protopic 0.1% ointment BID   - Continue to follow with rheumatology for treatment with plaquenil and ophthalmology for appropriate screening exams.   - Recommended [employer]ta[employer] sunscreen, UV Clear with titanium dioxide.   - Will follow-up in 3 months to monitor her progress    Follow-up in 3 months for tumid lupus.      Staff Involved:  Scribe/Staff    Scribe Disclosure  I, Kimberly Rico, am serving as a scribe to document services personally performed by Dr. Yamile Funez MD, based on data collection and the provider's statements to me.     Provider Disclosure:   The documentation recorded by the scribe accurately reflects the services I personally performed and the decisions made by me.    Yamile Funez MD    Department of Dermatology  [facility]: Phone: [phone], Fax:[phone]  [facility]: Phone: [phone], Fax: [phone]              Again, thank you  for allowing me to participate in the care of your patient.        Sincerely,        Yamile Funez MD

## 2020-04-02 ENCOUNTER — E-VISIT (OUTPATIENT)
Dept: PEDIATRICS | Facility: CLINIC | Age: 45
End: 2020-04-02
Payer: COMMERCIAL

## 2020-04-02 DIAGNOSIS — J01.01 ACUTE RECURRENT MAXILLARY SINUSITIS: Primary | ICD-10-CM

## 2020-04-02 PROCEDURE — 99422 OL DIG E/M SVC 11-20 MIN: CPT | Performed by: NURSE PRACTITIONER

## 2020-04-02 NOTE — LETTER
[address] [address]  Phone: [phone]  Fax: [phone]    April 2, 2020        Natasha Lee  [address] [address]          To whom it may concern:    RE: Natasha Lee    Patient was seen and treated today at our clinic via virtual visit  and missed work.  Patient may return to work April 6 with no restrictions.    Please contact me for questions or concerns.      Sincerely,      ELIAS Guerra CNP

## 2020-04-02 NOTE — LETTER
April 2, 2020      RE: Natasha Lee  [address] [address]       To whom it may concern:    Natasha Lee was seen in our clinic today for virtual visit. She  may return to work.  Sincerely,      Sandy Garcia RN, CNP

## 2020-06-09 ENCOUNTER — E-VISIT (OUTPATIENT)
Dept: PEDIATRICS | Facility: CLINIC | Age: 45
End: 2020-06-09

## 2020-06-09 ENCOUNTER — MYC MEDICAL ADVICE (OUTPATIENT)
Dept: PEDIATRICS | Facility: CLINIC | Age: 45
End: 2020-06-09

## 2020-06-09 ENCOUNTER — NURSE TRIAGE (OUTPATIENT)
Dept: NURSING | Facility: CLINIC | Age: 45
End: 2020-06-09

## 2020-06-09 ENCOUNTER — VIRTUAL VISIT (OUTPATIENT)
Dept: PEDIATRICS | Facility: CLINIC | Age: 45
End: 2020-06-09
Payer: COMMERCIAL

## 2020-06-09 DIAGNOSIS — R05.9 COUGH: ICD-10-CM

## 2020-06-09 DIAGNOSIS — Z53.9 ERRONEOUS ENCOUNTER--DISREGARD: Primary | ICD-10-CM

## 2020-06-09 DIAGNOSIS — J20.9 ACUTE BRONCHITIS, UNSPECIFIED ORGANISM: ICD-10-CM

## 2020-06-09 DIAGNOSIS — Z20.822 SUSPECTED COVID-19 VIRUS INFECTION: Primary | ICD-10-CM

## 2020-06-09 PROCEDURE — 99213 OFFICE O/P EST LOW 20 MIN: CPT | Mod: GT | Performed by: NURSE PRACTITIONER

## 2020-06-09 PROCEDURE — 99207 ZZC NON-BILLABLE SERV PER CHARTING: CPT | Performed by: NURSE PRACTITIONER

## 2020-06-09 RX ORDER — ALBUTEROL SULFATE 90 UG/1
2 AEROSOL, METERED RESPIRATORY (INHALATION) EVERY 6 HOURS PRN
Qty: 1 INHALER | Refills: 1 | Status: SHIPPED | OUTPATIENT
Start: 2020-06-09 | End: 2021-01-05

## 2020-06-09 NOTE — PROGRESS NOTES
"Natasha Lee is a 45 year old female who is being evaluated via a billable video visit.      The patient has been notified of following:     \"This video visit will be conducted via a call between you and your physician/provider. We have found that certain health care needs can be provided without the need for an in-person physical exam.  This service lets us provide the care you need with a video conversation.  If a prescription is necessary we can send it directly to your pharmacy.  If lab work is needed we can place an order for that and you can then stop by our lab to have the test done at a later time.    Video visits are billed at different rates depending on your insurance coverage.  Please reach out to your insurance provider with any questions.    If during the course of the call the physician/provider feels a video visit is not appropriate, you will not be charged for this service.\"    Patient has given verbal consent for Video visit? Yes    How would you like to obtain your AVS? Kari    Patient would like the video invitation sent by: Send to e-mail at: [email or web address]    Will anyone else be joining your video visit? No      Subjective     Natasha Lee is a 45 year old female who presents today via video visit for the following health issues:    HPI  Acute Illness   Acute illness concerns: fever-was in the ER 6/7/2020 and tested for covid19  Onset: 3 days    Fever: YES- 102-103    Chills/Sweats: YES    Headache (location?): YES    Sinus Pressure:no    Conjunctivitis:  no    Ear Pain: no    Rhinorrhea: YES    Congestion: no    Sore Throat: no     Cough: YES-non-productive    Wheeze: no    Decreased Appetite: YES    Nausea: no    Vomiting: no    Diarrhea:  no    Dysuria/Freq.: no    Fatigue/Achiness: YES    Sick/Strep Exposure: no     Therapies Tried and outcome: ibuprofen     Patient here for follow up   Was in ER 2 days ago and was tested for Covid and initial swab was negative "   Patient continues to feel achey with headache   Started 72 hours ago   She is not able to get her fever down and has not felt this sick in years   When she first went in was only ill about 24 hours   Fever at the ER was 101.9    Achey all over as soon as Ibuprofen wears   Works at [employer] and was tested in ER at [employer]     Video Start Time: 2:00 PM    Patient Active Problem List   Diagnosis     Chronic hepatitis B (H)     Lupus (systemic lupus erythematosus) (H)     CARDIOVASCULAR SCREENING; LDL GOAL LESS THAN 160     Health Care Home     Dysmenorrhea     Menorrhagia     Allergic rhinitis     Other nonspecific finding on examination of urine     Urinary frequency     Vitamin B12 deficiency (non anemic)     Heart murmur     S/P laparoscopic cholecystectomy     Cervical cancer screening     Past Surgical History:   Procedure Laterality Date     ANESTH,SKIN SURGERY,KNEE  1993, 1990    orthoscopic     C APPENDECTOMY  4/2008     CHOLECYSTECTOMY  2016     CL AFF SURGICAL PATHOLOGY  2007     LAPAROSCOPIC CHOLECYSTECTOMY N/A 5/18/2016    Procedure: LAPAROSCOPIC CHOLECYSTECTOMY;  Surgeon: Radha Cline MD;  Location: [facility]     LAPAROSCOPIC TUBAL LIGATION  12/15/2011    Procedure:LAPAROSCOPIC TUBAL LIGATION; Laparoscopic tubal ligation; Surgeon:AMY WYNN; Location:[facility] OR     OSTEOTOMY FOOT Left 2/13/2019    Procedure: Left Lapidus with  Azael Osteotomy;  Surgeon: Brett Chavez MD;  Location: [facility] OR       Social History     Tobacco Use     Smoking status: Never Smoker     Smokeless tobacco: Never Used   Substance Use Topics     Alcohol use: Yes     Comment: Occasional     Family History   Adopted: Yes   Family history unknown: Yes         Current Outpatient Medications   Medication Sig Dispense Refill     Cranberry 1000 MG CAPS Take 1 capsule by mouth 4 times daily       hydroxychloroquine (PLAQUENIL) 200 MG tablet Take 1 tablet (200 mg) by mouth daily 90 tablet 3     norethindrone-ethinyl estradiol  (ORTHO-NOVUM 1-35 TAB,NORTREL 1-35 TAB) 1-35 MG-MCG tablet Take 1 tablet by mouth daily 84 tablet 4     Prenatal MV-Min-Fe Fum-FA-DHA (PRENATAL MULTIVITAMIN + DHA PO)        tacrolimus (PROTOPIC) 0.1 % external ointment Apply twice daily to rash on face until resolved. 60 g 3     triamcinolone (KENALOG) 0.1 % cream Apply sparingly to affected area three times daily prn. 30 g 0     amoxicillin-clavulanate (AUGMENTIN) 875-125 MG tablet Take 1 tablet by mouth 2 times daily 20 tablet 0     Cyanocobalamin (B-12) 1000 MCG TBCR Take 1,000 mcg by mouth daily (Patient not taking: Reported on 6/9/2020) 100 tablet 1     DiphenhydrAMINE HCl (BENADRYL PO)        DiphenhydrAMINE HCl, Sleep, (UNISOM SLEEPGELS) 50 MG CAPS Take 50 mg by mouth At Bedtime       fluticasone (FLONASE) 50 MCG/ACT nasal spray Spray 2 sprays into both nostrils daily 16 g 3     ivermectin (STROMECTOL) 3 MG TABS tablet Take 15mg (5 pills) once by mouth, repeat monthly x3 months total. 15 tablet 0     montelukast (SINGULAIR) 10 MG tablet Take 1 tablet (10 mg) by mouth At Bedtime 30 tablet 1     multivitamin, therapeutic with minerals (MULTI-VITAMIN) TABS Take 1 tablet by mouth daily       Allergies   Allergen Reactions     Carbamazepine      Fever, rash     Isabel-Related Products      BP Readings from Last 3 Encounters:   11/26/19 132/72   09/09/19 100/70   08/22/19 130/80    Wt Readings from Last 3 Encounters:   11/26/19 72.2 kg (159 lb 3.2 oz)   09/09/19 74.2 kg (163 lb 8 oz)   08/22/19 74.9 kg (165 lb 1.6 oz)           Reviewed and updated as needed this visit by Provider         Review of Systems   CONSTITUTIONAL:POSITIVE  for anorexia, chills, fever  and myalgias  INTEGUMENTARY/SKIN: NEGATIVE for rash   ENT/MOUTH: POSITIVE for rhinorrhea-clear and NEGATIVE for hoarse voice, postnasal drainage, sinus pressure, sneezing, sore throat and swollen glands  RESP:POSITIVE for cough-non productive and Bronchospasms  and NEGATIVE for cough-productive, hemoptysis  "and Hx asthma  CV: NEGATIVE for chest pain, palpitations or peripheral edema  MUSCULOSKELETAL: POSITIVE  for myalgia and NEGATIVE for joint swelling  and joint warmth   NEURO: NEGATIVE for weakness, dizziness or paresthesias  HEME/ALLERGY/IMMUNE: NEGATIVE for bleeding problems and POSITIVE  for allergies      Objective    LMP 05/01/2020 (Approximate)   Breastfeeding No   Estimated body mass index is 29.26 kg/m  as calculated from the following:    Height as of 11/26/19: 1.571 m (5' 1.85\").    Weight as of 11/26/19: 72.2 kg (159 lb 3.2 oz).  Physical Exam     GENERAL: Patient is well nourished, well developed,in no apparent distress, non-toxic, in no respiratory distress and acyanotic, alert, cooperative and well hydrated  ill and uncomfortable      EYES: Eyes grossly normal to inspection.  No discharge or erythema, or obvious scleral/conjunctival abnormalities.  HENT: normal cephalic/atraumatic and oral mucous membranes moist  RESP: No audible wheeze, cough, or visible cyanosis.  No visible retractions or increased work of breathing.    MS: No gross musculoskeletal defects noted.  Normal range of motion.  No visible edema.  SKIN: Visible skin clear. No significant rash, abnormal pigmentation or lesions.  NEURO: Cranial nerves grossly intact.  Mentation and speech appropriate for age.  PSYCH: Mentation appears normal, affect normal/bright, judgement and insight intact, normal speech and appearance well-groomed.      Diagnostic Test Results:  Labs reviewed in Epic  Pending orders and results           Assessment & Plan     Natasha was seen today for fever.    Diagnoses and all orders for this visit:    Suspected COVID-19 virus infection  -     Symptomatic COVID-19 Virus (Coronavirus) by PCR; Future    Cough  -     albuterol (PROAIR HFA/PROVENTIL HFA/VENTOLIN HFA) 108 (90 Base) MCG/ACT inhaler; Inhale 2 puffs into the lungs every 6 hours as needed for shortness of breath / dyspnea or wheezing    Acute bronchitis, " unspecified organism  -     albuterol (PROAIR HFA/PROVENTIL HFA/VENTOLIN HFA) 108 (90 Base) MCG/ACT inhaler; Inhale 2 puffs into the lungs every 6 hours as needed for shortness of breath / dyspnea or wheezing         PLAN:   1.   Symptomatic therapy suggested: rest, increase fluids, OTC acetaminophen and call prn if symptoms persist or worsen.  I think this is primarily related to a viral illness given the various associated symptoms.  Went over the treatment of viral illnesses, which is primarily supportive.    Recheck if not improving as expected    2.  Orders Placed This Encounter   Medications     Prenatal MV-Min-Fe Fum-FA-DHA (PRENATAL MULTIVITAMIN + DHA PO)     albuterol (PROAIR HFA/PROVENTIL HFA/VENTOLIN HFA) 108 (90 Base) MCG/ACT inhaler     Sig: Inhale 2 puffs into the lungs every 6 hours as needed for shortness of breath / dyspnea or wheezing     Dispense:  1 Inhaler     Refill:  1     Pharmacy may dispense brand covered by insurance (Proair, or proventil or ventolin or generic albuterol inhaler)     Orders Placed This Encounter   Procedures     Symptomatic COVID-19 Virus (Coronavirus) by PCR       3. Patient needs to follow up in if no improvement,or sooner if worsening of symptoms or other symptoms develop.  Will follow up and/or notify patient of  results via My Chart to determine further need for followup      No follow-ups on file.    ELIAS Guerra CNP  [facility]      Video-Visit Details    Type of service:  Video Visit    Video End Time:2:11 PM    Originating Location (pt. Location): Home    Distant Location (provider location):  [facility]     Platform used for Video Visit: Doximity    No follow-ups on file.       ELIAS Guerra CNP

## 2020-06-09 NOTE — TELEPHONE ENCOUNTER
"Patient calling reporting she woke from a nap this evening and woke up with \"blotchy\" rash all over her body. Patient states she has red blotchy rash on her legs, arms, Chest, and her back. Some of the rash are little red dots. Denies difficutly of breathing. Temperature 103 F. Per guideline, informed patient RN will page on call provider for second level triage.     Paged on-call provider Shayy Nolan MD for [facility] at 6:38pm, via [facility] to call RN directly at [phone].    Dr. Lucas called RN and advised patient to be seen at the emergency department.     RN called patient and advised provider's recommendation. Patient states she will go to the emergency department.     Billy Acosta RN  [facility]     COVID 19 Nurse Triage Plan/Patient Instructions    Please be aware that novel coronavirus (COVID-19) may be circulating in the community. If you develop symptoms such as fever, cough, or SOB or if you have concerns about the presence of another infection including coronavirus (COVID-19), please contact your health care provider or visit [email or web address].     Disposition/Instructions    Patient to go to ED and follow protocol based instructions.     Bring Your Own Device:  Please also bring your smart device(s) (smart phones, tablets, laptops) and their charging cables for your personal use and to communicate with your care team during your visit.    Thank you for taking steps to prevent the spread of this virus.  o Limit your contact with others.  o Wear a simple mask to cover your cough.  o Wash your hands well and often.    Resources    [facility]: About COVID-19: [email or web address]    CDC: What to Do If You're Sick: www.cdc.gov/coronavirus/2019-ncov/about/steps-when-sick.html    CDC: Ending Home Isolation: www.cdc.gov/coronavirus/2019-ncov/hcp/disposition-in-home-patients.html     CDC: Caring for Someone: " www.cdc.gov/coronavirus/2019-ncov/if-you-are-sick/care-for-someone.html     [facility]: Interim Guidance for Hospital Discharge to Home: www.health.[facility].[place]/diseases/coronavirus/hcp/hospdischarge.pdf    [facility] clinical trials (COVID-19 research studies): clinicalaffairs.[facility]/umn-clinical-trials     Below are the COVID-19 hotlines at the [place] [facility] ([facility]). Interpreters are available.   o For health questions: Call 620-383-3242 or 1-473.499.4737 (7 a.m. to 7 p.m.)  o For questions about schools and childcare: Call 017-530-1295 or 1-534.444.4588 (7 a.m. to 7 p.m.)     Reason for Disposition    Fever    Additional Information    Negative: [1] Life-threatening reaction (anaphylaxis) in the past to similar substance (e.g., food, insect bite/sting, chemical, etc.) AND [2] < 2 hours since exposure    Negative: [1] Sudden onset of rash (within last 2 hours) AND [2] difficulty with breathing or swallowing    Negative: Shock suspected (e.g., cold/pale/clammy skin, too weak to stand, low BP, rapid pulse)    Negative: Difficult to awaken or acting confused (e.g., disoriented, slurred speech)    Negative: [1] Purple or blood-colored spots or dots AND [2] fever    Negative: Sounds like a life-threatening emergency to the triager    Negative: [1] Widespread rash AND [2] bright red, sunburn-like AND [3] current tampon use or nasal packing    Negative: [1] Widespread rash AND [2] bright red, sunburn-like AND [3] wound infection or recent surgery    Negative: [1] Bright red skin AND [2] peels off in sheets    Negative: Stiff neck (unable to touch chin to chest)    Protocols used: RASH OR REDNESS - WIDESPREAD-A-

## 2020-06-10 ENCOUNTER — NURSE TRIAGE (OUTPATIENT)
Dept: NURSING | Facility: CLINIC | Age: 45
End: 2020-06-10

## 2020-06-10 DIAGNOSIS — Z20.822 SUSPECTED COVID-19 VIRUS INFECTION: ICD-10-CM

## 2020-06-10 PROCEDURE — 99000 SPECIMEN HANDLING OFFICE-LAB: CPT | Performed by: NURSE PRACTITIONER

## 2020-06-10 PROCEDURE — 99207 ZZC NON-BILLABLE SERV PER CHARTING: CPT

## 2020-06-10 PROCEDURE — U0003 INFECTIOUS AGENT DETECTION BY NUCLEIC ACID (DNA OR RNA); SEVERE ACUTE RESPIRATORY SYNDROME CORONAVIRUS 2 (SARS-COV-2) (CORONAVIRUS DISEASE [COVID-19]), AMPLIFIED PROBE TECHNIQUE, MAKING USE OF HIGH THROUGHPUT TECHNOLOGIES AS DESCRIBED BY CMS-2020-01-R: HCPCS | Mod: 90 | Performed by: NURSE PRACTITIONER

## 2020-06-10 NOTE — TELEPHONE ENCOUNTER
Patient is requesting covid 19 testing for today in [place] versus tomorrow.  FNA transferred through to scheduling.    COVID 19 Nurse Triage Plan/Patient Instructions    Please be aware that novel coronavirus (COVID-19) may be circulating in the community. If you develop symptoms such as fever, cough, or SOB or if you have concerns about the presence of another infection including coronavirus (COVID-19), please contact your health care provider or visit [email or web address].     Disposition/Instructions    Patient to stay at home and follow home care protocol based instructions.     Thank you for taking steps to prevent the spread of this virus.  o Limit your contact with others.  o Wear a simple mask to cover your cough.  o Wash your hands well and often.    Resources    [facility]: About COVID-19: [email or web address]    CDC: What to Do If You're Sick: www.cdc.gov/coronavirus/2019-ncov/about/steps-when-sick.html    CDC: Ending Home Isolation: www.cdc.gov/coronavirus/2019-ncov/hcp/disposition-in-home-patients.html     CDC: Caring for Someone: www.cdc.gov/coronavirus/2019-ncov/if-you-are-sick/care-for-someone.html     [facility]: Interim Guidance for Hospital Discharge to Home: www.[facility].mn.us/diseases/coronavirus/hcp/hospdischarge.pdf    [facility] clinical trials (COVID-19 research studies): clinicalaffairs.[facility]-clinical-trials     Below are the COVID-19 hotlines at the [facility] of [facility] ([facility]). Interpreters are available.   o For health questions: Call 032-023-5843 or 1-976.664.5388 (7 a.m. to 7 p.m.)  o For questions about schools and childcare: Call 253-874-9881 or 1-412.685.1867 (7 a.m. to 7 p.m.)                         Additional Information    Negative: Nursing judgment, per information in Reference    Negative: Information only call about a Well Adult (no illness or injury)    Negative: Nursing judgment or information in reference    Negative: Nursing judgment  or information in reference    Negative: Nursing judgment or information in reference    Negative: Nursing judgment or information in reference    Negative: Nursing judgment or information in reference    Negative: Nursing judgment or information in reference    Negative: Nursing judgment or information in reference    Negative: Nursing judgment or information in reference    Negative: Nursing judgment or information in reference    Negative: Nursing judgment or information in reference    Negative: Nursing judgment or information in reference    Negative: Nursing judgment or information in reference    Negative: Nursing judgment or information in reference    Nursing judgment or information in reference    Protocols used: NO GUIDELINE [id number]-ND

## 2020-06-10 NOTE — LETTER
June 13, 2020        Natasha Lee  [address] [id number]    This letter provides a written record that you were tested for COVID-19 on 6/10/20.   Your result was negative.    This means that we didn t find the virus that causes COVID-19 in your sample. A test may show negative when you do actually have the virus. This can happen when the virus is in the early stages of infection, before you feel illness symptoms.    Even if you don t have symptoms, they may still appear. For safety, it s very important to follow these rules.    Keep yourself away from others (self-isolation):      Stay home. Don t go to work, school or anywhere else.     Stay in your own room (and use your own bathroom), if you can.    Stay away from others in your home. No hugging, kissing or shaking hands. No visitors.    Clean  high touch  surfaces often (doorknobs, counters, handles, etc.). Use a household cleaning spray or wipes.    Cover your mouth and nose with a mask, tissue or washcloth to avoid spreading germs.    Wash your hands and face often with soap and water.    Stay in self-isolation until you meet ALL of the guidelines below:    1. You have had no fever for at least 72 hours (that is 3 full days of no fever without the use of medicine that reduces fevers), AND  2. other symptoms (such as cough, shortness of breath) have gotten better, AND  3. at least 10 days have passed since your symptoms first appeared.    Going back to work  Check with your employer for any guidelines to follow for going back to work.    Employers: This document serves as formal notice that your employee tested negative for COVID-19, as of the testing date shown above.    For questions regarding this letter or your Negative COVID-19 result, call [phone] between 8A to 6:30P (M-F) and 10A to 6:30P (weekends).

## 2020-06-11 LAB
SARS-COV-2 RNA SPEC QL NAA+PROBE: NOT DETECTED
SPECIMEN SOURCE: NORMAL

## 2020-06-12 ENCOUNTER — MYC MEDICAL ADVICE (OUTPATIENT)
Dept: PEDIATRICS | Facility: CLINIC | Age: 45
End: 2020-06-12

## 2020-06-12 NOTE — RESULT ENCOUNTER NOTE
Ky Lee,    Attached are your test results.  Suprisingly is negative   Would still quarantine as we discussed    Please contact us if you have any questions.    Sandy Garcia, CNP

## 2020-06-17 ENCOUNTER — VIRTUAL VISIT (OUTPATIENT)
Dept: DERMATOLOGY | Facility: CLINIC | Age: 45
End: 2020-06-17
Payer: COMMERCIAL

## 2020-06-17 DIAGNOSIS — R21 MACULOPAPULAR RASH, GENERALIZED: Primary | ICD-10-CM

## 2020-06-17 DIAGNOSIS — L93.0 LUPUS ERYTHEMATOSUS TUMIDUS: ICD-10-CM

## 2020-06-17 PROCEDURE — 99214 OFFICE O/P EST MOD 30 MIN: CPT | Mod: GT | Performed by: PHYSICIAN ASSISTANT

## 2020-06-17 RX ORDER — TRIAMCINOLONE ACETONIDE 1 MG/G
CREAM TOPICAL
Qty: 30 G | Refills: 0 | Status: SHIPPED | OUTPATIENT
Start: 2020-06-17 | End: 2021-01-05

## 2020-06-17 NOTE — PROGRESS NOTES
[facility]  Store and Forward and Video ( Invitation sent by:  [email or web address] and text to cell phone )    Impression and Recommendations (Patient Counseled on the Following):  1. 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 Patient currently treating with plaquenil which is being managed by Dr. Zaman of Rheumatology. Recommended continued ophthalmologist follow-up for visual field checks and for possible ocular rosacea. We will continue topical protopic, this is the best treatment option given her neck and facial involvement and risk of steroid atrophy on the face. This is helpful for both lupus and rosacea.   - Advised patient to continue to apply protopic 0.1% ointment BID   - Continue to follow with rheumatology for treatment with plaquenil and ophthalmology for appropriate screening exams.   - Recommended EltaMD sunscreen, UV Clear with titanium dioxide.     - Will follow-up in 3 months to monitor her progress    2. Rash, appears maculopapular following URI about 1 week ago, improving. Minimal symptoms at this point and appears to be resolving, just with some mild pruritis on the upper mid back.   -recommended gentle bland emollients, vanicream, cerave, cetaphil creams once or twice daily as needed  -Start triamcinolone 0.1% cream to itchy patch on upper mid back  -Ok to continue with diphenhydramine 50mg as needed at bedtime  -Ok to trial on OTC zyrtec 10mg every day if she finds this helpful  -Given hx of SLE, will recommend a biopsy if rash has not resolved in about 2 weeks.    Follow-up:   Follow-up with dermatology in approximately 2 weeks prn. Earlier for new or changing lesions or rash.   CC Dr. Hicks on close of this encounter.     Staff only:    All risks, benefits and alternatives were discussed with patient.  Patient is in agreement and understands the assessment and plan.  All questions were answered.    Melva Rodas PA-C, MPAS  [facility]  [facility] [place] [facility]: Phone: [phone], Fax: [phone]  [facility]: Phone: [phone],  Fax: [phone]  _____________________________________________________________________________    Dermatology Problem List:  1. Relevant medical hx: SLE on plaquenil and prednisone, chronic hepatitis B  2. Tumid lupus +/- granulomatous rosacea  - Previously treated as severe and granulomatous appearing rosacea, interestingly did improve with oral doxycycline. 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 Maculopapular rash following URI 6/6/20 - [facility] 0.1% cream, bland emollients, OTC antihistamines    Encounter Date: Jun 17, 2020    CC:   No chief complaint on file.    History of Present Illness:  I have reviewed the teledermatology information and the nursing intake corresponding to this issue. Natasha Lee is a 45 year old female who presents via teledermatology for a new rash. She has a hx of tumid lupus and is managed by Rheum on Plaquenil. She has been using protopic on her facial redness. However, today she presents with a new problem. She notes that on 6/6/20 she spiked a fever of about 103F and other related COVID sx, such as a dry cough and body aches, chills and HA. She got tested for COVID twice (once in ED on 6/6 and once with PCP) and both were negative. On June 9th she was seen in the ED again for a rash and a fever. She was told at the time that is looked like a viral maculopapular rash. When the rash first began it was on her thighs, as red little dots, and then they spread on her  chest and then to her arms and abdomen. The rash is not really on her face or neck. Initially it was pink and raised but now the spots are flatter. Per patient there is no scaliness or flakiness associated with the rash. Since last week it has improved and appears to be less pronounced, and much less raised, however it has started to get a bit itchy on her upper back in one patch. It was recommended to her to take benadryl at night which has made a very slight difference, but not much. She is only taking the benadryl now as needed. She would like recommendations on how to alleviate this itching. She has taken no new medications or supplements. She has not tried any moisturizers, except one and it seemed to make the skin a little itchier. She is no longer experiencing cough, fever or other URI sx in the past 24 hours. No one else in her household got sick, neither her  or son. The only itchy area right now that is left is a little patch on her back, she gets no itching elsewhere. No other sx such as oral lesions, abdominal pain, HA, or blisters.    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 Patient has 1 child. She has had her tubes tied - no plans for future pregnancies.   Reviewed and left in chart for clinician convenience.      ROS: Patient is generally feeling well today   Constitutional: no fevers, night sweats or unintentional weight change   Ears, Nose, Mouth, Throat: no tinnitus or hearing change, no epistaxis or nasal discharge, no oral lesions, throat clear    Respiratory: no dyspnea, cough, shortness of breath or wheezing   GI: no nausea, vomiting, diarrhea or constipation, no abdominal pain   Musculoskeletal: no joint or muscle pain or swelling   Allergy: no environmental allergies   Psych: no depression or anxiety, no sleep problems  Skin: as per HPI    Physical Examination:  General: Well-appearing, appropriately-developed individual.  Skin: Focused  examination within the teledermatology photograph(s) including head/face, neck, chest, bilateral upper and bilateral lower extremities, and abdomen was performed.   -diffuse pink maculopapular rash on the trunk and extremities, no visible scaling present, there are areas coalescing as well as individual macules  -no oral lesions  -face with central erythema of the cheeks bilaterally and few edematous pink papules and fla topped patches on the lateral cheeks    Labs:  Reviewed from ED workup: UA, Lactic Acid, LFTs, BMP, PT/INR, CBC with diff and COVID testing all generally wnls    Past Medical History:   Patient Active Problem List   Diagnosis     Chronic hepatitis B (H)     Lupus (systemic lupus erythematosus) (H)     CARDIOVASCULAR SCREENING; LDL GOAL LESS THAN 160     Health Care Home     Dysmenorrhea     Menorrhagia     Allergic rhinitis     Other nonspecific finding on examination of urine     Urinary frequency     Vitamin B12 deficiency (non anemic)     Heart murmur     S/P laparoscopic cholecystectomy     Cervical cancer screening     Past Medical History:   Diagnosis Date     Diabetes (H)     Pre-Diabetic     Heart murmur 1997     Lupus (systemic lupus erythematosus) (H) 1988    Dr. Prather     Lupus (systemic lupus erythematosus) (H) 12/10/2009     Normal delivery 9/5/09    boy forceps     Seizures (H)     When a child     Past Surgical History:   Procedure Laterality Date     ANESTH,SKIN SURGERY,KNEE  1993, 1990    orthoscopic     C APPENDECTOMY  4/2008     CHOLECYSTECTOMY  2016     CL AFF SURGICAL PATHOLOGY  2007     LAPAROSCOPIC CHOLECYSTECTOMY N/A 5/18/2016    Procedure: LAPAROSCOPIC CHOLECYSTECTOMY;  Surgeon: Radha Cline MD;  Location: [facility] OR     LAPAROSCOPIC TUBAL LIGATION  12/15/2011    Procedure:LAPAROSCOPIC TUBAL LIGATION; Laparoscopic tubal ligation; Surgeon:AMY WYNN; Location:[facility] OR     OSTEOTOMY FOOT Left 2/13/2019    Procedure: Left Lapidus with  Azael Osteotomy;  Surgeon:  Brett Chavez MD;  Location:  OR       Social History:  Patient reports that she has never smoked. She has never used smokeless tobacco. She reports current alcohol use. She reports that she does not use drugs.    Family History:  Family History   Adopted: Yes   Family history unknown: Yes       Medications:  Current Outpatient Medications   Medication     albuterol (PROAIR HFA/PROVENTIL HFA/VENTOLIN HFA) 108 (90 Base) MCG/ACT inhaler     amoxicillin-clavulanate (AUGMENTIN) 875-125 MG tablet     Cranberry 1000 MG CAPS     Cyanocobalamin (B-12) 1000 MCG TBCR     DiphenhydrAMINE HCl (BENADRYL PO)     DiphenhydrAMINE HCl, Sleep, (UNISOM SLEEPGELS) 50 MG CAPS     fluticasone (FLONASE) 50 MCG/ACT nasal spray     hydroxychloroquine (PLAQUENIL) 200 MG tablet     ivermectin (STROMECTOL) 3 MG TABS tablet     montelukast (SINGULAIR) 10 MG tablet     multivitamin, therapeutic with minerals (MULTI-VITAMIN) TABS     norethindrone-ethinyl estradiol (ORTHO-NOVUM 1-35 TAB,NORTREL 1-35 TAB) 1-35 MG-MCG tablet     Prenatal MV-Min-Fe Fum-FA-DHA (PRENATAL MULTIVITAMIN + DHA PO)     tacrolimus (PROTOPIC) 0.1 % external ointment     triamcinolone (KENALOG) 0.1 % cream     No current facility-administered medications for this visit.      Allergies   Allergen Reactions     Carbamazepine      Fever, rash     [place]-Related Products    ____________________________________________________________________________    Teledermatology information:  - Location of patient in [place]: Home  - Patient presented as: return  - Location of teledermatologist:  ([facility] )  - Reason teledermatology is appropriate:  of National Emergency Regarding Coronavirus disease (COVID 19) Outbreak  - Image quality and interpretability: acceptable  - Physician has received verbal consent for a Video/Photos Visit from the patient? Yes  - In-person dermatology visit recommendation: no  - Date of images: 6/16/20  - Length of call: 23min  -  "Date of report: 6/17/2020     Teledermatology Nurse Call for RETURN patients seen within the last 3 years:    The patient was contacted by phone and we reviewed, \"Due to the coronavirus pandemic, we are calling to review your visit and offer you a teledermatology visit where you send in photos via In[employer]. These photos will be seen by an MD or CAROLINE. This will be billed to you and your insurance.\"  The patient was also told that \"a teledermatology visit is not as thorough as an in-person visit and that the quality of the photograph sent may not be of the same quality as that taken by the dermatology clinic, but the patient would like to proceed with an teledermatology because of National Emergency Regarding Coronavirus disease (COVID 19) Outbreak.\"  \"If a prescription is necessary we can send it directly to your pharmacy.  If lab work is needed we can place an order for that and you can then stop by our lab to have the test done at a later time.\"    The patient understood that they may receive a call from the clinic to review additional history, may still be instructed to come to clinic even after photo review and be billed for both visits with an MD. Visits are billed at different rates depending on your insurance coverage. Please reach out to your insurance provider with any questions.They were told that a photo assessment does not replace an in person skin exam. The patient understood that teledermatology is not for urgent issues and would require up to 3 business days for review. The patient denied skin pain, fever, mucosal symptoms (lesions, blisters, sores in the mouth, nose, eyes, or genitals)  IF PATIENT ENDORSES ANY OF THESE STOP AND PAGE  ON CALL ATTENDING. IF OTHER POSSIBLY URGENT SYMPTOMS THEN PAGE PHYSICIAN YOU ARE SCHEDULING WITH OR ON CALL IF NO ANSWER.       The patient chose to:                                                                                                                             "                                                                                        Consent to a teledermatology visit with [employer] photos. The patient understood they must upload a [employer] photo for this visit to be completed. They indicated that the photo will be taken at their home address (if other address please document here). Patient told nursing these are already uploaded .  The patient was instructed to take photos of all all areas of concern and all areas of any rash from near and far away.                                                                                                                                                                                                                               The patient is verified to be in the [facility] at their home address at the time of the encounter.                                                                                                                                                                                                              Patient concerns for this return visit: Had high fever a week ago 103-104. Was tested for Covid, negative. Developed rash all over body. Was told most likely viral rash. Not using anything on skin at the moment.    Photo instructions reviewed with patients as below:  -ALL patient needs to send photos unless they have a phone only visit approved by the clinic:  o To send photos to your doctor, respond to the message in [employer] as many times as needed to upload your photos. Each message allows for 3 photos.  o For spots or lesions of concern, please take at least 2 photos of each site you are worried about (at different angles if needed, and at least one close up and one farther away so we can tell where it is on the body. Be sure all photographs are in focus)  o For rashes, take photos of the entire body because it is important for us to see which areas are involved and which areas are not  involved.  At a minimum, please include photos of the arms, legs, front of trunk, back of trunk, face, and a few close-ups of the rash.  Leave undergarments in place unless the rash involves the skin in these areas  o For acne, please take photos of the face, upper chest and neck, and upper chest and back  o For hair loss, please send views of the top of the head, sides of the head, back of the head and a picture of your face with your hair pulled back. Also, a photos of both hands with nails.    -For ADULT NEW patients video visits are needed, to receive an invitation and connect with your provider, the [employer] video visit technology must be accessible from your smartphone or personal device. Please click the link below for setup instructions: [email or web address]    Nursing tasks completed  -Pharmacy preference was updated.  -The nurse has dropped in the AVS information *(For adults the phrase is umdermhteleavs and for pediatrics it is their own) for the physician to route in the AVS.                                                                                                                                                                                                                         -The patient was told to contact the clinic if they have not received correspondence within 72 hours.

## 2020-06-17 NOTE — PATIENT INSTRUCTIONS
[facility] Teledermatology Visit    Thank you for allowing us to participate in your care. Your findings, instructions and follow-up plan are as follows:    1. Rash  -recommended gentle bland emollients, vanicream, cerave, cetaphil creams once or twice daily as needed  -Start triamcinolone 0.1% cream to itchy patch on back  -Ok to continue with diphenhydramine 50mg as needed at bedtime  -Ok to trial on OTC zyrtec 10mg every day if she finds this helpful  -Given history of SLE, will recommend a biopsy if rash has not resolved in about 2 weeks.    When should I call my doctor?    If you are worsening or not improving, please, contact us or seek urgent care as noted below.     Who should I call with questions (adults)?    [facility] (adult and pediatric): [phone]     [facility] (adult): [phone]    For urgent needs outside of business hours call the [facility] at [phone] and ask for the dermatology resident on call    If this is a medical emergency and you are unable to reach an ER, Call [phone]      Who should I call with questions (pediatric)?  [facility]- Pediatric Dermatology  Dr. Shannon Peralta, Dr. Karen Aguilera, Dr. Debbie Blanco, Aliza Mohan, CHALINO Jung, Dr. Marcelina Thorpe & Dr. Corey Mayen  Non Urgent  Nurse Triage Line; [phone]- Yudelka and Mary FAM Care Coordinators   Pinky (/Complex ) [phone]    If you need a prescription refill, please contact your pharmacy. Refills are approved or denied by our Physicians during normal business hours, Monday through Fridays  Per office policy, refills will not be granted if you have not been seen within the past year (or sooner depending on your child's condition)    Scheduling Information:  Pediatric Appointment Scheduling and Call Center [phone]  Radiology Scheduling-  [phone]  Sedation Unit Scheduling- [phone]  [place] Scheduling- General [phone]; Pediatric Dermatology [phone]  Main  Services: [phone]  Sammarinese: [phone]  Burmese: [phone]  Hmong/Faroese/Adam: [phone]  Preadmission Nursing Department Fax Number: [phone] (Fax all pre-operative paperwork to this number)    For urgent matters arising during evenings, weekends, or holidays that cannot wait for normal business hours please call [phone] and ask for the Dermatology Resident On-Call to be paged.

## 2020-06-17 NOTE — LETTER
6/17/2020         RE: Natasha Lee  [address] [facility] [address]        Dear Colleague,    Thank you for referring your patient, Natasha Lee, to the [facility]. Please see a copy of my visit note below.    [facility]  Store and Forward and Video ( Invitation sent by:  Talking [employer] and text to cell phone )    Impression and Recommendations (Patient Counseled on the Following):  1. 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 Patient currently treating with plaquenil which is being managed by Dr. Zaman of Rheumatology. Recommended continued ophthalmologist follow-up for visual field checks and for possible ocular rosacea. We will continue topical protopic, this is the best treatment option given her neck and facial involvement and risk of steroid atrophy on the face. This is helpful for both lupus and rosacea.   - Advised patient to continue to apply protopic 0.1% ointment BID   - Continue to follow with rheumatology for treatment with plaquenil and ophthalmology for appropriate screening exams.   - Recommended EltaMD sunscreen, UV Clear with titanium dioxide.     - Will follow-up in 3 months to monitor her progress    2. Rash, appears maculopapular following URI about 1 week ago, improving. Minimal symptoms at this point and appears to be resolving, just with some mild pruritis on the upper mid back.   -recommended gentle bland emollients, vanicream, cerave, cetaphil creams once or twice daily as needed  -Start triamcinolone 0.1% cream to itchy patch on upper mid back  -Ok to continue with diphenhydramine 50mg as needed at bedtime  -Ok to trial on OTC zyrtec 10mg every day if she finds this helpful  -Given hx of SLE, will recommend a biopsy if rash has not resolved in about 2 weeks.    Follow-up:   Follow-up with dermatology in approximately 2 weeks prn. Earlier for new or changing lesions or rash.   CC  Dr. Hicks on close of this encounter.     Staff only:    All risks, benefits and alternatives were discussed with patient.  Patient is in agreement and understands the assessment and plan.  All questions were answered.    Melva Rodas PA-C, MPAS  [facility]: Phone: [phone], Fax: [phone]  [facility]: Phone: [phone],  Fax: [phone]  _____________________________________________________________________________    Dermatology Problem List:  1. Relevant medical hx: SLE on plaquenil and prednisone, chronic hepatitis B  2. Tumid lupus +/- granulomatous rosacea  - Previously treated as severe and granulomatous appearing rosacea, interestingly did improve with oral doxycycline. 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 Maculopapular rash following URI 6/6/20 - [facility] 0.1% cream, bland emollients, OTC antihistamines    Encounter Date: Jun 17, 2020    CC:   No chief complaint on file.    History of Present Illness:  I have reviewed the teledermatology information and the nursing intake corresponding to this issue. Natasha Lee is a 45 year old female who presents via teledermatology for a new rash. She has a hx of tumid lupus and is managed by Rheum on Plaquenil. She has been using protopic on her facial redness. However, today she presents with a new problem. She notes that on 6/6/20 she spiked a fever of about 103F and other related COVID sx, such as a dry cough and body aches, chills and HA. She got tested for COVID twice (once in ED on 6/6 and once with  PCP) and both were negative. On June 9th she was seen in the ED again for a rash and a fever. She was told at the time that is looked like a viral maculopapular rash. When the rash first began it was on her thighs, as red little dots, and then they spread on her chest and then to her arms and abdomen. The rash is not really on her face or neck. Initially it was pink and raised but now the spots are flatter. Per patient there is no scaliness or flakiness associated with the rash. Since last week it has improved and appears to be less pronounced, and much less raised, however it has started to get a bit itchy on her upper back in one patch. It was recommended to her to take benadryl at night which has made a very slight difference, but not much. She is only taking the benadryl now as needed. She would like recommendations on how to alleviate this itching. She has taken no new medications or supplements. She has not tried any moisturizers, except one and it seemed to make the skin a little itchier. She is no longer experiencing cough, fever or other URI sx in the past 24 hours. No one else in her household got sick, neither her  or son. The only itchy area right now that is left is a little patch on her back, she gets no itching elsewhere. No other sx such as oral lesions, abdominal pain, HA, or blisters.    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 Patient has 1 child. She has had her tubes tied - no plans for future pregnancies.   Reviewed and left in chart for clinician convenience.      ROS: Patient is generally feeling well today   Constitutional: no fevers, night sweats or unintentional weight change   Ears, Nose, Mouth, Throat: no tinnitus or hearing change, no epistaxis or nasal discharge, no oral lesions, throat clear    Respiratory: no dyspnea, cough, shortness of breath or wheezing   GI: no nausea, vomiting, diarrhea or constipation, no abdominal pain    Musculoskeletal: no joint or muscle pain or swelling   Allergy: no environmental allergies   Psych: no depression or anxiety, no sleep problems  Skin: as per HPI    Physical Examination:  General: Well-appearing, appropriately-developed individual.  Skin: Focused examination within the teledermatology photograph(s) including head/face, neck, chest, bilateral upper and bilateral lower extremities, and abdomen was performed.   -diffuse pink maculopapular rash on the trunk and extremities, no visible scaling present, there are areas coalescing as well as individual macules  -no oral lesions  -face with central erythema of the cheeks bilaterally and few edematous pink papules and fla topped patches on the lateral cheeks    Labs:  Reviewed from ED workup: UA, Lactic Acid, LFTs, BMP, PT/INR, CBC with diff and COVID testing all generally wnls    Past Medical History:   Patient Active Problem List   Diagnosis     Chronic hepatitis B (H)     Lupus (systemic lupus erythematosus) (H)     CARDIOVASCULAR SCREENING; LDL GOAL LESS THAN 160     Health Care Home     Dysmenorrhea     Menorrhagia     Allergic rhinitis     Other nonspecific finding on examination of urine     Urinary frequency     Vitamin B12 deficiency (non anemic)     Heart murmur     S/P laparoscopic cholecystectomy     Cervical cancer screening     Past Medical History:   Diagnosis Date     Diabetes (H)     Pre-Diabetic     Heart murmur 1997     Lupus (systemic lupus erythematosus) (H) 1988    Dr. Prather     Lupus (systemic lupus erythematosus) (H) 12/10/2009     Normal delivery 9/5/09    boy forceps     Seizures (H)     When a child     Past Surgical History:   Procedure Laterality Date     ANESTH,SKIN SURGERY,KNEE  1993, 1990    orthoscopic     C APPENDECTOMY  4/2008     CHOLECYSTECTOMY  2016     CL AFF SURGICAL PATHOLOGY  2007     LAPAROSCOPIC CHOLECYSTECTOMY N/A 5/18/2016    Procedure: LAPAROSCOPIC CHOLECYSTECTOMY;  Surgeon: Radha Cline MD;   [facility]     LAPAROSCOPIC TUBAL LIGATION  12/15/2011    Procedure:LAPAROSCOPIC TUBAL LIGATION; Laparoscopic tubal ligation; Surgeon:AMY WYNN; Location:[facility] OR     OSTEOTOMY FOOT Left 2/13/2019    Procedure: Left Lapidus with  Azael Osteotomy;  Surgeon: Brett Chavez MD;  [facility] OR       Social History:  Patient reports that she has never smoked. She has never used smokeless tobacco. She reports current alcohol use. She reports that she does not use drugs.    Family History:  Family History   Adopted: Yes   Family history unknown: Yes       Medications:  Current Outpatient Medications   Medication     albuterol (PROAIR HFA/PROVENTIL HFA/VENTOLIN HFA) 108 (90 Base) MCG/ACT inhaler     amoxicillin-clavulanate (AUGMENTIN) 875-125 MG tablet     Cranberry 1000 MG CAPS     Cyanocobalamin (B-12) 1000 MCG TBCR     DiphenhydrAMINE HCl (BENADRYL PO)     DiphenhydrAMINE HCl, Sleep, (UNISOM SLEEPGELS) 50 MG CAPS     fluticasone (FLONASE) 50 MCG/ACT nasal spray     hydroxychloroquine (PLAQUENIL) 200 MG tablet     ivermectin (STROMECTOL) 3 MG TABS tablet     montelukast (SINGULAIR) 10 MG tablet     multivitamin, therapeutic with minerals (MULTI-VITAMIN) TABS     norethindrone-ethinyl estradiol (ORTHO-NOVUM 1-35 TAB,NORTREL 1-35 TAB) 1-35 MG-MCG tablet     Prenatal MV-Min-Fe Fum-FA-DHA (PRENATAL MULTIVITAMIN + DHA PO)     tacrolimus (PROTOPIC) 0.1 % external ointment     triamcinolone (KENALOG) 0.1 % cream     No current facility-administered medications for this visit.      Allergies   Allergen Reactions     Carbamazepine      Fever, rash     [place]-Related Products    ____________________________________________________________________________    Teledermatology information:  - Location of patient in [place]: Home  - Patient presented as: return  - Location of teledermatologist:  ([facility] )  - Reason teledermatology is appropriate:  of National Emergency Regarding Coronavirus  "disease (COVID 19) Outbreak  - Image quality and interpretability: acceptable  - Physician has received verbal consent for a Video/Photos Visit from the patient? Yes  - In-person dermatology visit recommendation: no  - Date of images: 6/16/20  - Length of call: 23min  - Date of report: 6/17/2020     Teledermatology Nurse Call for RETURN patients seen within the last 3 years:    The patient was contacted by phone and we reviewed, \"Due to the coronavirus pandemic, we are calling to review your visit and offer you a teledermatology visit where you send in photos via [employer]. These photos will be seen by an MD or CAROLINE. This will be billed to you and your insurance.\"  The patient was also told that \"a teledermatology visit is not as thorough as an in-person visit and that the quality of the photograph sent may not be of the same quality as that taken by the dermatology clinic, but the patient would like to proceed with an teledermatology because of National Emergency Regarding Coronavirus disease (COVID 19) Outbreak.\"  \"If a prescription is necessary we can send it directly to your pharmacy.  If lab work is needed we can place an order for that and you can then stop by our lab to have the test done at a later time.\"    The patient understood that they may receive a call from the clinic to review additional history, may still be instructed to come to clinic even after photo review and be billed for both visits with an MD. Visits are billed at different rates depending on your insurance coverage. Please reach out to your insurance provider with any questions.They were told that a photo assessment does not replace an in person skin exam. The patient understood that teledermatology is not for urgent issues and would require up to 3 business days for review. The patient denied skin pain, fever, mucosal symptoms (lesions, blisters, sores in the mouth, nose, eyes, or genitals)  IF PATIENT ENDORSES ANY OF THESE STOP AND PAGE  ON " CALL ATTENDING. IF OTHER POSSIBLY URGENT SYMPTOMS THEN PAGE PHYSICIAN YOU ARE SCHEDULING WITH OR ON CALL IF NO ANSWER.       The patient chose to:                                                                                                                                                                                                                    Consent to a teledermatology visit with [employer] photos. The patient understood they must upload a [employer] photo for this visit to be completed. They indicated that the photo will be taken at their home address (if other address please document here). Patient told nursing these are already uploaded .  The patient was instructed to take photos of all all areas of concern and all areas of any rash from near and far away.                                                                                                                                                                                                                               The patient is verified to be in the [facility] at their home address at the time of the encounter.                                                                                                                                                                                                              Patient concerns for this return visit: Had high fever a week ago 103-104. Was tested for Covid, negative. Developed rash all over body. Was told most likely viral rash. Not using anything on skin at the moment.    Photo instructions reviewed with patients as below:  -ALL patient needs to send photos unless they have a phone only visit approved by the clinic:  o To send photos to your doctor, respond to the message in [email or web address] as many times as needed to upload your photos. Each message allows for 3 photos.  o For spots or lesions of concern, please take at least 2 photos of each site you are worried about (at different angles  if needed, and at least one close up and one farther away so we can tell where it is on the body. Be sure all photographs are in focus)  o For rashes, take photos of the entire body because it is important for us to see which areas are involved and which areas are not involved.  At a minimum, please include photos of the arms, legs, front of trunk, back of trunk, face, and a few close-ups of the rash.  Leave undergarments in place unless the rash involves the skin in these areas  o For acne, please take photos of the face, upper chest and neck, and upper chest and back  o For hair loss, please send views of the top of the head, sides of the head, back of the head and a picture of your face with your hair pulled back. Also, a photos of both hands with nails.    -For ADULT NEW patients video visits are needed, to receive an invitation and connect with your provider, the [employer] video visit technology must be accessible from your smartphone or personal device. Please click the link below for setup instructions: [email or web address]    Nursing tasks completed  -Pharmacy preference was updated.  -The nurse has dropped in the AVS information *(For adults the phrase is umdermhteleavs and for pediatrics it is their own) for the physician to route in the AVS.                                                                                                                                                                                                                         -The patient was told to contact the clinic if they have not received correspondence within 72 hours.         Again, thank you for allowing me to participate in the care of your patient.        Sincerely,        Melva Rodas PA-C

## 2020-06-18 ENCOUNTER — TELEPHONE (OUTPATIENT)
Dept: DERMATOLOGY | Facility: CLINIC | Age: 45
End: 2020-06-18

## 2020-06-18 ENCOUNTER — TELEPHONE (OUTPATIENT)
Dept: PEDIATRICS | Facility: CLINIC | Age: 45
End: 2020-06-18

## 2020-06-18 NOTE — TELEPHONE ENCOUNTER
6/18 Provided phone number [phone] to schedule video visit in about 2 weeks (around 7/1/2020).    Jeannie Darden   Procedure    Ortho/Sports Med/Ent/Eye   [facility]   [phone]

## 2020-06-18 NOTE — TELEPHONE ENCOUNTER
[facility] CLINICAL DOCUMENTATION    Form Documentation Form or Letter Request    Type or form/letter needing completion: NONI  Provider: Sandy Garcia  Has provider seen patient for office visit related to reason for form request? Yes  Date form needed: ASAP  Once completed: Fax form to: NONI fax# [phone]     Message and form routed to Sandy Sheffield CMA

## 2020-06-25 NOTE — TELEPHONE ENCOUNTER
Attempt #2  Second my chart sent to pt.  Left message for pt to return call to clinic with return to work date for form. Will await call back. Can leave info with call center.Shanna PENDLETONCMA

## 2020-06-25 NOTE — TELEPHONE ENCOUNTER
Attempt #1    Left message for patient to return call to clinic.  Sandy Garcia is filling out a form for her and needs the return to work date, advised patient she can give the return to work date to the call center.  Clinic number given.    Jocelyne Sheffield CMA

## 2020-06-25 NOTE — TELEPHONE ENCOUNTER
Please call as filling out her forms and need the date of return to work  Sent a my chart message already but did not see a return message yet

## 2020-06-29 NOTE — TELEPHONE ENCOUNTER
Forms faxed to [employer], provider informed patient via [employer]. Forms to be scanned into chart.    JESUSITA Yo

## 2020-10-22 ENCOUNTER — OFFICE VISIT (OUTPATIENT)
Dept: OBGYN | Facility: CLINIC | Age: 45
End: 2020-10-22
Payer: COMMERCIAL

## 2020-10-22 VITALS
SYSTOLIC BLOOD PRESSURE: 131 MMHG | BODY MASS INDEX: 29.87 KG/M2 | DIASTOLIC BLOOD PRESSURE: 77 MMHG | HEIGHT: 62 IN | HEART RATE: 70 BPM | OXYGEN SATURATION: 100 % | WEIGHT: 162.3 LBS

## 2020-10-22 DIAGNOSIS — Z01.419 ENCOUNTER FOR GYNECOLOGICAL EXAMINATION WITHOUT ABNORMAL FINDING: Primary | ICD-10-CM

## 2020-10-22 DIAGNOSIS — N92.0 MENORRHAGIA WITH REGULAR CYCLE: ICD-10-CM

## 2020-10-22 DIAGNOSIS — N94.6 DYSMENORRHEA: ICD-10-CM

## 2020-10-22 DIAGNOSIS — Z12.31 ENCOUNTER FOR SCREENING MAMMOGRAM FOR BREAST CANCER: ICD-10-CM

## 2020-10-22 PROCEDURE — 99396 PREV VISIT EST AGE 40-64: CPT | Performed by: OBSTETRICS & GYNECOLOGY

## 2020-10-22 ASSESSMENT — MIFFLIN-ST. JEOR: SCORE: 1334.44

## 2020-10-22 NOTE — PROGRESS NOTES
Natasha Lee is a 45 year old female , who presents for annual exam.   No unusual bleeding, no incontinence, or unusual discharge.   She is currently using OCPs for menstrual regulation, dysmenorrhea and  contraception.  She does not have any apparent contraindications to use.  She has not had any apparent complications with it's use. Her menses have been regular and less bleeding.  She has had less PMS    Last cholesterol:   Recent Labs   Lab Test 02/15/16  0827 14  0716   CHOL 143 145   HDL 47* 49*   LDL 82 74   TRIG 71 110   CHOLHDLRATIO  --  3.0     Past Medical History:   Diagnosis Date     Diabetes (H)     Pre-Diabetic     Heart murmur      Lupus (systemic lupus erythematosus) (H)     Dr. Prather     Lupus (systemic lupus erythematosus) (H) 12/10/2009     Normal delivery 09    boy forceps     Seizures (H)     When a child       Past Surgical History:   Procedure Laterality Date     ANESTH,SKIN SURGERY,KNEE  ,     orthoscopic     C APPENDECTOMY  2008     CHOLECYSTECTOMY       CL AFF SURGICAL PATHOLOGY       LAPAROSCOPIC CHOLECYSTECTOMY N/A 2016    Procedure: LAPAROSCOPIC CHOLECYSTECTOMY;  Surgeon: Radha Cline MD;  [facility] OR     LAPAROSCOPIC TUBAL LIGATION  12/15/2011    Procedure:LAPAROSCOPIC TUBAL LIGATION; Laparoscopic tubal ligation; Surgeon:AMY WYNN; Location:[facility] OR     OSTEOTOMY FOOT Left 2019    Procedure: Left Lapidus with  Azael Osteotomy;  Surgeon: Brett Chavez MD;  [facility]: [facility] OR       OB History    Para Term  AB Living   1 1 0 0 0 1   SAB TAB Ectopic Multiple Live Births   0 0 0 0 1      # Outcome Date GA Lbr Julio Cesar/2nd Weight Sex Delivery Anes PTL Lv   1 Para 09    M    PHILOMENA       Gyn History:  Gynecological History         Patient's last menstrual period was 10/08/2020 (approximate).     No STD /No PID/No IUD      history of abnormal pap smear:  no  Last pap:   Lab Results    Component Value Date    PAP NIL 08/22/2019           Current Outpatient Medications   Medication Sig Dispense Refill     albuterol (PROAIR HFA/PROVENTIL HFA/VENTOLIN HFA) 108 (90 Base) MCG/ACT inhaler Inhale 2 puffs into the lungs every 6 hours as needed for shortness of breath / dyspnea or wheezing 1 Inhaler 1     Cranberry 1000 MG CAPS Take 1 capsule by mouth 4 times daily       DiphenhydrAMINE HCl, Sleep, (UNISOM SLEEPGELS) 50 MG CAPS Take 50 mg by mouth At Bedtime       hydroxychloroquine (PLAQUENIL) 200 MG tablet Take 1 tablet (200 mg) by mouth daily 90 tablet 3     norethindrone-ethinyl estradiol (ORTHO-NOVUM 1-35 TAB,NORTREL 1-35 TAB) 1-35 MG-MCG tablet Take 1 tablet by mouth daily 84 tablet 4     Prenatal MV-Min-Fe Fum-FA-DHA (PRENATAL MULTIVITAMIN + DHA PO)        tacrolimus (PROTOPIC) 0.1 % external ointment Apply twice daily to rash on face until resolved. 60 g 3     triamcinolone (KENALOG) 0.1 % external cream Apply sparingly to affected area three times daily prn. 30 g 0     DiphenhydrAMINE HCl (BENADRYL PO)        fluticasone (FLONASE) 50 MCG/ACT nasal spray Spray 2 sprays into both nostrils daily 16 g 3       Allergies   Allergen Reactions     Carbamazepine      Fever, rash     [place]-Related Products        Social History     Socioeconomic History     Marital status:      Spouse name: Not on file     Number of children: 1     Years of education: Not on file     Highest education level: Not on file   Occupational History     Occupation: addiction therapy     Employer[place] [employer]   Social Needs     Financial resource strain: Not on file     Food insecurity     Worry: Not on file     Inability: Not on file     Transportation needs     Medical: Not on file     Non-medical: Not on file   Tobacco Use     Smoking status: Never Smoker     Smokeless tobacco: Never Used   Substance and Sexual Activity     Alcohol use: Yes     Comment: Occasional     Drug use: No     Sexual activity: Yes     Partners: Male      "Birth control/protection: Pill     Comment: tubal ligation, uses OCPs for menstrual and mood regulation   Lifestyle     Physical activity     Days per week: Not on file     Minutes per session: Not on file     Stress: Not on file   Relationships     Social connections     Talks on phone: Not on file     Gets together: Not on file     Attends Quaker service: Not on file     Active member of club or organization: Not on file     Attends meetings of clubs or organizations: Not on file     Relationship status: Not on file     Intimate partner violence     Fear of current or ex partner: Not on file     Emotionally abused: Not on file     Physically abused: Not on file     Forced sexual activity: Not on file   Other Topics Concern     Parent/sibling w/ CABG, MI or angioplasty before 65F 55M? 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      EXAM:  /77 (BP Location: Right arm, Cuff Size: Adult Regular)   Pulse 70   Ht 1.575 m (5' 2\")   Wt 73.6 kg (162 lb 4.8 oz)   LMP 10/08/2020 (Approximate)   SpO2 100%   Breastfeeding No   BMI 29.69 kg/m    General:  WNWD female, NAD  Alert  Oriented x 3  Gait:  Normal  Skin:  Normal skin turgor  Neurologic:  CN grossly intact, good sensation.    HEENT:  NC/AT, EOMI  Neck:  No masses palpated, symmetrical, carotids +2/4, no bruits heard  Heart:  RRR  Lungs:  Clear   Breasts:  Symmetrical, no dimpling noted, no masses palpated, no discharge expressed  Abdomen:  Non-tender, non-distended.  Vulva: No external " lesions, normal hair distribution, no adenopathy  BUS:  Normal, no masses noted  Urethra:  No hypermobility noted  Urethral meatus:  No masses noted  Vagina: Moist, pink, no abnormal discharge, well rugated, no lesions  Cervix: Smooth, pink, no visible lesions  Uterus: Normal size, anteverted, non-tender, mobile  Ovaries: No mass, non-tender, mobile  Perianal:  No masses noted.    Extremities:  No clubbing, cyanosis, or edema      ASSESSMENT/PLAN   Annual examination   Schedule for the mammogram, she will check to determine if insurance covers the 3D.   OCPs are taken on a cyclical basis.  Should she decide to try the continuous approach, she will let me know so I will be able to change the prescription.  Prescription for cyclical use for 1 year.   Low fat diet, weight bearing exercises and self breast exams on a monthly basis have been recommended.  I have discussed with patient the risks, benefits, medications, treatment options and modalities.   I have instructed the patient to call or schedule a follow-up appointment if any problems.

## 2020-11-10 DIAGNOSIS — M32.9 SYSTEMIC LUPUS ERYTHEMATOSUS, UNSPECIFIED SLE TYPE, UNSPECIFIED ORGAN INVOLVEMENT STATUS (H): ICD-10-CM

## 2020-11-10 LAB
ALBUMIN SERPL-MCNC: 3.8 G/DL (ref 3.4–5)
ALBUMIN UR-MCNC: NEGATIVE MG/DL
ALP SERPL-CCNC: 80 U/L (ref 40–150)
ALT SERPL W P-5'-P-CCNC: 46 U/L (ref 0–50)
ANION GAP SERPL CALCULATED.3IONS-SCNC: 4 MMOL/L (ref 3–14)
APPEARANCE UR: CLEAR
AST SERPL W P-5'-P-CCNC: 30 U/L (ref 0–45)
BASOPHILS # BLD AUTO: 0 10E9/L (ref 0–0.2)
BASOPHILS NFR BLD AUTO: 0.2 %
BILIRUB SERPL-MCNC: 0.3 MG/DL (ref 0.2–1.3)
BILIRUB UR QL STRIP: NEGATIVE
BUN SERPL-MCNC: 13 MG/DL (ref 7–30)
CALCIUM SERPL-MCNC: 8.4 MG/DL (ref 8.5–10.1)
CHLORIDE SERPL-SCNC: 108 MMOL/L (ref 94–109)
CK SERPL-CCNC: 69 U/L (ref 30–225)
CO2 SERPL-SCNC: 27 MMOL/L (ref 20–32)
COLOR UR AUTO: NORMAL
CREAT SERPL-MCNC: 0.6 MG/DL (ref 0.52–1.04)
CREAT UR-MCNC: 10 MG/DL
DIFFERENTIAL METHOD BLD: NORMAL
EOSINOPHIL # BLD AUTO: 0.1 10E9/L (ref 0–0.7)
EOSINOPHIL NFR BLD AUTO: 2 %
ERYTHROCYTE [DISTWIDTH] IN BLOOD BY AUTOMATED COUNT: 11.9 % (ref 10–15)
ERYTHROCYTE [SEDIMENTATION RATE] IN BLOOD BY WESTERGREN METHOD: 13 MM/H (ref 0–20)
GFR SERPL CREATININE-BSD FRML MDRD: >90 ML/MIN/{1.73_M2}
GLUCOSE SERPL-MCNC: 103 MG/DL (ref 70–99)
GLUCOSE UR STRIP-MCNC: NEGATIVE MG/DL
HCT VFR BLD AUTO: 40.1 % (ref 35–47)
HGB BLD-MCNC: 13.6 G/DL (ref 11.7–15.7)
HGB UR QL STRIP: NEGATIVE
IMM GRANULOCYTES # BLD: 0 10E9/L (ref 0–0.4)
IMM GRANULOCYTES NFR BLD: 0.2 %
KETONES UR STRIP-MCNC: NEGATIVE MG/DL
LEUKOCYTE ESTERASE UR QL STRIP: NEGATIVE
LYMPHOCYTES # BLD AUTO: 1.4 10E9/L (ref 0.8–5.3)
LYMPHOCYTES NFR BLD AUTO: 31 %
MCH RBC QN AUTO: 30.8 PG (ref 26.5–33)
MCHC RBC AUTO-ENTMCNC: 33.9 G/DL (ref 31.5–36.5)
MCV RBC AUTO: 91 FL (ref 78–100)
MONOCYTES # BLD AUTO: 0.5 10E9/L (ref 0–1.3)
MONOCYTES NFR BLD AUTO: 10.5 %
NEUTROPHILS # BLD AUTO: 2.5 10E9/L (ref 1.6–8.3)
NEUTROPHILS NFR BLD AUTO: 56.1 %
NITRATE UR QL: NEGATIVE
PH UR STRIP: 6.5 PH (ref 5–7)
PLATELET # BLD AUTO: 185 10E9/L (ref 150–450)
POTASSIUM SERPL-SCNC: 3.8 MMOL/L (ref 3.4–5.3)
PROT SERPL-MCNC: 7.2 G/DL (ref 6.8–8.8)
PROT UR-MCNC: <0.05 G/L
PROT/CREAT 24H UR: NORMAL G/G CR (ref 0–0.2)
RBC # BLD AUTO: 4.41 10E12/L (ref 3.8–5.2)
SODIUM SERPL-SCNC: 139 MMOL/L (ref 133–144)
SOURCE: NORMAL
SP GR UR STRIP: 1 (ref 1–1.03)
UROBILINOGEN UR STRIP-MCNC: NORMAL MG/DL (ref 0–2)
WBC # BLD AUTO: 4.5 10E9/L (ref 4–11)

## 2020-11-10 PROCEDURE — 84156 ASSAY OF PROTEIN URINE: CPT | Performed by: INTERNAL MEDICINE

## 2020-11-10 PROCEDURE — 86160 COMPLEMENT ANTIGEN: CPT | Performed by: INTERNAL MEDICINE

## 2020-11-10 PROCEDURE — 81003 URINALYSIS AUTO W/O SCOPE: CPT | Performed by: INTERNAL MEDICINE

## 2020-11-10 PROCEDURE — 36415 COLL VENOUS BLD VENIPUNCTURE: CPT | Performed by: INTERNAL MEDICINE

## 2020-11-10 PROCEDURE — 80053 COMPREHEN METABOLIC PANEL: CPT | Performed by: INTERNAL MEDICINE

## 2020-11-10 PROCEDURE — 86225 DNA ANTIBODY NATIVE: CPT | Performed by: INTERNAL MEDICINE

## 2020-11-10 PROCEDURE — 82550 ASSAY OF CK (CPK): CPT | Performed by: INTERNAL MEDICINE

## 2020-11-10 PROCEDURE — 85025 COMPLETE CBC W/AUTO DIFF WBC: CPT | Performed by: INTERNAL MEDICINE

## 2020-11-10 PROCEDURE — 85652 RBC SED RATE AUTOMATED: CPT | Performed by: INTERNAL MEDICINE

## 2020-11-10 PROCEDURE — 86140 C-REACTIVE PROTEIN: CPT | Performed by: INTERNAL MEDICINE

## 2020-11-11 LAB
C3 SERPL-MCNC: 64 MG/DL (ref 81–157)
C4 SERPL-MCNC: 17 MG/DL (ref 13–39)
CRP SERPL-MCNC: 3.1 MG/L (ref 0–8)

## 2020-11-12 LAB — DSDNA AB SER-ACNC: 20 IU/ML

## 2020-11-17 ENCOUNTER — VIRTUAL VISIT (OUTPATIENT)
Dept: RHEUMATOLOGY | Facility: CLINIC | Age: 45
End: 2020-11-17
Payer: COMMERCIAL

## 2020-11-17 DIAGNOSIS — Z79.899 HIGH RISK MEDICATION USE: ICD-10-CM

## 2020-11-17 DIAGNOSIS — B18.1 CHRONIC HEPATITIS B (H): ICD-10-CM

## 2020-11-17 DIAGNOSIS — M32.9 SYSTEMIC LUPUS ERYTHEMATOSUS, UNSPECIFIED SLE TYPE, UNSPECIFIED ORGAN INVOLVEMENT STATUS (H): Primary | ICD-10-CM

## 2020-11-17 PROCEDURE — 99214 OFFICE O/P EST MOD 30 MIN: CPT | Mod: TEL | Performed by: INTERNAL MEDICINE

## 2020-11-17 NOTE — PROGRESS NOTES
"Natasha Lee is a 45 year old female who is being evaluated via a billable telephone visit.      The patient has been notified of following:     \"This telephone visit will be conducted via a call between you and your physician/provider. We have found that certain health care needs can be provided without the need for a physical exam.  This service lets us provide the care you need with a short phone conversation.  If a prescription is necessary we can send it directly to your pharmacy.  If lab work is needed we can place an order for that and you can then stop by our lab to have the test done at a later time.    Telephone visits are billed at different rates depending on your insurance coverage. During this emergency period, for some insurers they may be billed the same as an in-person visit.  Please reach out to your insurance provider with any questions.    If during the course of the call the physician/provider feels a telephone visit is not appropriate, you will not be charged for this service.\"    Patient has given verbal consent for Telephone visit?  Yes    What phone number would you like to be contacted at? [phone]    How would you like to obtain your AVS? Kari Rico CMA Rheumatology  11/17/2020 12:00 PM    Rheumatology Telephone/Telehealth Visit      Natasha Lee MRN# [id number]   YOB: 1975 Age: 45 year old      Date of visit: 11/17/20   PCP: Sandy Garcia    Chief Complaint   Patient presents with:  Systemic Lupus Erythematous: Breaking out on face      Assessment and Plan     1.  Systemic lupus erythematosus (dsDNA positive, RNP+, hypocomplementemia C3 & C4, malar rash, oral sores, skin lesions, photosensitivity, Raynaud's phenomenon, fatigue, seizure history [grand mal seizure as an infant, absence seizures multiple times from age of [age], no seizure since age of 19 years old]): Diagnosed at the age of 13 years old.  Established with me on 3/8/2018.  " Reportedly treated with hydroxychloroquine when she was initially diagnosed at the age of 13 when she developed oral sores, weight loss, malar rash, and joint aches.  Also reportedly treated with azathioprine; she states that she has not been on azathioprine since at least 2003.  Had been doing well without DMARD therapy for several years but then in 2019 she started to have more erythematous lesions on her arms so hydroxychloroquine was restarted with improvement of these lesions.  Doing well on hydroxychloroquine monotherapy.  Toxicity monitoring eye exam is overdue and she says that she will schedule this.    - Continue hydroxychloroquine 200 mg daily  - Ophthalmology referral for hydroxychloroquine toxicity monitoring  - Labs every 6 months: CBC, CMP, ESR, CRP, CK, C3, C4, dsDNA, UA, Uprotein:creatinine    2.  Raynaud's phenomenon: Managed well with cold avoidance.  May consider calcium channel blocker in the future if needed.    3. Chronic hepatitis B: Positive since birth, per patient.  Hepatitis B core antibody and surface antigen positive in the past.  Discussed the option of seeing hepatology.  - GI/Hepatology referral    # Relevant labs and imaging were reviewed with the patient    # High risk medication toxicity monitoring: discussion and labs reviewed; appropriate labs ordered. See above.  Instructed that if confirmed to have COVID-19 or exposure to someone with confirmed COVID-19 to call this clinic for directions on DMARD management.    # Note that this is a virtual visit to reduce the risk of COVID-19 exposure during this current pandemic.      # Considered to be at high risk of complications from the COVID-19 virus.  It is recommended to limit contact with other people and if possible to work remotely or provide a leave of absence to reduce the risk for COVID-19.      Ms. Lee verbalized agreement with and understanding of the rational for the diagnosis and treatment plan.  All questions were  answered to best of my ability and the patient's satisfaction. Ms. Lee was advised to contact the clinic with any questions that may arise after the clinic visit.      Thank you for involving me in the care of the patient    Return to clinic: 12 months      HPI   Natasha Lee is a 45 year old female with a past medical history significant for chronic hepatitis B, heart murmur, history of cutting (she did this while ago and was associated with depression; had therapy and this resolved many years ago; scarring on her left arm) and SLE who is seen for follow-up of lupus.     Today, she reports that she is doing well on hydroxychloroquine.  No joint pain.  No morning stiffness.  No rash.  Overall happy with how well she is doing.  Notes that she is not doing anything to monitor the hepatitis B and would like to see hepatology.      Denies fevers, chills, nausea, vomiting, constipation, diarrhea. No abdominal pain. No chest pain/pressure, palpitations, or shortness of breath. No LE swelling. No neck pain. No oral or nasal sores currently.  Malar rash hx; no rash now.    No sicca symptoms. No eye pain or redness. No history of inflammatory eye disease.  No history of DVT, pulmonary embolism, or miscarriage; one healthy child.      Unknown family history as she was adopted.       Tobacco: None  EtOH: None  Drugs: None  Occupation: Drug and alcohol counselor at [employer]    ROS   GEN: No fevers, chills, night sweats, or weight change  SKIN: See HPI  HEENT: No oral or nasal ulcers currently but has had them in the past.  CV: No chest pain, pressure, palpitations, or dyspnea on exertion.  PULM: No SOB, wheeze, cough.  GI: No nausea, vomiting, constipation, diarrhea. No blood in stool. No abdominal pain.  : No blood in urine.  MSK: See HPI.  NEURO: No numbness or tingling  EXT: No LE swelling  PSYCH: Negative    Active Problem List     Patient Active Problem List   Diagnosis     Chronic  hepatitis B (H)     Lupus (systemic lupus erythematosus) (H)     CARDIOVASCULAR SCREENING; LDL GOAL LESS THAN 160     Health Care Home     Dysmenorrhea     Menorrhagia     Allergic rhinitis     Other nonspecific finding on examination of urine     Urinary frequency     Vitamin B12 deficiency (non anemic)     Heart murmur     S/P laparoscopic cholecystectomy     Cervical cancer screening     Past Medical History     Past Medical History:   Diagnosis Date     Diabetes (H)     Pre-Diabetic     Heart murmur 1997     Lupus (systemic lupus erythematosus) (H) 1988    Dr. Prather     Lupus (systemic lupus erythematosus) (H) 12/10/2009     Normal delivery 9/5/09    boy forceps     Seizures (H)     When a child     Past Surgical History     Past Surgical History:   Procedure Laterality Date     ANESTH,SKIN SURGERY,KNEE  1993, 1990    orthoscopic     C APPENDECTOMY  4/2008     CHOLECYSTECTOMY  2016     CL AFF SURGICAL PATHOLOGY  2007     LAPAROSCOPIC CHOLECYSTECTOMY N/A 5/18/2016    Procedure: LAPAROSCOPIC CHOLECYSTECTOMY;  Surgeon: Radha Cline MD;  Location: [facility] OR     LAPAROSCOPIC TUBAL LIGATION  12/15/2011    Procedure:LAPAROSCOPIC TUBAL LIGATION; Laparoscopic tubal ligation; Surgeon:AMY WYNN; Location:[facility] OR     OSTEOTOMY FOOT Left 2/13/2019    Procedure: Left Lapidus with  Azael Osteotomy;  Surgeon: Brett Chavez MD;  Location: [facility] OR     Allergy     Allergies   Allergen Reactions     Carbamazepine      Fever, rash     Saline-Related Products      Current Medication List     Current Outpatient Medications   Medication Sig     albuterol (PROAIR HFA/PROVENTIL HFA/VENTOLIN HFA) 108 (90 Base) MCG/ACT inhaler Inhale 2 puffs into the lungs every 6 hours as needed for shortness of breath / dyspnea or wheezing     Cranberry 1000 MG CAPS Take 1 capsule by mouth 4 times daily     DiphenhydrAMINE HCl (BENADRYL PO)      DiphenhydrAMINE HCl, Sleep, (UNISOM SLEEPGELS) 50 MG CAPS Take 50 mg by mouth At  "Bedtime     fluticasone (FLONASE) 50 MCG/ACT nasal spray Spray 2 sprays into both nostrils daily     hydroxychloroquine (PLAQUENIL) 200 MG tablet Take 1 tablet (200 mg) by mouth daily     norethindrone-ethinyl estradiol (ORTHO-NOVUM) 1-35 MG-MCG tablet Take 1 tablet by mouth daily     Prenatal MV-Min-Fe Fum-FA-DHA (PRENATAL MULTIVITAMIN + DHA PO)      tacrolimus (PROTOPIC) 0.1 % external ointment Apply twice daily to rash on face until resolved.     triamcinolone (KENALOG) 0.1 % external cream Apply sparingly to affected area three times daily prn.     No current facility-administered medications for this visit.          Social History   See HPI    Family History     Family History   Adopted: Yes   Family history unknown: Yes       Physical Exam     Temp Readings from Last 3 Encounters:   09/09/19 96.2  F (35.7  C) (Temporal)   08/09/19 99.7  F (37.6  C) (Temporal)   02/13/19 97.2  F (36.2  C) (Temporal)     BP Readings from Last 5 Encounters:   10/22/20 131/77   11/26/19 132/72   09/09/19 100/70   08/22/19 130/80   08/09/19 136/87     Pulse Readings from Last 1 Encounters:   10/22/20 70     Resp Readings from Last 1 Encounters:   02/13/19 16     Estimated body mass index is 29.69 kg/m  as calculated from the following:    Height as of 10/22/20: 1.575 m (5' 2\").    Weight as of 10/22/20: 73.6 kg (162 lb 4.8 oz).    Telephone Visit     Labs / Imaging (select studies)     RNP/Sm/SSA/SSB  Recent Labs   Lab Test 03/07/18  1548 06/11/14  1540   RNPIGG 1.8*  --    SMIGG 0.2  --    SSAIGG >8.0*  --    SSBIGG <0.2  --    SCLIGG <0.2  --    TREPAB  --  Negative     dsDNA  Recent Labs   Lab Test 11/10/20  1500 11/21/19  1536 06/07/18  1347 03/07/18  1548 03/20/13  1452   DNA 20* 12* 15* 19* 55*     C3/C4  Recent Labs   Lab Test 11/10/20  1500 11/21/19  1536 06/07/18  1347 03/07/18  1548 03/20/13  1452   [id number] 64* 56* 59* 60* 57*   [id number] 17 16 16 16 15     Antiphospholipid Antibodies  Recent Labs   Lab Test 03/07/18  1548 "   B2GPG 2.9   B2GPM 1.5   CARDG 5.9   CARDM 0.3   LUPINT Negative     CBC  Recent Labs   Lab Test 11/10/20  1500 11/21/19  1536 01/21/19  1700 06/07/18  1347   WBC 4.5 4.3 4.3 4.4   RBC 4.41 4.08 4.37 4.33   HGB 13.6 12.6 13.4 13.6   HCT 40.1 36.8 38.6 38.7   MCV 91 90 88 89   RDW 11.9 12.4 12.3 12.3    200 224 219   MCH 30.8 30.9 30.7 31.4   MCHC 33.9 34.2 34.7 35.1   NEUTROPHIL 56.1 59.1  --  62.1   LYMPH 31.0 28.3  --  28.1   MONOCYTE 10.5 10.5  --  8.2   EOSINOPHIL 2.0 1.4  --  0.9   BASOPHIL 0.2 0.5  --  0.5   ANEU 2.5 2.5  --  2.7   ALYM 1.4 1.2  --  1.2   CALLIE 0.5 0.5  --  0.4   AEOS 0.1 0.1  --  0.0   ABAS 0.0 0.0  --  0.0     CMP  Recent Labs   Lab Test 11/10/20  1500 11/21/19  1536 01/21/19  1700 06/07/18  1347 03/07/18  1548 05/13/16  1438    141 142 141 144 142   POTASSIUM 3.8 3.6 3.5 3.6 3.8 3.7   CHLORIDE 108 112* 107 109 108 107   CO2 27 27 27 25 29 28   ANIONGAP 4 2* 8 7 7 7   * 130* 106* 130* 78 106*   BUN 13 10 9 14 13 8   CR 0.60 0.64 0.63 0.64 0.72 0.56   GFRESTIMATED >90 >90 >90 >90 89 >90  Non  GFR Calc     GFRESTBLACK >90 >90 >90 >90 >90 >90  African American GFR Calc     JUAN A 8.4* 8.8 8.8 8.5 8.8 8.7   BILITOTAL 0.3 0.2  --  0.5 0.3 0.4   ALBUMIN 3.8 3.7  --  3.8 4.1 3.7   PROTTOTAL 7.2 6.8  --  7.3 7.4 7.2   ALKPHOS 80 85  --  55 78 79   AST 30 26  --  27 23 27   ALT 46 44  --  40 39 44     HgA1c  Recent Labs   Lab Test 02/15/16  0827 10/17/14  0913   A1C 5.3 5.4     Iron Studies  Recent Labs   Lab Test 02/15/16  0827   AVELINA 234*     Calcium/VitaminD  Recent Labs   Lab Test 11/10/20  1500 11/21/19  1536 01/21/19  1700 02/15/16  0827 02/15/16  0827   JUAN A 8.4* 8.8 8.8   < > 8.0*   VITDT  --   --   --   --  32    < > = values in this interval not displayed.     ESR/CRP  Recent Labs   Lab Test 11/10/20  1500 11/21/19  1536 06/07/18  1347   SED 13 19 13   CRP 3.1 5.4 4.6     CK/Aldolase  Recent Labs   Lab Test 11/10/20  1500 06/07/18  1347 03/07/18  1548   CKT 69  87 60     TSH/T4  Recent Labs   Lab Test 02/15/16  0827 04/02/14  1509   TSH 1.51 0.58     Lipid Panel  Recent Labs   Lab Test 02/15/16  0827 02/11/14  0716   CHOL 143 145   TRIG 71 110   HDL 47* 49*   LDL 82 74   VLDL  --  22   CHOLHDLRATIO  --  3.0   NHDL 96  --      Hepatitis B  Recent Labs   Lab Test 11/16/18  1249 03/07/18  1548 03/20/13  1452   AUSAB 1.76  --   --    HBCAB Reactive*  --   --    HEPBANG Reactive*  --  Positive*   HBQLOG  --  2.1*  --      Hepatitis C  Recent Labs   Lab Test 11/16/18  1249 06/11/14  1540 03/20/13  1452   HCVAB Nonreactive Negative Negative     Lyme confirmation testing by Western Blot  Recent Labs   Lab Test 06/11/14  1540   LYWG Negative  Reference range: Negative  (Note)  Band(s) present: NONE  (Insufficient number of bands for positive result)  INTERPRETIVE INFORMATION: Borrelia Burgdorferi Ab, IgG  Western Blot    For this assay, a positive result is reported when any 5 or  more of the following 10 bands are present: 18, 23, 28, 30,  39, 41, 45, 58, 66, or 93 kDa.  All other banding patterns  are reported as negative.     LYWM Negative  Reference range: Negative  (Note)  Band(s) present: NONE  (Insufficient number of bands for positive result)  INTERPRETIVE INFORMATION: Borrelia Burgdorferi Antibody,  IgM Western Blot    For this assay, a positive result is reported when any 2 or  more of the following bands are present: 23, 39, or 41 kDa.  All other banding patterns are reported as negative.  Performed by [facility],  [address] [place] [id number] [phone]  [email or web address], Elfego Franco MD, Lab. Director       HIV Screening  Recent Labs   Lab Test 11/16/18  1249 06/11/14  1540   HIAGAB Nonreactive Nonreactive   HIV-1 p24 Ag & HIV-1/HIV-2 Ab Not Detected       UA  Recent Labs   Lab Test 11/10/20  1501 02/19/19  1300 06/07/18  1347 03/07/18  1548 12/18/14  1340 06/27/14  1120 06/27/14  1120   COLOR Straw  --  Light Yellow Yellow Straw   < > Straw   APPEARANCE  Clear  --  Clear Clear Clear   < > Clear   URINEGLC Negative  --  Negative Negative Negative   < > Negative   URINEBILI Negative  --  Negative Negative Negative   < > Negative   SG 1.003  --  1.007 1.025 1.001*   < > 1.005   URINEPH 6.5  --  6.0 5.5 5.5   < > 6.5   PROTEIN Negative  --  Negative Negative Negative   < > Negative   UROBILINOGEN  --   --   --  0.2  --   --   --    NITRITE Negative  --  Negative Negative Negative   < > Negative   UBLD Negative  --  Negative Negative Negative   < > Negative   LEUKEST Negative  --  Negative Negative Small*   < > Moderate*   WBCU  --  0 - 5  --   --  2-5*  --  O - 2   RBCU  --  O - 2  --   --  O - 2  --  O - 2   SQUAMOUSEPI  --  Few  --   --  Few  --  Few   BACTERIA  --  Few*  --   --   --   --  Few*    < > = values in this interval not displayed.     Urine Microscopic  Recent Labs   Lab Test 02/19/19  1300 12/18/14  1340 06/27/14  1120   WBCU 0 - 5 2-5* O - 2   RBCU O - 2 O - 2 O - 2   SQUAMOUSEPI Few Few Few   BACTERIA Few*  --  Few*     Urine Protein  Recent Labs   Lab Test 11/10/20  1501 06/07/18  1349 03/07/18  1548   UTP <0.05 0.07 0.09   UTPG Unable to calculate due to low value 0.17 0.08   UCRR 10 40 110       [facility] from Oct 2019 reviewed and is sufficient for CTD monitoring purposes    Immunization History     Immunization History   Administered Date(s) Administered     Influenza (H1N1) 12/04/2009     Influenza (IIV3) PF 12/18/2000, 09/22/2009, 11/17/2011, 11/01/2012, 09/22/2014     Influenza Vaccine IM > 6 months Valent IIV4 09/26/2013, 09/30/2015, 10/01/2015, 11/02/2018          Chart documentation done in part with Dragon Voice recognition Software. Although reviewed after completion, some word and grammatical error may remain.    Phone call start time: 12:45 PM  Phone call end time: 1:00 PM    This visit is equivalent to a [id number] visit    Location of patient: [place], [place]  Location of provider: [place]    Follow up:  follow up appointment  scheduled to be in November 2021    Christopher Zaman MD

## 2020-12-01 ENCOUNTER — VIRTUAL VISIT (OUTPATIENT)
Dept: DERMATOLOGY | Facility: CLINIC | Age: 45
End: 2020-12-01
Payer: COMMERCIAL

## 2020-12-01 DIAGNOSIS — L93.0 LUPUS ERYTHEMATOSUS TUMIDUS: Primary | ICD-10-CM

## 2020-12-01 PROCEDURE — 99213 OFFICE O/P EST LOW 20 MIN: CPT | Mod: TEL | Performed by: DERMATOLOGY

## 2020-12-01 NOTE — LETTER
12/1/2020         RE: Natasha Lee  [address] [address]        Dear Colleague,    Thank you for referring your patient, Natasha Lee, to the [facility]. Please see a copy of my visit note below.    [facility] Dermatology Record:  Store and Forward Telephone: [phone]      Dermatology Problem List:  1. Relevant medical hx: SLE on plaquenil and intermittent prednisone, chronic hepatitis B  2. Tumid lupus +/- granulomatous rosacea  - Previously treated as severe and granulomatous appearing rosacea, interestingly did improve with oral doxycycline. 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   - Biopsy 1/6/2020 consistent with cutaneous lupus erythematosus  - Current t/x: TAC 0.1% cream, plaquenil (managed by rheum), Heliocare  -previous tx: Protopic 0.1% ointment (good at baseline but not helpful for flares), metronidazole 0.75% cream, oral ivermectin 15 mg monthly x3 months,  permethrin 5% cream, gentle skin care, doxycycline 100 mg BID (x3 months - improved substantially in first month then went back to baseline)  3. Maculopapular rash following URI 6/6/20 - TMC 0.1% cream, bland emollients, OTC antihistamines    Encounter Date: Dec 1, 2020    CC:   Chief Complaint   Patient presents with     Acne       History of Present Illness:  Natasha Lee is a 45 year old female who presents for follow up of facial rash.    Better in fall, worse in the winter now  Wonders if related to mask wearing or increased stress. No other changes she can identify.  Wearing sunscreen daily  Protopic 0.1% ointment not helping much now, helped more in the past.  Continues to take plaquenil. Sees rheum yearly, labs twice yearly. SLE doing well.    ROS: Patient is generally feeling well today     Physical Examination:  General: Well-appearing female, appropriately-developed individual.  Skin: Focused examination including face, neck was performed.   -Red edematous  papule with comedonal or pustular heads bridging from the cheeks onto the nasal bridge. Also on the chin.    Labs:  Reviewed recent labs. SLE under good control.    Past Medical History:   Patient Active Problem List   Diagnosis     Chronic hepatitis B (H)     Lupus (systemic lupus erythematosus) (H)     CARDIOVASCULAR SCREENING; LDL GOAL LESS THAN 160     Health Care Home     Dysmenorrhea     Menorrhagia     Allergic rhinitis     Other nonspecific finding on examination of urine     Urinary frequency     Vitamin B12 deficiency (non anemic)     Heart murmur     S/P laparoscopic cholecystectomy     Cervical cancer screening     Past Medical History:   Diagnosis Date     Diabetes (H)     Pre-Diabetic     Heart murmur 1997     Lupus (systemic lupus erythematosus) (H) 1988    Dr. Prather     Lupus (systemic lupus erythematosus) (H) 12/10/2009     Normal delivery 9/5/09    boy forceps     Seizures (H)     When a child     Past Surgical History:   Procedure Laterality Date     ANESTH,SKIN SURGERY,KNEE  1993, 1990    orthoscopic     C APPENDECTOMY  4/2008     CHOLECYSTECTOMY  2016     CL AFF SURGICAL PATHOLOGY  2007     LAPAROSCOPIC CHOLECYSTECTOMY N/A 5/18/2016    Procedure: LAPAROSCOPIC CHOLECYSTECTOMY;  Surgeon: Radha Cline MD;  Location:  OR     LAPAROSCOPIC TUBAL LIGATION  12/15/2011    Procedure:LAPAROSCOPIC TUBAL LIGATION; Laparoscopic tubal ligation; Surgeon:AMY WYNN; Location: OR     OSTEOTOMY FOOT Left 2/13/2019    Procedure: Left Lapidus with  Azael Osteotomy;  Surgeon: Brett Chavez MD;  Location:  OR       Social History:  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 Patient has 1 child. She has had her tubes tied - no plans for future pregnancies.   Reviewed and left in chart for clinician convenience.     Family History:  Unknown - patient is adopted.  Reviewed and left in chart for clinician convenience.     Medications:  Current Outpatient  Medications   Medication     albuterol (PROAIR HFA/PROVENTIL HFA/VENTOLIN HFA) 108 (90 Base) MCG/ACT inhaler     Cranberry 1000 MG CAPS     DiphenhydrAMINE HCl (BENADRYL PO)     DiphenhydrAMINE HCl, Sleep, (UNISOM SLEEPGELS) 50 MG CAPS     fluticasone (FLONASE) 50 MCG/ACT nasal spray     hydroxychloroquine (PLAQUENIL) 200 MG tablet     norethindrone-ethinyl estradiol (ORTHO-NOVUM) 1-35 MG-MCG tablet     Prenatal MV-Min-Fe Fum-FA-DHA (PRENATAL MULTIVITAMIN + DHA PO)     tacrolimus (PROTOPIC) 0.1 % external ointment     triamcinolone (KENALOG) 0.1 % external cream     No current facility-administered medications for this visit.           Allergies   Allergen Reactions     Carbamazepine      Fever, rash     [place]-Related Products            Impression and Recommendations (Patient Counseled on the Following):  1. 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 Patient currently on plaquenil (managed by rheum). Currently flared. Will have her stop protopic and use TAC 0.1% cream BID for next two weeks. If not helpful, could consider super potent topical steroid versus oral immunosuppressant. Minor scarring now, but want to avoid this. Recommended daily heliocare as this may be helpful for photosensitive conditions.     - Advised patient to apply TAC 0.1% cream BID   - Continue to follow with rheumatology for treatment with plaquenil and ophthalmology for appropriate screening exams.   - Recommended continued daily physical blocker sunscreen  - Will follow-up in 2 weeks with virtual visit to monitor her progress      Follow-up:   2 weeks for virtual visit     Staff only    Yamile Funez MD    Department of Dermatology  [facility]: Phone: [phone], Fax:[phone]  [facility]: Phone: [phone], Fax:  "[phone]    _____________________________________________________________________________    Teledermatology information:  - Location of patient: [place]  - Patient presented as: return  - Location of teledermatologist:  [facility] )  - Image quality and interpretability: acceptable  - Physician has received verbal consent for a Video/Photos Visit from the patient? YES  - In-person dermatology visit recommendation: no  - Date of images: 11/24/20  - Service start time: 10:20  - Service end time: 10:28  - Date of report: 12/1/2020       Teledermatology Nurse Call for RETURN patients seen within the last 3 years:      The patient was contacted by phone and we reviewed they have a visit in teledermatology upcoming with an MD or PA-C;  Importantly, \"a teledermatology visit may not be as thorough as an in-person visit, and the quality of the photograph and/or video sent may not be of the same quality as that taken by the dermatology clinic.\"     We have found that certain health care needs can be provided without the need for an in-person physical exam.   If a prescription is necessary we can send it directly to your pharmacy.  If lab work is needed we can place an order for that and you can then stop by our lab to have the test done at a later time.Visits are billed at different rates depending on your insurance coverage. Please reach out to your insurance provider with any questions.    The patient chose to:                                                                                                                                                                                                                 Consent to a teledermatology visit with LINAGORA photos. Patient told by nursing these are already uploaded. Instructions sent to patient via LINAGORA. They verified they will be in the state of [place] at the time of the encounter.                                                        "                                                                                                                                                                    The patient denied skin pain, fever, mucosal symptoms(lesions, blisters, sores in the mouth, nose, eyes, or genitals) IF PATIENT ENDORSES ANY OF THESE STOP AND PAGE ON CALL ATTENDING      -Redness/acne mix - not sure what cause of flare up is.   -Dry winter air and mask wearing seems to be making things worse  -Using Kenalog and Protopic cream/ointment, not making things any better      Eryn Guevara LPN                                                                                                                                                                                                                             Again, thank you for allowing me to participate in the care of your patient.        Sincerely,        Yamile Funez MD

## 2020-12-01 NOTE — PROGRESS NOTES
[facility] Dermatology Record:  Store and Forward Telephone: [phone]      Dermatology Problem List:  1. Relevant medical hx: SLE on plaquenil and intermittent prednisone, chronic hepatitis B  2. Tumid lupus +/- granulomatous rosacea  - Previously treated as severe and granulomatous appearing rosacea, interestingly did improve with oral doxycycline. 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   - Biopsy 1/6/2020 consistent with cutaneous lupus erythematosus  - Current t/x: TAC 0.1% cream, plaquenil (managed by rheum), Heliocare  -previous tx: Protopic 0.1% ointment (good at baseline but not helpful for flares), metronidazole 0.75% cream, oral ivermectin 15 mg monthly x3 months,  permethrin 5% cream, gentle skin care, doxycycline 100 mg BID (x3 months - improved substantially in first month then went back to baseline)  3. Maculopapular rash following URI 6/6/20 - TMC 0.1% cream, bland emollients, OTC antihistamines    Encounter Date: Dec 1, 2020    CC:   Chief Complaint   Patient presents with     Acne       History of Present Illness:  Natasha Lee is a 45 year old female who presents for follow up of facial rash.    Better in fall, worse in the winter now  Wonders if related to mask wearing or increased stress. No other changes she can identify.  Wearing sunscreen daily  Protopic 0.1% ointment not helping much now, helped more in the past.  Continues to take plaquenil. Sees rheum yearly, labs twice yearly. SLE doing well.    ROS: Patient is generally feeling well today     Physical Examination:  General: Well-appearing female, appropriately-developed individual.  Skin: Focused examination including face, neck was performed.   -Red edematous papule with comedonal or pustular heads bridging from the cheeks onto the nasal bridge. Also on the chin.    Labs:  Reviewed recent labs. SLE under good control.    Past Medical History:   Patient Active Problem List   Diagnosis     Chronic hepatitis B (H)     Lupus (systemic  lupus erythematosus) (H)     CARDIOVASCULAR SCREENING; LDL GOAL LESS THAN 160     Health Care Home     Dysmenorrhea     Menorrhagia     Allergic rhinitis     Other nonspecific finding on examination of urine     Urinary frequency     Vitamin B12 deficiency (non anemic)     Heart murmur     S/P laparoscopic cholecystectomy     Cervical cancer screening     Past Medical History:   Diagnosis Date     Diabetes (H)     Pre-Diabetic     Heart murmur 1997     Lupus (systemic lupus erythematosus) (H) 1988    Dr. Prather     Lupus (systemic lupus erythematosus) (H) 12/10/2009     Normal delivery 9/5/09    boy forceps     Seizures (H)     When a child     Past Surgical History:   Procedure Laterality Date     ANESTH,SKIN SURGERY,KNEE  1993, 1990    orthoscopic     C APPENDECTOMY  4/2008     CHOLECYSTECTOMY  2016     CL AFF SURGICAL PATHOLOGY  2007     LAPAROSCOPIC CHOLECYSTECTOMY N/A 5/18/2016    Procedure: LAPAROSCOPIC CHOLECYSTECTOMY;  Surgeon: Radha Cline MD;  Location: [facility] OR     LAPAROSCOPIC TUBAL LIGATION  12/15/2011    Procedure:LAPAROSCOPIC TUBAL LIGATION; Laparoscopic tubal ligation; Surgeon:AYM WYNN; Location: OR     OSTEOTOMY FOOT Left 2/13/2019    Procedure: Left Lapidus with  Azael Osteotomy;  Surgeon: Brett Chavez MD;  Location:  OR       Social History:  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 Patient has 1 child. She has had her tubes tied - no plans for future pregnancies.   Reviewed and left in chart for clinician convenience.     Family History:  Unknown - patient is adopted.  Reviewed and left in chart for clinician convenience.     Medications:  Current Outpatient Medications   Medication     albuterol (PROAIR HFA/PROVENTIL HFA/VENTOLIN HFA) 108 (90 Base) MCG/ACT inhaler     Cranberry 1000 MG CAPS     DiphenhydrAMINE HCl (BENADRYL PO)     DiphenhydrAMINE HCl, Sleep, (UNISOM SLEEPGELS) 50 MG CAPS     fluticasone (FLONASE) 50 MCG/ACT nasal spray      hydroxychloroquine (PLAQUENIL) 200 MG tablet     norethindrone-ethinyl estradiol (ORTHO-NOVUM) 1-35 MG-MCG tablet     Prenatal MV-Min-Fe Fum-FA-DHA (PRENATAL MULTIVITAMIN + DHA PO)     tacrolimus (PROTOPIC) 0.1 % external ointment     triamcinolone (KENALOG) 0.1 % external cream     No current facility-administered medications for this visit.           Allergies   Allergen Reactions     Carbamazepine      Fever, rash     [place]-Related Products            Impression and Recommendations (Patient Counseled on the Following):  1. 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 Patient currently on plaquenil (managed by rheum). Currently flared. Will have her stop protopic and use TAC 0.1% cream BID for next two weeks. If not helpful, could consider super potent topical steroid versus oral immunosuppressant. Minor scarring now, but want to avoid this. Recommended daily heliocare as this may be helpful for photosensitive conditions.     - Advised patient to apply TAC 0.1% cream BID   - Continue to follow with rheumatology for treatment with plaquenil and ophthalmology for appropriate screening exams.   - Recommended continued daily physical blocker sunscreen  - Will follow-up in 2 weeks with virtual visit to monitor her progress      Follow-up:   2 weeks for virtual visit     Staff only    Yamile uFnez MD    Department of Dermatology  [facility]: Phone: [phone], Fax:[phone]  [facility]: Phone: [phone], Fax: [phone]    _____________________________________________________________________________    Teledermatology information:  - Location of patient: [place]  - Patient presented as: return  - Location of teledermatologist:  ([facility] )  - Image quality and interpretability: acceptable  - Physician has received  "verbal consent for a Video/Photos Visit from the patient? YES  - In-person dermatology visit recommendation: no  - Date of images: 11/24/20  - Service start time: 10:20  - Service end time: 10:28  - Date of report: 12/1/2020       Teledermatology Nurse Call for RETURN patients seen within the last 3 years:      The patient was contacted by phone and we reviewed they have a visit in teledermatology upcoming with an MD or CAROLINE;  Importantly, \"a teledermatology visit may not be as thorough as an in-person visit, and the quality of the photograph and/or video sent may not be of the same quality as that taken by the dermatology clinic.\"     We have found that certain health care needs can be provided without the need for an in-person physical exam.   If a prescription is necessary we can send it directly to your pharmacy.  If lab work is needed we can place an order for that and you can then stop by our lab to have the test done at a later time.Visits are billed at different rates depending on your insurance coverage. Please reach out to your insurance provider with any questions.    The patient chose to:                                                                                                                                                                                                                 Consent to a teledermatology visit with Progression photos. Patient told by nursing these are already uploaded. Instructions sent to patient via Progression. They verified they will be in the state of [place] at the time of the encounter.                                                                                                                                                                                                                           The patient denied skin pain, fever, mucosal symptoms(lesions, blisters, sores in the mouth, nose, eyes, or genitals) IF PATIENT ENDORSES ANY OF THESE STOP AND PAGE ON CALL " ATTENDING      -Redness/acne mix - not sure what cause of flare up is.   -Dry winter air and mask wearing seems to be making things worse  -Using Kenalog and Protopic cream/ointment, not making things any better      Eryn Guevara LPN

## 2020-12-01 NOTE — PATIENT INSTRUCTIONS
[facility] Dermatology Visit    Thank you for allowing us to participate in your care. Your findings, instructions and follow-up plan are as follows:      Plan  -Start over the counter Heliocare supplement daily  -Continue daily sunscreen  -Stop protopic 0.1% ointment. Instead, use Triamcinolone 0.1% cream twice daily on red spots on the face for the next two weeks. We will do recheck then and determine next steps.      When should I call my doctor?    If you are worsening or not improving, please, contact us or seek urgent care as noted below.     Who should I call with questions (adults)?    [facility] (adult and pediatric): [phone]     [facility] (adult): [phone]    For urgent needs outside of business hours call the [facility] at [phone] and ask for the dermatology resident on call    If this is a medical emergency and you are unable to reach an ER, Call 911      Who should I call with questions (pediatric)?  [facility]- Pediatric Dermatology  Dr. Shannon Peralta, Dr. Karen Aguilera, Dr. Debbie Blanco, Aliza Mohan, PA  Dr. Elena Jung, Dr. Marcelina Thorpe & Dr. Corey Mayen  Non Urgent  Nurse Triage Line; [phone]- Yudelka and Mary FAM Care Coordinators   Pinky (/Complex ) [phone]    If you need a prescription refill, please contact your pharmacy. Refills are approved or denied by our Physicians during normal business hours, Monday through Fridays  Per office policy, refills will not be granted if you have not been seen within the past year (or sooner depending on your child's condition)    Scheduling Information:  Pediatric Appointment Scheduling and Call Center [phone]  Radiology Scheduling- [phone]  Sedation Unit Scheduling- [phone]  [place] Scheduling- General [phone]; Pediatric Dermatology [phone]  [facility]   Services: [phone]  Greenlandic: [phone]  Czech: [phone]  Hmong/Armenian/Malay: [phone]  Preadmission Nursing Department Fax Number: [phone] (Fax all pre-operative paperwork to this number)    For urgent matters arising during evenings, weekends, or holidays that cannot wait for normal business hours please call [phone] and ask for the Dermatology Resident On-Call to be paged.

## 2020-12-01 NOTE — Clinical Note
Needs 2 weeks follow up virtual visit with me.    Yamile Funez MD    Department of Dermatology  [facility]: Phone: [phone], Fax:[phone]  [facility]: Phone: [phone], Fax: [phone]

## 2020-12-02 ENCOUNTER — TELEPHONE (OUTPATIENT)
Dept: DERMATOLOGY | Facility: CLINIC | Age: 45
End: 2020-12-02

## 2020-12-02 NOTE — TELEPHONE ENCOUNTER
Yamile Funez MD sent to [facility] Dermatology Adult [place]             Needs 2 weeks follow up virtual visit with me.      Left message for the patient to call back to schedule virtual appointment.     [id number], MEDICAL ASSISTANT

## 2020-12-02 NOTE — TELEPHONE ENCOUNTER
1st attempt. Left message for patient to return call. Patient is due for a virtual appointment with Dr Funez due around 12/15/20. Number to clinic and Mychart option given, please assist in scheduling once patient returns clinic call.     Call Center OKAY TO SCHEDULE.    Thanks,   Collette Espinoza  Surgical Specialties Procedure   [facility] [place]  12/2/2020 9:58 AM

## 2020-12-07 NOTE — TELEPHONE ENCOUNTER
2nd attempt to schedule as noted below. Message left for patient to return call and schedule.    Collette Espinoza  Surgical Specialties Procedure   [facility] [place]  12/7/2020 9:56 AM

## 2020-12-07 NOTE — TELEPHONE ENCOUNTER
Left voicemail for patient to return call to set up 2 week virtual follow up.    Eryn Guevara LPN   Aspiration pneumonia of right lower lobe due to regurgitated food

## 2020-12-17 ENCOUNTER — VIRTUAL VISIT (OUTPATIENT)
Dept: DERMATOLOGY | Facility: CLINIC | Age: 45
End: 2020-12-17
Payer: COMMERCIAL

## 2020-12-17 ENCOUNTER — TELEPHONE (OUTPATIENT)
Dept: DERMATOLOGY | Facility: CLINIC | Age: 45
End: 2020-12-17

## 2020-12-17 DIAGNOSIS — L93.0 LUPUS ERYTHEMATOSUS TUMIDUS: Primary | ICD-10-CM

## 2020-12-17 PROCEDURE — 99213 OFFICE O/P EST LOW 20 MIN: CPT | Mod: TEL | Performed by: DERMATOLOGY

## 2020-12-17 RX ORDER — BETAMETHASONE DIPROPIONATE 0.5 MG/G
CREAM TOPICAL
Qty: 50 G | Refills: 0 | Status: SHIPPED | OUTPATIENT
Start: 2020-12-17 | End: 2021-07-15

## 2020-12-17 NOTE — PATIENT INSTRUCTIONS
[facility] Dermatology Visit    Thank you for allowing us to participate in your care. Your findings, instructions and follow-up plan are as follows:     Stop Triamcinolone. Switch to augmented betamethasone cream twice daily. Follow up in 3 weeks    When should I call my doctor?    If you are worsening or not improving, please, contact us or seek urgent care as noted below.     Who should I call with questions (adults)?    [facility] (adult and pediatric): [phone]     [facility] (adult): [phone]    For urgent needs outside of business hours call the [facility] at [phone] and ask for the dermatology resident on call    If this is a medical emergency and you are unable to reach an ER, Call 911      Who should I call with questions (pediatric)?  [facility]- Pediatric Dermatology  Dr. Shannon Peralta, Dr. Karen Aguilera, Dr. Debbie Blanco, Aliza Mohan, PA  Dr. Elena Jung, Dr. Marcelina Thorpe & Dr. Corey Mayen  Non Urgent  Nurse Triage Line; [phone]- Yudelka and Mary FAM Care Coordinators   Pinky (/Complex ) [phone]    If you need a prescription refill, please contact your pharmacy. Refills are approved or denied by our Physicians during normal business hours, Monday through Fridays  Per office policy, refills will not be granted if you have not been seen within the past year (or sooner depending on your child's condition)    Scheduling Information:  Pediatric Appointment Scheduling and Call Center [phone]  Radiology Scheduling- [phone]  Sedation Unit Scheduling- [phone]  [place] Scheduling- General [phone]; Pediatric Dermatology [phone]  Main  Services: [phone]  Kiswahili: [phone]  Mauritanian: [phone]  Hmong/Ammon/Adam: [phone]  Preadmission Nursing Department Fax Number: [phone] (Fax  all pre-operative paperwork to this number)    For urgent matters arising during evenings, weekends, or holidays that cannot wait for normal business hours please call [phone] and ask for the Dermatology Resident On-Call to be paged.

## 2020-12-17 NOTE — TELEPHONE ENCOUNTER
2-3 week follow up for lupus virtually.     Yamile Funez MD      Department of Dermatology   [facility]: Phone: [phone], Fax:[phone]   [facility]: Phone: [phone], Fax: [phone]

## 2020-12-17 NOTE — PROGRESS NOTES
[facility] Dermatology Record:  Store and Forward and Telephone [phone]      Dermatology Problem List:  1. Relevant medical hx: SLE on plaquenil and intermittent prednisone, chronic hepatitis B  2. Tumid lupus +/- granulomatous rosacea  - Previously treated as severe and granulomatous appearing rosacea, interestingly did improve with oral doxycycline. 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   - Biopsy 1/6/2020 consistent with cutaneous lupus erythematosus  - Current t/x:  plaquenil (managed by rheum), Heliocare, aug betamethasone 0.05% cream BID  -previous tx: TAC 0.1% cream (helped some but not enough on face), Protopic 0.1% ointment (good at baseline but not helpful for flares), metronidazole 0.75% cream, oral ivermectin 15 mg monthly x3 months,  permethrin 5% cream, gentle skin care, doxycycline 100 mg BID (x3 months - improved substantially in first month then went back to baseline)  3. Maculopapular rash following URI 6/6/20 - TMC 0.1% cream, bland emollients, OTC antihistamines    Encounter Date: Dec 17, 2020    CC:   Chief Complaint   Patient presents with     Derm Problem       History of Present Illness:  Natasha Lee is a 45 year old female who presents for tumid lupus.    The patient was last seen 12/01/2020 for tumid lupus. At that time, they were advised to apply TAC 0.1% cream BID, as well as continuing to follow with rheumatology for treatment with plaquenil.    Today, the patient reports concerns about her face. She has noticed some improvement, but is hoping to make more progress. She has started Heliocare and is compliant with her treatment plan. She does note she will be following with GI after the holiday for chronic Hepatitis B. Otherwise denies any tender, nonhealing, or bleeding skin lesions.    She believes she had COVID-19 in June. She reports her PCP believes that she had a low viral load, with enough to make her feel ill but not test positive. She is unsure if she would be able to  move to remote work full time, and notes that ideally she would not like to.    No other concerns addressed today.      ROS: Patient is generally feeling well today    Physical Examination:  General: Well-appearing, appropriately-developed individual.  Skin:  Exam was performed by photo review and is significant for the following:  --Red edematous papules with comedonal or pustular heads bridging from the nasal bridge to the cheeks to the nasolabial folds. Minimal scale. About 25% improved from last visit. There are some atrophic scars and dyspigmentation.                Labs:  None today    Past Medical History:   Patient Active Problem List   Diagnosis     Chronic hepatitis B (H)     Lupus (systemic lupus erythematosus) (H)     CARDIOVASCULAR SCREENING; LDL GOAL LESS THAN 160     Health Care Home     Dysmenorrhea     Menorrhagia     Allergic rhinitis     Other nonspecific finding on examination of urine     Urinary frequency     Vitamin B12 deficiency (non anemic)     Heart murmur     S/P laparoscopic cholecystectomy     Cervical cancer screening     Past Medical History:   Diagnosis Date     Diabetes (H)     Pre-Diabetic     Heart murmur 1997     Lupus (systemic lupus erythematosus) (H) 1988    Dr. Prather     Lupus (systemic lupus erythematosus) (H) 12/10/2009     Normal delivery 9/5/09    boy forceps     Seizures (H)     When a child     Past Surgical History:   Procedure Laterality Date     ANESTH,SKIN SURGERY,KNEE  1993, 1990    orthoscopic     C APPENDECTOMY  4/2008     CHOLECYSTECTOMY  2016     CL AFF SURGICAL PATHOLOGY  2007     LAPAROSCOPIC CHOLECYSTECTOMY N/A 5/18/2016    Procedure: LAPAROSCOPIC CHOLECYSTECTOMY;  Surgeon: Radha Cline MD;  [facility]: [facility] OR     LAPAROSCOPIC TUBAL LIGATION  12/15/2011    Procedure:LAPAROSCOPIC TUBAL LIGATION; Laparoscopic tubal ligation; Surgeon:AMY WYNN; Location:[facility] OR     OSTEOTOMY FOOT Left 2/13/2019    Procedure: Left Lapidus with  Akin Osteotomy;   Surgeon: Brett Chavez MD;  Location:  OR       Social History:  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 Patient has 1 child. She has had her tubes tied - no plans for future pregnancies.   Reviewed and left in chart for clinician convenience.     Family History:  Unknown - patient is adopted.  Reviewed and left in chart for clinician convenience.     Medications:  Current Outpatient Medications   Medication     albuterol (PROAIR HFA/PROVENTIL HFA/VENTOLIN HFA) 108 (90 Base) MCG/ACT inhaler     Cranberry 1000 MG CAPS     DiphenhydrAMINE HCl (BENADRYL PO)     DiphenhydrAMINE HCl, Sleep, (UNISOM SLEEPGELS) 50 MG CAPS     fluticasone (FLONASE) 50 MCG/ACT nasal spray     hydroxychloroquine (PLAQUENIL) 200 MG tablet     norethindrone-ethinyl estradiol (ORTHO-NOVUM) 1-35 MG-MCG tablet     Prenatal MV-Min-Fe Fum-FA-DHA (PRENATAL MULTIVITAMIN + DHA PO)     tacrolimus (PROTOPIC) 0.1 % external ointment     triamcinolone (KENALOG) 0.1 % external cream     No current facility-administered medications for this visit.           Allergies   Allergen Reactions     Carbamazepine      Fever, rash     [place]-Related Products          Impression and Recommendations (Patient Counseled on the Following):  1. Lupus of the face. Biopsy previously on the right cheek showed tumid lupus on 1/6/2020. None more consistent with acute and discoid lupus. Systemic SLE under great control, see rheum yearly and gets labs twice yearly. Unfortunately, facial lesions are starting to cause some scarring and dyspigmentation. I am worried that Natasha will need a systemic immunosuppressant to get lesions under control. This is complicated by the current COVID-19 pandemic and the fact that patient has chronic hepatitis B. As a last resort, we will try super potent topical steroid with close follow up.  - Discontinue TAC 0.1% cream  - Start augmented betamethasone dipropionate 0.05% cream applied BID  - Continue Plaquenil  and Heliocare      Follow-up:   Follow-up with dermatology in approximately 3 weeks. Earlier for new or changing lesions or rash.      Staff and scribe    Scribe Disclosure:   I, Dung Pressley, am serving as a scribe to document services personally performed by this physician, Dr. Yamile Funez, based on data collection and the provider's statements to me.     Provider Disclosure:   The documentation recorded by the scribe accurately reflects the services I personally performed and the decisions made by me.    Yamile Funez MD    Department of Dermatology  [facility]: Phone: [phone], Fax:[phone]  [facility]: Phone: [phone], Fax: [phone]    _____________________________________________________________________________    Teledermatology information:  - Patient presented as: return  - Location of teledermatologist:  [facility] )  - Image quality and interpretability: acceptable  - Physician has received verbal consent for a Video/Photos Visit from the patient? YES  - In-person dermatology visit recommendation: no  - Date of images: 12/16/2020  - Service start time:9:39 am  - Service end time: 9:48 am  - Date of report: 12/17/2020     Teledermatology Nurse Call Patients:     Are you  in the [facility] at the time of the encounter? yes    Today's visit will be billed to you and your insurance.    A teledermatology visit is not as thorough as an in-person visit and the quality of the photograph sent may not be of the same quality as that taken by the dermatology clinic.    - Patient reports that rash has improved, but not completely gone. No burning or itching with rash. She stopped using Protopic and is using triamcinolone for rash currently.    Liz Ansari LPN

## 2020-12-17 NOTE — Clinical Note
2-3 week follow up for lupus virtually.    Yamile Funez MD    Department of Dermatology  [facility][note 3]: Phone: [phone], Fax:[phone]  [facility]: Phone: [phone], Fax: [phone]

## 2020-12-17 NOTE — LETTER
12/17/2020         RE: Natasha Lee  [address] [address]        Dear Colleague,    Thank you for referring your patient, Natasha Lee, to the [facility]. Please see a copy of my visit note below.    [facility] Dermatology Record:  Store and Forward and Telephone [phone]      Dermatology Problem List:  1. Relevant medical hx: SLE on plaquenil and intermittent prednisone, chronic hepatitis B  2. Tumid lupus +/- granulomatous rosacea  - Previously treated as severe and granulomatous appearing rosacea, interestingly did improve with oral doxycycline. 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   - Biopsy 1/6/2020 consistent with cutaneous lupus erythematosus  - Current t/x:  plaquenil (managed by rheum), Heliocare, aug betamethasone 0.05% cream BID  -previous tx: TAC 0.1% cream (helped some but not enough on face), Protopic 0.1% ointment (good at baseline but not helpful for flares), metronidazole 0.75% cream, oral ivermectin 15 mg monthly x3 months,  permethrin 5% cream, gentle skin care, doxycycline 100 mg BID (x3 months - improved substantially in first month then went back to baseline)  3. Maculopapular rash following URI 6/6/20 - TMC 0.1% cream, bland emollients, OTC antihistamines    Encounter Date: Dec 17, 2020    CC:   Chief Complaint   Patient presents with     Derm Problem       History of Present Illness:  Natasha Lee is a 45 year old female who presents for tumid lupus.    The patient was last seen 12/01/2020 for tumid lupus. At that time, they were advised to apply TAC 0.1% cream BID, as well as continuing to follow with rheumatology for treatment with plaquenil.    Today, the patient reports concerns about her face. She has noticed some improvement, but is hoping to make more progress. She has started Heliocare and is compliant with her treatment plan. She does note she will be following with GI after the holiday for chronic  Hepatitis B. Otherwise denies any tender, nonhealing, or bleeding skin lesions.    She believes she had COVID-19 in June. She reports her PCP believes that she had a low viral load, with enough to make her feel ill but not test positive. She is unsure if she would be able to move to remote work full time, and notes that ideally she would not like to.    No other concerns addressed today.      ROS: Patient is generally feeling well today    Physical Examination:  General: Well-appearing, appropriately-developed individual.  Skin:  Exam was performed by photo review and is significant for the following:  --Red edematous papules with comedonal or pustular heads bridging from the nasal bridge to the cheeks to the nasolabial folds. Minimal scale. About 25% improved from last visit. There are some atrophic scars and dyspigmentation.                Labs:  None today    Past Medical History:   Patient Active Problem List   Diagnosis     Chronic hepatitis B (H)     Lupus (systemic lupus erythematosus) (H)     CARDIOVASCULAR SCREENING; LDL GOAL LESS THAN 160     Health Care Home     Dysmenorrhea     Menorrhagia     Allergic rhinitis     Other nonspecific finding on examination of urine     Urinary frequency     Vitamin B12 deficiency (non anemic)     Heart murmur     S/P laparoscopic cholecystectomy     Cervical cancer screening     Past Medical History:   Diagnosis Date     Diabetes (H)     Pre-Diabetic     Heart murmur 1997     Lupus (systemic lupus erythematosus) (H) 1988    Dr. Prather     Lupus (systemic lupus erythematosus) (H) 12/10/2009     Normal delivery 9/5/09    boy forceps     Seizures (H)     When a child     Past Surgical History:   Procedure Laterality Date     ANESTH,SKIN SURGERY,KNEE  1993, 1990    orthoscopic     C APPENDECTOMY  4/2008     CHOLECYSTECTOMY  2016     CL AFF SURGICAL PATHOLOGY  2007     LAPAROSCOPIC CHOLECYSTECTOMY N/A 5/18/2016    Procedure: LAPAROSCOPIC CHOLECYSTECTOMY;  Surgeon: Radha Cline  Mary Lou Isaacs MD;  Location: [facility] OR     LAPAROSCOPIC TUBAL LIGATION  12/15/2011    Procedure:LAPAROSCOPIC TUBAL LIGATION; Laparoscopic tubal ligation; Surgeon:AMY WYNN; Location: OR     OSTEOTOMY FOOT Left 2/13/2019    Procedure: Left Lapidus with  Azael Osteotomy;  Surgeon: Brett Chavez MD;  Location:  OR       Social History:  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 Patient has 1 child. She has had her tubes tied - no plans for future pregnancies.   Reviewed and left in chart for clinician convenience.     Family History:  Unknown - patient is adopted.  Reviewed and left in chart for clinician convenience.     Medications:  Current Outpatient Medications   Medication     albuterol (PROAIR HFA/PROVENTIL HFA/VENTOLIN HFA) 108 (90 Base) MCG/ACT inhaler     Cranberry 1000 MG CAPS     DiphenhydrAMINE HCl (BENADRYL PO)     DiphenhydrAMINE HCl, Sleep, (UNISOM SLEEPGELS) 50 MG CAPS     fluticasone (FLONASE) 50 MCG/ACT nasal spray     hydroxychloroquine (PLAQUENIL) 200 MG tablet     norethindrone-ethinyl estradiol (ORTHO-NOVUM) 1-35 MG-MCG tablet     Prenatal MV-Min-Fe Fum-FA-DHA (PRENATAL MULTIVITAMIN + DHA PO)     tacrolimus (PROTOPIC) 0.1 % external ointment     triamcinolone (KENALOG) 0.1 % external cream     No current facility-administered medications for this visit.           Allergies   Allergen Reactions     Carbamazepine      Fever, rash     [place]-Related Products          Impression and Recommendations (Patient Counseled on the Following):  1. Lupus of the face. Biopsy previously on the right cheek showed tumid lupus on 1/6/2020. None more consistent with acute and discoid lupus. Systemic SLE under great control, see rheum yearly and gets labs twice yearly. Unfortunately, facial lesions are starting to cause some scarring and dyspigmentation. I am worried that Natasha will need a systemic immunosuppressant to get lesions under control. This is complicated by the  current COVID-19 pandemic and the fact that patient has chronic hepatitis B. As a last resort, we will try super potent topical steroid with close follow up.  - Discontinue TAC 0.1% cream  - Start augmented betamethasone dipropionate 0.05% cream applied BID  - Continue Plaquenil and Heliocare      Follow-up:   Follow-up with dermatology in approximately 3 weeks. Earlier for new or changing lesions or rash.      Staff and scribe    Scribe Disclosure:   I, Dung Pressley, am serving as a scribe to document services personally performed by this physician, Dr. Yamile Funez, based on data collection and the provider's statements to me.     Provider Disclosure:   The documentation recorded by the scribe accurately reflects the services I personally performed and the decisions made by me.    Yamile Funez MD    Department of Dermatology  [facility]: Phone: [phone], Fax:[phone]  [facility]: Phone: [phone], Fax: [phone]    _____________________________________________________________________________    Teledermatology information:  - Patient presented as: return  - Location of teledermatologist:  [facility] )  - Image quality and interpretability: acceptable  - Physician has received verbal consent for a Video/Photos Visit from the patient? YES  - In-person dermatology visit recommendation: no  - Date of images: 12/16/2020  - Service start time:9:39 am  - Service end time: 9:48 am  - Date of report: 12/17/2020     Teledermatology Nurse Call Patients:     Are you  in the [facility] at the time of the encounter? yes    Today's visit will be billed to you and your insurance.    A teledermatology visit is not as thorough as an in-person visit and the quality of the photograph sent may not be of the same quality as that taken by the  dermatology clinic.    - Patient reports that rash has improved, but not completely gone. No burning or itching with rash. She stopped using Protopic and is using triamcinolone for rash currently.    Liz Ansari LPN                                                                                                                                                                                                                                      Again, thank you for allowing me to participate in the care of your patient.        Sincerely,        Yamile Funez MD

## 2020-12-29 ENCOUNTER — NURSE TRIAGE (OUTPATIENT)
Dept: NURSING | Facility: CLINIC | Age: 45
End: 2020-12-29

## 2020-12-29 NOTE — TELEPHONE ENCOUNTER
Called to schedule her antibody test. Order in the system per patient report. Verified order is there 11/25/20 and transferred back to scheduling.   Iqra Mcfarland RN on 12/29/2020 at 8:19 AM        Additional Information    Question about upcoming scheduled test, no triage required and triager able to answer question    Protocols used: INFORMATION ONLY CALL-A-AH

## 2021-01-05 ENCOUNTER — VIRTUAL VISIT (OUTPATIENT)
Dept: DERMATOLOGY | Facility: CLINIC | Age: 46
End: 2021-01-05
Payer: COMMERCIAL

## 2021-01-05 DIAGNOSIS — L93.0 LUPUS ERYTHEMATOSUS TUMIDUS: Primary | ICD-10-CM

## 2021-01-05 DIAGNOSIS — L93.0 DISCOID LUPUS ERYTHEMATOSUS: ICD-10-CM

## 2021-01-05 PROCEDURE — 99214 OFFICE O/P EST MOD 30 MIN: CPT | Mod: TEL | Performed by: DERMATOLOGY

## 2021-01-05 NOTE — PROGRESS NOTES
Teledermatology Nurse Call Patients:     Are you  in the [facility] at the time of the encounter? yes    Today's visit will be billed to you and your insurance.    A teledermatology visit is not as thorough as an in-person visit and the quality of the photograph sent may not be of the same quality as that taken by the dermatology clinic.       Follow up for lupus.     - Start augmented betamethasone dipropionate 0.05% cream applied BID  - Using Plaquenil and Heliocare, and seeing some improvement.    Using cleanser unscented balm, organic grape seed cream. Does not feel anything currently making skin better or worse.                                                                                                                                                                                                             [facility] Dermatology Record:  Store and Forward and Telephone [phone]      Dermatology Problem List:  1. 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   - Biopsy 1/6/2020 consistent with cutaneous lupus erythematosus  - Current t/x:  plaquenil (managed by rheum), Heliocare, aug betamethasone 0.05% cream BID  - Previous tx: TAC 0.1% cream (helped some but not enough on face), Protopic 0.1% ointment (good at baseline but not helpful for flares), metronidazole 0.75% cream, oral ivermectin 15 mg monthly x3 months,  permethrin 5% cream, gentle skin care, doxycycline 100 mg BID (x3 months - improved substantially in first month then went back to baseline)  3. Maculopapular rash following URI 6/6/20 - TMC 0.1% cream, bland emollients, OTC antihistamines    Encounter Date: Jan 5, 2021    CC:   Chief Complaint   Patient presents with     Derm Problem      lupus, face redness       History of Present Illness:  Natasha Lee is a 45 year old female who presents for tumid lupus.    The patient was last seen 12/17/2020 for tumid lupus with possible DLE overlap. At that time, we increase topical steroid from TAC to augmented betamethasone in hopes of control skin without needing a systemic agent.     Today, Natasha notes some improvement, lesions are flatter and less scaly, but is hoping to make more progress. She has noticed some early scarring on the face.    Natasha diligently takes heliocare and uses sunscreen with a physical blocker daily. She is on plaquenil and has been for some time.     Natasha has not seen GI, but notes she did have a referral given at one time. She has chronic Hepatitis B. She is agreeable to contacting GI and scheduling as soon as she can.    Sees rheum yearly, does labs every 6 months (no signs of systemic lupus at present).    No other concerns addressed today.      ROS: Patient is generally feeling well today    Physical Examination:  General: Well-appearing, appropriately-developed individual.  Skin:  Exam was performed by photo review and is significant for the following:  --Red edematous papules with comedonal or pustular heads bridging from the nasal bridge to the cheeks to the nasolabial folds. Minimal scale. About 25% improved from last visit. There are some atrophic scars and dyspigmentation.                Labs:  None today    Past Medical History:   Patient Active Problem List   Diagnosis     Chronic hepatitis B (H)     Lupus (systemic lupus erythematosus) (H)     CARDIOVASCULAR SCREENING; LDL GOAL LESS THAN 160     Health Care Home     Dysmenorrhea     Menorrhagia     Allergic rhinitis     Other nonspecific finding on examination of urine     Urinary frequency     Vitamin B12 deficiency (non anemic)     Heart murmur     S/P laparoscopic cholecystectomy     Cervical cancer screening     Past Medical History:   Diagnosis Date      Diabetes (H)     Pre-Diabetic     Heart murmur 1997     Lupus (systemic lupus erythematosus) (H) 1988    Dr. Prather     Lupus (systemic lupus erythematosus) (H) 12/10/2009     Normal delivery 9/5/09    boy forceps     Seizures (H)     When a child     Past Surgical History:   Procedure Laterality Date     ANESTH,SKIN SURGERY,KNEE  1993, 1990    orthoscopic     C APPENDECTOMY  4/2008     CHOLECYSTECTOMY  2016     CL AFF SURGICAL PATHOLOGY  2007     LAPAROSCOPIC CHOLECYSTECTOMY N/A 5/18/2016    Procedure: LAPAROSCOPIC CHOLECYSTECTOMY;  Surgeon: Radha Cline MD;  Location: [facility] OR     LAPAROSCOPIC TUBAL LIGATION  12/15/2011    Procedure:LAPAROSCOPIC TUBAL LIGATION; Laparoscopic tubal ligation; Surgeon:AMY WYNN; Location:[facility] OR     OSTEOTOMY FOOT Left 2/13/2019    Procedure: Left Lapidus with  Azael Osteotomy;  Surgeon: Brett Chavez MD;  Location:  OR       Social History:  Patient works at [employer]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 Patient has 1 child. She has had her tubes tied - no plans for future pregnancies.   Reviewed and left in chart for clinician convenience.     Family History:  Unknown - patient is adopted.  Reviewed and left in chart for clinician convenience.     Medications:  Current Outpatient Medications   Medication     albuterol (PROAIR HFA/PROVENTIL HFA/VENTOLIN HFA) 108 (90 Base) MCG/ACT inhaler     augmented betamethasone dipropionate (DIPROLENE-AF) 0.05 % external cream     Cranberry 1000 MG CAPS     DiphenhydrAMINE HCl (BENADRYL PO)     DiphenhydrAMINE HCl, Sleep, (UNISOM SLEEPGELS) 50 MG CAPS     hydroxychloroquine (PLAQUENIL) 200 MG tablet     norethindrone-ethinyl estradiol (ORTHO-NOVUM) 1-35 MG-MCG tablet     Prenatal MV-Min-Fe Fum-FA-DHA (PRENATAL MULTIVITAMIN + DHA PO)     triamcinolone (KENALOG) 0.1 % external cream     No current facility-administered medications for this visit.           Allergies   Allergen Reactions     Carbamazepine      Fever,  rash     [place]-Related Products          Impression and Recommendations (Patient Counseled on the Following):  1. Lupus of the face. Biopsy previously on the right cheek showed tumid lupus on 1/6/2020. None more consistent with discoid lupus as there is some scale, scarring, and dyspigmentation. Systemic SLE under great control, see rheum yearly and gets labs twice yearly. Unfortunately, facial lesions are starting to cause some scarring and dyspigmentation. She is improved, but not optimal on super potent topical steroids. I am worried that Natasha will need a systemic immunosuppressant to get lesions under control. Today, we discussed starting cellcept, including potential risks (especially infection, GI side effects, lab abnormalities) and need for close lab monitoring. Before starting, she will need to see GI about chronic hepatitis B so we can come up with a plan for monitoring. I will also reach out to Dr. Zaman to see if anything besides cellcept should be considered first. Given that she is improving on augmented betamethasone, we still have some time to decide on optimal treatment.  - Continue augmented betamethasone dipropionate 0.05% cream applied BID  - Continue Plaquenil and Heliocare  - Consider CellCept in the future  - Get input from GI on monitoring chronic hep B while on an immunosuppressant  - Get input from Dr. Zaman on CellCept vs other treatment      Follow-up:   Follow-up with dermatology in approximately 1 month. Earlier for new or changing lesions or rash.      Staff and scribe    Scribe Disclosure:   I, Dung Pressley, am serving as a scribe to document services personally performed by this physician, Dr. Yamile Funez, based on data collection and the provider's statements to me.     Provider Disclosure:   The documentation recorded by the scribe accurately reflects the services I personally performed and the decisions made by me.    Yamile Funez MD  Assistant  Professor  Department of Dermatology  [facility]: Phone: [phone], Fax:[phone]  [facility]: Phone: [phone], Fax: [phone]      _____________________________________________________________________________    Teledermatology information:  - Patient presented as: return  - Location of teledermatologist:  [facility] )  - Image quality and interpretability: acceptable  - Physician has received verbal consent for a Video/Photos Visit from the patient? YES  - In-person dermatology visit recommendation: no  - Date of images: 1/4/21  - Service start time: 3:35 PM  - Service end time: 3:43 PM  - Date of report: 1/5/21

## 2021-01-05 NOTE — PATIENT INSTRUCTIONS
[facility] Dermatology Visit    Thank you for allowing us to participate in your care. Your findings, instructions and follow-up plan are as follows:     -Schedule appointment with GI. Let them know we are thinking about starting immunosuppression for lupus and need a plan to follow hepatitis B.  -Continue everything as you are.  -I will reach out to Dr. Zaman to make sure cellcept is the best option.      When should I call my doctor?    If you are worsening or not improving, please, contact us or seek urgent care as noted below.     Who should I call with questions (adults)?    [facility] (adult and pediatric): [phone]     [facility] (adult): [phone]    For urgent needs outside of business hours call the [facility] at [phone] and ask for the dermatology resident on call    If this is a medical emergency and you are unable to reach an ER, Call 911      Who should I call with questions (pediatric)?  [facility]- Pediatric Dermatology  Dr. Shannon Peralta, Dr. Karen Aguilera, Dr. Debbie Blanco, [facility] [place]  Dr. Eelna Jung, Dr. Marcelina Thorpe & Dr. Corey Mayen  Non Urgent  Nurse Triage Line; [phone]- Yudelka and Mary FAM Care Coordinators   Pinky (/Complex ) [phone]    If you need a prescription refill, please contact your pharmacy. Refills are approved or denied by our Physicians during normal business hours, Monday through Fridays  Per office policy, refills will not be granted if you have not been seen within the past year (or sooner depending on your child's condition)    Scheduling Information:  Pediatric Appointment Scheduling and Call Center [phone]  Radiology Scheduling- [phone]  Sedation Unit Scheduling- [phone]  [place] Scheduling- General [phone]; Pediatric Dermatology [phone]  Main   Services: [phone]  Russian: [phone]  Portuguese: [phone]  Hmong/Sammarinese/Danish: [phone]  Preadmission Nursing Department Fax Number: [phone] (Fax all pre-operative paperwork to this number)    For urgent matters arising during evenings, weekends, or holidays that cannot wait for normal business hours please call [phone] and ask for the Dermatology Resident On-Call to be paged.

## 2021-01-06 ENCOUNTER — TELEPHONE (OUTPATIENT)
Dept: DERMATOLOGY | Facility: CLINIC | Age: 46
End: 2021-01-06

## 2021-01-06 DIAGNOSIS — B18.1 CHRONIC HEPATITIS B (H): Primary | ICD-10-CM

## 2021-01-06 NOTE — TELEPHONE ENCOUNTER
RECORDS RECEIVED FROM: Internal   Appt Date: 01.08.2021   NOTES STATUS DETAILS   OFFICE NOTE from referring provider Internal 11.17.2020 Consult Christopher Zaman MD   OFFICE NOTES from other specialists Internal 11.16.2018 Sandy Garcia, ELIAS CNP    03.07.2018 Christopher Zaman MD   DISCHARGE SUMMARY from hospital N/A    MEDICATION LIST Internal    LIVER BIOSPY (IF APPLICABLE)      PATHOLOGY REPORTS  N/A    IMAGING     ENDOSCOPY (IF AVAILABLE) N/A    COLONOSCOPY (IF AVAILABLE) N/A    ULTRASOUND LIVER N/A    CT OF ABDOMEN N/A    MRI OF LIVER N/A    FIBROSCAN, US ELASTOGRAPHY, FIBROSIS SCAN, MR ELASTOGRAPHY N/A    LABS     HEPATIC PANEL (LIVER PANEL) N/A    BASIC METABOLIC PANEL Internal 01.21.2019   COMPLETE METABOLIC PANEL Internal 11.30.2020   COMPLETE BLOOD COUNT (CBC) Internal 11.30.2020   INTERNATIONAL NORMALIZED RATIO (INR) N/A    HEPATITIS C ANTIBODY Internal 11.16.2018   HEPATITIS C VIRAL LOAD/PCR N/A    HEPATITIS C GENOTYPE N/A    HEPATITIS B SURFACE ANTIGEN Internal 11.16.2018   HEPATITIS B SURFACE ANTIBODY Internal 11.16.2018   HEPATITIS B DNA QUANT LEVEL N/A    HEPATITIS B CORE ANTIBODY Internal 11.16.2018

## 2021-01-06 NOTE — TELEPHONE ENCOUNTER
Yamile Funez MD  P [facility] Dermatology Adult [place]             4 week follow up for lupus virtually.     LF        Left voicemail for patient to return call to help set up 4 week follow up.      Eryn Guevara LPN

## 2021-01-07 ENCOUNTER — NURSE TRIAGE (OUTPATIENT)
Dept: NURSING | Facility: CLINIC | Age: 46
End: 2021-01-07

## 2021-01-07 DIAGNOSIS — Z20.822 SUSPECTED COVID-19 VIRUS INFECTION: ICD-10-CM

## 2021-01-07 DIAGNOSIS — B18.1 CHRONIC HEPATITIS B (H): ICD-10-CM

## 2021-01-07 LAB
ALBUMIN SERPL-MCNC: 3.8 G/DL (ref 3.4–5)
ALP SERPL-CCNC: 62 U/L (ref 40–150)
ALT SERPL W P-5'-P-CCNC: 38 U/L (ref 0–50)
ANION GAP SERPL CALCULATED.3IONS-SCNC: 5 MMOL/L (ref 3–14)
AST SERPL W P-5'-P-CCNC: 23 U/L (ref 0–45)
BILIRUB DIRECT SERPL-MCNC: 0.1 MG/DL (ref 0–0.2)
BILIRUB SERPL-MCNC: 0.3 MG/DL (ref 0.2–1.3)
BUN SERPL-MCNC: 11 MG/DL (ref 7–30)
CALCIUM SERPL-MCNC: 8.6 MG/DL (ref 8.5–10.1)
CHLORIDE SERPL-SCNC: 109 MMOL/L (ref 94–109)
CO2 SERPL-SCNC: 25 MMOL/L (ref 20–32)
CREAT SERPL-MCNC: 0.64 MG/DL (ref 0.52–1.04)
ERYTHROCYTE [DISTWIDTH] IN BLOOD BY AUTOMATED COUNT: 12.3 % (ref 10–15)
GFR SERPL CREATININE-BSD FRML MDRD: >90 ML/MIN/{1.73_M2}
GLUCOSE SERPL-MCNC: 115 MG/DL (ref 70–99)
HCT VFR BLD AUTO: 40.4 % (ref 35–47)
HGB BLD-MCNC: 14 G/DL (ref 11.7–15.7)
INR PPP: 0.87 (ref 0.86–1.14)
MCH RBC QN AUTO: 31.6 PG (ref 26.5–33)
MCHC RBC AUTO-ENTMCNC: 34.7 G/DL (ref 31.5–36.5)
MCV RBC AUTO: 91 FL (ref 78–100)
PLATELET # BLD AUTO: 212 10E9/L (ref 150–450)
POTASSIUM SERPL-SCNC: 4 MMOL/L (ref 3.4–5.3)
PROT SERPL-MCNC: 6.9 G/DL (ref 6.8–8.8)
RBC # BLD AUTO: 4.43 10E12/L (ref 3.8–5.2)
SODIUM SERPL-SCNC: 139 MMOL/L (ref 133–144)
WBC # BLD AUTO: 4.2 10E9/L (ref 4–11)

## 2021-01-07 PROCEDURE — 85610 PROTHROMBIN TIME: CPT | Performed by: PHYSICIAN ASSISTANT

## 2021-01-07 PROCEDURE — 80076 HEPATIC FUNCTION PANEL: CPT | Performed by: PHYSICIAN ASSISTANT

## 2021-01-07 PROCEDURE — 87517 HEPATITIS B DNA QUANT: CPT | Performed by: PHYSICIAN ASSISTANT

## 2021-01-07 PROCEDURE — 36415 COLL VENOUS BLD VENIPUNCTURE: CPT | Performed by: PHYSICIAN ASSISTANT

## 2021-01-07 PROCEDURE — 85027 COMPLETE CBC AUTOMATED: CPT | Performed by: PHYSICIAN ASSISTANT

## 2021-01-07 PROCEDURE — 86769 SARS-COV-2 COVID-19 ANTIBODY: CPT | Performed by: NURSE PRACTITIONER

## 2021-01-07 PROCEDURE — 80048 BASIC METABOLIC PNL TOTAL CA: CPT | Performed by: PHYSICIAN ASSISTANT

## 2021-01-07 NOTE — TELEPHONE ENCOUNTER
I connected her to the serology testing line. She stated her MD already entered in the order for testing.  Zuri Hagan RN  [facility]    Additional Information    Negative: Nursing judgment    Negative: Nursing judgment    Negative: Nursing judgment    Negative: Nursing judgment    Information only question and nurse able to answer    Protocols used: NO PROTOCOL AVAILABLE - INFORMATION ONLY-A-[place]

## 2021-01-08 ENCOUNTER — VIRTUAL VISIT (OUTPATIENT)
Dept: GASTROENTEROLOGY | Facility: CLINIC | Age: 46
End: 2021-01-08
Attending: INTERNAL MEDICINE
Payer: COMMERCIAL

## 2021-01-08 ENCOUNTER — PRE VISIT (OUTPATIENT)
Dept: GASTROENTEROLOGY | Facility: CLINIC | Age: 46
End: 2021-01-08

## 2021-01-08 DIAGNOSIS — M32.9 SYSTEMIC LUPUS ERYTHEMATOSUS, UNSPECIFIED SLE TYPE, UNSPECIFIED ORGAN INVOLVEMENT STATUS (H): Primary | ICD-10-CM

## 2021-01-08 DIAGNOSIS — B18.1 CHRONIC HEPATITIS B (H): ICD-10-CM

## 2021-01-08 LAB
HBV DNA SERPL NAA+PROBE-ACNC: <20 [IU]/ML
HBV DNA SERPL NAA+PROBE-LOG IU: <1.3 {LOG_IU}/ML
SARS-COV-2 AB PNL SERPL IA: NEGATIVE
SARS-COV-2 IGG SERPL IA-ACNC: NORMAL

## 2021-01-08 PROCEDURE — 99204 OFFICE O/P NEW MOD 45 MIN: CPT | Mod: 95 | Performed by: PHYSICIAN ASSISTANT

## 2021-01-08 ASSESSMENT — PAIN SCALES - GENERAL: PAINLEVEL: NO PAIN (0)

## 2021-01-08 NOTE — PROGRESS NOTES
Left voicemail for patient to call back to set up telemedicine visit, will call again before appointment time.  Sara Bowman CMA on 1/8/2021 at 1:25 PM

## 2021-01-08 NOTE — LETTER
1/8/2021         RE: Natasha Lee  [address] [address]        Dear Colleague,    Thank you for referring your patient, Natasha Lee, to the [facility]PATOLOG[facility] [place]. Please see a copy of my visit note below.    Left voicemail for patient to call back to set up telemedicine visit, will call again before appointment time.  Sara Bowman CMA on 1/8/2021 at 1:25 PM      Natasha is a 45 year old who is being evaluated via a billable video visit.      How would you like to obtain your AVS? MyChart  If the video visit is dropped, the invitation should be resent by: Text to cell phone: [phone]  Will anyone else be joining your video visit? No        Video-Visit Details    Type of service:  Video Visit converted to Telephone    Video Start Time:   2:06    Video End Time: 2:11 pm      Originating Location (pt. Location): Home    Distant Location (provider location):  [facility] [place]     Had to switch to phone due to technical difficulties.   PHONE CALL TOTAL : 21 minutes    Hepatology Clinic Note  Natasha Lee   Date of Birth 1975  Date of Service 1/8/2021     REASON FOR CONSULTATION: Hepatitis B  REFERRING PROVIDER: Yamile Funez MD           Assessment/plan:   Natasha Lee is a 45 year old female with Hepatitis B, e antigen negative and e antibody positive, who recently has had low Hep B viremia and quasi-normal transaminases consistent with inactive Hepatitis B. She has cutaneous involvement of SLE with dermatologic recommendations to likely start systemic treatment with CellCept. Recommend that she start Hep B anti-viral due to moderate risk of reactivation with positive Hep B surface antigen status. Also discussed HCC screening with unknown family history.     Suspect transaminases higher than expected due to component of fatty liver disease. We discussed Hepatitis B transmission, the natural  course of Hepatitis B and indications for treatment. We also discussed the importance of regular labs and follow-up.      - If CellCept or similar systemic immunosuppressant/biologic is started, recommend starting Hep B prophylaxis. Will reach out to Dermatology provider.    - HCC screen: US abdomen in the near future   - Follow up labs in 6 months with Hepatic panel, HBV DNA quant.  - Continue optimization of blood glucose levels and weight   - Follow-up in clinic in one year or sooner as needed     Jazmyn Urrutia PA-C   [facility] Hepatology     Subjective     Natasha is a 45 year old who presents to clinic today for the following health issues:     HPI     Hepatitis B   -E antigen negative  -E antibody positive  -Diagnosed: infancy  -History: Likely MTC  -Prior biopsy: No   -Prior treatments: Jose      Patient states she was diagnosed with Hepatitis B at infancy noted on medical records when she was adopted from [place]. No previous treatment. No routine labs through the years.      Appetite is good. Weight is up about 15 lbs over the past year. She has regular bowel movements.       Patient denies jaundice, lower extremity edema, abdominal distension or confusion.  Patient also denies melena, hematochezia or hematemesis. Patient denies fevers, sweats or chills.     PMH: Lupus,     SMH: s/p cholecystectomy, bunion removal, appendeix     Medications:   See below      No previous tobacco use. Rare alcohol and no history of significant use. No history of IV/IND. Drug and alcohol couselor [place] [employer] [place] Live with  and 11 year old son. Unknown family history.      Lab work-up thus far  HCV antibody - nonreactive  HAV Ab IgM : nonreactive  HAV IgG - nonreactive   HBV SAb: 0.0  HBV SAg: positive  HBV CAb: positive    Review of Systems   ROS as noted above in the HPI      Objective    Vitals - Patient Reported  Pain Score: No Pain (0)    Physical Exam   GENERAL: healthy, alert and no distress  EYES: Eyes grossly  normal to inspection, conjunctivae and sclerae normal  RESP:  No visible retractions or increased work of breathing.  Able to speak fully in complete sentences  NEURO: speech normal  PSYCH: mentation appears normal, affect normal/bright, judgement and insight intact, normal speech and appearance well-groomed    Current Outpatient Medications   Medication     augmented betamethasone dipropionate (DIPROLENE-AF) 0.05 % external cream     Cranberry 1000 MG CAPS     DiphenhydrAMINE HCl (BENADRYL PO)     DiphenhydrAMINE HCl, Sleep, (UNISOM SLEEPGELS) 50 MG CAPS     hydroxychloroquine (PLAQUENIL) 200 MG tablet     norethindrone-ethinyl estradiol (ORTHO-NOVUM) 1-35 MG-MCG tablet     Prenatal MV-Min-Fe Fum-FA-DHA (PRENATAL MULTIVITAMIN + DHA PO)       Data:   Reviewed in person and significant for:         Lab Results   Component Value Date     01/07/2021           Lab Results   Component Value Date    POTASSIUM 4.0 01/07/2021           Lab Results   Component Value Date    CHLORIDE 109 01/07/2021           Lab Results   Component Value Date    CO2 25 01/07/2021           Lab Results   Component Value Date    BUN 11 01/07/2021           Lab Results   Component Value Date    CR 0.64 01/07/2021           Lab Results   Component Value Date    WBC 4.2 01/07/2021           Lab Results   Component Value Date    HGB 14.0 01/07/2021           Lab Results   Component Value Date    HCT 40.4 01/07/2021           Lab Results   Component Value Date    MCV 91 01/07/2021           Lab Results   Component Value Date     01/07/2021           Lab Results   Component Value Date    AST 23 01/07/2021           Lab Results   Component Value Date    ALT 38 01/07/2021           Lab Results   Component Value Date    BILICONJ 0.0 09/22/2009           Lab Results   Component Value Date    BILITOTAL 0.3 01/07/2021           Lab Results   Component Value Date    ALBUMIN 3.8 01/07/2021           Lab Results   Component Value Date     PROTTOTAL 6.9 01/07/2021           Lab Results   Component Value Date    ALKPHOS 62 01/07/2021           Lab Results   Component Value Date    INR 0.87 01/07/2021       US ABDOMEN COMPLETE:   5/6/2016:      IMPRESSION:    Cholelithiasis with thickening of the gallbladder wall,  but no pericholecystic fluid collection and a negative sonographic  Matthews's sign. Although this could be some form of chronic  cholecystitis, cannot exclude a neoplastic process within the wall of  the gallbladder. Surgical consultation should be considered.     [Critical Result: Thickened gallbladder wall in patient with negative  sonographic Matthews's sign.]          Again, thank you for allowing me to participate in the care of your patient.        Sincerely,        Jazmyn Urrutia PA-C

## 2021-01-08 NOTE — PROGRESS NOTES
Natasha is a 45 year old who is being evaluated via a billable video visit.      How would you like to obtain your AVS? MyChart  If the video visit is dropped, the invitation should be resent by: Text to cell phone: [phone]  Will anyone else be joining your video visit? No        Video-Visit Details    Type of service:  Video Visit converted to Telephone    Video Start Time:   2:06    Video End Time: 2:11 pm      Originating Location (pt. Location): Home    Distant Location (provider location):  [facility]PAT[facility] [place]     Had to switch to phone due to technical difficulties.   PHONE CALL TOTAL : 21 minutes    Hepatology Clinic Note  Natasha Lee   Date of Birth 1975  Date of Service 1/8/2021     REASON FOR CONSULTATION: Hepatitis B  REFERRING PROVIDER: Yamile Funez MD           Assessment/plan:   Natasha Lee is a 45 year old female with Hepatitis B, e antigen negative and e antibody positive, who recently has had low Hep B viremia and quasi-normal transaminases consistent with inactive Hepatitis B. She has cutaneous involvement of SLE with dermatologic recommendations to likely start systemic treatment with CellCept. Recommend that she start Hep B anti-viral due to moderate risk of reactivation with positive Hep B surface antigen status. Also discussed HCC screening with unknown family history.     Suspect transaminases higher than expected due to component of fatty liver disease. We discussed Hepatitis B transmission, the natural course of Hepatitis B and indications for treatment. We also discussed the importance of regular labs and follow-up.      - If CellCept or similar systemic immunosuppressant/biologic is started, recommend starting Hep B prophylaxis. Will reach out to Dermatology provider.    - HCC screen: US abdomen in the near future   - Follow up labs in 6 months with Hepatic panel, HBV DNA quant.  - Continue optimization of blood glucose levels and  weight   - Follow-up in clinic in one year or sooner as needed     Jazmyn Urrutia PA-C   [facility] Hepatology     Subjective     Natasha is a 45 year old who presents to clinic today for the following health issues:     HPI     Hepatitis B   -E antigen negative  -E antibody positive  -Diagnosed: infancy  -History: Likely MTC  -Prior biopsy: No   -Prior treatments: Jose      Patient states she was diagnosed with Hepatitis B at infancy noted on medical records when she was adopted from [place]. No previous treatment. No routine labs through the years.      Appetite is good. Weight is up about 15 lbs over the past year. She has regular bowel movements.       Patient denies jaundice, lower extremity edema, abdominal distension or confusion.  Patient also denies melena, hematochezia or hematemesis. Patient denies fevers, sweats or chills.     PMH: Lupus,     SMH: s/p cholecystectomy, bunion removal, appendeix     Medications:   See below      No previous tobacco use. Rare alcohol and no history of significant use. No history of IV/IND. Drug and alcohol couselor at [facility] [place]. Live with  and 11 year old son. Unknown family history.      Lab work-up thus far  HCV antibody - nonreactive  HAV Ab IgM : nonreactive  HAV IgG - nonreactive   HBV SAb: 0.0  HBV SAg: positive  HBV CAb: positive    Review of Systems   ROS as noted above in the HPI      Objective    Vitals - Patient Reported  Pain Score: No Pain (0)    Physical Exam   GENERAL: healthy, alert and no distress  EYES: Eyes grossly normal to inspection, conjunctivae and sclerae normal  RESP:  No visible retractions or increased work of breathing.  Able to speak fully in complete sentences  NEURO: speech normal  PSYCH: mentation appears normal, affect normal/bright, judgement and insight intact, normal speech and appearance well-groomed    Current Outpatient Medications   Medication     augmented betamethasone dipropionate (DIPROLENE-AF) 0.05 % external cream      Cranberry 1000 MG CAPS     DiphenhydrAMINE HCl (BENADRYL PO)     DiphenhydrAMINE HCl, Sleep, (UNISOM SLEEPGELS) 50 MG CAPS     hydroxychloroquine (PLAQUENIL) 200 MG tablet     norethindrone-ethinyl estradiol (ORTHO-NOVUM) 1-35 MG-MCG tablet     Prenatal MV-Min-Fe Fum-FA-DHA (PRENATAL MULTIVITAMIN + DHA PO)       Data:   Reviewed in person and significant for:         Lab Results   Component Value Date     01/07/2021           Lab Results   Component Value Date    POTASSIUM 4.0 01/07/2021           Lab Results   Component Value Date    CHLORIDE 109 01/07/2021           Lab Results   Component Value Date    CO2 25 01/07/2021           Lab Results   Component Value Date    BUN 11 01/07/2021           Lab Results   Component Value Date    CR 0.64 01/07/2021           Lab Results   Component Value Date    WBC 4.2 01/07/2021           Lab Results   Component Value Date    HGB 14.0 01/07/2021           Lab Results   Component Value Date    HCT 40.4 01/07/2021           Lab Results   Component Value Date    MCV 91 01/07/2021           Lab Results   Component Value Date     01/07/2021           Lab Results   Component Value Date    AST 23 01/07/2021           Lab Results   Component Value Date    ALT 38 01/07/2021           Lab Results   Component Value Date    BILICONJ 0.0 09/22/2009           Lab Results   Component Value Date    BILITOTAL 0.3 01/07/2021           Lab Results   Component Value Date    ALBUMIN 3.8 01/07/2021           Lab Results   Component Value Date    PROTTOTAL 6.9 01/07/2021           Lab Results   Component Value Date    ALKPHOS 62 01/07/2021           Lab Results   Component Value Date    INR 0.87 01/07/2021       US ABDOMEN COMPLETE:   5/6/2016:      IMPRESSION:    Cholelithiasis with thickening of the gallbladder wall,  but no pericholecystic fluid collection and a negative sonographic  Matthews's sign. Although this could be some form of chronic  cholecystitis, cannot exclude a  neoplastic process within the wall of  the gallbladder. Surgical consultation should be considered.     [Critical Result: Thickened gallbladder wall in patient with negative  sonographic Matthews's sign.]

## 2021-01-09 ENCOUNTER — HEALTH MAINTENANCE LETTER (OUTPATIENT)
Age: 46
End: 2021-01-09

## 2021-01-11 NOTE — RESULT ENCOUNTER NOTE
Ky Lee,    Attached are your test results.  Negative Covid antibodies    Please contact us if you have any questions.    Sandy Garcia, CNP

## 2021-01-12 NOTE — TELEPHONE ENCOUNTER
2nd attempt to schedule as noted below. Provided direct call back number to schedule.      Collette Espinoza  Surgical Specialties Procedure   [employer] [place]  1/12/2021 9:06 AM

## 2021-01-28 ENCOUNTER — MYC MEDICAL ADVICE (OUTPATIENT)
Dept: DERMATOLOGY | Facility: CLINIC | Age: 46
End: 2021-01-28

## 2021-02-02 ENCOUNTER — VIRTUAL VISIT (OUTPATIENT)
Dept: DERMATOLOGY | Facility: CLINIC | Age: 46
End: 2021-02-02
Payer: COMMERCIAL

## 2021-02-02 DIAGNOSIS — M32.9 SLE (SYSTEMIC LUPUS ERYTHEMATOSUS RELATED SYNDROME) (H): ICD-10-CM

## 2021-02-02 DIAGNOSIS — L93.2 CUTANEOUS LUPUS ERYTHEMATOSUS: Primary | ICD-10-CM

## 2021-02-02 PROCEDURE — 99214 OFFICE O/P EST MOD 30 MIN: CPT | Mod: TEL | Performed by: DERMATOLOGY

## 2021-02-02 RX ORDER — MYCOPHENOLATE MOFETIL 500 MG/1
500 TABLET ORAL 2 TIMES DAILY
Qty: 60 TABLET | Refills: 2 | Status: SHIPPED | OUTPATIENT
Start: 2021-02-02 | End: 2021-05-14 | Stop reason: ALTCHOICE

## 2021-02-02 ASSESSMENT — PAIN SCALES - GENERAL: PAINLEVEL: NO PAIN (0)

## 2021-02-02 NOTE — PATIENT INSTRUCTIONS
[facility] Dermatology Visit    Thank you for allowing us to participate in your care. Your findings, instructions and follow-up plan are as follows:     1. Start hep B treatment  2. Then start cellcept 500mg twice daily  3. Labs one week after starting, will continue weekly for one month. Call to [place] to schedule a lab appointment for these.  4. Next visit with me in about 2 weeks - will call you to schedule  5. Next visit with our rheum derm specialist Dr. Terry in about 4 weeks - will call you to schedule          When should I call my doctor?    If you are worsening or not improving, please, contact us or seek urgent care as noted below.     Who should I call with questions (adults)?    [facility] (adult and pediatric): [phone]     [facility] (adult): [phone]    For urgent needs outside of business hours call the [facility] at [phone] and ask for the dermatology resident on call    If this is a medical emergency and you are unable to reach an ER, Call [phone]      Who should I call with questions (pediatric)?  [facility]- Pediatric Dermatology  Dr. Shannon Peralta, Dr. Karen Aguilera, Dr. Debbie Blanco, Aliza Mohan, PA  Dr. Elena Jung, Dr. Marcelina Thorpe & Dr. Corey Mayen  Non Urgent  Nurse Triage Line; [phone]- Yudelka and Mary FAM Care Coordinators   Pinky (/Complex ) [phone]    If you need a prescription refill, please contact your pharmacy. Refills are approved or denied by our Physicians during normal business hours, Monday through Fridays  Per office policy, refills will not be granted if you have not been seen within the past year (or sooner depending on your child's condition)    Scheduling Information:  Pediatric Appointment Scheduling and Call Center [phone]  Radiology Scheduling- [phone]  Sedation Unit  Scheduling- [phone]  [place] Scheduling- General [phone]; Pediatric Dermatology [phone]  Main  Services: [phone]  Italian: [phone]  Uzbek: [phone]  Hmong/Ammon/Turkmen: [phone]  Preadmission Nursing Department Fax Number: [phone] (Fax all pre-operative paperwork to this number)    For urgent matters arising during evenings, weekends, or holidays that cannot wait for normal business hours please call [phone] and ask for the Dermatology Resident On-Call to be paged.

## 2021-02-02 NOTE — NURSING NOTE
Natasha Lee's goals for this visit include:   Chief Complaint   Patient presents with     Follow Up     lupus, pt states things are going ok, still breaking out more around the private areas and thigh area.        She requests these members of her care team be copied on today's visit information:     PCP: Sandy Garcia    Referring Provider:  No referring provider defined for this encounter.    There were no vitals taken for this visit.    Do you need any medication refills at today's visit? No    Gertrude Solis LPN

## 2021-02-02 NOTE — Clinical Note
2 week virtual visit follow up with me, please    Yamile Funez MD    Department of Dermatology  [facility]: Phone: [phone], Fax:[phone]  [facility]: Phone: [phone], Fax: [phone]

## 2021-02-02 NOTE — PROGRESS NOTES
[facility] Dermatology Note [phone]  Encounter Date: Feb 2, 2021  [employer]-and-Forward and Telephone [phone]). Location of teledermatologist: [facility].  Start time: 3:47. End time: 3:58.    Dermatology Problem List:  1. 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   - Biopsy 1/6/2020 consistent with cutaneous lupus erythematosus  - Current t/x:  plaquenil (managed by rheum), Heliocare, aug betamethasone 0.05% cream BID, cellcept 500mg BID (started 2/2021 pending GI starting hep B treatment)  - Previous tx: TAC 0.1% cream (helped some but not enough on face), Protopic 0.1% ointment (good at baseline but not helpful for flares), metronidazole 0.75% cream, oral ivermectin 15 mg monthly x3 months,  permethrin 5% cream, gentle skin care, doxycycline 100 mg BID (x3 months - improved substantially in first month then went back to baseline)  3. Maculopapular rash following URI 6/6/20 - TMC 0.1% cream, bland emollients, OTC antihistamines    Family history: Unknown - adopted.  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 Patient has 1 child. She has had her tubes tied - no plans for future pregnancies.         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 Worsened. Biopsy previously on the right cheek showed tumid lupus on 1/6/2020. None more consistent with acute cutaneous lupus and discoid lupus as there is some scale, scarring, and dyspigmentation. Systemic SLE under great control, see rheum yearly and gets labs twice yearly. Unfortunately, facial lesions are starting to cause some scarring and dyspigmentation. She improved  some but not enough on super potent topical steroids. Systemic immunosuppressant is warranted to get lesions under control and prevent scarring.     Discussed with Dr. Zaman who agreed cellcept was a reasonable choice.   Discussed with GI that I recommend starting hep B treatment immediately, but has not yet been started. Will send message again today to see what plan is.    Today, we reviewed cellcept, including potential risks (especially infection, GI side effects, lab abnormalities, liver dysfunction, immunosuppression,  risk of lymphoproliferative disease and risk of PML) and need for close lab monitoring. She saw GI and plan is to start treatment for hepatitis B. Discussed with CHYNA Urrutia who did not think monitoring for hep B viral load was necessary while on treatment. Only normal labs will be needed for monitoring.    - Start treatment for hepatitis B first. Reached out to GI to see what needs to be done to start this.  - Then start cellcept 500mg BID. Handout on Cellcept provided.   - Continue augmented betamethasone dipropionate 0.05% cream applied BID  - Continue Plaquenil and Heliocare  - *Reviewed baseline CBC and LFTs. Patient has had regular TB and HIV testing through work and declines today.  - Labs one week after starting cellcept including CBC with diff and CMP. Will be checked weekly x1 month, then monthly x3, then every 3 months.  - Follow up with me in 2 weeks, will see if Dr. Terry able to see in about 4 weeks to make sure he agrees with my diagnosis and management.  - Encouraged use of masks and shields when at work, social distancing as much as possible when not at work due to COVID-19 pandemic and immunosuppression. Patient wants to think about possible work restrictions. I am happy to write her a letter if she needs one.    Procedures Performed:    None    Follow-up: 2 weeks, virtual visit    Staff:     Yamile Funez MD    Department of Dermatology  [facility]  [facility]: Phone: [phone], Fax:[phone]  [facility]: Phone: [phone], Fax: [phone]    ____________________________________________    CC: Follow Up (lupus, pt states things are going ok, still breaking out more around the private areas and thigh area. )    HPI:  Ms. Natasha Lee is a(n) 45 year old female who presents today as a return patient for cutaneous lupus.    Natasha notes her skin got better, then flared. No trigger she can identify for flare. Using topicals, taking plaquenil and heliocare. Has not started treatment for Hep B yet. Gets regular hepatitis, HIV, and TB testing at work, does not feel need to repeat at present.     Patient is otherwise feeling well, without additional concerns.    Labs:  NA    Physical Exam:  Vitals: There were no vitals taken for this visit.  SKIN: Teledermatology photos were reviewed; image quality and interpretability: acceptable. Image date: 1/28/21see upload date.  - Red edematous papules bridging from the nasal bridge to the cheeks to the nasolabial folds. Minimal scale. There are some atrophic scars and dyspigmentation.  - No other lesions of concern on areas examined.     Medications:  Current Outpatient Medications   Medication     augmented betamethasone dipropionate (DIPROLENE-AF) 0.05 % external cream     Cranberry 1000 MG CAPS     DiphenhydrAMINE HCl (BENADRYL PO)     DiphenhydrAMINE HCl, Sleep, (UNISOM SLEEPGELS) 50 MG CAPS     hydroxychloroquine (PLAQUENIL) 200 MG tablet     mycophenolate (GENERIC EQUIVALENT) 500 MG tablet     norethindrone-ethinyl estradiol (ORTHO-NOVUM) 1-35 MG-MCG tablet     Prenatal MV-Min-Fe Fum-FA-DHA (PRENATAL MULTIVITAMIN + DHA PO)     No current facility-administered medications for this visit.       Past Medical/Surgical History:   Patient Active Problem List   Diagnosis     Chronic hepatitis B (H)     Lupus (systemic lupus  erythematosus) (H)     CARDIOVASCULAR SCREENING; LDL GOAL LESS THAN 160     Health Care Home     Dysmenorrhea     Menorrhagia     Allergic rhinitis     Other nonspecific finding on examination of urine     Urinary frequency     Vitamin B12 deficiency (non anemic)     Heart murmur     S/P laparoscopic cholecystectomy     Cervical cancer screening     Past Medical History:   Diagnosis Date     Diabetes (H)     Pre-Diabetic     Heart murmur 1997     Lupus (systemic lupus erythematosus) (H) 1988    Dr. Prather     Lupus (systemic lupus erythematosus) (H) 12/10/2009     Normal delivery 9/5/09    boy forceps     Seizures (H)     When a child       CC No referring provider defined for this encounter. on close of this encounter.    Teledermatology Nurse Call Patients:     Are you  in the [facility] at the time of the encounter? yes    Today's visit will be billed to you and your insurance.    A teledermatology visit is not as thorough as an in-person visit and the quality of the photograph sent may not be of the same quality as that taken by the dermatology clinic.

## 2021-02-02 NOTE — LETTER
2/2/2021         RE: Natasha Lee  [address] [address]        Dear Colleague,    Thank you for referring your patient, Natasha Lee, to the [facility]. Please see a copy of my visit note below.    [facility] Dermatology Note [phone]  Encounter Date: Feb 2, 2021  Store-and-Forward and Telephone [phone]). Location of teledermatologist: [facility].  Start time: 3:47. End time: 3:58.    Dermatology Problem List:  1. 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   - Biopsy 1/6/2020 consistent with cutaneous lupus erythematosus  - Current t/x:  plaquenil (managed by rheum), Heliocare, aug betamethasone 0.05% cream BID, cellcept 500mg BID (started 2/2021 pending GI starting hep B treatment)  - Previous tx: TAC 0.1% cream (helped some but not enough on face), Protopic 0.1% ointment (good at baseline but not helpful for flares), metronidazole 0.75% cream, oral ivermectin 15 mg monthly x3 months,  permethrin 5% cream, gentle skin care, doxycycline 100 mg BID (x3 months - improved substantially in first month then went back to baseline)  3. Maculopapular rash following URI 6/6/20 - TMC 0.1% cream, bland emollients, OTC antihistamines    Family history: Unknown - adopted.  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 Patient has 1 child. She has had her tubes tied - no plans for future pregnancies.         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 Worsened. Biopsy previously on the right cheek showed tumid lupus on 1/6/2020. None more consistent with  acute cutaneous lupus and discoid lupus as there is some scale, scarring, and dyspigmentation. Systemic SLE under great control, see rheum yearly and gets labs twice yearly. Unfortunately, facial lesions are starting to cause some scarring and dyspigmentation. She improved some but not enough on super potent topical steroids. Systemic immunosuppressant is warranted to get lesions under control and prevent scarring.     Discussed with Dr. Zaman who agreed cellcept was a reasonable choice.   Discussed with GI that I recommend starting hep B treatment immediately, but has not yet been started. Will send message again today to see what plan is.    Today, we reviewed cellcept, including potential risks (especially infection, GI side effects, lab abnormalities, liver dysfunction, immunosuppression,  risk of lymphoproliferative disease and risk of PML) and need for close lab monitoring. She saw GI and plan is to start treatment for hepatitis B. Discussed with CHYNA Urrutia who did not think monitoring for hep B viral load was necessary while on treatment. Only normal labs will be needed for monitoring.    - Start treatment for hepatitis B first. Reached out to GI to see what needs to be done to start this.  - Then start cellcept 500mg BID. Handout on Cellcept provided.   - Continue augmented betamethasone dipropionate 0.05% cream applied BID  - Continue Plaquenil and Heliocare  - *Reviewed baseline CBC and LFTs. Patient has had regular TB and HIV testing through work and declines today.  - Labs one week after starting cellcept including CBC with diff and CMP. Will be checked weekly x1 month, then monthly x3, then every 3 months.  - Follow up with me in 2 weeks, will see if Dr. Terry able to see in about 4 weeks to make sure he agrees with my diagnosis and management.  - Encouraged use of masks and shields when at work, social distancing as much as possible when not at work due to COVID-19 pandemic and immunosuppression.  Patient wants to think about possible work restrictions. I am happy to write her a letter if she needs one.    Procedures Performed:    None    Follow-up: 2 weeks, virtual visit    Staff:     Yamile Funez MD    Department of Dermatology  [facility]: Phone: [phone], Fax:[phone]  [facility]: Phone: [phone], Fax: [phone]    ____________________________________________    CC: Follow Up (lupus, pt states things are going ok, still breaking out more around the private areas and thigh area. )    HPI:  Ms. Natasha Lee is a(n) 45 year old female who presents today as a return patient for cutaneous lupus.    Natasha notes her skin got better, then flared. No trigger she can identify for flare. Using topicals, taking plaquenil and heliocare. Has not started treatment for Hep B yet. Gets regular hepatitis, HIV, and TB testing at work, does not feel need to repeat at present.     Patient is otherwise feeling well, without additional concerns.    Labs:  NA    Physical Exam:  Vitals: There were no vitals taken for this visit.  SKIN: Teledermatology photos were reviewed; image quality and interpretability: acceptable. Image date: 1/28/21see upload date.  - Red edematous papules bridging from the nasal bridge to the cheeks to the nasolabial folds. Minimal scale. There are some atrophic scars and dyspigmentation.  - No other lesions of concern on areas examined.     Medications:  Current Outpatient Medications   Medication     augmented betamethasone dipropionate (DIPROLENE-AF) 0.05 % external cream     Cranberry 1000 MG CAPS     DiphenhydrAMINE HCl (BENADRYL PO)     DiphenhydrAMINE HCl, Sleep, (UNISOM SLEEPGELS) 50 MG CAPS     hydroxychloroquine (PLAQUENIL) 200 MG tablet     mycophenolate (GENERIC EQUIVALENT) 500 MG tablet     norethindrone-ethinyl estradiol (ORTHO-NOVUM) 1-35  MG-MCG tablet     Prenatal MV-Min-Fe Fum-FA-DHA (PRENATAL MULTIVITAMIN + DHA PO)     No current facility-administered medications for this visit.       Past Medical/Surgical History:   Patient Active Problem List   Diagnosis     Chronic hepatitis B (H)     Lupus (systemic lupus erythematosus) (H)     CARDIOVASCULAR SCREENING; LDL GOAL LESS THAN 160     Health Care Home     Dysmenorrhea     Menorrhagia     Allergic rhinitis     Other nonspecific finding on examination of urine     Urinary frequency     Vitamin B12 deficiency (non anemic)     Heart murmur     S/P laparoscopic cholecystectomy     Cervical cancer screening     Past Medical History:   Diagnosis Date     Diabetes (H)     Pre-Diabetic     Heart murmur 1997     Lupus (systemic lupus erythematosus) (H) 1988    Dr. Prather     Lupus (systemic lupus erythematosus) (H) 12/10/2009     Normal delivery 9/5/09    boy forceps     Seizures (H)     When a child       CC No referring provider defined for this encounter. on close of this encounter.    Teledermatology Nurse Call Patients:     Are you  in the [facility] at the time of the encounter? yes    Today's visit will be billed to you and your insurance.    A teledermatology visit is not as thorough as an in-person visit and the quality of the photograph sent may not be of the same quality as that taken by the dermatology clinic.                                                                                                                                                                                                                                          Again, thank you for allowing me to participate in the care of your patient.        Sincerely,        Yamile Funez MD

## 2021-02-02 NOTE — Clinical Note
Ruben Bender, can  you get this patient set up with a virtual visit with Dr. Terry in 4 weeks? I just want his second opinion on my diagnosis of lupus.    Yamile Funez MD    Department of Dermatology  [facility]: Phone: [phone], Fax:[phone]  [facility]: Phone: [phone], Fax: [phone]

## 2021-02-03 ENCOUNTER — TELEPHONE (OUTPATIENT)
Dept: GASTROENTEROLOGY | Facility: CLINIC | Age: 46
End: 2021-02-03

## 2021-02-03 DIAGNOSIS — B18.1 CHRONIC HEPATITIS B (H): Primary | ICD-10-CM

## 2021-02-03 RX ORDER — TENOFOVIR DISOPROXIL FUMARATE 300 MG/1
300 TABLET, FILM COATED ORAL DAILY
Qty: 30 TABLET | Refills: 11 | Status: SHIPPED | OUTPATIENT
Start: 2021-02-03

## 2021-02-03 NOTE — TELEPHONE ENCOUNTER
Called patient to let her know a prescription for tenofovir  mg will be sent to her pharmacy for hepatitis B reactivation protection now that she is starting CellCept.     She is to let us know if that is not the preferred medication with her insurance and we can send an alternative.     Janie TORRES LPN  Hepatology Clinic

## 2021-02-04 ENCOUNTER — TELEPHONE (OUTPATIENT)
Dept: DERMATOLOGY | Facility: CLINIC | Age: 46
End: 2021-02-04

## 2021-02-04 NOTE — TELEPHONE ENCOUNTER
1st attempt. Left message for patient to return call. Patient is due for a return phone appointment with photos with Dr Funez due around 2/17/21. Number to clinic and Mychart option given, please assist in scheduling once patient returns clinic call.     Call Center OKAY TO SCHEDULE.    Thanks,   Collette Espinoza  Surgical Specialties Procedure   [facility] [place]  2/4/2021 10:07 AM

## 2021-02-04 NOTE — TELEPHONE ENCOUNTER
----- Message from Mi Markham RN sent at 2/3/2021  8:23 AM CST -----  Regarding: appt  Yamile Funez MD sent to [facility] Dermatology Adult [place]         2 week virtual visit follow up with me, please     Yamile Funez MD

## 2021-02-05 ENCOUNTER — TELEPHONE (OUTPATIENT)
Dept: DERMATOLOGY | Facility: CLINIC | Age: 46
End: 2021-02-05

## 2021-02-05 NOTE — TELEPHONE ENCOUNTER
----- Message from Yamile Funez MD sent at 2/5/2021  7:13 AM CST -----  Ruben Bender,  Can you get this patient in with Mara for a virtual visit around 4 weeks from now?  Thank you,    Yamile Funez MD    Department of Dermatology  [facility]: Phone: [phone], Fax:[phone]  [facility]: Phone: [phone], Fax: [phone]

## 2021-02-05 NOTE — TELEPHONE ENCOUNTER
Patient needs to be scheduled in a LONG virtual visit (phone or video) with Dr. Terry on a Friday for Cutaneous lupus erythematosus.    Napoleon Jarrett, [facility]

## 2021-02-05 NOTE — TELEPHONE ENCOUNTER
Voicemail left with patient asking to call the clinic back to schedule an appointment with Dr. Terry. Clinic phone number provided.    Napoleon Jarrett CMA

## 2021-02-11 NOTE — TELEPHONE ENCOUNTER
2nd attempt to schedule as noted below. Message left for patient to return call and schedule.    Collette Espinoza  Surgical Specialties Procedure   [facility] [place]  2/11/2021 3:16 PM

## 2021-02-23 NOTE — TELEPHONE ENCOUNTER
Tino Bender.     team - Natasha is not scheduled with me until 3/24/21. I'd like to see her sooner. Can you find room for a virtual visit to check in with her in about 2 weeks? If you get ahold of her, let her know Dr. Terry's nurse has also been trying to schedule her. I want her to see Dr. Terry in about 4 weeks time.    Yamile Funez MD    Department of Dermatology  [facility]: Phone: [phone], Fax:[phone]  [facility]: Phone: [phone], Fax: [phone]

## 2021-02-23 NOTE — TELEPHONE ENCOUNTER
Voicemail left with patient asking to call the clinic back to schedule an appointment with Dr. Terry per Dr. Funez. Clinic phone number provided.     Napoleon Jarrett, [facility]

## 2021-02-25 NOTE — TELEPHONE ENCOUNTER
Pt called, No answer.  does not identify pt. Left general message for pt to call the [facility] clinic back at [phone]...Collette Paniagua RN

## 2021-02-26 ENCOUNTER — TELEPHONE (OUTPATIENT)
Dept: SURGERY | Facility: CLINIC | Age: 46
End: 2021-02-26

## 2021-02-26 DIAGNOSIS — M32.9 SLE (SYSTEMIC LUPUS ERYTHEMATOSUS RELATED SYNDROME) (H): ICD-10-CM

## 2021-02-26 LAB
ALBUMIN SERPL-MCNC: 3.9 G/DL (ref 3.4–5)
ALP SERPL-CCNC: 81 U/L (ref 40–150)
ALT SERPL W P-5'-P-CCNC: 39 U/L (ref 0–50)
ANION GAP SERPL CALCULATED.3IONS-SCNC: 2 MMOL/L (ref 3–14)
AST SERPL W P-5'-P-CCNC: 25 U/L (ref 0–45)
BASOPHILS # BLD AUTO: 0 10E9/L (ref 0–0.2)
BASOPHILS NFR BLD AUTO: 0.4 %
BILIRUB SERPL-MCNC: 0.2 MG/DL (ref 0.2–1.3)
BUN SERPL-MCNC: 13 MG/DL (ref 7–30)
CALCIUM SERPL-MCNC: 8.8 MG/DL (ref 8.5–10.1)
CHLORIDE SERPL-SCNC: 109 MMOL/L (ref 94–109)
CO2 SERPL-SCNC: 29 MMOL/L (ref 20–32)
CREAT SERPL-MCNC: 0.71 MG/DL (ref 0.52–1.04)
DIFFERENTIAL METHOD BLD: NORMAL
EOSINOPHIL # BLD AUTO: 0 10E9/L (ref 0–0.7)
EOSINOPHIL NFR BLD AUTO: 0.8 %
ERYTHROCYTE [DISTWIDTH] IN BLOOD BY AUTOMATED COUNT: 12.1 % (ref 10–15)
GFR SERPL CREATININE-BSD FRML MDRD: >90 ML/MIN/{1.73_M2}
GLUCOSE SERPL-MCNC: 109 MG/DL (ref 70–99)
HCT VFR BLD AUTO: 38.6 % (ref 35–47)
HGB BLD-MCNC: 13.2 G/DL (ref 11.7–15.7)
IMM GRANULOCYTES # BLD: 0 10E9/L (ref 0–0.4)
IMM GRANULOCYTES NFR BLD: 0.2 %
LYMPHOCYTES # BLD AUTO: 1.2 10E9/L (ref 0.8–5.3)
LYMPHOCYTES NFR BLD AUTO: 24.3 %
MCH RBC QN AUTO: 30.8 PG (ref 26.5–33)
MCHC RBC AUTO-ENTMCNC: 34.2 G/DL (ref 31.5–36.5)
MCV RBC AUTO: 90 FL (ref 78–100)
MONOCYTES # BLD AUTO: 0.4 10E9/L (ref 0–1.3)
MONOCYTES NFR BLD AUTO: 8 %
NEUTROPHILS # BLD AUTO: 3.1 10E9/L (ref 1.6–8.3)
NEUTROPHILS NFR BLD AUTO: 66.3 %
PLATELET # BLD AUTO: 198 10E9/L (ref 150–450)
POTASSIUM SERPL-SCNC: 3.7 MMOL/L (ref 3.4–5.3)
PROT SERPL-MCNC: 6.9 G/DL (ref 6.8–8.8)
RBC # BLD AUTO: 4.28 10E12/L (ref 3.8–5.2)
SODIUM SERPL-SCNC: 140 MMOL/L (ref 133–144)
WBC # BLD AUTO: 4.7 10E9/L (ref 4–11)

## 2021-02-26 PROCEDURE — 36415 COLL VENOUS BLD VENIPUNCTURE: CPT | Performed by: DERMATOLOGY

## 2021-02-26 PROCEDURE — 80053 COMPREHEN METABOLIC PANEL: CPT | Performed by: DERMATOLOGY

## 2021-02-26 PROCEDURE — 85025 COMPLETE CBC W/AUTO DIFF WBC: CPT | Performed by: DERMATOLOGY

## 2021-02-26 NOTE — TELEPHONE ENCOUNTER
Natasha called back and I rescheduled her with Dr. Funez on 3/2/21 for a phone visit at 4pm.  She said that she also just got off the phone with Dr. Terry's office as well.  Mi Markham RN

## 2021-02-26 NOTE — TELEPHONE ENCOUNTER
Yamile Funez MD  [facility] D[facility]atology Adult [place]             Cellcept safety labs look great. Will send mychart to patient to let her know. Will continue weekly checks x3 more.     Nursing staff: Please cancel patient's visit with me on 3/2/21 - she is seeing Dr. Terry on 3/5/21 instead.     Yamile Funez MD      Department of Dermatology   [facility]: Phone: [phone], Fax:[phone]   [facility]: Phone: [phone], Fax: [phone]    Associated Results    Contains abnormal data Comprehensive metabolic panel (CMP)  Order: [id number]  Status:  Final result   Visible to patient:  Yes (MyChart) Dx:  SLE (systemic lupus erythematosus rel...   Ref Range & Units  2:31 PM   (2/26/21) 1mo ago   (1/7/21) 1mo ago   (1/7/21)   Sodium 133 - 144 mmol/L 140   139    Potassium 3.4 - 5.3 mmol/L 3.7   4.0    Chloride 94 - 109 mmol/L 109   109    Carbon Dioxide 20 - 32 mmol/L 29   25    Anion Gap 3 - 14 mmol/L 2Low    5    Glucose 70 - 99 mg/dL 109High    115High     Urea Nitrogen 7 - 30 mg/dL 13   11    Creatinine 0.52 - 1.04 mg/dL 0.71   0.64    GFR Estimate >60 mL/min/ >90   >90 CM    Comment: Non  GFR Calc   Starting 12/18/2018, serum creatinine based estimated GFR (eGFR) will be   calculated using the Chronic Kidney Disease Epidemiology Collaboration   (CKD-EPI) equation.    GFR Estimate If Black >60 mL/min/ >90   >90 CM    Comment:  GFR Calc   Starting 12/18/2018, serum creatinine based estimated GFR (eGFR) will be   calculated using the Chronic Kidney Disease Epidemiology Collaboration   (CKD-EPI) equation.    Calcium 8.5 - 10.1 mg/dL 8.8   8.6    Bilirubin Total 0.2 - 1.3 mg/dL 0.2  0.3     Albumin 3.4 - 5.0 g/dL 3.9  3.8     Protein Total 6.8 - 8.8 g/dL 6.9  6.9     Alkaline Phosphatase 40 - 150 U/L 81  62     ALT 0 - 50 U/L 39  38     AST  0 - 45 U/L 25  23     Resulting Agency  [facility]         Specimen Collected: 02/26/21  2:31 PM Last Resulted: 02/26/21  3:01 PM         Lab Flowsheet      Order Details      View Encounter      Lab and Collection Details      Routing      Result History        CM=Additional comments        Result Communications      Result Notes     Collette Paniagua RN   2/26/2021  4:13 PM CST      Appt cancelled ..Yamile Kennedy RN, MD   2/26/2021  3:27 PM CST      Cellcept safety labs look great. Will send mychart to patient to let her know. Will continue weekly checks x3 more.     Nursing staff: Please cancel patient's visit with me on 3/2/21 - she is seeing Dr. Terry on 3/5/21 instead.     Yamile Funez MD    Department of Dermatology  [facility]: Phone: [phone], Fax:[phone]  [facility]: Phone: [phone], Fax: [phone]

## 2021-03-01 ENCOUNTER — DOCUMENTATION ONLY (OUTPATIENT)
Dept: CARE COORDINATION | Facility: CLINIC | Age: 46
End: 2021-03-01

## 2021-03-05 ENCOUNTER — VIRTUAL VISIT (OUTPATIENT)
Dept: DERMATOLOGY | Facility: CLINIC | Age: 46
End: 2021-03-05
Payer: COMMERCIAL

## 2021-03-05 DIAGNOSIS — M32.19 SYSTEMIC LUPUS ERYTHEMATOSUS WITH OTHER ORGAN INVOLVEMENT, UNSPECIFIED SLE TYPE (H): ICD-10-CM

## 2021-03-05 DIAGNOSIS — L93.0 LUPUS ERYTHEMATOSUS TUMIDUS: Primary | ICD-10-CM

## 2021-03-05 DIAGNOSIS — B18.1 CHRONIC HEPATITIS B VIRUS INFECTION (H): ICD-10-CM

## 2021-03-05 PROCEDURE — 99214 OFFICE O/P EST MOD 30 MIN: CPT | Mod: GQ | Performed by: DERMATOLOGY

## 2021-03-05 RX ORDER — MYCOPHENOLATE MOFETIL 500 MG/1
TABLET ORAL
Qty: 90 TABLET | Refills: 3 | Status: SHIPPED | OUTPATIENT
Start: 2021-03-05 | End: 2021-05-14

## 2021-03-05 ASSESSMENT — PAIN SCALES - GENERAL: PAINLEVEL: NO PAIN (0)

## 2021-03-05 NOTE — LETTER
3/5/2021       RE: Natasha Lee  [address] [address]     Dear Colleague,    Thank you for referring your patient, Natasha Lee, to the [facility] [place] at [facility]. Please see a copy of my visit note below.    [facility] Dermatology Note  Encounter Date: Mar 5, 2021  Store-and-Forward and Telephone ([phone]). Location of teledermatologist: [facility] [place].  Start time: 9:15. End time: 9:27.    Dermatology Problem List:  1. 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   - Biopsy 1/6/2020 consistent with cutaneous lupus erythematosus  - Current t/x:  plaquenil (managed by rheum), Heliocare, aug betamethasone 0.05% cream BID, cellcept 500mg BID (started 2/2021 pending GI starting hep B treatment)  - Previous tx: TAC 0.1% cream (helped some but not enough on face), Protopic 0.1% ointment (good at baseline but not helpful for flares), metronidazole 0.75% cream, oral ivermectin 15 mg monthly x3 months,  permethrin 5% cream, gentle skin care, doxycycline 100 mg BID (x3 months - improved substantially in first month then went back to baseline)  3. Maculopapular rash following URI 6/6/20 - TMC 0.1% cream, bland emollients, OTC antihistamines     Family history: Unknown - adopted.  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 She has had her tubes tied - no plans for future pregnancies.        ____________________________________________    Assessment & Plan:     1. Cutaneous lupus with underlying systemic lupus: still a bit too early to assess  response to mycophenolate, although with quite widespread cutaneous disease. We discussed risks/benefits of increasing her mycophenolate dose slightly, which I am in favor of. We also discussed more consistent use of topicals - augmented betamethasone may be used for short periods of time on the face, but if we are finding that longer term treatment is required as we are waiting for the mycophenolate mofetil to take full effect, would consider switch to tacrolimus ointment for facial lesions and continuation of augmented betamethasone for lesions on the extremities.    Based on the clinical appearance and distribution, I would favor tumid lupus over discoid lupus, however new lesions of discoid lupus can look quite similar and photos alone may be inadequate to definitively distinguish these two closely related entities. From a practical standpoint, this distinction does not alter our treatment regimen.    If mycophenolate mofetil does not provide adequate control of cutaneous lesions, recommend methotrexate as this is the typical first-line systemic agent for cutaneous lupus after hydroxychloroquine.     - Increase mycophenolate mofetil to 1000 mg QAM and 500 mg QPM; if refractory, can further increase to 1000 mg BID  - Check labs in 2 weeks, then q3 months  - continue hydroxychloroquine  - apply augmented betamethasone to active papules on the face and extremities; consider switch to tacrolimus ointment for face if remains active    2. Chronic HBV: saw hepatology and being treated with tenofovir      Procedures Performed:    None    Follow-up: 8 weeks    Staff:     Corey Terry MD   of Dermatology  Department of Dermatology  [facility] [employer]    ____________________________________________    CC: Derm Problem (Cutaneous lupus erythematosus - Natasha have been having breakouts on her face and throughout her body.)    HPI:  Ms. Natasha Lee is a(n) 45 year old  female who presents today as a return patient for cutaneous lupus    Continuing to break out with new rash despite mycophenolate  - red bumps - both upper arms, thighs, cheeks - a little tender to the touch  - started mycophenolate 2 weeks ago - 500 mg twice daily  - augmented betamethasone ointment - using infrequently    Patient is otherwise feeling well, without additional skin concerns.    Labs Reviewed:  2/2021: unremarkable CBC, CMP  1/2021: +HBV DNA  11/2020: low C', elevated dsDNA (20), normal UA, normal UPCR, normal CRP/ESR  11/2018: negative HCV, HIV  3/2018: aCLs/b2gp negative, +SSA; -SSB/Scl70/Sm/RNP  10/2008: low positive PATI by EIA (2.4)    Physical Exam:  Vitals: There were no vitals taken for this visit.  SKIN: Teledermatology photos were reviewed; image quality and interpretability: acceptable. Image date: 2/28/21, 3/4/21.  - erythematous indurated papules on the cheeks, upper arms, and left anterior shoulder; some with targetoid appearance  - No other lesions of concern on areas examined.     Medications:  Current Outpatient Medications   Medication     augmented betamethasone dipropionate (DIPROLENE-AF) 0.05 % external cream     Cranberry 1000 MG CAPS     DiphenhydrAMINE HCl (BENADRYL PO)     DiphenhydrAMINE HCl, Sleep, (UNISOM SLEEPGELS) 50 MG CAPS     hydroxychloroquine (PLAQUENIL) 200 MG tablet     mycophenolate (GENERIC EQUIVALENT) 500 MG tablet     norethindrone-ethinyl estradiol (ORTHO-NOVUM) 1-35 MG-MCG tablet     Prenatal MV-Min-Fe Fum-FA-DHA (PRENATAL MULTIVITAMIN + DHA PO)     tenofovir (VIREAD) 300 MG tablet     No current facility-administered medications for this visit.       Past Medical/Surgical History:   Patient Active Problem List   Diagnosis     Chronic hepatitis B (H)     Lupus (systemic lupus erythematosus) (H)     CARDIOVASCULAR SCREENING; LDL GOAL LESS THAN 160     Health Care Home     Dysmenorrhea     Menorrhagia     Allergic rhinitis     Other nonspecific finding on  examination of urine     Urinary frequency     Vitamin B12 deficiency (non anemic)     Heart murmur     S/P laparoscopic cholecystectomy     Cervical cancer screening     Past Medical History:   Diagnosis Date     Diabetes (H)     Pre-Diabetic     Heart murmur 1997     Lupus (systemic lupus erythematosus) (H) 1988    Dr. Prather     Lupus (systemic lupus erythematosus) (H) 12/10/2009     Normal delivery 9/5/09    boy forceps     Seizures (H)     When a child       CC Yamile Funez MD  [address] [place] [phone] on close of this encounter.

## 2021-03-05 NOTE — NURSING NOTE
Dermatology Rooming Note    Natasha Lee's goals for this visit include:   Chief Complaint   Patient presents with     Derm Problem     Cutaneous lupus erythematosus - Natasha have been having breakouts on her face and throughout her body.     Napoleon Jarrett, CMA

## 2021-03-05 NOTE — PATIENT INSTRUCTIONS
[facility] Dermatology Visit    Thank you for allowing us to participate in your care. Your findings, instructions and follow-up plan are as follows:     Increase mycophenolate mofetil to 1000 mg in the morning and 500 mg in the evening  Continue hydroxychloroquine  Apply betamethasone twice daily to the active, red, raised spots for the next few weeks. If you notice skin thinning, then stop use and contact me by MyChart or phone. Down the road we can switch to a non-steroid topical ointment called tacrolimus, but in the short term, given the degree of inflammation, betamethasone is a more effective treatment.    When should I call my doctor?    If you are worsening or not improving, please, contact us or seek urgent care as noted below.     Who should I call with questions (adults)?    [facility] (adult and pediatric): [phone]     [facility] (adult): [phone]    For urgent needs outside of business hours call the [facility] at [phone] and ask for the dermatology resident on call    If this is a medical emergency and you are unable to reach an ER, Call [phone]      Who should I call with questions (pediatric)?  [facility]- Pediatric Dermatology  Dr. Shannon Peralta, Dr. Karen Aguilera, Dr. Debbie Blanco, Aliza Mohan, CHALINO Jung, Dr. Marcelina Thorpe & Dr. Corey Mayen  Non Urgent  Nurse Triage Line; [phone]- Yudelka and Mary FAM Care Coordinators   Pinky (/Complex ) [phone]    If you need a prescription refill, please contact your pharmacy. Refills are approved or denied by our Physicians during normal business hours, Monday through Fridays  Per office policy, refills will not be granted if you have not been seen within the past year (or sooner depending on your child's condition)    Scheduling Information:  Pediatric Appointment  Scheduling and Call Center [phone]  Radiology Scheduling- [phone]  Sedation Unit Scheduling- [phone]  [place] Scheduling- General [phone]; Pediatric Dermatology [phone]  Main  Services: [phone]  Kazakh: [phone]  Grenadian: [phone]  Hmong/Ammon/Barbadian: [phone]  Preadmission Nursing Department Fax Number: [phone] (Fax all pre-operative paperwork to this number)    For urgent matters arising during evenings, weekends, or holidays that cannot wait for normal business hours please call [phone] and ask for the Dermatology Resident On-Call to be paged.

## 2021-03-05 NOTE — PROGRESS NOTES
[facility] Dermatology Note  Encounter Date: Mar 5, 2021  Store-and-Forward and Telephone ([phone]). Location of teledermatologist: [facility] DERMATOLOGY CLINIC [place].  Start time: 9:15. End time: 9:27.    Dermatology Problem List:  1. 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   - Biopsy 1/6/2020 consistent with cutaneous lupus erythematosus  - Current t/x:  plaquenil (managed by rheum), Heliocare, aug betamethasone 0.05% cream BID, cellcept 500mg BID (started 2/2021 pending GI starting hep B treatment)  - Previous tx: TAC 0.1% cream (helped some but not enough on face), Protopic 0.1% ointment (good at baseline but not helpful for flares), metronidazole 0.75% cream, oral ivermectin 15 mg monthly x3 months,  permethrin 5% cream, gentle skin care, doxycycline 100 mg BID (x3 months - improved substantially in first month then went back to baseline)  3. Maculopapular rash following URI 6/6/20 - TMC 0.1% cream, bland emollients, OTC antihistamines     Family history: Unknown - adopted.  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 Patient has 1 child. She has had her tubes tied - no plans for future pregnancies.        ____________________________________________    Assessment & Plan:     1. Cutaneous lupus with underlying systemic lupus: still a bit too early to assess response to mycophenolate, although with quite widespread cutaneous disease. We discussed risks/benefits of increasing her mycophenolate dose slightly, which I am in favor of. We also discussed more consistent use of topicals - augmented betamethasone may be used for short periods of time on the face, but if we are finding that longer  term treatment is required as we are waiting for the mycophenolate mofetil to take full effect, would consider switch to tacrolimus ointment for facial lesions and continuation of augmented betamethasone for lesions on the extremities.    Based on the clinical appearance and distribution, I would favor tumid lupus over discoid lupus, however new lesions of discoid lupus can look quite similar and photos alone may be inadequate to definitively distinguish these two closely related entities. From a practical standpoint, this distinction does not alter our treatment regimen.    If mycophenolate mofetil does not provide adequate control of cutaneous lesions, recommend methotrexate as this is the typical first-line systemic agent for cutaneous lupus after hydroxychloroquine.     - Increase mycophenolate mofetil to 1000 mg QAM and 500 mg QPM; if refractory, can further increase to 1000 mg BID  - Check labs in 2 weeks, then q3 months  - continue hydroxychloroquine  - apply augmented betamethasone to active papules on the face and extremities; consider switch to tacrolimus ointment for face if remains active    2. Chronic HBV: saw hepatology and being treated with tenofovir      Procedures Performed:    None    Follow-up: 8 weeks    Staff:     Corey Terry MD   of Dermatology  Department of Dermatology  [facility] [employer]    ____________________________________________    CC: Derm Problem (Cutaneous lupus erythematosus - Natasha have been having breakouts on her face and throughout her body.)    HPI:  Ms. Natasha Lee is a(n) 45 year old female who presents today as a return patient for cutaneous lupus    Continuing to break out with new rash despite mycophenolate  - red bumps - both upper arms, thighs, cheeks - a little tender to the touch  - started mycophenolate 2 weeks ago - 500 mg twice daily  - augmented betamethasone ointment - using infrequently    Patient is  otherwise feeling well, without additional skin concerns.    Labs Reviewed:  2/2021: unremarkable CBC, CMP  1/2021: +HBV DNA  11/2020: low C', elevated dsDNA (20), normal UA, normal UPCR, normal CRP/ESR  11/2018: negative HCV, HIV  3/2018: aCLs/b2gp negative, +SSA; -SSB/Scl70/Sm/RNP  10/2008: low positive PATI by EIA (2.4)    Physical Exam:  Vitals: There were no vitals taken for this visit.  SKIN: Teledermatology photos were reviewed; image quality and interpretability: acceptable. Image date: 2/28/21, 3/4/21.  - erythematous indurated papules on the cheeks, upper arms, and left anterior shoulder; some with targetoid appearance  - No other lesions of concern on areas examined.     Medications:  Current Outpatient Medications   Medication     augmented betamethasone dipropionate (DIPROLENE-AF) 0.05 % external cream     Cranberry 1000 MG CAPS     DiphenhydrAMINE HCl (BENADRYL PO)     DiphenhydrAMINE HCl, Sleep, (UNISOM SLEEPGELS) 50 MG CAPS     hydroxychloroquine (PLAQUENIL) 200 MG tablet     mycophenolate (GENERIC EQUIVALENT) 500 MG tablet     norethindrone-ethinyl estradiol (ORTHO-NOVUM) 1-35 MG-MCG tablet     Prenatal MV-Min-Fe Fum-FA-DHA (PRENATAL MULTIVITAMIN + DHA PO)     tenofovir (VIREAD) 300 MG tablet     No current facility-administered medications for this visit.       Past Medical/Surgical History:   Patient Active Problem List   Diagnosis     Chronic hepatitis B (H)     Lupus (systemic lupus erythematosus) (H)     CARDIOVASCULAR SCREENING; LDL GOAL LESS THAN 160     Health Care Home     Dysmenorrhea     Menorrhagia     Allergic rhinitis     Other nonspecific finding on examination of urine     Urinary frequency     Vitamin B12 deficiency (non anemic)     Heart murmur     S/P laparoscopic cholecystectomy     Cervical cancer screening     Past Medical History:   Diagnosis Date     Diabetes (H)     Pre-Diabetic     Heart murmur 1997     Lupus (systemic lupus erythematosus) (H) 1988    Dr. Prather     Lupus  (systemic lupus erythematosus) (H) 12/10/2009     Normal delivery 9/5/09    boy forceps     Seizures (H)     When a child       CC Yamile Funez MD  [address] [place] [phone] on close of this encounter.

## 2021-05-14 ENCOUNTER — VIRTUAL VISIT (OUTPATIENT)
Dept: DERMATOLOGY | Facility: CLINIC | Age: 46
End: 2021-05-14
Payer: COMMERCIAL

## 2021-05-14 DIAGNOSIS — L93.0 LUPUS ERYTHEMATOSUS TUMIDUS: ICD-10-CM

## 2021-05-14 DIAGNOSIS — M32.19 SYSTEMIC LUPUS ERYTHEMATOSUS WITH OTHER ORGAN INVOLVEMENT, UNSPECIFIED SLE TYPE (H): ICD-10-CM

## 2021-05-14 DIAGNOSIS — B18.1 CHRONIC HEPATITIS B (H): ICD-10-CM

## 2021-05-14 DIAGNOSIS — M32.9 SYSTEMIC LUPUS ERYTHEMATOSUS, UNSPECIFIED SLE TYPE, UNSPECIFIED ORGAN INVOLVEMENT STATUS (H): ICD-10-CM

## 2021-05-14 DIAGNOSIS — L93.2 CUTANEOUS LUPUS ERYTHEMATOSUS: Primary | ICD-10-CM

## 2021-05-14 PROCEDURE — 99213 OFFICE O/P EST LOW 20 MIN: CPT | Mod: 95 | Performed by: DERMATOLOGY

## 2021-05-14 RX ORDER — MYCOPHENOLATE MOFETIL 500 MG/1
TABLET ORAL
Qty: 90 TABLET | Refills: 3 | Status: SHIPPED | OUTPATIENT
Start: 2021-05-14 | End: 2022-04-28

## 2021-05-14 ASSESSMENT — PAIN SCALES - GENERAL: PAINLEVEL: NO PAIN (0)

## 2021-05-14 NOTE — PATIENT INSTRUCTIONS
[facility] Dermatology Visit    Thank you for allowing us to participate in your care. Your findings, instructions and follow-up plan are as follows:     Continue mycophenolate and hydroxychloroquine at current doses. If things start to plateau, we will plan to increase mycophenolate a bit.  Continue topical ointments. As things improve, switch from betamethasone to tacrolimus ointment.  Check labs at end of May/early June    When should I call my doctor?    If you are worsening or not improving, please, contact us or seek urgent care as noted below.     Who should I call with questions (adults)?    [facility] (adult and pediatric): [phone]     [facility] (adult): [phone]    For urgent needs outside of business hours call the [facility] at [phone] and ask for the dermatology resident on call    If this is a medical emergency and you are unable to reach an ER, Call 911      Who should I call with questions (pediatric)?  [facility]- Pediatric Dermatology  Dr. Shannon Peralta, Dr. Karen Aguilera, Dr. Debbie Blanco, Aliza Mohan, [place]  Dr. Elena Jung, Dr. Marcelina Thorpe & Dr. Corey Mayen  Non Urgent  Nurse Triage Line; [phone]- Yudelka and Mary FAM Care Coordinatorshruthi Vance (/Complex ) [phone]    If you need a prescription refill, please contact your pharmacy. Refills are approved or denied by our Physicians during normal business hours, Monday through Fridays  Per office policy, refills will not be granted if you have not been seen within the past year (or sooner depending on your child's condition)    Scheduling Information:  Pediatric Appointment Scheduling and Call Center [phone]  Radiology Scheduling- [phone]  Sedation Unit Scheduling- [phone]  [facility] Scheduling- General [phone]; Pediatric Dermatology  [phone]  Main  Services: [phone]  Serbian: [phone]  German: [phone]  Hmong/Uzbek/Adam: [phone]  Preadmission Nursing Department Fax Number: [phone] (Fax all pre-operative paperwork to this number)    For urgent matters arising during evenings, weekends, or holidays that cannot wait for normal business hours please call [phone] and ask for the Dermatology Resident On-Call to be paged.

## 2021-05-14 NOTE — LETTER
5/14/2021       RE: Natasha Lee  [address] [facility] [id number]     Dear Colleague,    Thank you for referring your patient, Natasha Lee, to the [facility] DERMATOLOGY CLINIC [place] at [facility]. Please see a copy of my visit note below.    [facility] Dermatology Note  Encounter Date: May 14, 2021  Store-and-Forward and Telephone ([phone]). Location of teledermatologist: [facility] DERMATOLOGY CLINIC [place].  Start time: 8:57. End time: 9:06.    Dermatology Problem List:  1. SLE on hydroxychloroquine and intermittent prednisone, chronic hepatitis B  2. 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 Possible ocular rosacea.   - Biopsy 1/6/2020 consistent with cutaneous lupus erythematosus  - Current t/x: hydroxychloroquine (managed by rheum), Heliocare, aug betamethasone 0.05% cream BID, mycophenolate 1000 mg BID (started 2/2021 pending GI starting hep B treatment)  - Previous tx: triamcinolone 0.1% cream (helped some but not enough on face), tacrolimus 0.1% ointment (good at baseline but not helpful for flares), metronidazole 0.75% cream, oral ivermectin 15 mg monthly x3 months,  permethrin 5% cream, gentle skin care, doxycycline 100 mg BID (x3 months - improved substantially in first month then went back to baseline)  3. Maculopapular rash following URI 6/6/20 - TMC 0.1% cream, bland emollients, OTC antihistamines     Family history: Unknown - adopted.  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 She has had her tubes tied - no plans for future pregnancies.     ____________________________________________    Assessment & Plan:     1. Cutaneous lupus: tumid vs early discoid; responding well to increased  "mycophenolate dose with less erythema, fewer flares, improved texture/decreased swelling. At this time I'm in favor of maintaining the current regimen, however I have a low threshold to push her mycophenolate dose a bit if she starts to plateau or develops worsening symptoms. We discussed switching from augmented betamethasone to tacrolimus ointment as her symptoms improve, to avoid atrophy (no evidence of atrophy to date in submitted photos).  - continue mycophenolate 1000/500 mg daily; low threshold to increase to 1000 mg BID if plateaus  - continue hydroxychloroquine  - continue topicals though switch from augmented betamethasone to tacrolimus ointment as symptoms improve    Procedures Performed:    None    Follow-up: 2 months    Staff:     Corey Terry MD   of Dermatology  Department of Dermatology  [facility] [employer]    ____________________________________________    CC: Derm Problem (Lupus - Natasha states she has been \"okay\")    HPI:  Ms. Natasha Lee is a(n) 46 year old female who presents today as a return patient for cutaneous lupus    Lupus - mycophenolate dose increased last visit - in last 2 weeks, texture improvement and decreased swelling/puffiness  - fewer flares; a bit less red  - using augmented betamethasone for resistant spots  - also on hydroxychloroquine    Patient is otherwise feeling well, without additional skin concerns.    Labs Reviewed:  2/2026 CMP, CBC unremarkable    Physical Exam:  Vitals: There were no vitals taken for this visit.  SKIN: Teledermatology photos were reviewed; image quality and interpretability: acceptable. Image date: 5/12/21.  - photodistributed/malar erythema with multiple erythematous papules overlying; interval decrease in erythema and papules since last visit  - No other lesions of concern on areas examined.     Medications:  Current Outpatient Medications   Medication     augmented betamethasone dipropionate " (DIPROLENE-AF) 0.05 % external cream     Cranberry 1000 MG CAPS     DiphenhydrAMINE HCl (BENADRYL PO)     DiphenhydrAMINE HCl, Sleep, (UNISOM SLEEPGELS) 50 MG CAPS     hydroxychloroquine (PLAQUENIL) 200 MG tablet     mycophenolate (GENERIC EQUIVALENT) 500 MG tablet     mycophenolate (GENERIC EQUIVALENT) 500 MG tablet     norethindrone-ethinyl estradiol (ORTHO-NOVUM) 1-35 MG-MCG tablet     Prenatal MV-Min-Fe Fum-FA-DHA (PRENATAL MULTIVITAMIN + DHA PO)     tenofovir (VIREAD) 300 MG tablet     No current facility-administered medications for this visit.       Past Medical/Surgical History:   Patient Active Problem List   Diagnosis     Chronic hepatitis B (H)     Lupus (systemic lupus erythematosus) (H)     CARDIOVASCULAR SCREENING; LDL GOAL LESS THAN 160     Health Care Home     Dysmenorrhea     Menorrhagia     Allergic rhinitis     Other nonspecific finding on examination of urine     Urinary frequency     Vitamin B12 deficiency (non anemic)     Heart murmur     S/P laparoscopic cholecystectomy     Cervical cancer screening     Past Medical History:   Diagnosis Date     Diabetes (H)     Pre-Diabetic     Heart murmur 1997     Lupus (systemic lupus erythematosus) (H) 1988    Dr. Prather     Lupus (systemic lupus erythematosus) (H) 12/10/2009     Normal delivery 9/5/09    boy forceps     Seizures (H)     When a child       CC Yamile Funez MD  [address] [place] [phone] on close of this encounter.

## 2021-05-14 NOTE — NURSING NOTE
"Dermatology Rooming Note    Natasha Lee's goals for this visit include:   Chief Complaint   Patient presents with     Derm Problem     Lupus - Natasha states she has been \"okay\"     Napoleon Jarrett, [facility]   "

## 2021-05-14 NOTE — PROGRESS NOTES
[facility] Dermatology Note  Encounter Date: May 14, 2021  Store-and-Forward and Telephone ([phone]). Location of teledermatologist: [facility] DERMATOLOGY CLINIC [place].  Start time: 8:57. End time: 9:06.    Dermatology Problem List:  1. SLE on hydroxychloroquine and intermittent prednisone, chronic hepatitis B  2. 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 Possible ocular rosacea.   - Biopsy 1/6/2020 consistent with cutaneous lupus erythematosus  - Current t/x: hydroxychloroquine (managed by rheum), Heliocare, aug betamethasone 0.05% cream BID, mycophenolate 1000 mg BID (started 2/2021 pending GI starting hep B treatment)  - Previous tx: triamcinolone 0.1% cream (helped some but not enough on face), tacrolimus 0.1% ointment (good at baseline but not helpful for flares), metronidazole 0.75% cream, oral ivermectin 15 mg monthly x3 months,  permethrin 5% cream, gentle skin care, doxycycline 100 mg BID (x3 months - improved substantially in first month then went back to baseline)  3. Maculopapular rash following URI 6/6/20 - TMC 0.1% cream, bland emollients, OTC antihistamines     Family history: Unknown - adopted.  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 She has had her tubes tied - no plans for future pregnancies.     ____________________________________________    Assessment & Plan:     1. Cutaneous lupus: tumid vs early discoid; responding well to increased mycophenolate dose with less erythema, fewer flares, improved texture/decreased swelling. At this time I'm in favor of maintaining the current regimen, however I have a low threshold to push her mycophenolate dose a bit if she starts to plateau or develops worsening symptoms. We discussed switching from augmented betamethasone to tacrolimus  "ointment as her symptoms improve, to avoid atrophy (no evidence of atrophy to date in submitted photos).  - continue mycophenolate 1000/500 mg daily; low threshold to increase to 1000 mg BID if plateaus  - continue hydroxychloroquine  - continue topicals though switch from augmented betamethasone to tacrolimus ointment as symptoms improve    Procedures Performed:    None    Follow-up: 2 months    Staff:     Corey Terry MD   of Dermatology  Department of Dermatology  [facility] [employer] [facility]    ____________________________________________    CC: Derm Problem (Lupus - Natasha states she has been \"okay\")    HPI:  Ms. Natasha Lee is a(n) 46 year old female who presents today as a return patient for cutaneous lupus    Lupus - mycophenolate dose increased last visit - in last 2 weeks, texture improvement and decreased swelling/puffiness  - fewer flares; a bit less red  - using augmented betamethasone for resistant spots  - also on hydroxychloroquine    Patient is otherwise feeling well, without additional skin concerns.    Labs Reviewed:  2/2026 CMP, CBC unremarkable    Physical Exam:  Vitals: There were no vitals taken for this visit.  SKIN: Teledermatology photos were reviewed; image quality and interpretability: acceptable. Image date: 5/12/21.  - photodistributed/malar erythema with multiple erythematous papules overlying; interval decrease in erythema and papules since last visit  - No other lesions of concern on areas examined.     Medications:  Current Outpatient Medications   Medication     augmented betamethasone dipropionate (DIPROLENE-AF) 0.05 % external cream     Cranberry 1000 MG CAPS     DiphenhydrAMINE HCl (BENADRYL PO)     DiphenhydrAMINE HCl, Sleep, (UNISOM SLEEPGELS) 50 MG CAPS     hydroxychloroquine (PLAQUENIL) 200 MG tablet     mycophenolate (GENERIC EQUIVALENT) 500 MG tablet     mycophenolate (GENERIC EQUIVALENT) 500 MG tablet     norethindrone-ethinyl " estradiol (ORTHO-NOVUM) 1-35 MG-MCG tablet     Prenatal MV-Min-Fe Fum-FA-DHA (PRENATAL MULTIVITAMIN + DHA PO)     tenofovir (VIREAD) 300 MG tablet     No current facility-administered medications for this visit.       Past Medical/Surgical History:   Patient Active Problem List   Diagnosis     Chronic hepatitis B (H)     Lupus (systemic lupus erythematosus) (H)     CARDIOVASCULAR SCREENING; LDL GOAL LESS THAN 160     Health Care Home     Dysmenorrhea     Menorrhagia     Allergic rhinitis     Other nonspecific finding on examination of urine     Urinary frequency     Vitamin B12 deficiency (non anemic)     Heart murmur     S/P laparoscopic cholecystectomy     Cervical cancer screening     Past Medical History:   Diagnosis Date     Diabetes (H)     Pre-Diabetic     Heart murmur 1997     Lupus (systemic lupus erythematosus) (H) 1988    Dr. Prather     Lupus (systemic lupus erythematosus) (H) 12/10/2009     Normal delivery 9/5/09    boy forceps     Seizures (H)     When a child       CC Yamile Funez MD  [address] [place] [address] on close of this encounter.

## 2021-05-17 ENCOUNTER — MYC MEDICAL ADVICE (OUTPATIENT)
Dept: FAMILY MEDICINE | Facility: CLINIC | Age: 46
End: 2021-05-17

## 2021-05-18 ENCOUNTER — TELEPHONE (OUTPATIENT)
Dept: DERMATOLOGY | Facility: CLINIC | Age: 46
End: 2021-05-18

## 2021-05-18 NOTE — TELEPHONE ENCOUNTER
"Patient needs to scheduled an appointment with Dr. Terry for an 2 month follow up in July as TELEPHONE VISIT SHORT RETURN. Please be mindful when scheduling for Dr. Terry returning patients as some of them should be in extend slots as provider will put in the summary notes as \"LEO\" meaning extend slots extend slots for virtual is TELEPHONE VISIT SHORT RETURN.    Thank you :)    Jenn Holcomb  Dermatology Clinic Coordinator  5/18/21  "

## 2021-06-04 ENCOUNTER — IMMUNIZATION (OUTPATIENT)
Dept: LAB | Facility: CLINIC | Age: 46
End: 2021-06-04

## 2021-07-15 ENCOUNTER — OFFICE VISIT (OUTPATIENT)
Dept: OPHTHALMOLOGY | Facility: CLINIC | Age: 46
End: 2021-07-15
Payer: COMMERCIAL

## 2021-07-15 DIAGNOSIS — M32.9 SYSTEMIC LUPUS ERYTHEMATOSUS, UNSPECIFIED SLE TYPE, UNSPECIFIED ORGAN INVOLVEMENT STATUS (H): ICD-10-CM

## 2021-07-15 DIAGNOSIS — Z79.899 HIGH RISK MEDICATION USE: Primary | ICD-10-CM

## 2021-07-15 PROCEDURE — 92002 INTRM OPH EXAM NEW PATIENT: CPT | Performed by: STUDENT IN AN ORGANIZED HEALTH CARE EDUCATION/TRAINING PROGRAM

## 2021-07-15 PROCEDURE — 92083 EXTENDED VISUAL FIELD XM: CPT | Performed by: STUDENT IN AN ORGANIZED HEALTH CARE EDUCATION/TRAINING PROGRAM

## 2021-07-15 PROCEDURE — 92134 CPTRZ OPH DX IMG PST SGM RTA: CPT | Performed by: STUDENT IN AN ORGANIZED HEALTH CARE EDUCATION/TRAINING PROGRAM

## 2021-07-15 ASSESSMENT — EXTERNAL EXAM - LEFT EYE: OS_EXAM: NORMAL

## 2021-07-15 ASSESSMENT — VISUAL ACUITY
METHOD: SNELLEN - LINEAR
OS_CC: 20/25
CORRECTION_TYPE: GLASSES
OD_CC: 20/20

## 2021-07-15 ASSESSMENT — CUP TO DISC RATIO
OS_RATIO: 0.3
OD_RATIO: 0.3

## 2021-07-15 ASSESSMENT — EXTERNAL EXAM - RIGHT EYE: OD_EXAM: NORMAL

## 2021-07-15 NOTE — PROGRESS NOTES
Current Eye Medications:  lumify as needed.      Subjective:  Dr. Zaman recommended she see Dr. Rodriges for Plaquenil toxicity tests.  Patient is here for an OCT, and visual field.  No vision changes or concerns.  She has been taking Plaquenil for almost 2 years.    Last eye exam:  1-29-20 with Dr. Carrizales.     Objective:  See Ophthalmology Exam.       Assessment:  Natasha Lee is a 46 year old female who presents with:   Encounter Diagnoses   Name Primary?     High risk medication use Was restarted on Plaquenil in 2016 (was on it for a few years prior to that).     Macular SD-OCT within normal limits both eyes.    Bennett visual field (HVF) 10-2 within normal limits both eyes.    No signs of Plaquenil retinal toxicity at present.        Systemic lupus erythematosus, unspecified SLE type, unspecified organ involvement status (H)        Plan:  Normal Plaquenil eye tests today.    Raman Rodriges MD  [phone]

## 2021-07-15 NOTE — LETTER
7/15/2021       RE: Natasha Lee  [address] [id number]      Dear Dr. Zaman,    Thank you for referring your patient, Natasha Lee, to the [facility]. Her Plaquenil eye tests were normal today. Plan to repeat tests in 1 year. Please see a copy of my visit note below.     Current Eye Medications:  lumify as needed.      Subjective:  Dr. Zaman recommended she see Dr. Rodriges for Plaquenil toxicity tests.  Patient is here for an OCT, and visual field.  No vision changes or concerns.  She has been taking Plaquenil for almost 2 years.    Last eye exam:  1-29-20 with Dr. Carrizales.     Objective:  See Ophthalmology Exam.       Assessment:  Natasha Lee is a 46 year old female who presents with:   Encounter Diagnoses   Name Primary?     High risk medication use Was restarted on Plaquenil in 2016 (was on it for a few years prior to that).     Macular SD-OCT within normal limits both eyes.    Bennett visual field (HVF) 10-2 within normal limits both eyes.    No signs of Plaquenil retinal toxicity at present.        Systemic lupus erythematosus, unspecified SLE type, unspecified organ involvement status (H)        Plan:  Normal Plaquenil eye tests today.    Raman Rodriges MD  [phone]          Again, thank you for allowing me to participate in the care of your patient.        Sincerely,        Raman Rodriges MD

## 2021-07-19 ENCOUNTER — VIRTUAL VISIT (OUTPATIENT)
Dept: DERMATOLOGY | Facility: CLINIC | Age: 46
End: 2021-07-19
Payer: COMMERCIAL

## 2021-07-19 DIAGNOSIS — M32.9 SYSTEMIC LUPUS ERYTHEMATOSUS, UNSPECIFIED SLE TYPE, UNSPECIFIED ORGAN INVOLVEMENT STATUS (H): Primary | ICD-10-CM

## 2021-07-19 DIAGNOSIS — L93.2 CUTANEOUS LUPUS ERYTHEMATOSUS: ICD-10-CM

## 2021-07-19 PROCEDURE — 99214 OFFICE O/P EST MOD 30 MIN: CPT | Mod: GQ | Performed by: DERMATOLOGY

## 2021-07-19 NOTE — LETTER
7/19/2021       RE: Natasha Lee  [address] [address]     Dear Colleague,    Thank you for referring your patient, Natasha Lee, to the [facility] [place] at [facility]. Please see a copy of my visit note below.    [facility] Dermatology Note  Encounter Date: Jul 19, 2021  Store-and-Forward and Telephone ([phone] ). Location of teledermatologist: [facility] [place].  Start time: 1:32. End time: 1:40.    Dermatology Problem List:  1. SLE on hydroxychloroquine and intermittent prednisone, chronic hepatitis B  2. 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 Possible ocular rosacea.   - Biopsy 1/6/2020 consistent with cutaneous lupus erythematosus  - Current t/x: hydroxychloroquine (managed by rheum), Heliocare, augmented betamethasone 0.05% cream BID, mycophenolate 1000 mg BID (started 2/2021 pending GI starting hep B treatment)  - Previous tx: triamcinolone 0.1% cream (helped some but not enough on face), tacrolimus 0.1% ointment (good at baseline but not helpful for flares), metronidazole 0.75% cream, oral ivermectin 15 mg monthly x3 months,  permethrin 5% cream, gentle skin care, doxycycline 100 mg BID (x3 months - improved substantially in first month then went back to baseline)  3. Maculopapular rash following URI 6/6/20 - [facility] 0.1% cream, bland emollients, OTC antihistamines     Family history: Unknown - adopted.  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 Patient has 1 child. She has had her tubes tied - no plans for future pregnancies.        ____________________________________________    Assessment & Plan:     1. Cutaneous lupus: overall doing better on higher dose mycophenolate and  has had demonstrable improvement in last 2 months. I do not think further dose escalation is warranted at this time. Can continue augmented betamethasone as needed if refractory, otherwise use tacrolimus ointment.   - continue mycophenolate mofetil 1000/500 mg daily, hydroxychloroquine 200 mg daily  - continue augmented betamethasone cream and tacrolimus ointment  - check CBC, CMP, and lupus labs    Procedures Performed:    None    Follow-up: 3 months    Staff:     Corey Terry MD, FAAD   of Dermatology  Department of Dermatology  [employer] [facility] [employer]    ____________________________________________    CC: Derm Problem (Natasha, is here for a lupus check up she states she has had no problems, no other concerns )    HPI:  Ms. Natasha Lee is a(n) 46 year old female who presents today as a return patient for cutaneous lupus    Overall lupus doing well - less redness on higher dose of mycophenolate, less symptomatic  - still having some activity - not gone yet  - using betamethasone sparingly when most active - tolerating well    Patient is otherwise feeling well, without additional skin concerns.    Labs Reviewed:  2/2021 CMP, CBC unremarkable     Physical Exam:  Vitals: There were no vitals taken for this visit.  SKIN: Teledermatology photos were reviewed; image quality and interpretability: acceptable. Image date: 7/19/21.  - photodistributed/malar faint erythema with multiple faintly erythematous papules overlying; interval improvement since last visit  - No other lesions of concern on areas examined.     Medications:  Current Outpatient Medications   Medication     augmented betamethasone dipropionate (DIPROLENE-AF) 0.05 % external cream     Cranberry 1000 MG CAPS     DiphenhydrAMINE HCl (BENADRYL PO)     DiphenhydrAMINE HCl, Sleep, (UNISOM SLEEPGELS) 50 MG CAPS     hydroxychloroquine (PLAQUENIL) 200 MG tablet     mycophenolate (GENERIC EQUIVALENT) 500 MG tablet      norethindrone-ethinyl estradiol (ORTHO-NOVUM) 1-35 MG-MCG tablet     Prenatal MV-Min-Fe Fum-FA-DHA (PRENATAL MULTIVITAMIN + DHA PO)     tenofovir (VIREAD) 300 MG tablet     No current facility-administered medications for this visit.      Past Medical/Surgical History:   Patient Active Problem List   Diagnosis     Chronic hepatitis B (H)     Lupus (systemic lupus erythematosus) (H)     CARDIOVASCULAR SCREENING; LDL GOAL LESS THAN 160     Health Care Home     Dysmenorrhea     Menorrhagia     Allergic rhinitis     Other nonspecific finding on examination of urine     Urinary frequency     Vitamin B12 deficiency (non anemic)     Heart murmur     S/P laparoscopic cholecystectomy     Cervical cancer screening     Past Medical History:   Diagnosis Date     Diabetes (H)     Pre-Diabetic     Heart murmur 1997     Lupus (systemic lupus erythematosus) (H) 1988    Dr. Prather     Lupus (systemic lupus erythematosus) (H) 12/10/2009     Normal delivery 9/5/09    boy forceps     Seizures (H)     When a child       CC Referred Self, MD  No address on file on close of this encounter.

## 2021-07-19 NOTE — PROGRESS NOTES
[facility] Dermatology Note  Encounter Date: Jul 19, 2021  Store-and-Forward and Telephone ([phone] ). Location of teledermatologist: [facility] [place].  Start time: 1:32. End time: 1:40.    Dermatology Problem List:  1. SLE on hydroxychloroquine and intermittent prednisone, chronic hepatitis B  2. 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 Possible ocular rosacea.   - Biopsy 1/6/2020 consistent with cutaneous lupus erythematosus  - Current t/x: hydroxychloroquine (managed by rheum), Heliocare, augmented betamethasone 0.05% cream BID, mycophenolate 1000 mg BID (started 2/2021 pending GI starting hep B treatment)  - Previous tx: triamcinolone 0.1% cream (helped some but not enough on face), tacrolimus 0.1% ointment (good at baseline but not helpful for flares), metronidazole 0.75% cream, oral ivermectin 15 mg monthly x3 months,  permethrin 5% cream, gentle skin care, doxycycline 100 mg BID (x3 months - improved substantially in first month then went back to baseline)  3. Maculopapular rash following URI 6/6/20 - [facility] 0.1% cream, bland emollients, OTC antihistamines     Family history: Unknown - adopted.  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 She has had her tubes tied - no plans for future pregnancies.        ____________________________________________    Assessment & Plan:     1. Cutaneous lupus: overall doing better on higher dose mycophenolate and has had demonstrable improvement in last 2 months. I do not think further dose escalation is warranted at this time. Can continue augmented betamethasone as needed if refractory, otherwise use tacrolimus ointment.   - continue mycophenolate mofetil 1000/500 mg daily, hydroxychloroquine 200 mg daily  - continue augmented betamethasone  cream and tacrolimus ointment  - check CBC, CMP, and lupus labs    Procedures Performed:    None    Follow-up: 3 months    Staff:     Corey Terry MD, FAAD   of Dermatology  Department of Dermatology  [employer] [employer]    ____________________________________________    CC: Derm Problem (Natasha, is here for a lupus check up she states she has had no problems, no other concerns )    HPI:  Ms. Natasha Lee is a(n) 46 year old female who presents today as a return patient for cutaneous lupus    Overall lupus doing well - less redness on higher dose of mycophenolate, less symptomatic  - still having some activity - not gone yet  - using betamethasone sparingly when most active - tolerating well    Patient is otherwise feeling well, without additional skin concerns.    Labs Reviewed:  2/2021 CMP, CBC unremarkable     Physical Exam:  Vitals: There were no vitals taken for this visit.  SKIN: Teledermatology photos were reviewed; image quality and interpretability: acceptable. Image date: 7/19/21.  - photodistributed/malar faint erythema with multiple faintly erythematous papules overlying; interval improvement since last visit  - No other lesions of concern on areas examined.     Medications:  Current Outpatient Medications   Medication     augmented betamethasone dipropionate (DIPROLENE-AF) 0.05 % external cream     Cranberry 1000 MG CAPS     DiphenhydrAMINE HCl (BENADRYL PO)     DiphenhydrAMINE HCl, Sleep, (UNISOM SLEEPGELS) 50 MG CAPS     hydroxychloroquine (PLAQUENIL) 200 MG tablet     mycophenolate (GENERIC EQUIVALENT) 500 MG tablet     norethindrone-ethinyl estradiol (ORTHO-NOVUM) 1-35 MG-MCG tablet     Prenatal MV-Min-Fe Fum-FA-DHA (PRENATAL MULTIVITAMIN + DHA PO)     tenofovir (VIREAD) 300 MG tablet     No current facility-administered medications for this visit.      Past Medical/Surgical History:   Patient Active Problem List   Diagnosis     Chronic hepatitis  B (H)     Lupus (systemic lupus erythematosus) (H)     CARDIOVASCULAR SCREENING; LDL GOAL LESS THAN 160     Health Care Home     Dysmenorrhea     Menorrhagia     Allergic rhinitis     Other nonspecific finding on examination of urine     Urinary frequency     Vitamin B12 deficiency (non anemic)     Heart murmur     S/P laparoscopic cholecystectomy     Cervical cancer screening     Past Medical History:   Diagnosis Date     Diabetes (H)     Pre-Diabetic     Heart murmur 1997     Lupus (systemic lupus erythematosus) (H) 1988    Dr. Prather     Lupus (systemic lupus erythematosus) (H) 12/10/2009     Normal delivery 9/5/09    boy forceps     Seizures (H)     When a child       CC Referred Self, MD  No address on file on close of this encounter.

## 2021-07-19 NOTE — NURSING NOTE
Chief Complaint   Patient presents with     Derm Problem     Natasha, is here for a lupus check up she states she has had no problems, no other concerns     Frandy Lee EMT

## 2021-07-27 ENCOUNTER — TELEPHONE (OUTPATIENT)
Dept: DERMATOLOGY | Facility: CLINIC | Age: 46
End: 2021-07-27

## 2021-07-27 NOTE — TELEPHONE ENCOUNTER
Please schedule an 3 month follow up in October VIDEO VISIT SHORT with Dr. Terry in Dermatology.    Jenn Holcomb  Dermatology Clinic Coordinator'  7/27/21

## 2021-10-18 ENCOUNTER — VIRTUAL VISIT (OUTPATIENT)
Dept: DERMATOLOGY | Facility: CLINIC | Age: 46
End: 2021-10-18
Payer: COMMERCIAL

## 2021-10-18 DIAGNOSIS — L93.2 CUTANEOUS LUPUS ERYTHEMATOSUS: Primary | ICD-10-CM

## 2021-10-18 DIAGNOSIS — M32.19 OTHER SYSTEMIC LUPUS ERYTHEMATOSUS WITH OTHER ORGAN INVOLVEMENT (H): ICD-10-CM

## 2021-10-18 PROCEDURE — 99213 OFFICE O/P EST LOW 20 MIN: CPT | Mod: 95 | Performed by: DERMATOLOGY

## 2021-10-18 ASSESSMENT — PAIN SCALES - GENERAL: PAINLEVEL: NO PAIN (0)

## 2021-10-18 NOTE — NURSING NOTE
Chief Complaint   Patient presents with     Derm Problem     Natasha, is here for a lupus appt, no other concerns    Frandy Lee EMT

## 2021-10-18 NOTE — PROGRESS NOTES
[facility] Dermatology Note  Encounter Date: Oct 18, 2021  MyChart Connected.    Dermatology Problem List:  1. SLE on hydroxychloroquine and intermittent prednisone, chronic hepatitis B  2. 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 Possible ocular rosacea.   - Biopsy 1/6/2020 consistent with cutaneous lupus erythematosus  - Current t/x: hydroxychloroquine (managed by rheum), Tacrolimus daily on face, augmented betamethasone 0.05% cream PRN, mycophenolate 1500 mg BID (started 2/2021 pending GI starting hep B treatment)  - Previous tx: triamcinolone 0.1% cream (helped some but not enough on face), tacrolimus 0.1% ointment (good at baseline but not helpful for flares), metronidazole 0.75% cream, oral ivermectin 15 mg monthly x3 months,  permethrin 5% cream, gentle skin care, doxycycline 100 mg BID (x3 months - improved substantially in first month then went back to baseline)  3. Maculopapular rash following URI 6/6/20 - triamcinolone 0.1% cream, bland emollients, OTC antihistamines    ____________________________________________    Assessment & Plan:     1. Cutaneous lupus: continues to improve on higher dose of mycophenolate and leaving stressful job  - continue mycophenolate mofetil 1000/500 mg daily, hydroxychloroquine 200 mg daily ; will consider lowering mycophenolate in future if stable  - continue augmented betamethasone cream and tacrolimus ointment  - check CBC, CMP, and lupus labs    Procedures Performed:    None    Follow-up: 6 month(s) virtually (telephone with photos), or earlier for new or changing lesions    Staff and Medical Student: Alton Palma MS4, seen and staffed with Dr. Terry    Staff Physician Comments:   I evaluated any available patient photographs with the medical student and I edited the assessment and plan as documented in the note. I was  present on the line and participated in the entire telephone call.    Corey Terry MD   of Dermatology  Department of Dermatology  [facility] [employer]    ____________________________________________    CC: Derm Problem (Natasha, is here for a lupus appt, no other concerns )    HPI:  Ms. Natasha Lee is a(n) 46 year old female who presents today as a return patient for lupus.     Patient was last seen three months ago, at which point we opted to continue the regimen of 1500mg daily of mycophenolate and 200 of plaquenil with as needed use of topical betamethasone for refractory areas of erythema.    Today, patient states that things continue to improve. She reports that she changed jobs last week and has been experiencing significantly less stress as a result of this. She attributes the improvement of the rash secondary to the medication as well as the decreased stress. She states that she uses the betamethasone cream about once or twice per week for persistent areas but that for the most part, her redness and the papules have been decreasing. Patient has no other dermatologic concerns today and had no additional questions. The labs from last visit were not completed.     Patient is otherwise feeling well, without additional skin concerns.    Labs Reviewed:  N/A    Physical Exam:  Vitals: There were no vitals taken for this visit.  SKIN: No tele dermatology images were sent in, however video visit was completed.  - Very faint photo distributed/malar erythema with few overlying papules that appears improved compared to previous photos.  - No other lesions of concern on areas examined.     Medications:  Current Outpatient Medications   Medication     augmented betamethasone dipropionate (DIPROLENE-AF) 0.05 % external cream     Cranberry 1000 MG CAPS     DiphenhydrAMINE HCl (BENADRYL PO)     DiphenhydrAMINE HCl, Sleep, (UNISOM SLEEPGELS) 50 MG CAPS      hydroxychloroquine (PLAQUENIL) 200 MG tablet     mycophenolate (GENERIC EQUIVALENT) 500 MG tablet     norethindrone-ethinyl estradiol (ORTHO-NOVUM) 1-35 MG-MCG tablet     Prenatal MV-Min-Fe Fum-FA-DHA (PRENATAL MULTIVITAMIN + DHA PO)     tenofovir (VIREAD) 300 MG tablet     No current facility-administered medications for this visit.      Past Medical/Surgical History:   Patient Active Problem List   Diagnosis     Chronic hepatitis B (H)     Lupus (systemic lupus erythematosus) (H)     CARDIOVASCULAR SCREENING; LDL GOAL LESS THAN 160     Health Care Home     Dysmenorrhea     Menorrhagia     Allergic rhinitis     Other nonspecific finding on examination of urine     Urinary frequency     Vitamin B12 deficiency (non anemic)     Heart murmur     S/P laparoscopic cholecystectomy     Cervical cancer screening     Past Medical History:   Diagnosis Date     Diabetes (H)     Pre-Diabetic     Heart murmur 1997     Lupus (systemic lupus erythematosus) (H) 1988    Dr. Prather     Lupus (systemic lupus erythematosus) (H) 12/10/2009     Normal delivery 9/5/09    boy forceps     Seizures (H)     When a child       CC Referred Self, MD  No address on file on close of this encounter.

## 2021-10-18 NOTE — LETTER
10/18/2021       RE: Natasha Lee  [address] [address]     Dear Colleague,    Thank you for referring your patient, Natasha Lee, to the [facility]ERMAT[facility] [place] at [facility]. Please see a copy of my visit note below.    [facility] Dermatology Note  Encounter Date: Oct 18, 2021  MyChart Connected.    Dermatology Problem List:  1. SLE on hydroxychloroquine and intermittent prednisone, chronic hepatitis B  2. 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 Possible ocular rosacea.   - Biopsy 1/6/2020 consistent with cutaneous lupus erythematosus  - Current t/x: hydroxychloroquine (managed by rheum), Tacrolimus daily on face, augmented betamethasone 0.05% cream PRN, mycophenolate 1500 mg BID (started 2/2021 pending GI starting hep B treatment)  - Previous tx: triamcinolone 0.1% cream (helped some but not enough on face), tacrolimus 0.1% ointment (good at baseline but not helpful for flares), metronidazole 0.75% cream, oral ivermectin 15 mg monthly x3 months,  permethrin 5% cream, gentle skin care, doxycycline 100 mg BID (x3 months - improved substantially in first month then went back to baseline)  3. Maculopapular rash following URI 6/6/20 - triamcinolone 0.1% cream, bland emollients, OTC antihistamines    ____________________________________________    Assessment & Plan:     1. Cutaneous lupus: continues to improve on higher dose of mycophenolate and leaving stressful job  - continue mycophenolate mofetil 1000/500 mg daily, hydroxychloroquine 200 mg daily ; will consider lowering mycophenolate in future if stable  - continue augmented betamethasone cream and tacrolimus ointment  - check CBC, CMP, and lupus labs    Procedures Performed:    None    Follow-up: 6  month(s) virtually (telephone with photos), or earlier for new or changing lesions    Staff and Medical Student: Alton Palma MS4, seen and staffed with Dr. Terry    Staff Physician Comments:   I evaluated any available patient photographs with the medical student and I edited the assessment and plan as documented in the note. I was present on the line and participated in the entire telephone call.    Corey Terry MD   of Dermatology  Department of Dermatology  [facility]    ____________________________________________    CC: Derm Problem (Natasha, is here for a lupus appt, no other concerns )    HPI:  Ms. Natasha Lee is a(n) 46 year old female who presents today as a return patient for lupus.     Patient was last seen three months ago, at which point we opted to continue the regimen of 1500mg daily of mycophenolate and 200 of plaquenil with as needed use of topical betamethasone for refractory areas of erythema.    Today, patient states that things continue to improve. She reports that she changed jobs last week and has been experiencing significantly less stress as a result of this. She attributes the improvement of the rash secondary to the medication as well as the decreased stress. She states that she uses the betamethasone cream about once or twice per week for persistent areas but that for the most part, her redness and the papules have been decreasing. Patient has no other dermatologic concerns today and had no additional questions. The labs from last visit were not completed.     Patient is otherwise feeling well, without additional skin concerns.    Labs Reviewed:  N/A    Physical Exam:  Vitals: There were no vitals taken for this visit.  SKIN: No tele dermatology images were sent in, however video visit was completed.  - Very faint photo distributed/malar erythema with few overlying papules that appears improved compared to previous  photos.  - No other lesions of concern on areas examined.     Medications:  Current Outpatient Medications   Medication     augmented betamethasone dipropionate (DIPROLENE-AF) 0.05 % external cream     Cranberry 1000 MG CAPS     DiphenhydrAMINE HCl (BENADRYL PO)     DiphenhydrAMINE HCl, Sleep, (UNISOM SLEEPGELS) 50 MG CAPS     hydroxychloroquine (PLAQUENIL) 200 MG tablet     mycophenolate (GENERIC EQUIVALENT) 500 MG tablet     norethindrone-ethinyl estradiol (ORTHO-NOVUM) 1-35 MG-MCG tablet     Prenatal MV-Min-Fe Fum-FA-DHA (PRENATAL MULTIVITAMIN + DHA PO)     tenofovir (VIREAD) 300 MG tablet     No current facility-administered medications for this visit.      Past Medical/Surgical History:   Patient Active Problem List   Diagnosis     Chronic hepatitis B (H)     Lupus (systemic lupus erythematosus) (H)     CARDIOVASCULAR SCREENING; LDL GOAL LESS THAN 160     Health Care Home     Dysmenorrhea     Menorrhagia     Allergic rhinitis     Other nonspecific finding on examination of urine     Urinary frequency     Vitamin B12 deficiency (non anemic)     Heart murmur     S/P laparoscopic cholecystectomy     Cervical cancer screening     Past Medical History:   Diagnosis Date     Diabetes (H)     Pre-Diabetic     Heart murmur 1997     Lupus (systemic lupus erythematosus) (H) 1988    Dr. Prather     Lupus (systemic lupus erythematosus) (H) 12/10/2009     Normal delivery 9/5/09    boy forceps     Seizures (H)     When a child       CC Referred Self, MD  No address on file on close of this encounter.

## 2021-11-02 ENCOUNTER — OFFICE VISIT (OUTPATIENT)
Dept: OPTOMETRY | Facility: CLINIC | Age: 46
End: 2021-11-02
Payer: COMMERCIAL

## 2021-11-02 DIAGNOSIS — H53.8 BLURRED VISION: Primary | ICD-10-CM

## 2021-11-02 DIAGNOSIS — H18.20 MICROCYSTIC EDEMA OF LEFT CORNEA: ICD-10-CM

## 2021-11-02 PROCEDURE — 92012 INTRM OPH EXAM EST PATIENT: CPT | Performed by: OPTOMETRIST

## 2021-11-02 RX ORDER — LOTEPREDNOL ETABONATE 5 MG/ML
SUSPENSION/ DROPS OPHTHALMIC
Qty: 5 ML | Refills: 0 | Status: SHIPPED | OUTPATIENT
Start: 2021-11-02 | End: 2021-11-16

## 2021-11-02 ASSESSMENT — REFRACTION_WEARINGRX
OD_CYLINDER: SPHERE
OS_CYLINDER: +0.50
OS_AXIS: 032
OD_SPHERE: -2.25
OS_SPHERE: -2.00

## 2021-11-02 ASSESSMENT — VISUAL ACUITY
OS_CC: 20/40
METHOD: SNELLEN - LINEAR
CORRECTION_TYPE: GLASSES
OD_CC: 20/20
OD_CC+: -1

## 2021-11-02 ASSESSMENT — EXTERNAL EXAM - LEFT EYE: OS_EXAM: NORMAL

## 2021-11-02 ASSESSMENT — REFRACTION_MANIFEST
OD_AXIS: 076
OS_AXIS: 032
OD_SPHERE: -2.75
OS_SPHERE: -2.50
OD_CYLINDER: +0.25
OS_CYLINDER: +0.75

## 2021-11-02 ASSESSMENT — EXTERNAL EXAM - RIGHT EYE: OD_EXAM: NORMAL

## 2021-11-02 ASSESSMENT — TONOMETRY
OS_IOP_MMHG: 21
IOP_METHOD: TONOPEN

## 2021-11-02 ASSESSMENT — CUP TO DISC RATIO
OD_RATIO: 0.3
OS_RATIO: 0.3

## 2021-11-02 ASSESSMENT — SLIT LAMP EXAM - LIDS
COMMENTS: MEIBOMIAN GLAND DYSFUNCTION,
COMMENTS: MEIBOMIAN GLAND DYSFUNCTION,

## 2021-11-02 NOTE — PATIENT INSTRUCTIONS
Discontinue all other eye drops.    Loteprednol or substitute- 1 drop left eye 4 x day for 7 days then 3 x day for 7 days.    Sometimes vision gets a little worse before it gets better.    Return in 2 weeks for recheck or sooner if any new changes or sudden blurred vision.    Mauricio Carrizales, OD

## 2021-11-02 NOTE — PROGRESS NOTES
Chief Complaint   Patient presents with     Blurred Vision Evaluation     Blurred Vision Evaluation       HPI      Blurred Vision Evaluation      Laterality:  left eye     Onset:  gradual     Onset:  2 weeks ago     Quality:  blurred vision     Severity:  moderate     Frequency:  constantly     Associated symptoms:  double vision, redness, trauma, and foreign body sensation     Treatments tried:  eye drops     Response to treatment:  mild improvement         Comments      Patient was poked in os eye with make up brush 2 weeks ago and went to urgent care and was given eye drops. Patient having double vision in os eye.                  Was prescribed ofloxacin 4 x day and patanol.  Using antibiotic but Patanol irritated the eye so not using.  Patient does  Not wear contacts       Medical, surgical and family histories reviewed and updated 11/2/2021.       OBJECTIVE: See Ophthalmology exam    ASSESSMENT:    ICD-10-CM    1. Blurred vision  H53.8    2. Microcystic edema of left cornea  H18.20 loteprednol (LOTEMAX) 0.5 % ophthalmic suspension      PLAN:     Patient Instructions   Discontinue all other eye drops.    Loteprednol or substitute- 1 drop left eye 4 x day for 7 days then 3 x day for 7 days.    Sometimes vision gets a little worse before it gets better.    Return in 2 weeks for recheck or sooner if any new changes or sudden blurred vision.    Mauricio Carrizales, OD

## 2021-11-26 ENCOUNTER — APPOINTMENT (OUTPATIENT)
Dept: URGENT CARE | Facility: CLINIC | Age: 46
End: 2021-11-26

## 2021-12-18 ENCOUNTER — HEALTH MAINTENANCE LETTER (OUTPATIENT)
Age: 46
End: 2021-12-18

## 2021-12-28 DIAGNOSIS — M32.9 SYSTEMIC LUPUS ERYTHEMATOSUS, UNSPECIFIED SLE TYPE, UNSPECIFIED ORGAN INVOLVEMENT STATUS (H): ICD-10-CM

## 2021-12-28 NOTE — TELEPHONE ENCOUNTER
hydroxychloroquine (PLAQUENIL) 200 MG tablet    Last Written Prescription Date:  04/28/21  Last Fill Quantity: 30,   # refills: 2  Last Office Visit: 04/28/21  Future Office visit:     REAL Mcknight refill request to provider for review/approval because:  Medication is reported/historical  Pending Prescriptions:                       Disp   Refills    hydroxychloroquine (PLAQUENIL) 200 MG tab*30 tab*3            Sig: Take 1 tablet (200 mg) by mouth daily .           Ophthalmology exam, including 10-2 VF and SD-OCT,           is required yearly.    April St Dary CMA 12/28/2021 4:14 PM

## 2021-12-30 RX ORDER — HYDROXYCHLOROQUINE SULFATE 200 MG/1
200 TABLET, FILM COATED ORAL DAILY
Qty: 90 TABLET | Refills: 0 | Status: SHIPPED | OUTPATIENT
Start: 2021-12-30 | End: 2022-04-22

## 2021-12-30 NOTE — TELEPHONE ENCOUNTER
RN: Please call to notify Ms. Lee that hydroxychloroquine has been refilled. Rheumatology follow-up required for next refill.   Christopher Zaman MD  12/30/2021

## 2021-12-30 NOTE — TELEPHONE ENCOUNTER
Left message for patient to return call to clinic. Also sent patient a [employer] message.    Himanshu Roberts RN....12/30/2021 2:12 PM

## 2022-02-04 ENCOUNTER — VIRTUAL VISIT (OUTPATIENT)
Dept: FAMILY MEDICINE | Facility: CLINIC | Age: 47
End: 2022-02-04
Payer: COMMERCIAL

## 2022-02-04 DIAGNOSIS — M32.19 OTHER SYSTEMIC LUPUS ERYTHEMATOSUS WITH OTHER ORGAN INVOLVEMENT (H): ICD-10-CM

## 2022-02-04 DIAGNOSIS — N30.00 ACUTE CYSTITIS WITHOUT HEMATURIA: Primary | ICD-10-CM

## 2022-02-04 PROCEDURE — 99213 OFFICE O/P EST LOW 20 MIN: CPT | Mod: 95 | Performed by: NURSE PRACTITIONER

## 2022-02-04 RX ORDER — CEPHALEXIN 500 MG/1
500 CAPSULE ORAL 2 TIMES DAILY
Qty: 14 CAPSULE | Refills: 0 | Status: SHIPPED | OUTPATIENT
Start: 2022-02-04 | End: 2022-02-11

## 2022-02-04 NOTE — PROGRESS NOTES
merari is a 46 year old who is being evaluated via a billable video visit.      How would you like to obtain your AVS? MyChart  If the video visit is dropped, the invitation should be resent by: Text to cell phone: [phone]  Will anyone else be joining your video visit? No      Video Start Time: 12:57 PM       ICD-10-CM    1. Acute cystitis without hematuria  N30.00 cephALEXin (KEFLEX) 500 MG capsule   2. Other systemic lupus erythematosus with other organ involvement (H)  M32.19      We will treat for possible urinary tract infection with cephalexin.  Educated on indications and side effects.  She is to increase her fluid intake.  Discussed methods of preventing urinary tract infections in the future.  If no improvement in symptoms, she is to follow-up with her PCP for urine sample and wet prep.  She is content with the plan.    Subjective   merari is a 46 year old who presents for the following health issues   Patient is concerned for possible urinary tract infection.  She has been experiencing urinary frequency, urinary urgency, pelvic pressure for 3 to 4 days.  She denies dysuria, hematuria, low back pain, fever.  She just finished her menstrual period.  She denies any vaginal discharge or irritation.  Patient states she is prone to urinary tract infections, but has not had one within the past year.  Upon review of her chart, she has a history of group B strep.  She sees rheumatology for SLE.    Review of Systems   Constitutional, HEENT, cardiovascular, pulmonary, gi and gu systems are negative, except as otherwise noted.      Objective           Vitals:  No vitals were obtained today due to virtual visit.    Physical Exam   GENERAL: Healthy, alert and no distress  EYES: Eyes grossly normal to inspection.  No discharge or erythema, or obvious scleral/conjunctival abnormalities.  HENT: Normal cephalic/atraumatic.  External ears, nose and mouth without ulcers or lesions.  No nasal drainage visible.  NECK: No  asymmetry, visible masses or scars  RESP: No audible wheeze, cough, or visible cyanosis.  No visible retractions or increased work of breathing.    MS: No gross musculoskeletal defects noted.  Normal range of motion.  No visible edema.  SKIN: Visible skin clear. No significant rash, abnormal pigmentation or lesions.  NEURO: Cranial nerves grossly intact.  Mentation and speech appropriate for age.  PSYCH: Mentation appears normal, affect normal/bright, judgement and insight intact, normal speech and appearance well-groomed.          Video-Visit Details    Type of service:  Video Visit    Video End Time:1:03 PM    Originating Location (pt. Location): Home    Distant Location (provider location):  [facility]     Platform used for Video Visit: Vizibility

## 2022-02-11 ENCOUNTER — MYC MEDICAL ADVICE (OUTPATIENT)
Dept: RHEUMATOLOGY | Facility: CLINIC | Age: 47
End: 2022-02-11
Payer: COMMERCIAL

## 2022-02-12 ENCOUNTER — HEALTH MAINTENANCE LETTER (OUTPATIENT)
Age: 47
End: 2022-02-12

## 2022-02-16 ENCOUNTER — LAB (OUTPATIENT)
Dept: LAB | Facility: CLINIC | Age: 47
End: 2022-02-16
Payer: COMMERCIAL

## 2022-02-16 DIAGNOSIS — M32.9 SYSTEMIC LUPUS ERYTHEMATOSUS, UNSPECIFIED SLE TYPE, UNSPECIFIED ORGAN INVOLVEMENT STATUS (H): ICD-10-CM

## 2022-02-16 DIAGNOSIS — M32.9 SLE (SYSTEMIC LUPUS ERYTHEMATOSUS RELATED SYNDROME) (H): Primary | ICD-10-CM

## 2022-02-16 LAB
ALBUMIN SERPL-MCNC: 3.9 G/DL (ref 3.4–5)
ALBUMIN UR-MCNC: NEGATIVE MG/DL
ALP SERPL-CCNC: 77 U/L (ref 40–150)
ALT SERPL W P-5'-P-CCNC: 34 U/L (ref 0–50)
ANION GAP SERPL CALCULATED.3IONS-SCNC: 4 MMOL/L (ref 3–14)
APPEARANCE UR: CLEAR
AST SERPL W P-5'-P-CCNC: 20 U/L (ref 0–45)
BACTERIA #/AREA URNS HPF: ABNORMAL /HPF
BASOPHILS # BLD AUTO: 0 10E3/UL (ref 0–0.2)
BASOPHILS NFR BLD AUTO: 0 %
BILIRUB SERPL-MCNC: 0.3 MG/DL (ref 0.2–1.3)
BILIRUB UR QL STRIP: NEGATIVE
BUN SERPL-MCNC: 14 MG/DL (ref 7–30)
CALCIUM SERPL-MCNC: 8.2 MG/DL (ref 8.5–10.1)
CHLORIDE BLD-SCNC: 110 MMOL/L (ref 94–109)
CO2 SERPL-SCNC: 26 MMOL/L (ref 20–32)
COLOR UR AUTO: YELLOW
CREAT SERPL-MCNC: 0.67 MG/DL (ref 0.52–1.04)
CREAT UR-MCNC: 161 MG/DL
CRP SERPL-MCNC: <2.9 MG/L (ref 0–8)
EOSINOPHIL # BLD AUTO: 0.1 10E3/UL (ref 0–0.7)
EOSINOPHIL NFR BLD AUTO: 2 %
ERYTHROCYTE [DISTWIDTH] IN BLOOD BY AUTOMATED COUNT: 12.7 % (ref 10–15)
GFR SERPL CREATININE-BSD FRML MDRD: >90 ML/MIN/1.73M2
GLUCOSE BLD-MCNC: 120 MG/DL (ref 70–99)
GLUCOSE UR STRIP-MCNC: NEGATIVE MG/DL
HCT VFR BLD AUTO: 39.1 % (ref 35–47)
HGB BLD-MCNC: 13.1 G/DL (ref 11.7–15.7)
HGB UR QL STRIP: NEGATIVE
IMM GRANULOCYTES # BLD: 0 10E3/UL
IMM GRANULOCYTES NFR BLD: 0 %
KETONES UR STRIP-MCNC: NEGATIVE MG/DL
LEUKOCYTE ESTERASE UR QL STRIP: NEGATIVE
LYMPHOCYTES # BLD AUTO: 1.3 10E3/UL (ref 0.8–5.3)
LYMPHOCYTES NFR BLD AUTO: 26 %
MCH RBC QN AUTO: 30.9 PG (ref 26.5–33)
MCHC RBC AUTO-ENTMCNC: 33.5 G/DL (ref 31.5–36.5)
MCV RBC AUTO: 92 FL (ref 78–100)
MONOCYTES # BLD AUTO: 0.5 10E3/UL (ref 0–1.3)
MONOCYTES NFR BLD AUTO: 10 %
MUCOUS THREADS #/AREA URNS LPF: PRESENT /LPF
NEUTROPHILS # BLD AUTO: 3 10E3/UL (ref 1.6–8.3)
NEUTROPHILS NFR BLD AUTO: 62 %
NITRATE UR QL: NEGATIVE
NRBC # BLD AUTO: 0 10E3/UL
NRBC BLD AUTO-RTO: 0 /100
PH UR STRIP: 5.5 [PH] (ref 5–7)
PLATELET # BLD AUTO: 210 10E3/UL (ref 150–450)
POTASSIUM BLD-SCNC: 3.5 MMOL/L (ref 3.4–5.3)
PROT SERPL-MCNC: 7.1 G/DL (ref 6.8–8.8)
PROT UR-MCNC: 0.13 G/L
PROT/CREAT 24H UR: 0.08 G/G CR (ref 0–0.2)
RBC # BLD AUTO: 4.24 10E6/UL (ref 3.8–5.2)
RBC #/AREA URNS AUTO: ABNORMAL /HPF
SODIUM SERPL-SCNC: 140 MMOL/L (ref 133–144)
SP GR UR STRIP: 1.03 (ref 1–1.03)
SQUAMOUS #/AREA URNS AUTO: ABNORMAL /LPF
UROBILINOGEN UR STRIP-MCNC: NORMAL MG/DL
WBC # BLD AUTO: 4.9 10E3/UL (ref 4–11)
WBC #/AREA URNS AUTO: ABNORMAL /HPF

## 2022-02-16 PROCEDURE — 81001 URINALYSIS AUTO W/SCOPE: CPT

## 2022-02-16 PROCEDURE — 86160 COMPLEMENT ANTIGEN: CPT

## 2022-02-16 PROCEDURE — 84156 ASSAY OF PROTEIN URINE: CPT

## 2022-02-16 PROCEDURE — 86225 DNA ANTIBODY NATIVE: CPT

## 2022-02-16 PROCEDURE — 80053 COMPREHEN METABOLIC PANEL: CPT

## 2022-02-16 PROCEDURE — 86140 C-REACTIVE PROTEIN: CPT

## 2022-02-16 PROCEDURE — 85025 COMPLETE CBC W/AUTO DIFF WBC: CPT

## 2022-02-16 PROCEDURE — 36415 COLL VENOUS BLD VENIPUNCTURE: CPT

## 2022-02-17 LAB
C3 SERPL-MCNC: 68 MG/DL (ref 81–157)
C4 SERPL-MCNC: 17 MG/DL (ref 13–39)
DSDNA AB SER-ACNC: 17 IU/ML

## 2022-03-02 ENCOUNTER — TELEPHONE (OUTPATIENT)
Dept: FAMILY MEDICINE | Facility: CLINIC | Age: 47
End: 2022-03-02

## 2022-03-02 ENCOUNTER — LAB (OUTPATIENT)
Dept: LAB | Facility: CLINIC | Age: 47
End: 2022-03-02
Payer: COMMERCIAL

## 2022-03-02 ENCOUNTER — VIRTUAL VISIT (OUTPATIENT)
Dept: FAMILY MEDICINE | Facility: CLINIC | Age: 47
End: 2022-03-02
Payer: COMMERCIAL

## 2022-03-02 DIAGNOSIS — N39.0 RECURRENT UTI: ICD-10-CM

## 2022-03-02 DIAGNOSIS — R10.2 SUPRAPUBIC PRESSURE: ICD-10-CM

## 2022-03-02 DIAGNOSIS — B18.1 CHRONIC HEPATITIS B (H): ICD-10-CM

## 2022-03-02 DIAGNOSIS — R10.2 SUPRAPUBIC PRESSURE: Primary | ICD-10-CM

## 2022-03-02 LAB
ALBUMIN UR-MCNC: NEGATIVE MG/DL
APPEARANCE UR: CLEAR
BACTERIA #/AREA URNS HPF: ABNORMAL /HPF
BILIRUB UR QL STRIP: NEGATIVE
COLOR UR AUTO: NORMAL
GLUCOSE UR STRIP-MCNC: NEGATIVE MG/DL
HGB UR QL STRIP: NEGATIVE
KETONES UR STRIP-MCNC: NEGATIVE MG/DL
LEUKOCYTE ESTERASE UR QL STRIP: NEGATIVE
NITRATE UR QL: NEGATIVE
PH UR STRIP: 6.5 [PH] (ref 5–7)
RBC #/AREA URNS AUTO: ABNORMAL /HPF
SP GR UR STRIP: 1 (ref 1–1.03)
SQUAMOUS #/AREA URNS AUTO: ABNORMAL /LPF
UROBILINOGEN UR STRIP-MCNC: NORMAL MG/DL
WBC #/AREA URNS AUTO: ABNORMAL /HPF

## 2022-03-02 PROCEDURE — 87086 URINE CULTURE/COLONY COUNT: CPT

## 2022-03-02 PROCEDURE — 99214 OFFICE O/P EST MOD 30 MIN: CPT | Mod: 95 | Performed by: FAMILY MEDICINE

## 2022-03-02 PROCEDURE — 81001 URINALYSIS AUTO W/SCOPE: CPT

## 2022-03-02 NOTE — PROGRESS NOTES
merari is a 46 year old who is being evaluated via a billable video visit.      How would you like to obtain your AVS? MyChart  If the video visit is dropped, the invitation should be resent by: Text to cell phone: [phone]  Will anyone else be joining your video visit? No    Video Start Time: 2:55 PM    Assessment & Plan     Suprapubic pressure  ddx-cystitis versus?  Cervical uterine fibroid due to symptoms and reoccurring UTI  Recommended to proceed with urine exam and pelvic ultrasound for further evaluation  - UA with Microscopic reflex to Culture - lab collect; Future  - Urine Culture Aerobic Bacterial - lab collect; Future  - US Pelvic Complete with Transvaginal; Future    Recurrent UTI  as above    - UA with Microscopic reflex to Culture - lab collect; Future  - Urine Culture Aerobic Bacterial - lab collect; Future  - US Pelvic Complete with Transvaginal; Future    History of chronic hepatitis B  -On Viread  Review of the result(s) of each unique test - Urine exam  [id number]}     Chart documentation done in part with Dragon Voice recognition Software. Although reviewed after completion, some word and grammatical error may remain.    See Patient Instructions    Return in about 3 months (around 6/2/2022), or if symptoms worsen or fail to improve, for Physical Exam with PCP Yamile Garcia.    Jake Pierce MD  [facility]   merari is a 46 year old who presents for the following health issues     Patient is here for a video visit instead of in person visit due to the current COVID-19 pandemic.  Patient with past medical history significant for SLE, chronic hepatitis B is here for video visit with concerns of having increasing suprapubic pressure and urinary frequency for the past 4 weeks  Has been having recent episodes of cystitis and UTI in the past year  Denies current urinary urgency, dysuria, low back or flank pain, hematuria.  Has no history of nephrolithiasis  Patient is   1 para 1, last childbirth-12 years ago  Is currently on hormonal contraception.  Gets cycles every 6 weeks, LMP-6 weeks ago  Cycles last for 3 to 5 days, normal to slightly heavy flow, with no associated concerns with melena, passing blood clots.  Has no history of uterine fibroids in the past  She is currently on immunosuppression from mycophenolate and Plaquenil.  On Viread for chronic hepatitis B  Has no concerns for fever, chills, nausea, vomiting abdominal vaginal bleeding, discharge  HPI     Genitourinary - Female  Onset/Duration: was treated 1 month   Description:   Painful urination (Dysuria): YES- pressure           Frequency: YES  Blood in urine (Hematuria): no  Delay in urine (Hesitency): YES  Intensity: moderate  Progression of Symptoms:  same  Accompanying Signs & Symptoms:  Fever/chills: no  Flank pain: no  Nausea and vomiting: no  Vaginal symptoms: none  Abdominal/Pelvic Pain: no  History:   History of frequent UTI s: YES  History of kidney stones: no  Sexually Active: no  Possibility of pregnancy: No  Precipitating or alleviating factors: None  Therapies tried and outcome:  abx             Review of Systems   CONSTITUTIONAL: NEGATIVE for fever, chills, change in weight  RESP: NEGATIVE for significant cough or SOB  CV: NEGATIVE for chest pain, palpitations or peripheral edema  GI: NEGATIVE for nausea, abdominal pain, heartburn, or change in bowel habits and history of chronic hepatitis B  : dysuria and frequency   MUSCULOSKELETAL: NEGATIVE for significant arthralgias or myalgia  NEURO: NEGATIVE for weakness, dizziness or paresthesias  ENDOCRINE: NEGATIVE for temperature intolerance, skin/hair changes  HEME/ALLERGY/IMMUNE: NEGATIVE for bleeding problems and history of SLE  PSYCHIATRIC: NEGATIVE for changes in mood or affect      Objective           Vitals:  No vitals were obtained today due to virtual visit.    Physical Exam   GENERAL: Healthy, alert and no distress  EYES: Eyes grossly normal  to inspection  RESP: No audible wheeze, cough, or visible cyanosis.  No visible retractions or increased work of breathing.    SKIN: Visible skin-clear  PSYCH: Mentation appears normal, affect normal/bright, judgement and insight intact, normal speech and appearance well-groomed.    Results for orders placed or performed in visit on 03/02/22   UA with Microscopic reflex to Culture - lab collect     Status: Normal    Specimen: Urine, Midstream   Result Value Ref Range    Color Urine Straw Colorless, Straw, Light Yellow, Yellow    Appearance Urine Clear Clear    Glucose Urine Negative Negative mg/dL    Bilirubin Urine Negative Negative    Ketones Urine Negative Negative mg/dL    Specific Gravity Urine 1.005 1.003 - 1.035    Blood Urine Negative Negative    pH Urine 6.5 5.0 - 7.0    Protein Albumin Urine Negative Negative mg/dL    Nitrite Urine Negative Negative    Leukocyte Esterase Urine Negative Negative    Urobilinogen Urine Normal Normal, 2.0 mg/dL   Urine Microscopic     Status: Abnormal   Result Value Ref Range    Bacteria Urine Few (A) None Seen /HPF    RBC Urine 0-2 0-2 /HPF /HPF    WBC Urine 0-5 0-5 /HPF /HPF    Squamous Epithelials Urine Few (A) None Seen /LPF    Narrative    Urine Culture not indicated                 Video-Visit Details    Type of service:  Video Visit    Video End Time:3:03 PM    Originating Location (pt. Location): Home    Distant Location (provider location):  [facility]     Platform used for Video Visit: Brenden   Was not able to connect properly through [employer] / [employer]

## 2022-03-03 NOTE — RESULT ENCOUNTER NOTE
Ruben Kaur,  Your urine exam showed no significant concern for infection but a few bacteria which is nonspecific and can happen even in the absence of infection.  I wait for the urine culture  and pelvic scan results for further recommendations.   Let me know if you have any questions. Take care.  Jake Pierce MD

## 2022-03-04 LAB — BACTERIA UR CULT: NORMAL

## 2022-03-04 NOTE — RESULT ENCOUNTER NOTE
Ruben Kaur,  Your urine culture did not show definite concern for infection  Please proceed with a pelvic scan as discussed for further recommendations.   Let me know if you have any questions. Take care.  Jake Pierce MD

## 2022-04-19 ENCOUNTER — LAB (OUTPATIENT)
Dept: LAB | Facility: CLINIC | Age: 47
End: 2022-04-19
Payer: COMMERCIAL

## 2022-04-19 DIAGNOSIS — M32.9 SYSTEMIC LUPUS ERYTHEMATOSUS, UNSPECIFIED SLE TYPE, UNSPECIFIED ORGAN INVOLVEMENT STATUS (H): ICD-10-CM

## 2022-04-19 LAB
ALBUMIN SERPL-MCNC: 4 G/DL (ref 3.4–5)
ALBUMIN UR-MCNC: NEGATIVE MG/DL
ALP SERPL-CCNC: 77 U/L (ref 40–150)
ALT SERPL W P-5'-P-CCNC: 35 U/L (ref 0–50)
ANION GAP SERPL CALCULATED.3IONS-SCNC: 8 MMOL/L (ref 3–14)
APPEARANCE UR: CLEAR
AST SERPL W P-5'-P-CCNC: 16 U/L (ref 0–45)
BASOPHILS # BLD AUTO: 0 10E3/UL (ref 0–0.2)
BASOPHILS NFR BLD AUTO: 0 %
BILIRUB SERPL-MCNC: 0.3 MG/DL (ref 0.2–1.3)
BILIRUB UR QL STRIP: NEGATIVE
BUN SERPL-MCNC: 10 MG/DL (ref 7–30)
CALCIUM SERPL-MCNC: 9 MG/DL (ref 8.5–10.1)
CHLORIDE BLD-SCNC: 108 MMOL/L (ref 94–109)
CO2 SERPL-SCNC: 26 MMOL/L (ref 20–32)
COLOR UR AUTO: NORMAL
CREAT SERPL-MCNC: 0.72 MG/DL (ref 0.52–1.04)
CREAT UR-MCNC: 88 MG/DL
CRP SERPL-MCNC: 4.3 MG/L (ref 0–8)
EOSINOPHIL # BLD AUTO: 0.1 10E3/UL (ref 0–0.7)
EOSINOPHIL NFR BLD AUTO: 2 %
ERYTHROCYTE [DISTWIDTH] IN BLOOD BY AUTOMATED COUNT: 12.6 % (ref 10–15)
GFR SERPL CREATININE-BSD FRML MDRD: >90 ML/MIN/1.73M2
GLUCOSE BLD-MCNC: 105 MG/DL (ref 70–99)
GLUCOSE UR STRIP-MCNC: NEGATIVE MG/DL
HCT VFR BLD AUTO: 38.6 % (ref 35–47)
HGB BLD-MCNC: 13.1 G/DL (ref 11.7–15.7)
HGB UR QL STRIP: NEGATIVE
IMM GRANULOCYTES # BLD: 0 10E3/UL
IMM GRANULOCYTES NFR BLD: 1 %
KETONES UR STRIP-MCNC: NEGATIVE MG/DL
LEUKOCYTE ESTERASE UR QL STRIP: NEGATIVE
LYMPHOCYTES # BLD AUTO: 1.4 10E3/UL (ref 0.8–5.3)
LYMPHOCYTES NFR BLD AUTO: 28 %
MCH RBC QN AUTO: 31.4 PG (ref 26.5–33)
MCHC RBC AUTO-ENTMCNC: 33.9 G/DL (ref 31.5–36.5)
MCV RBC AUTO: 93 FL (ref 78–100)
MONOCYTES # BLD AUTO: 0.6 10E3/UL (ref 0–1.3)
MONOCYTES NFR BLD AUTO: 12 %
NEUTROPHILS # BLD AUTO: 2.9 10E3/UL (ref 1.6–8.3)
NEUTROPHILS NFR BLD AUTO: 57 %
NITRATE UR QL: NEGATIVE
NRBC # BLD AUTO: 0 10E3/UL
NRBC BLD AUTO-RTO: 0 /100
PH UR STRIP: 6 [PH] (ref 5–7)
PLATELET # BLD AUTO: 208 10E3/UL (ref 150–450)
POTASSIUM BLD-SCNC: 3.6 MMOL/L (ref 3.4–5.3)
PROT SERPL-MCNC: 7.1 G/DL (ref 6.8–8.8)
PROT UR-MCNC: 0.12 G/L
PROT/CREAT 24H UR: 0.14 G/G CR (ref 0–0.2)
RBC # BLD AUTO: 4.17 10E6/UL (ref 3.8–5.2)
RBC #/AREA URNS AUTO: ABNORMAL /HPF
SODIUM SERPL-SCNC: 142 MMOL/L (ref 133–144)
SP GR UR STRIP: 1.02 (ref 1–1.03)
SQUAMOUS #/AREA URNS AUTO: ABNORMAL /LPF
UROBILINOGEN UR STRIP-MCNC: NORMAL MG/DL
WBC # BLD AUTO: 5.1 10E3/UL (ref 4–11)
WBC #/AREA URNS AUTO: ABNORMAL /HPF

## 2022-04-19 PROCEDURE — 84156 ASSAY OF PROTEIN URINE: CPT

## 2022-04-19 PROCEDURE — 36415 COLL VENOUS BLD VENIPUNCTURE: CPT

## 2022-04-19 PROCEDURE — 81001 URINALYSIS AUTO W/SCOPE: CPT

## 2022-04-19 PROCEDURE — 86140 C-REACTIVE PROTEIN: CPT

## 2022-04-19 PROCEDURE — 86225 DNA ANTIBODY NATIVE: CPT

## 2022-04-19 PROCEDURE — 85025 COMPLETE CBC W/AUTO DIFF WBC: CPT

## 2022-04-19 PROCEDURE — 86160 COMPLEMENT ANTIGEN: CPT

## 2022-04-19 PROCEDURE — 80053 COMPREHEN METABOLIC PANEL: CPT

## 2022-04-20 LAB
C3 SERPL-MCNC: 74 MG/DL (ref 81–157)
C4 SERPL-MCNC: 19 MG/DL (ref 13–39)
DSDNA AB SER-ACNC: 17 IU/ML

## 2022-04-22 ENCOUNTER — OFFICE VISIT (OUTPATIENT)
Dept: RHEUMATOLOGY | Facility: CLINIC | Age: 47
End: 2022-04-22
Payer: COMMERCIAL

## 2022-04-22 VITALS
HEART RATE: 93 BPM | DIASTOLIC BLOOD PRESSURE: 74 MMHG | OXYGEN SATURATION: 100 % | SYSTOLIC BLOOD PRESSURE: 151 MMHG | WEIGHT: 162 LBS | BODY MASS INDEX: 29.63 KG/M2

## 2022-04-22 DIAGNOSIS — B18.1 CHRONIC HEPATITIS B (H): ICD-10-CM

## 2022-04-22 DIAGNOSIS — I73.00 RAYNAUD'S DISEASE WITHOUT GANGRENE: ICD-10-CM

## 2022-04-22 DIAGNOSIS — M32.9 SYSTEMIC LUPUS ERYTHEMATOSUS, UNSPECIFIED SLE TYPE, UNSPECIFIED ORGAN INVOLVEMENT STATUS (H): Primary | ICD-10-CM

## 2022-04-22 PROCEDURE — 99214 OFFICE O/P EST MOD 30 MIN: CPT | Performed by: INTERNAL MEDICINE

## 2022-04-22 RX ORDER — FAMOTIDINE 20 MG
1000 TABLET ORAL DAILY
Qty: 90 CAPSULE | Refills: 2 | Status: SHIPPED | OUTPATIENT
Start: 2022-04-22

## 2022-04-22 RX ORDER — HYDROXYCHLOROQUINE SULFATE 200 MG/1
200 TABLET, FILM COATED ORAL DAILY
Qty: 90 TABLET | Refills: 1 | Status: SHIPPED | OUTPATIENT
Start: 2022-04-22 | End: 2023-02-23

## 2022-04-22 NOTE — PROGRESS NOTES
Rheumatology Clinic  Visit      Natasha Lee MRN# [id number]   YOB: 1975 Age: 47 year old      Date of visit: 4/22/22   PCP: Sandy Garcia    Chief Complaint   Patient presents with:  Systemic Lupus Erythematous: Feels the same as always    Assessment and Plan     1.  Systemic lupus erythematosus (dsDNA positive, RNP+, hypocomplementemia C3 & C4, malar rash, oral sores, skin lesions, photosensitivity, Raynaud's phenomenon, fatigue, seizure history [grand mal seizure as an infant, absence seizures multiple times from age of [age], no seizure since age of 19 years old]): Reportedly diagnosed at the age of 13 years old.  Established with me on 3/8/2018.  Reportedly treated with hydroxychloroquine when she was initially diagnosed at the age of 13 when she developed oral sores, weight loss, malar rash, and joint aches.  Also reportedly treated with azathioprine; she states that she has not been on azathioprine since at least 2003.  Had been doing well without DMARD therapy for several years but then in 2019 she started to have more erythematous lesions on her arms so hydroxychloroquine was restarted with improvement of these lesions.  Also establish care with dermatology and is now on mycophenolate 1000 mg in the morning and 500 mg in the evening at the direction of dermatology; has old lesions but no new or erythematous skin lesions seen today.  Overall she says that she feels well.  She says that she was lost to follow-up between 2603-6432 due to the COVID-19 pandemic.  Chronic illness  - Continue hydroxychloroquine 200 mg daily (normal eye exam on 7/15/2021; yearly exam required)  - Mycophenolate per dermatology  - Labs in 6 months: CBC, CMP, UA, Uprotein:creatinine  - Labs in 12 months: CBC, CMP, ESR, CRP, CK, C3, C4, dsDNA, UA, Uprotein:creatinine    2.  Raynaud's phenomenon: Managed well with cold avoidance.  May consider calcium channel blocker in the future if needed.    3. Chronic  hepatitis B: Positive since birth, per patient.  Hepatitis B core antibody and surface antigen positive in the past.  Seen by hepatology 1/8/2021 who recommended that HBV ppx be started prior to systemic immunosuppression.  Currently on tenofovir.     4.  Elevated blood pressure:  natasha to follow up with Primary Care provider regarding elevated blood pressure.     - COVID-19: Bindu on 6/4/2021.  An mRNA COVID-19 vaccination (Pfizer or Moderna) is recommended to receive 28 days after the single Bindu vaccination to complete the initial series.  A second mRNA (Pfizer or Moderna) COVID-19 vaccine would be considered the first booster and should be received at least 2 months after the initial series is completed.  A 3rd mRNA COVID-19 vaccine, considered the 2nd booster, may be received at least 4 months after the first booster.   Based on our current understanding (and this may change over time as we learn more), medications should be adjusted as noted below, if disease activity allows:  - NSAIDs and Acetaminophen: hold for 24 hours prior to vaccination if able to do so  - Mycophenolate (Cellcept) should be held for 1-2 weeks after each COVID-19 vaccine (as disease activity allows)    Total minutes spent in evaluation with patient, documentation, , and review of pertinent studies and chart notes: 18    Ms. Lee verbalized agreement with and understanding of the rational for the diagnosis and treatment plan.  All questions were answered to best of my ability and the patient's satisfaction. Ms. Lee was advised to contact the clinic with any questions that may arise after the clinic visit.      Thank you for involving me in the care of the patient    Return to clinic: 12 months      HPI   Natasha Lee is a 47 year old female with a past medical history significant for chronic hepatitis B, heart murmur, history of cutting (she did this while ago and was associated with depression; had therapy  and this resolved many years ago; scarring on her left arm) and SLE who is seen for follow-up of lupus.     11/17/2020, she reports that she is doing well on hydroxychloroquine.  No joint pain.  No morning stiffness.  No rash.  Overall happy with how well she is doing.  Notes that she is not doing anything to monitor the hepatitis B and would like to see hepatology.      11/3/2021: Patient canceled appointment.    2/23/2022: No-show to scheduled appointment    Today, 3/30/2022: Reports that she was lost to follow-up due to the COVID-19 pandemic.  Has been doing well.  No new symptoms.  Raynaud's phenomenon is controlled with cold avoidance and she does well without any loss of tissue on the ends of fingers where the Raynaud's is most active.  No black or bloody stools.  Skin disease is controlled; no new skin lesions she reports but does have chronic changes from the skin lesions on her arms.  On mycophenolate from dermatology.    Denies fevers, chills, nausea, vomiting, constipation, diarrhea. No abdominal pain. No chest pain/pressure, palpitations, or shortness of breath. No LE swelling. No neck pain. No oral or nasal sores currently.  Malar rash hx; no rash now.    No sicca symptoms. No eye pain or redness. No history of inflammatory eye disease.  No history of DVT, pulmonary embolism, or miscarriage; one healthy child.        Tobacco: None  EtOH: None  Drugs: None  Occupation: Drug and alcohol counselor at [employer]    ROS   12 point review of system was completed and negative except as noted in the HPI     Active Problem List     Patient Active Problem List   Diagnosis     Chronic hepatitis B (H)     Lupus (systemic lupus erythematosus) (H)     CARDIOVASCULAR SCREENING; LDL GOAL LESS THAN 160     Health Care Home     Dysmenorrhea     Menorrhagia     Allergic rhinitis     Other nonspecific finding on examination of urine     Urinary frequency     Vitamin B12 deficiency (non anemic)     Heart murmur     S/P  laparoscopic cholecystectomy     Cervical cancer screening     Past Medical History     Past Medical History:   Diagnosis Date     Diabetes (H)     Pre-Diabetic     Heart murmur 1997     Lupus (systemic lupus erythematosus) (H) 1988    Dr. Prather     Lupus (systemic lupus erythematosus) (H) 12/10/2009     Normal delivery 9/5/09    boy forceps     Seizures (H)     When a child     Past Surgical History     Past Surgical History:   Procedure Laterality Date     ANESTH,SKIN SURGERY,KNEE  1993, 1990    orthoscopic     CHOLECYSTECTOMY  2016     CL AFF SURGICAL PATHOLOGY  2007     LAPAROSCOPIC CHOLECYSTECTOMY N/A 5/18/2016    Procedure: LAPAROSCOPIC CHOLECYSTECTOMY;  Surgeon: Radha Cline MD;  Location: [facility] OR     LAPAROSCOPIC TUBAL LIGATION  12/15/2011    Procedure:LAPAROSCOPIC TUBAL LIGATION; Laparoscopic tubal ligation; Surgeon:AMY WYNN; Location:[facility] OR     OSTEOTOMY FOOT Left 2/13/2019    Procedure: Left Lapidus with  Azael Osteotomy;  Surgeon: Brett Chavez MD;  Location: [facility] OR     ZZC APPENDECTOMY  4/2008     Allergy     Allergies   Allergen Reactions     Carbamazepine      Fever, rash     [place]-Related Products      Current Medication List     Current Outpatient Medications   Medication Sig     augmented betamethasone dipropionate (DIPROLENE-AF) 0.05 % external cream Apply thin layer twice daily to lupus on the face until dermatology follow up.     Cranberry 1000 MG CAPS Take 1 capsule by mouth 4 times daily     DiphenhydrAMINE HCl (BENADRYL PO)      diphenhydrAMINE HCl (Sleep) 50 MG CAPS Take 50 mg by mouth At Bedtime     hydroxychloroquine (PLAQUENIL) 200 MG tablet Take 1 tablet (200 mg) by mouth daily . Ophthalmology exam, including 10-2 VF and SD-OCT, is required yearly.  Rheumatology follow-up required for next refill.     mycophenolate (GENERIC EQUIVALENT) 500 MG tablet Take 1000 mg (2 tabs) in the morning and 500 mg (1 tab) in the evening     norethindrone-ethinyl estradiol  "(ORTHO-NOVUM) 1-35 MG-MCG tablet Take 1 tablet by mouth daily     Prenatal MV-Min-Fe Fum-FA-DHA (PRENATAL MULTIVITAMIN + DHA PO)      tenofovir (VIREAD) 300 MG tablet Take 1 tablet (300 mg) by mouth daily     No current facility-administered medications for this visit.         Social History   See HPI    Family History     Family History   Adopted: Yes   Family history unknown: Yes       Physical Exam     Temp Readings from Last 3 Encounters:   09/09/19 96.2  F (35.7  C) (Temporal)   08/09/19 99.7  F (37.6  C) (Temporal)   02/13/19 97.2  F (36.2  C) (Temporal)     BP Readings from Last 5 Encounters:   04/22/22 (!) 151/74   10/22/20 131/77   11/26/19 132/72   09/09/19 100/70   08/22/19 130/80     Pulse Readings from Last 1 Encounters:   04/22/22 93     Resp Readings from Last 1 Encounters:   02/13/19 16     Estimated body mass index is 29.63 kg/m  as calculated from the following:    Height as of 10/22/20: 1.575 m (5' 2\").    Weight as of this encounter: 73.5 kg (162 lb).    GEN: NAD. Healthy appearing adult.   HEENT:  Anicteric, noninjected sclera. No obvious external lesions of the ear and nose. Hearing intact.  CV: S1, S2. RRR. No m/r/g  PULM: No increased work of breathing. CTA bilaterally   MSK: MCPs, PIPs, DIPs without swelling or tenderness to palpation.  Wrists without swelling or tenderness to palpation.  Elbows and shoulders without swelling or tenderness to palpation.   Knees and ankles without swelling or tenderness to palpation.  Negative MTP squeeze.  SKIN: Hyperpigmented lesions on her arms but no erythematous, raised, or warm rashes seen.  PSYCH: Alert. Appropriate.     Labs / Imaging (select studies)     RNP/Sm/SSA/SSB  Recent Labs   Lab Test 03/07/18  1548 06/11/14  1540   RNPIGG 1.8*  --    SMIGG 0.2  --    SSAIGG >8.0*  --    SSBIGG <0.2  --    SCLIGG <0.2  --    TREPAB  --  Negative     dsDNA  Recent Labs   Lab Test 02/16/22  1652 11/10/20  1500 11/21/19  1536 06/07/18  1347 03/07/18  1548   DNA " 17.0* 20* 12* 15* 19*     C3/C4  Recent Labs   Lab Test 02/16/22  1652 11/10/20  1500 11/21/19  1536 06/07/18  1347 03/07/18  1548   N2TUAVD 68* 64* 56* 59* 60*   G2FKAWR 17 17 16 16 16     Antiphospholipid Antibodies  Recent Labs   Lab Test 03/07/18  1548   B2GPG 2.9   B2GPM 1.5   CARDG 5.9   CARDM 0.3   LUPINT Negative     CBC  Recent Labs   Lab Test 02/16/22 1652 02/26/21  1431 01/07/21  1409 11/10/20  1500 11/21/19  1536   WBC 4.9 4.7 4.2 4.5 4.3   RBC 4.24 4.28 4.43 4.41 4.08   HGB 13.1 13.2 14.0 13.6 12.6   HCT 39.1 38.6 40.4 40.1 36.8   MCV 92 90 91 91 90   RDW 12.7 12.1 12.3 11.9 12.4    198 212 185 200   MCH 30.9 30.8 31.6 30.8 30.9   MCHC 33.5 34.2 34.7 33.9 34.2   NEUTROPHIL 62 66.3  --  56.1 59.1   LYMPH 26 24.3  --  31.0 28.3   MONOCYTE 10 8.0  --  10.5 10.5   EOSINOPHIL 2 0.8  --  2.0 1.4   BASOPHIL 0 0.4  --  0.2 0.5   ANEU  --  3.1  --  2.5 2.5   ALYM  --  1.2  --  1.4 1.2   CALLIE  --  0.4  --  0.5 0.5   AEOS  --  0.0  --  0.1 0.1   ABAS  --  0.0  --  0.0 0.0   ANEUTAUTO 3.0  --   --   --   --    ALYMPAUTO 1.3  --   --   --   --    AMONOAUTO 0.5  --   --   --   --    AEOSAUTO 0.1  --   --   --   --    ABSBASO 0.0  --   --   --   --      CMP  Recent Labs   Lab Test 02/16/22 1652 02/26/21  1431 01/07/21  1409 11/10/20  1500 11/21/19  1536 01/21/19  1700    140 139 139 141 142   POTASSIUM 3.5 3.7 4.0 3.8 3.6 3.5   CHLORIDE 110* 109 109 108 112* 107   CO2 26 29 25 27 27 27   ANIONGAP 4 2* 5 4 2* 8   * 109* 115* 103* 130* 106*   BUN 14 13 11 13 10 9   CR 0.67 0.71 0.64 0.60 0.64 0.63   GFRESTIMATED >90 >90 >90 >90 >90 >90   GFRESTBLACK  --  >90 >90 >90 >90 >90   JUAN A 8.2* 8.8 8.6 8.4* 8.8 8.8   BILITOTAL 0.3 0.2 0.3 0.3 0.2  --    ALBUMIN 3.9 3.9 3.8 3.8 3.7  --    PROTTOTAL 7.1 6.9 6.9 7.2 6.8  --    ALKPHOS 77 81 62 80 85  --    AST 20 25 23 30 26  --    ALT 34 39 38 46 44  --      HgA1c  Recent Labs   Lab Test 02/15/16  0827 10/17/14  0913   A1C 5.3 5.4     Calcium/VitaminD  Recent  Labs   Lab Test 02/16/22  1652 02/26/21  1431 01/07/21  1409 05/13/16  1438 02/15/16  0827   JUAN A 8.2* 8.8 8.6   < > 8.0*   VITDT  --   --   --   --  32    < > = values in this interval not displayed.     ESR/CRP  Recent Labs   Lab Test 02/16/22  1652 11/10/20  1500 11/21/19  1536 06/07/18  1347   SED  --  13 19 13   CRP <2.9 3.1 5.4 4.6     CK/Aldolase  Recent Labs   Lab Test 11/10/20  1500 06/07/18  1347 03/07/18  1548   CKT 69 87 60     TSH/T4  Recent Labs   Lab Test 02/15/16  0827 04/02/14  1509   TSH 1.51 0.58     Lipid Panel  Recent Labs   Lab Test 02/15/16  0827 02/11/14  0716   CHOL 143 145   TRIG 71 110   HDL 47* 49*   LDL 82 74   VLDL  --  22   CHOLHDLRATIO  --  3.0   NHDL 96  --      Hepatitis B  Recent Labs   Lab Test 01/07/21  1409 11/16/18  1249 03/07/18  1548   AUSAB  --  1.76  --    HBCAB  --  Reactive*  --    HEPBANG  --  Reactive*  --    HBQLOG <1.3  --  2.1*     Hepatitis C  Recent Labs   Lab Test 11/16/18  1249 06/11/14  1540   HCVAB Nonreactive Negative     Lyme confirmation testing by Western Blot  Recent Labs   Lab Test 06/11/14  1540   LYWG Negative  Reference range: Negative  (Note)  Band(s) present: NONE  (Insufficient number of bands for positive result)  INTERPRETIVE INFORMATION: Borrelia Burgdorferi Ab, IgG  Western Blot    For this assay, a positive result is reported when any 5 or  more of the following 10 bands are present: 18, 23, 28, 30,  39, 41, 45, 58, 66, or 93 kDa.  All other banding patterns  are reported as negative.     LYWM Negative  Reference range: Negative  (Note)  Band(s) present: NONE  (Insufficient number of bands for positive result)  INTERPRETIVE INFORMATION: Borrelia Burgdorferi Antibody,  IgM Western Blot    For this assay, a positive result is reported when any 2 or  more of the following bands are present: 23, 39, or 41 kDa.  All other banding patterns are reported as negative.  Performed by [email or web address],  [address] [place] [address],[place] [address]  [phone]  [email or web address] Elfego Franco MD, Lab. Director         HIV Screening  Recent Labs   Lab Test 11/16/18  1249 06/11/14  1540   HIAGAB Nonreactive Nonreactive   HIV-1 p24 Ag & HIV-1/HIV-2 Ab Not Detected       UA  Recent Labs   Lab Test 03/02/22 1648 02/16/22  1652 11/10/20  1501 02/19/19  1300 06/07/18  1347 03/07/18  1548 12/18/14  1340 07/03/14  1320 06/27/14  1120   COLOR Straw Yellow Straw  --    < > Yellow Straw   < > Straw   APPEARANCE Clear Clear Clear  --    < > Clear Clear   < > Clear   URINEGLC Negative Negative Negative  --    < > Negative Negative   < > Negative   URINEBILI Negative Negative Negative  --    < > Negative Negative   < > Negative   SG 1.005 1.026 1.003  --    < > 1.025 1.001*   < > 1.005   URINEPH 6.5 5.5 6.5  --    < > 5.5 5.5   < > 6.5   PROTEIN Negative Negative Negative  --    < > Negative Negative   < > Negative   UROBILINOGEN  --   --   --   --   --  0.2  --   --   --    NITRITE Negative Negative Negative  --    < > Negative Negative   < > Negative   UBLD Negative Negative Negative  --    < > Negative Negative   < > Negative   LEUKEST Negative Negative Negative  --    < > Negative Small*   < > Moderate*   WBCU 0-5 0-5  --  0 - 5  --   --  2-5*  --  O - 2   RBCU 0-2 0-2  --  O - 2  --   --  O - 2  --  O - 2   SQUAMOUSEPI  --   --   --  Few  --   --  Few  --  Few   BACTERIA Few* Few*  --  Few*  --   --   --   --  Few*    < > = values in this interval not displayed.     Urine Microscopic  Recent Labs   Lab Test 03/02/22 1648 02/16/22 1652 02/19/19  1300 12/18/14  1340 06/27/14  1120   WBCU 0-5 0-5 0 - 5 2-5* O - 2   RBCU 0-2 0-2 O - 2 O - 2 O - 2   SQUAMOUSEPI  --   --  Few Few Few   BACTERIA Few* Few* Few*  --  Few*     Urine Protein  Recent Labs   Lab Test 02/16/22  1652 11/10/20  1501 06/07/18  1349   UTP 0.13 <0.05 0.07   UTPG 0.08 Unable to calculate due to low value 0.17   UCRR 161 10 40     [facility] UA from Oct 2019 reviewed and is sufficient for CTD  monitoring purposes    Immunization History     Immunization History   Administered Date(s) Administered     COVID-19,RIGOBERTO,Bindu 06/04/2021     Influenza (H1N1) 12/04/2009     Influenza (IIV3) PF 12/18/2000, 09/22/2009, 11/17/2011, 11/01/2012, 09/22/2014     Influenza Vaccine IM > 6 months Valent IIV4 (Alfuria,Fluzone) 09/26/2013, 09/30/2015, 10/01/2015, 11/02/2018          Chart documentation done in part with Dragon Voice recognition Software. Although reviewed after completion, some word and grammatical error may remain.    Christopher Zaman MD

## 2022-04-22 NOTE — PATIENT INSTRUCTIONS
RHEUMATOLOGY    Dr. Christopher Zaman    [address] [place] [address]  Phone number: [phone]  Fax number: [phone]      Thank you for choosing [facility]!    Randi Rico CMA Rheumatology

## 2022-04-28 ENCOUNTER — VIRTUAL VISIT (OUTPATIENT)
Dept: DERMATOLOGY | Facility: CLINIC | Age: 47
End: 2022-04-28
Payer: COMMERCIAL

## 2022-04-28 DIAGNOSIS — L93.0 LUPUS ERYTHEMATOSUS TUMIDUS: Primary | ICD-10-CM

## 2022-04-28 DIAGNOSIS — M32.19 SYSTEMIC LUPUS ERYTHEMATOSUS WITH OTHER ORGAN INVOLVEMENT, UNSPECIFIED SLE TYPE (H): ICD-10-CM

## 2022-04-28 DIAGNOSIS — D84.9 IMMUNOSUPPRESSION (H): ICD-10-CM

## 2022-04-28 PROCEDURE — 99214 OFFICE O/P EST MOD 30 MIN: CPT | Mod: 95 | Performed by: DERMATOLOGY

## 2022-04-28 RX ORDER — BETAMETHASONE DIPROPIONATE 0.5 MG/G
CREAM TOPICAL DAILY
Qty: 50 G | Refills: 3 | Status: SHIPPED | OUTPATIENT
Start: 2022-04-28 | End: 2023-08-16

## 2022-04-28 RX ORDER — MYCOPHENOLATE MOFETIL 500 MG/1
1000 TABLET ORAL 2 TIMES DAILY
Qty: 120 TABLET | Refills: 3 | Status: SHIPPED | OUTPATIENT
Start: 2022-04-28 | End: 2023-02-23

## 2022-04-28 RX ORDER — TACROLIMUS 1 MG/G
OINTMENT TOPICAL 2 TIMES DAILY
Qty: 100 G | Refills: 3 | Status: SHIPPED | OUTPATIENT
Start: 2022-04-28

## 2022-04-28 ASSESSMENT — PAIN SCALES - GENERAL: PAINLEVEL: NO PAIN (0)

## 2022-04-28 NOTE — LETTER
4/28/2022       RE: Natasha Lee  [address] [address]     Dear Colleague,    Thank you for referring your patient, Natasha eLe, to the [facility] [place] at [facility]. Please see a copy of my visit note below.    [facility] Dermatology Note  Encounter Date: Apr 28, 2022  Store-and-Forward and Telephone ([phone]). Location of teledermatologist: [facility] [place].  Start time: 3:41. End time: 3:51.    Dermatology Problem List:  1. SLE on hydroxychloroquine and intermittent prednisone, chronic hepatitis B  2. 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 Possible ocular rosacea.   - Biopsy 1/6/2020 consistent with cutaneous lupus erythematosus  - Current t/x: hydroxychloroquine (managed by rheum), Tacrolimus daily on face, augmented betamethasone 0.05% cream PRN, mycophenolate 1000 mg BID (started 2/2021 pending GI starting hep B treatment)  - Previous tx: triamcinolone 0.1% cream (helped some but not enough on face), tacrolimus 0.1% ointment (good at baseline but not helpful for flares), metronidazole 0.75% cream, oral ivermectin 15 mg monthly x3 months,  permethrin 5% cream, gentle skin care, doxycycline 100 mg BID (x3 months - improved substantially in first month then went back to baseline)  3. Maculopapular rash following URI 6/6/20 - triamcinolone 0.1% cream, bland emollients, OTC antihistamines    ____________________________________________    Assessment & Plan:     1. Tumid lupus: much more active recently on face, left arm, legs. We discussed risks/benefits of next steps in treatment and will increase topical regimen as well as increase dose of mycophenolate. We discussed rescue prednisone but will defer at  Called patient's daughter and left message to call with any further questions or concerns. Unable to reach patient after 4 attempts with multiple messages left   this time.  - increase mycophenolate to 1000 mg BID  - add tacrolimus ointment daily-BID and continue augmented betamethasone cream daily  - continue hydroxychloroquine 200 mg daily      Procedures Performed:    None    Follow-up: 3 months    Staff:     Corey Terry MD, FAAD   of Dermatology  Department of Dermatology  [facility]    ____________________________________________    CC: Derm Problem (Autoimmune follow up)    HPI:  Ms. Natasha Lee is a(n) 47 year old female who presents today as a return patient for lupus    Lupus - more activity on the face and left forearm, upper thighs, down legs  - breakout similar on thighs as on the arm  - active for last few months  - hydroxychloroquine, mycophenolate 1000/500 mg  - using augmented betamethasone cream in all areas of rash - has been helpful   - using once daily    Patient is otherwise feeling well, without additional skin concerns.    Labs Reviewed:  4/2022 CMP, CBC, CRP, UA/UPCR unremarkable; C3 74 (LLN 81), C4 normal, dsDNA 17 (ULN 10)    Physical Exam:  Vitals: There were no vitals taken for this visit.  SKIN: Teledermatology photos were reviewed; image quality and interpretability: acceptable. Image date: 4/21/22.  - erythematous indurated plaques without scaling on the malar cheeks and numerous papules and plaques on the left dorsal forearm  - No other lesions of concern on areas examined.     Medications:  Current Outpatient Medications   Medication     augmented betamethasone dipropionate (DIPROLENE-AF) 0.05 % external cream     Cranberry 1000 MG CAPS     DiphenhydrAMINE HCl (BENADRYL PO)     diphenhydrAMINE HCl (Sleep) 50 MG CAPS     hydroxychloroquine (PLAQUENIL) 200 MG tablet     mycophenolate (GENERIC EQUIVALENT) 500 MG tablet     norethindrone-ethinyl estradiol (ORTHO-NOVUM) 1-35 MG-MCG tablet     Prenatal MV-Min-Fe Fum-FA-DHA (PRENATAL MULTIVITAMIN + DHA PO)     tenofovir (VIREAD) 300  MG tablet     Vitamin D, Cholecalciferol, 25 MCG (1000 UT) CAPS     No current facility-administered medications for this visit.      Past Medical/Surgical History:   Patient Active Problem List   Diagnosis     Chronic hepatitis B (H)     Lupus (systemic lupus erythematosus) (H)     CARDIOVASCULAR SCREENING; LDL GOAL LESS THAN 160     Health Care Home     Dysmenorrhea     Menorrhagia     Allergic rhinitis     Other nonspecific finding on examination of urine     Urinary frequency     Vitamin B12 deficiency (non anemic)     Heart murmur     S/P laparoscopic cholecystectomy     Cervical cancer screening     Past Medical History:   Diagnosis Date     Diabetes (H)     Pre-Diabetic     Heart murmur 1997     Lupus (systemic lupus erythematosus) (H) 1988    Dr. Prather     Lupus (systemic lupus erythematosus) (H) 12/10/2009     Normal delivery 9/5/09    boy forceps     Seizures (H)     When a child       CC Referred Self, MD  No address on file on close of this encounter.

## 2022-04-28 NOTE — PROGRESS NOTES
[facility] Dermatology Note  Encounter Date: Apr 28, 2022  Store-and-Forward and Telephone ([phone]). Location of teledermatologist: [facility] [place].  Start time: 3:41. End time: 3:51.    Dermatology Problem List:  1. SLE on hydroxychloroquine and intermittent prednisone, chronic hepatitis B  2. 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 Possible ocular rosacea.   - Biopsy 1/6/2020 consistent with cutaneous lupus erythematosus  - Current t/x: hydroxychloroquine (managed by rheum), Tacrolimus daily on face, augmented betamethasone 0.05% cream PRN, mycophenolate 1000 mg BID (started 2/2021 pending GI starting hep B treatment)  - Previous tx: triamcinolone 0.1% cream (helped some but not enough on face), tacrolimus 0.1% ointment (good at baseline but not helpful for flares), metronidazole 0.75% cream, oral ivermectin 15 mg monthly x3 months,  permethrin 5% cream, gentle skin care, doxycycline 100 mg BID (x3 months - improved substantially in first month then went back to baseline)  3. Maculopapular rash following URI 6/6/20 - triamcinolone 0.1% cream, bland emollients, OTC antihistamines    ____________________________________________    Assessment & Plan:     1. Tumid lupus: much more active recently on face, left arm, legs. We discussed risks/benefits of next steps in treatment and will increase topical regimen as well as increase dose of mycophenolate. We discussed rescue prednisone but will defer at this time.  - increase mycophenolate to 1000 mg BID  - add tacrolimus ointment daily-BID and continue augmented betamethasone cream daily  - continue hydroxychloroquine 200 mg daily      Procedures Performed:    None    Follow-up: 3 months    Staff:     Corey Terry MD, FAAD   of Dermatology  Department of  Dermatology  [facility]    ____________________________________________    CC: Derm Problem (Autoimmune follow up)    HPI:  Ms. Natasha Lee is a(n) 47 year old female who presents today as a return patient for lupus    Lupus - more activity on the face and left forearm, upper thighs, down legs  - breakout similar on thighs as on the arm  - active for last few months  - hydroxychloroquine, mycophenolate 1000/500 mg  - using augmented betamethasone cream in all areas of rash - has been helpful   - using once daily    Patient is otherwise feeling well, without additional skin concerns.    Labs Reviewed:  4/2022 CMP, CBC, CRP, UA/UPCR unremarkable; C3 74 (LLN 81), C4 normal, dsDNA 17 (ULN 10)    Physical Exam:  Vitals: There were no vitals taken for this visit.  SKIN: Teledermatology photos were reviewed; image quality and interpretability: acceptable. Image date: 4/21/22.  - erythematous indurated plaques without scaling on the malar cheeks and numerous papules and plaques on the left dorsal forearm  - No other lesions of concern on areas examined.     Medications:  Current Outpatient Medications   Medication     augmented betamethasone dipropionate (DIPROLENE-AF) 0.05 % external cream     Cranberry 1000 MG CAPS     DiphenhydrAMINE HCl (BENADRYL PO)     diphenhydrAMINE HCl (Sleep) 50 MG CAPS     hydroxychloroquine (PLAQUENIL) 200 MG tablet     mycophenolate (GENERIC EQUIVALENT) 500 MG tablet     norethindrone-ethinyl estradiol (ORTHO-NOVUM) 1-35 MG-MCG tablet     Prenatal MV-Min-Fe Fum-FA-DHA (PRENATAL MULTIVITAMIN + DHA PO)     tenofovir (VIREAD) 300 MG tablet     Vitamin D, Cholecalciferol, 25 MCG (1000 UT) CAPS     No current facility-administered medications for this visit.      Past Medical/Surgical History:   Patient Active Problem List   Diagnosis     Chronic hepatitis B (H)     Lupus (systemic lupus erythematosus) (H)     CARDIOVASCULAR SCREENING; LDL GOAL LESS THAN 160      Health Care Home     Dysmenorrhea     Menorrhagia     Allergic rhinitis     Other nonspecific finding on examination of urine     Urinary frequency     Vitamin B12 deficiency (non anemic)     Heart murmur     S/P laparoscopic cholecystectomy     Cervical cancer screening     Past Medical History:   Diagnosis Date     Diabetes (H)     Pre-Diabetic     Heart murmur 1997     Lupus (systemic lupus erythematosus) (H) 1988    Dr. Prather     Lupus (systemic lupus erythematosus) (H) 12/10/2009     Normal delivery 9/5/09    boy forceps     Seizures (H)     When a child       CC Referred Self, MD  No address on file on close of this encounter.

## 2022-04-28 NOTE — NURSING NOTE
Dermatology Rooming Note    Natasha Lee's goals for this visit include:   Chief Complaint   Patient presents with     Derm Problem     Autoimmune follow up     Stacey Lambert, Visit Facilitator

## 2022-05-24 ENCOUNTER — PATIENT OUTREACH (OUTPATIENT)
Dept: DERMATOLOGY | Facility: CLINIC | Age: 47
End: 2022-05-24
Payer: COMMERCIAL

## 2022-05-24 NOTE — LETTER
May 24, 2022          Natasha Lee  [address] [address]        Dear Natasha Lee:    We recently reviewed your medical records from [facility] and found that you are due for an appointment with Dr. Corey Terry for a telephone visit.  Our office has attempted to reach you via telephone and left a message.    At your earliest convenience, please call the clinic at [phone] to arrange the recommended exam and possible testing.  Please kindly mention this letter when calling so that your appointment(s) can be accurately scheduled.      Sincerely,       The Clinic Staff        Medical Record Number:  [id number]

## 2022-05-24 NOTE — CONFIDENTIAL NOTE
edAttempted to reach patient to schedule follow up in the Dermatology Clinic.  No answer,  LM on VM to call office and Letter mailed.    Schedule with Dr. Corey Terry - rtn auto- lupus- telephone.

## 2022-08-08 ENCOUNTER — MYC MEDICAL ADVICE (OUTPATIENT)
Dept: FAMILY MEDICINE | Facility: CLINIC | Age: 47
End: 2022-08-08

## 2022-08-30 ENCOUNTER — LAB (OUTPATIENT)
Dept: URGENT CARE | Facility: URGENT CARE | Age: 47
End: 2022-08-30
Attending: NURSE PRACTITIONER
Payer: COMMERCIAL

## 2022-08-30 DIAGNOSIS — Z20.822 SUSPECTED 2019 NOVEL CORONAVIRUS INFECTION: ICD-10-CM

## 2022-08-30 PROCEDURE — U0003 INFECTIOUS AGENT DETECTION BY NUCLEIC ACID (DNA OR RNA); SEVERE ACUTE RESPIRATORY SYNDROME CORONAVIRUS 2 (SARS-COV-2) (CORONAVIRUS DISEASE [COVID-19]), AMPLIFIED PROBE TECHNIQUE, MAKING USE OF HIGH THROUGHPUT TECHNOLOGIES AS DESCRIBED BY CMS-2020-01-R: HCPCS

## 2022-08-30 PROCEDURE — U0005 INFEC AGEN DETEC AMPLI PROBE: HCPCS

## 2022-08-31 LAB — SARS-COV-2 RNA RESP QL NAA+PROBE: NEGATIVE

## 2022-08-31 NOTE — RESULT ENCOUNTER NOTE
Ky Lee,    Attached are your test results.  This is good news    Please contact us if you have any questions.    Sandy Garcia, CNP

## 2022-09-26 ENCOUNTER — VIRTUAL VISIT (OUTPATIENT)
Dept: FAMILY MEDICINE | Facility: CLINIC | Age: 47
End: 2022-09-26
Payer: COMMERCIAL

## 2022-09-26 DIAGNOSIS — J30.9 ALLERGIC RHINITIS, UNSPECIFIED SEASONALITY, UNSPECIFIED TRIGGER: ICD-10-CM

## 2022-09-26 DIAGNOSIS — J98.01 COUGH DUE TO BRONCHOSPASM: Primary | ICD-10-CM

## 2022-09-26 PROCEDURE — 99213 OFFICE O/P EST LOW 20 MIN: CPT | Mod: 95 | Performed by: NURSE PRACTITIONER

## 2022-09-26 RX ORDER — ALBUTEROL SULFATE 90 UG/1
2 AEROSOL, METERED RESPIRATORY (INHALATION) EVERY 6 HOURS
Qty: 18 G | Refills: 1 | Status: SHIPPED | OUTPATIENT
Start: 2022-09-26

## 2022-09-26 RX ORDER — METHYLPREDNISOLONE 4 MG
TABLET, DOSE PACK ORAL
Qty: 21 TABLET | Refills: 0 | Status: SHIPPED | OUTPATIENT
Start: 2022-09-26 | End: 2023-12-01

## 2022-09-26 NOTE — PATIENT INSTRUCTIONS
PLAN:   1.   Symptomatic therapy suggested: will start on short course of steroids and inhaler.  ,Zyrtec, Claritin or Allegra  (or generic equivalents)  2.  Orders Placed This Encounter   Medications    methylPREDNISolone (MEDROL DOSEPAK) 4 MG tablet therapy pack     Sig: Follow package instructions     Dispense:  21 tablet     Refill:  0    albuterol (PROAIR HFA/PROVENTIL HFA/VENTOLIN HFA) 108 (90 Base) MCG/ACT inhaler     Sig: Inhale 2 puffs into the lungs every 6 hours     Dispense:  18 g     Refill:  1     Pharmacy may dispense brand covered by insurance (Proair, or proventil or ventolin or generic albuterol inhaler)    Spacer/Aero-Holding Chambers (SPACER/AERO-HOLD CHAMBER MASK) REMY     Si Device as needed     Dispense:  1 each     Refill:  0     Orders Placed This Encounter   Procedures    XR Chest 2 Views       3. Patient needs to follow up in if no improvement,or sooner if worsening of symptoms or other symptoms develop.  Send me a message at end of week as may need to add steroid inhaler

## 2022-09-26 NOTE — PROGRESS NOTES
merari is a 47 year old who is being evaluated via a billable video visit.      How would you like to obtain your AVS? MyChart  If the video visit is dropped, the invitation should be resent by: Text to cell phone: [phone]  Will anyone else be joining your video visit? No          Subjective   merari is a 47 year old, presenting for the following health issues:  Cough      History of Present Illness       [facility]  Symptom onset:  More than a month  Symptoms include:  Coughing and runy nose  Symptom intensity:  Moderate  Symptom progression:  Staying the same  Had these symptoms before:  Yes  Has tried/received treatment for these symptoms:  No  What makes it worse:  No  What makes it better:  Unsure    She eats 2-3 servings of fruits and vegetables daily.She consumes 2 sweetened beverage(s) daily.She exercises with enough effort to increase her heart rate 9 or less minutes per day.  She exercises with enough effort to increase her heart rate 3 or less days per week. She is missing 1 dose(s) of medications per week.  She is not taking prescribed medications regularly due to remembering to take.       Acute Illness  Acute illness concerns: has a cough for 2 months and did at least 3 Covid tests already   Onset/Duration: 2 months   Symptoms:  Fever: No  Chills/Sweats: No  Headache (location?): No  Sinus Pressure: No  Conjunctivitis:  No  Ear Pain: no  Rhinorrhea: YES  Congestion: YES  Sore Throat: No  Cough: YES-non-productive  Wheeze: YES  Decreased Appetite: No  Nausea: No  Vomiting: No  Diarrhea: No  Dysuria/Freq.: No  Dysuria or Hematuria: No  Fatigue/Achiness: YES  Sick/Strep Exposure: No  Therapies tried and outcome:  Will have episodes where can not stop coughing   Tried Claritin and helps some      Labs reviewed in EPIC  BP Readings from Last 3 Encounters:   04/22/22 (!) 151/74   10/22/20 131/77   11/26/19 132/72    Wt Readings from Last 3 Encounters:   04/22/22 73.5 kg (162 lb)   10/22/20  73.6 kg (162 lb 4.8 oz)   11/26/19 72.2 kg (159 lb 3.2 oz)                  Patient Active Problem List   Diagnosis     Chronic hepatitis B (H)     Lupus (systemic lupus erythematosus) (H)     CARDIOVASCULAR SCREENING; LDL GOAL LESS THAN 160     Health Care Home     Dysmenorrhea     Menorrhagia     Allergic rhinitis     Other nonspecific finding on examination of urine     Urinary frequency     Vitamin B12 deficiency (non anemic)     Heart murmur     S/P laparoscopic cholecystectomy     Cervical cancer screening     Past Surgical History:   Procedure Laterality Date     ANESTH,SKIN SURGERY,KNEE  1993, 1990    orthoscopic     CHOLECYSTECTOMY  2016     CL AFF SURGICAL PATHOLOGY  2007     LAPAROSCOPIC CHOLECYSTECTOMY N/A 5/18/2016    Procedure: LAPAROSCOPIC CHOLECYSTECTOMY;  Surgeon: Rahda Cline MD;  Location: [facility] OR     LAPAROSCOPIC TUBAL LIGATION  12/15/2011    Procedure:LAPAROSCOPIC TUBAL LIGATION; Laparoscopic tubal ligation; Surgeon:AMY WYNN; Location:[facility] OR     OSTEOTOMY FOOT Left 2/13/2019    Procedure: Left Lapidus with  Azael Osteotomy;  Surgeon: Brett Chavez MD;  Location: [facility] OR     ZZC APPENDECTOMY  4/2008       Social History     Tobacco Use     Smoking status: Never Smoker     Smokeless tobacco: Never Used   Substance Use Topics     Alcohol use: Yes     Comment: Occasional     Family History   Adopted: Yes   Family history unknown: Yes         Current Outpatient Medications   Medication Sig Dispense Refill     augmented betamethasone dipropionate (DIPROLENE-AF) 0.05 % external cream Apply topically daily 50 g 3     Cranberry 1000 MG CAPS Take 1 capsule by mouth 4 times daily       DiphenhydrAMINE HCl (BENADRYL PO)        diphenhydrAMINE HCl (Sleep) 50 MG CAPS Take 50 mg by mouth At Bedtime       hydroxychloroquine (PLAQUENIL) 200 MG tablet Take 1 tablet (200 mg) by mouth daily . Ophthalmology exam, including 10-2 VF and SD-OCT, is required yearly. 90 tablet 1      mycophenolate (GENERIC EQUIVALENT) 500 MG tablet Take 2 tablets (1,000 mg) by mouth 2 times daily 120 tablet 3     norethindrone-ethinyl estradiol (ORTHO-NOVUM) 1-35 MG-MCG tablet Take 1 tablet by mouth daily 84 tablet 4     Prenatal MV-Min-Fe Fum-FA-DHA (PRENATAL MULTIVITAMIN + DHA PO)        tacrolimus (PROTOPIC) 0.1 % external ointment Apply topically 2 times daily 100 g 3     tenofovir (VIREAD) 300 MG tablet Take 1 tablet (300 mg) by mouth daily 30 tablet 11     Vitamin D, Cholecalciferol, 25 MCG (1000 UT) CAPS Take 1,000 Units by mouth daily 90 capsule 2     Allergies   Allergen Reactions     Carbamazepine      Fever, rash     [place]-Related Products        Review of Systems   Constitutional, HEENT, cardiovascular, pulmonary, gi and gu systems are negative, except as otherwise noted.      Objective           Vitals:  No vitals were obtained today due to virtual visit.    Physical Exam   GENERAL: Patient is well nourished, well developed,in no apparent distress, well developed and well nourished, non-toxic, in no respiratory distress and acyanotic, alert, cooperative and well hydrated  mildly ill but alert and responsive    EYES: Eyes grossly normal to inspection.  No discharge or erythema, or obvious scleral/conjunctival abnormalities.  HENT: normal cephalic/atraumatic and oral mucous membranes moist  RESP: No audible wheeze, cough, or visible cyanosis.  No visible retractions or increased work of breathing.    MS: extremities normal- no gross deformities noted  SKIN: Visible skin clear. No significant rash, abnormal pigmentation or lesions.  NEURO: mentation intact  PSYCH: Mentation appears normal, affect normal/bright, judgement and insight intact, normal speech and appearance well-groomed.    .Pending orders and results     Assessment & Plan     Cough due to bronchospasm  Will try short course of steroids as well a inhaler   - XR Chest 2 Views  - methylPREDNISolone (MEDROL DOSEPAK) 4 MG tablet therapy pack   Dispense: 21 tablet; Refill: 0  - albuterol (PROAIR HFA/PROVENTIL HFA/VENTOLIN HFA) 108 (90 Base) MCG/ACT inhaler  Dispense: 18 g; Refill: 1  - Spacer/Aero-Holding Chambers (SPACER/AERO-HOLD CHAMBER MASK) REMY  Dispense: 1 each; Refill: 0    Allergic rhinitis, unspecified seasonality, unspecified trigger  Discussed the nature and general treatment of allergies including avoidance, meds, and shot therapy. I recommended initially trying to control the symptoms with meds and going on to allergy treating and shot therapy if not well controlled.  Discussed the use of antihistamines, nasal steroid sprays and Singulair. See orders in [facility]        Ordering of each unique test  Prescription drug management   Time spent doing chart review, history and exam, documentation and further activities per the note       See Patient Instructions  Patient Instructions     PLAN:   1.   Symptomatic therapy suggested: will start on short course of steroids and inhaler.  ,Zyrtec, Claritin or Allegra  (or generic equivalents)  2.  Orders Placed This Encounter   Medications     methylPREDNISolone (MEDROL DOSEPAK) 4 MG tablet therapy pack     Sig: Follow package instructions     Dispense:  21 tablet     Refill:  0     albuterol (PROAIR HFA/PROVENTIL HFA/VENTOLIN HFA) 108 (90 Base) MCG/ACT inhaler     Sig: Inhale 2 puffs into the lungs every 6 hours     Dispense:  18 g     Refill:  1     Pharmacy may dispense brand covered by insurance (Proair, or proventil or ventolin or generic albuterol inhaler)     Spacer/Aero-Holding Chambers (SPACER/AERO-HOLD CHAMBER MASK) REMY     Si Device as needed     Dispense:  1 each     Refill:  0     Orders Placed This Encounter   Procedures     XR Chest 2 Views       3. Patient needs to follow up in if no improvement,or sooner if worsening of symptoms or other symptoms develop.  Send me a message at end of week as may need to add steroid inhaler        Return in about 1 week (around 10/3/2022), or if  symptoms worsen or fail to improve.    ELIAS Guerra CNP  [facility]          Video-Visit Details    Video Start Time: 4:41 PM    Type of service:  Video Visit    Video End Time:4:53 PM    Originating Location (pt. Location): Home    Distant Location (provider location):  [facility]     Platform used for Video Visit: "[employer]

## 2022-10-09 ENCOUNTER — HEALTH MAINTENANCE LETTER (OUTPATIENT)
Age: 47
End: 2022-10-09

## 2022-11-11 ENCOUNTER — E-VISIT (OUTPATIENT)
Dept: FAMILY MEDICINE | Facility: CLINIC | Age: 47
End: 2022-11-11
Payer: COMMERCIAL

## 2022-11-11 DIAGNOSIS — J01.90 ACUTE SINUSITIS WITH SYMPTOMS > 10 DAYS: Primary | ICD-10-CM

## 2022-11-11 PROCEDURE — 99421 OL DIG E/M SVC 5-10 MIN: CPT | Performed by: NURSE PRACTITIONER

## 2022-11-11 NOTE — PATIENT INSTRUCTIONS
Dear Natasha Lee    After reviewing your responses, I've been able to diagnose you with?a sinus infection caused by bacteria.?     Based on your responses and diagnosis, I have prescribed Augmentin  to treat your symptoms. I have sent this to your pharmacy.?     It is also important to stay well hydrated, get lots of rest and take over-the-counter decongestants,?tylenol?or ibuprofen if you?are able to?take those medications per your primary care provider to help relieve discomfort.?     It is important that you take?all of?your prescribed medication even if your symptoms are improving after a few doses.? Taking?all of?your medicine helps prevent the symptoms from returning.?     If your symptoms worsen, you develop severe headache, vomiting, high fever (>102), or are not improving in 7 days, please contact your primary care provider for an appointment or visit any of our convenient Walk-in Care or Urgent Care Centers to be seen which can be found on our website?here.?     Thanks again for choosing?us?as your health care partner,?   ?  ELIAS Guerra CNP?

## 2022-12-06 ENCOUNTER — E-VISIT (OUTPATIENT)
Dept: FAMILY MEDICINE | Facility: CLINIC | Age: 47
End: 2022-12-06
Payer: COMMERCIAL

## 2022-12-06 DIAGNOSIS — J98.01 COUGH DUE TO BRONCHOSPASM: Primary | ICD-10-CM

## 2022-12-06 DIAGNOSIS — J01.90 ACUTE SINUSITIS WITH SYMPTOMS > 10 DAYS: ICD-10-CM

## 2022-12-06 PROCEDURE — 99421 OL DIG E/M SVC 5-10 MIN: CPT | Performed by: NURSE PRACTITIONER

## 2022-12-06 RX ORDER — DOXYCYCLINE HYCLATE 100 MG
100 TABLET ORAL 2 TIMES DAILY
Qty: 14 TABLET | Refills: 0 | Status: SHIPPED | OUTPATIENT
Start: 2022-12-06 | End: 2023-05-02

## 2022-12-06 RX ORDER — METHYLPREDNISOLONE 4 MG
TABLET, DOSE PACK ORAL
Qty: 21 TABLET | Refills: 0 | Status: SHIPPED | OUTPATIENT
Start: 2022-12-06 | End: 2023-12-01

## 2022-12-07 NOTE — PATIENT INSTRUCTIONS
Dear Natasha Lee    After reviewing your responses, I've been able to diagnose you with    Cough due to bronchospasm  Acute sinusitis with symptoms > 10 days.      Based on your responses and diagnosis, I have prescribed   Orders Placed This Encounter     methylPREDNISolone (MEDROL DOSEPAK) 4 MG tablet therapy pack     doxycycline hyclate (VIBRA-TABS) 100 MG tablet    to treat your symptoms. I have sent this to your pharmacy.?     It is also important to stay well hydrated, get lots of rest and take over-the-counter decongestants,?tylenol?or ibuprofen if you?are able to?take those medications per your primary care provider to help relieve discomfort.?     It is important that you take?all of?your prescribed medication even if your symptoms are improving after a few doses.? Taking?all of?your medicine helps prevent the symptoms from returning.?     If your symptoms worsen, you develop severe headache, vomiting, high fever (>102), or are not improving in 7 days, please contact your primary care provider for an appointment or visit any of our convenient Walk-in Care or Urgent Care Centers to be seen which can be found on our website?here.?     Thanks again for choosing?us?as your health care partner,?   ?  ELIAS Guerra CNP?

## 2022-12-10 ENCOUNTER — NURSE TRIAGE (OUTPATIENT)
Dept: NURSING | Facility: CLINIC | Age: 47
End: 2022-12-10

## 2022-12-11 NOTE — TELEPHONE ENCOUNTER
"\"I tested positive today for COVID and I also have lupus. I am interested in Paxlovid treatment. I am currently on antibiotics and methylprednisolone for sinus infection. My sx are mild: decreased taste and smell. 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 SX began today.\"  Triaged to a disposition of Call PCP now. Home care given per guideline. Call transferred to  to make appointment with virtual provider to discuss Paxlovid treatment.     Anastacia Garcia RN Triage Nurse Advisor 9:18 PM 12/10/2022    Reason for Disposition    HIGH RISK for severe COVID complications (e.g., weak immune system, age > 64 years, obesity with BMI > 25, pregnant, chronic lung disease or other chronic medical condition)  (Exception: Already seen by PCP and no new or worsening symptoms.)    Additional Information    Negative: SEVERE difficulty breathing (e.g., struggling for each breath, speaks in single words)    Negative: Difficult to awaken or acting confused (e.g., disoriented, slurred speech)    Negative: Bluish (or gray) lips or face now    Negative: Shock suspected (e.g., cold/pale/clammy skin, too weak to stand, low BP, rapid pulse)    Negative: Sounds like a life-threatening emergency to the triager    Negative: [1] Diagnosed or suspected COVID-19 AND [2] symptoms lasting 3 or more weeks    Negative: [1] COVID-19 exposure AND [2] no symptoms    Negative: COVID-19 vaccine reaction suspected (e.g., fever, headache, muscle aches) occurring 1 to 3 days after getting vaccine    Negative: COVID-19 vaccine, questions about    Negative: [1] Lives with someone known to have influenza (flu test positive) AND [2] flu-like symptoms (e.g., cough, runny nose, sore throat, SOB; with or without fever)    Negative: [1] Adult with possible COVID-19 symptoms AND [2] triager concerned about severity of symptoms or other causes    Negative: COVID-19 and breastfeeding, questions about    Negative: SEVERE or constant " chest pain or pressure  (Exception: Mild central chest pain, present only when coughing.)    Negative: MODERATE difficulty breathing (e.g., speaks in phrases, SOB even at rest, pulse 100-120)    Negative: [1] Headache AND [2] stiff neck (can't touch chin to chest)    Negative: Oxygen level (e.g., pulse oximetry) 90 percent or lower    Negative: Chest pain or pressure    Negative: Patient sounds very sick or weak to the triager    Negative: MILD difficulty breathing (e.g., minimal/no SOB at rest, SOB with walking, pulse <100)    Negative: Fever > 103 F (39.4 C)    Negative: [1] Fever > 101 F (38.3 C) AND [2] age > 60 years    Negative: [1] Fever > 100.0 F (37.8 C) AND [2] bedridden (e.g., nursing home patient, CVA, chronic illness, recovering from surgery)    Protocols used: CORONAVIRUS (COVID-19) DIAGNOSED OR [id number] 1.18.2022

## 2022-12-12 ENCOUNTER — MYC MEDICAL ADVICE (OUTPATIENT)
Dept: FAMILY MEDICINE | Facility: OTHER | Age: 47
End: 2022-12-12

## 2022-12-12 DIAGNOSIS — U07.1 INFECTION DUE TO 2019 NOVEL CORONAVIRUS: ICD-10-CM

## 2022-12-12 DIAGNOSIS — J98.01 COUGH DUE TO BRONCHOSPASM: ICD-10-CM

## 2022-12-13 NOTE — TELEPHONE ENCOUNTER
RN called [facility] in [place] to verify if medication was in stock. It was not. RN cancelled it and sent to the pharmacy of patients choice.     MARIA A VázquezN, RN, N  [place]  December 13, 2022

## 2023-01-12 ENCOUNTER — E-VISIT (OUTPATIENT)
Dept: FAMILY MEDICINE | Facility: CLINIC | Age: 48
End: 2023-01-12
Payer: COMMERCIAL

## 2023-01-12 DIAGNOSIS — R20.2 RIGHT HAND PARESTHESIA: Primary | ICD-10-CM

## 2023-01-12 PROCEDURE — 99421 OL DIG E/M SVC 5-10 MIN: CPT | Performed by: NURSE PRACTITIONER

## 2023-02-03 ENCOUNTER — OFFICE VISIT (OUTPATIENT)
Dept: OPHTHALMOLOGY | Facility: CLINIC | Age: 48
End: 2023-02-03
Payer: COMMERCIAL

## 2023-02-03 DIAGNOSIS — Z79.899 HIGH RISK MEDICATION USE: Primary | ICD-10-CM

## 2023-02-03 DIAGNOSIS — M32.9 SYSTEMIC LUPUS ERYTHEMATOSUS, UNSPECIFIED SLE TYPE, UNSPECIFIED ORGAN INVOLVEMENT STATUS (H): ICD-10-CM

## 2023-02-03 PROCEDURE — 92083 EXTENDED VISUAL FIELD XM: CPT | Performed by: STUDENT IN AN ORGANIZED HEALTH CARE EDUCATION/TRAINING PROGRAM

## 2023-02-03 PROCEDURE — 92012 INTRM OPH EXAM EST PATIENT: CPT | Performed by: STUDENT IN AN ORGANIZED HEALTH CARE EDUCATION/TRAINING PROGRAM

## 2023-02-03 PROCEDURE — 92134 CPTRZ OPH DX IMG PST SGM RTA: CPT | Performed by: STUDENT IN AN ORGANIZED HEALTH CARE EDUCATION/TRAINING PROGRAM

## 2023-02-03 ASSESSMENT — CUP TO DISC RATIO
OD_RATIO: 0.3
OS_RATIO: 0.3

## 2023-02-03 ASSESSMENT — SLIT LAMP EXAM - LIDS
COMMENTS: MEIBOMIAN GLAND DYSFUNCTION,
COMMENTS: MEIBOMIAN GLAND DYSFUNCTION,

## 2023-02-03 ASSESSMENT — TONOMETRY
IOP_METHOD: APPLANATION
OD_IOP_MMHG: 14
OS_IOP_MMHG: 15

## 2023-02-03 ASSESSMENT — VISUAL ACUITY
METHOD: SNELLEN - LINEAR
OS_CC: 20/25
OS_CC+: -2
CORRECTION_TYPE: GLASSES
OD_CC: 20/20

## 2023-02-03 ASSESSMENT — EXTERNAL EXAM - LEFT EYE: OS_EXAM: NORMAL

## 2023-02-03 ASSESSMENT — EXTERNAL EXAM - RIGHT EYE: OD_EXAM: NORMAL

## 2023-02-03 NOTE — PROGRESS NOTES
Current Eye Medications:  Plaquenil 200 mg daily      Subjective:  Follow up for Plaquenil 10-2 HVF and OCT macula (PLAQ). Vision is little more blurry in the distance for last year. Reading up close is doing well. No eye pain or discomfort in either eye.      Objective:  See Ophthalmology Exam.      Assessment:  Natasha Lee is a 47 year old female who presents with:   Encounter Diagnoses   Name Primary?     High risk medication use Was restarted on Plaquenil in 2019 (was on it for a few years prior to that).     Macular SD-OCT within normal limits both eyes.    Bennett visual field (HVF) 10-2 within normal limits both eyes.    No signs of Plaquenil retinal toxicity at present.          Systemic lupus erythematosus, unspecified SLE type, unspecified organ involvement status (H)        Plan:  Normal Plaquenil eye tests today.    Raman Rodriges MD  [phone]

## 2023-02-03 NOTE — LETTER
2/3/2023         RE: Natasha Lee  [address] [address]        Dear Colleague,    Thank you for referring your patient, Natasha Lee, to the [facility]. Please see a copy of my visit note below.     Current Eye Medications:  Plaquenil 200 mg daily      Subjective:  Follow up for Plaquenil 10-2 HVF and OCT macula (PLAQ). Vision is little more blurry in the distance for last year. Reading up close is doing well. No eye pain or discomfort in either eye.      Objective:  See Ophthalmology Exam.      Assessment:  Natasha Lee is a 47 year old female who presents with:   Encounter Diagnoses   Name Primary?     High risk medication use Was restarted on Plaquenil in 2019 (was on it for a few years prior to that).     Macular SD-OCT within normal limits both eyes.    Bennett visual field (HVF) 10-2 within normal limits both eyes.    No signs of Plaquenil retinal toxicity at present.          Systemic lupus erythematosus, unspecified SLE type, unspecified organ involvement status (H)        Plan:  Normal Plaquenil eye tests today.    Raman Rodriges MD  [phone]                Again, thank you for allowing me to participate in the care of your patient.        Sincerely,        Raman Rodriges MD

## 2023-02-18 ENCOUNTER — HEALTH MAINTENANCE LETTER (OUTPATIENT)
Age: 48
End: 2023-02-18

## 2023-02-20 ENCOUNTER — TELEPHONE (OUTPATIENT)
Dept: NEUROLOGY | Facility: CLINIC | Age: 48
End: 2023-02-20
Payer: COMMERCIAL

## 2023-02-20 NOTE — TELEPHONE ENCOUNTER
Left Voicemail (1st Attempt) for the patient to call back and schedule the following:    Appointment type: EMG  Provider: Dr. Winkler  Return date: next available  Specialty phone number: [phone]  Additional appointment(s) needed:   Additonal Notes:     Dr. Winkler is not available on 5/1/2023, appointment needs to be rescheduled. Can be done at any location with any Provider, requested by the patient.    Also sent a [facility] message.    Sheila ROBERTSON/Procedure    [facility]   Neurology, NeuroSurgery, NeuroPsychology and Pain Management Specialties  Medical/Surgical Adult Specialties

## 2023-02-22 NOTE — TELEPHONE ENCOUNTER
Sent appointment reschedule letter. No response from [email or web address] or [employer] message.    Sheila R/Procedure    [facility]   Neurology, NeuroSurgery, NeuroPsychology and Pain Management Specialties  Medical/Surgical Adult Specialties

## 2023-02-23 ENCOUNTER — VIRTUAL VISIT (OUTPATIENT)
Dept: DERMATOLOGY | Facility: CLINIC | Age: 48
End: 2023-02-23
Payer: COMMERCIAL

## 2023-02-23 DIAGNOSIS — L93.0 LUPUS ERYTHEMATOSUS TUMIDUS: ICD-10-CM

## 2023-02-23 DIAGNOSIS — M32.9 SYSTEMIC LUPUS ERYTHEMATOSUS, UNSPECIFIED SLE TYPE, UNSPECIFIED ORGAN INVOLVEMENT STATUS (H): ICD-10-CM

## 2023-02-23 DIAGNOSIS — D84.9 IMMUNOSUPPRESSION (H): Primary | ICD-10-CM

## 2023-02-23 PROCEDURE — 99214 OFFICE O/P EST MOD 30 MIN: CPT | Mod: TEL | Performed by: DERMATOLOGY

## 2023-02-23 RX ORDER — HYDROXYCHLOROQUINE SULFATE 200 MG/1
200 TABLET, FILM COATED ORAL DAILY
Qty: 90 TABLET | Refills: 3 | Status: SHIPPED | OUTPATIENT
Start: 2023-02-23 | End: 2023-02-24

## 2023-02-23 RX ORDER — MYCOPHENOLATE MOFETIL 500 MG/1
1000 TABLET ORAL 2 TIMES DAILY
Qty: 360 TABLET | Refills: 3 | Status: SHIPPED | OUTPATIENT
Start: 2023-02-23 | End: 2023-02-24

## 2023-02-23 NOTE — LETTER
2/23/2023       RE: Natasha Lee  [address] [facility] [id number]     Dear Colleague,    Thank you for referring your patient, Natasha Lee, to the [facility] [place] at [facility]. Please see a copy of my visit note below.    [facility] Dermatology Note  Encounter Date: Feb 23, 2023  Store-and-Forward and Telephone ([phone]). Location of teledermatologist: [facility] [place].  Start time: 3:22. End time: 3:29.    Dermatology Problem List:  1. SLE on hydroxychloroquine and intermittent prednisone, chronic hepatitis B  2. 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 Possible ocular rosacea.   - Biopsy 1/6/2020 consistent with cutaneous lupus erythematosus  - Current t/x: hydroxychloroquine (managed by rheum), Tacrolimus daily on face, augmented betamethasone 0.05% cream PRN, mycophenolate 1000 mg BID (started 2/2021 pending GI starting hep B treatment)  - Previous tx: triamcinolone 0.1% cream (helped some but not enough on face), tacrolimus 0.1% ointment (good at baseline but not helpful for flares), metronidazole 0.75% cream, oral ivermectin 15 mg monthly x3 months,  permethrin 5% cream, gentle skin care, doxycycline 100 mg BID (x3 months - improved substantially in first month then went back to baseline)  3. Maculopapular rash following URI 6/6/20 - triamcinolone 0.1% cream, bland emollients, OTC antihistamines       ____________________________________________    Assessment & Plan:     1. Systemic and cutaneous lupus: overall stable with some increased activity and needing refills on mycophenolate; was off for a period of time but this had been controlling symptoms. Topicals also helpful, though a bit less so. Overall breakouts  are less frequent but still occurring.  - restart mycophenolate 1000 mg BID  - check labs - CBC, CMP q3 months  - hydroxychloroquine 200 mg daily  - topicals    Procedures Performed:    None    Follow-up: 6-12 months    Staff:     Corey Terry MD, FAAD   of Dermatology  Department of Dermatology  [facility] [employer]    ____________________________________________    CC: Follow Up    HPI:  Ms. Natasha Lee is a(n) 47 year old female who presents today as a return patient for lupus    Tumid lupus - mycophenolate seemed to work well  - breaks out more on left than right side  - topicals control most of breakouts  - less frequent breakouts and not lasting as long    Patient is otherwise feeling well, without additional skin concerns.    Labs Reviewed:  4/2022 CBC, CMP unremarkable    Physical Exam:  Vitals: There were no vitals taken for this visit.  SKIN: Teledermatology photos were reviewed; image quality and interpretability: acceptable. Image date: 2/22/23.  - erythematous/hyperpigmented papules and confluent plaques on the face and arms  - No other lesions of concern on areas examined.     Medications:  Current Outpatient Medications   Medication     albuterol (PROAIR HFA/PROVENTIL HFA/VENTOLIN HFA) 108 (90 Base) MCG/ACT inhaler     augmented betamethasone dipropionate (DIPROLENE-AF) 0.05 % external cream     Cranberry 1000 MG CAPS     DiphenhydrAMINE HCl (BENADRYL PO)     diphenhydrAMINE HCl (Sleep) 50 MG CAPS     hydroxychloroquine (PLAQUENIL) 200 MG tablet     methylPREDNISolone (MEDROL DOSEPAK) 4 MG tablet therapy pack     methylPREDNISolone (MEDROL DOSEPAK) 4 MG tablet therapy pack     mycophenolate (GENERIC EQUIVALENT) 500 MG tablet     Spacer/Aero-Holding Chambers (SPACER/AERO-HOLD CHAMBER MASK) REMY     tacrolimus (PROTOPIC) 0.1 % external ointment     Vitamin D, Cholecalciferol, 25 MCG (1000 UT) CAPS     doxycycline hyclate (VIBRA-TABS) 100 MG tablet      norethindrone-ethinyl estradiol (ORTHO-NOVUM) 1-35 MG-MCG tablet     Prenatal MV-Min-Fe Fum-FA-DHA (PRENATAL MULTIVITAMIN + DHA PO)     tenofovir (VIREAD) 300 MG tablet     No current facility-administered medications for this visit.      Past Medical/Surgical History:   Patient Active Problem List   Diagnosis     Chronic hepatitis B (H)     Lupus (systemic lupus erythematosus) (H)     CARDIOVASCULAR SCREENING; LDL GOAL LESS THAN 160     Health Care Home     Dysmenorrhea     Menorrhagia     Allergic rhinitis     Other nonspecific finding on examination of urine     Urinary frequency     Vitamin B12 deficiency (non anemic)     Heart murmur     S/P laparoscopic cholecystectomy     Cervical cancer screening     Cough due to bronchospasm     Past Medical History:   Diagnosis Date     Diabetes (H)     Pre-Diabetic     Heart murmur 1997     Lupus (systemic lupus erythematosus) (H) 1988    Dr. Prather     Lupus (systemic lupus erythematosus) (H) 12/10/2009     Normal delivery 9/5/09    boy forceps     Seizures (H)     When a child       CC Referred Self, MD  No address on file on close of this encounter.

## 2023-02-23 NOTE — PROGRESS NOTES
[facility] Dermatology Note  Encounter Date: Feb 23, 2023  Store-and-Forward and Telephone ([phone]). Location of teledermatologist: [facility] [place].  Start time: 3:22. End time: 3:29.    Dermatology Problem List:  1. SLE on hydroxychloroquine and intermittent prednisone, chronic hepatitis B  2. 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 Possible ocular rosacea.   - Biopsy 1/6/2020 consistent with cutaneous lupus erythematosus  - Current t/x: hydroxychloroquine (managed by rheum), Tacrolimus daily on face, augmented betamethasone 0.05% cream PRN, mycophenolate 1000 mg BID (started 2/2021 pending GI starting hep B treatment)  - Previous tx: triamcinolone 0.1% cream (helped some but not enough on face), tacrolimus 0.1% ointment (good at baseline but not helpful for flares), metronidazole 0.75% cream, oral ivermectin 15 mg monthly x3 months,  permethrin 5% cream, gentle skin care, doxycycline 100 mg BID (x3 months - improved substantially in first month then went back to baseline)  3. Maculopapular rash following URI 6/6/20 - triamcinolone 0.1% cream, bland emollients, OTC antihistamines       ____________________________________________    Assessment & Plan:     1. Systemic and cutaneous lupus: overall stable with some increased activity and needing refills on mycophenolate; was off for a period of time but this had been controlling symptoms. Topicals also helpful, though a bit less so. Overall breakouts are less frequent but still occurring.  - restart mycophenolate 1000 mg BID  - check labs - CBC, CMP q3 months  - hydroxychloroquine 200 mg daily  - topicals    Procedures Performed:    None    Follow-up: 6-12 months    Staff:     Corey Terry MD, FAAD   of Dermatology  Department of Dermatology  [facility]  [place] [employer] [facility]    ____________________________________________    CC: Follow Up    HPI:  Ms. Natasha Lee is a(n) 47 year old female who presents today as a return patient for lupus    Tumid lupus - mycophenolate seemed to work well  - breaks out more on left than right side  - topicals control most of breakouts  - less frequent breakouts and not lasting as long    Patient is otherwise feeling well, without additional skin concerns.    Labs Reviewed:  4/2022 CBC, CMP unremarkable    Physical Exam:  Vitals: There were no vitals taken for this visit.  SKIN: Teledermatology photos were reviewed; image quality and interpretability: acceptable. Image date: 2/22/23.  - erythematous/hyperpigmented papules and confluent plaques on the face and arms  - No other lesions of concern on areas examined.     Medications:  Current Outpatient Medications   Medication     albuterol (PROAIR HFA/PROVENTIL HFA/VENTOLIN HFA) 108 (90 Base) MCG/ACT inhaler     augmented betamethasone dipropionate (DIPROLENE-AF) 0.05 % external cream     Cranberry 1000 MG CAPS     DiphenhydrAMINE HCl (BENADRYL PO)     diphenhydrAMINE HCl (Sleep) 50 MG CAPS     hydroxychloroquine (PLAQUENIL) 200 MG tablet     methylPREDNISolone (MEDROL DOSEPAK) 4 MG tablet therapy pack     methylPREDNISolone (MEDROL DOSEPAK) 4 MG tablet therapy pack     mycophenolate (GENERIC EQUIVALENT) 500 MG tablet     Spacer/Aero-Holding Chambers (SPACER/AERO-HOLD CHAMBER MASK) REMY     tacrolimus (PROTOPIC) 0.1 % external ointment     Vitamin D, Cholecalciferol, 25 MCG (1000 UT) CAPS     doxycycline hyclate (VIBRA-TABS) 100 MG tablet     norethindrone-ethinyl estradiol (ORTHO-NOVUM) 1-35 MG-MCG tablet     Prenatal MV-Min-Fe Fum-FA-DHA (PRENATAL MULTIVITAMIN + DHA PO)     tenofovir (VIREAD) 300 MG tablet     No current facility-administered medications for this visit.      Past Medical/Surgical History:   Patient Active Problem List   Diagnosis     Chronic hepatitis  B (H)     Lupus (systemic lupus erythematosus) (H)     CARDIOVASCULAR SCREENING; LDL GOAL LESS THAN 160     Health Care Home     Dysmenorrhea     Menorrhagia     Allergic rhinitis     Other nonspecific finding on examination of urine     Urinary frequency     Vitamin B12 deficiency (non anemic)     Heart murmur     S/P laparoscopic cholecystectomy     Cervical cancer screening     Cough due to bronchospasm     Past Medical History:   Diagnosis Date     Diabetes (H)     Pre-Diabetic     Heart murmur 1997     Lupus (systemic lupus erythematosus) (H) 1988    Dr. Prather     Lupus (systemic lupus erythematosus) (H) 12/10/2009     Normal delivery 9/5/09    boy forceps     Seizures (H)     When a child       CC Referred Self, MD  No address on file on close of this encounter.

## 2023-02-23 NOTE — NURSING NOTE
Chief Complaint   Patient presents with     Follow Up     Teledermatology Nurse Call Patients:     Are you in the [facility] at the time of the encounter? yes    Today's visit will be billed to you and your insurance.    A teledermatology visit is not as thorough as an in-person visit and the quality of the photograph sent may not be of the same quality as that taken by the dermatology clinic.      Sandra Dow, CMA

## 2023-02-24 ENCOUNTER — MYC MEDICAL ADVICE (OUTPATIENT)
Dept: DERMATOLOGY | Facility: CLINIC | Age: 48
End: 2023-02-24
Payer: COMMERCIAL

## 2023-02-24 DIAGNOSIS — M32.9 SYSTEMIC LUPUS ERYTHEMATOSUS, UNSPECIFIED SLE TYPE, UNSPECIFIED ORGAN INVOLVEMENT STATUS (H): ICD-10-CM

## 2023-02-24 DIAGNOSIS — L93.0 LUPUS ERYTHEMATOSUS TUMIDUS: ICD-10-CM

## 2023-02-24 RX ORDER — MYCOPHENOLATE MOFETIL 500 MG/1
1000 TABLET ORAL 2 TIMES DAILY
Qty: 360 TABLET | Refills: 3 | Status: SHIPPED | OUTPATIENT
Start: 2023-02-24 | End: 2024-04-09

## 2023-02-24 RX ORDER — HYDROXYCHLOROQUINE SULFATE 200 MG/1
200 TABLET, FILM COATED ORAL DAILY
Qty: 90 TABLET | Refills: 3 | Status: SHIPPED | OUTPATIENT
Start: 2023-02-24 | End: 2024-04-09

## 2023-02-24 NOTE — TELEPHONE ENCOUNTER
Resent medications to patient's pharmacy, let patient know of this and told her to let us know if she continues to have issues with this.    Yeimy ROBERTSON RN

## 2023-02-24 NOTE — TELEPHONE ENCOUNTER
3rd and final attempt to reach the patient and reschedule the EMG with Dr. Winkler on 5/1/2023. No further attempts will be made.    Patient has read the [employer] message and has not responded.    Sheila ROBERTSON/Procedure    [facility]   Neurology, NeuroSurgery, NeuroPsychology and Pain Management Specialties  Medical/Surgical Adult Specialties

## 2023-04-14 ENCOUNTER — LAB (OUTPATIENT)
Dept: LAB | Facility: CLINIC | Age: 48
End: 2023-04-14
Payer: COMMERCIAL

## 2023-04-14 DIAGNOSIS — M32.9 SYSTEMIC LUPUS ERYTHEMATOSUS, UNSPECIFIED SLE TYPE, UNSPECIFIED ORGAN INVOLVEMENT STATUS (H): ICD-10-CM

## 2023-04-14 LAB
ALBUMIN MFR UR ELPH: 7 MG/DL (ref 1–14)
ALBUMIN SERPL-MCNC: 4.1 G/DL (ref 3.4–5)
ALBUMIN UR-MCNC: NEGATIVE MG/DL
ALP SERPL-CCNC: 84 U/L (ref 40–150)
ALT SERPL W P-5'-P-CCNC: 36 U/L (ref 0–50)
ANION GAP SERPL CALCULATED.3IONS-SCNC: <1 MMOL/L (ref 3–14)
APPEARANCE UR: CLEAR
AST SERPL W P-5'-P-CCNC: 23 U/L (ref 0–45)
BASOPHILS # BLD AUTO: 0 10E3/UL (ref 0–0.2)
BASOPHILS NFR BLD AUTO: 1 %
BILIRUB SERPL-MCNC: 0.4 MG/DL (ref 0.2–1.3)
BILIRUB UR QL STRIP: NEGATIVE
BUN SERPL-MCNC: 9 MG/DL (ref 7–30)
CALCIUM SERPL-MCNC: 8.8 MG/DL (ref 8.5–10.1)
CHLORIDE BLD-SCNC: 112 MMOL/L (ref 94–109)
CK SERPL-CCNC: 52 U/L (ref 30–225)
CO2 SERPL-SCNC: 28 MMOL/L (ref 20–32)
COLOR UR AUTO: NORMAL
CREAT SERPL-MCNC: 0.58 MG/DL (ref 0.52–1.04)
CREAT UR-MCNC: 65.9 MG/DL
CRP SERPL-MCNC: 4.5 MG/L (ref 0–8)
EOSINOPHIL # BLD AUTO: 0.1 10E3/UL (ref 0–0.7)
EOSINOPHIL NFR BLD AUTO: 2 %
ERYTHROCYTE [DISTWIDTH] IN BLOOD BY AUTOMATED COUNT: 12.4 % (ref 10–15)
ERYTHROCYTE [SEDIMENTATION RATE] IN BLOOD BY WESTERGREN METHOD: 10 MM/HR (ref 0–20)
GFR SERPL CREATININE-BSD FRML MDRD: >90 ML/MIN/1.73M2
GLUCOSE BLD-MCNC: 91 MG/DL (ref 70–99)
GLUCOSE UR STRIP-MCNC: NEGATIVE MG/DL
HCT VFR BLD AUTO: 40.2 % (ref 35–47)
HGB BLD-MCNC: 13.8 G/DL (ref 11.7–15.7)
HGB UR QL STRIP: NEGATIVE
IMM GRANULOCYTES # BLD: 0.1 10E3/UL
IMM GRANULOCYTES NFR BLD: 1 %
KETONES UR STRIP-MCNC: NEGATIVE MG/DL
LEUKOCYTE ESTERASE UR QL STRIP: NEGATIVE
LYMPHOCYTES # BLD AUTO: 1.1 10E3/UL (ref 0.8–5.3)
LYMPHOCYTES NFR BLD AUTO: 21 %
MCH RBC QN AUTO: 30.7 PG (ref 26.5–33)
MCHC RBC AUTO-ENTMCNC: 34.3 G/DL (ref 31.5–36.5)
MCV RBC AUTO: 90 FL (ref 78–100)
MONOCYTES # BLD AUTO: 0.6 10E3/UL (ref 0–1.3)
MONOCYTES NFR BLD AUTO: 11 %
NEUTROPHILS # BLD AUTO: 3.2 10E3/UL (ref 1.6–8.3)
NEUTROPHILS NFR BLD AUTO: 64 %
NITRATE UR QL: NEGATIVE
NRBC # BLD AUTO: 0 10E3/UL
NRBC BLD AUTO-RTO: 0 /100
PH UR STRIP: 6 [PH] (ref 5–7)
PLATELET # BLD AUTO: 209 10E3/UL (ref 150–450)
POTASSIUM BLD-SCNC: 3.3 MMOL/L (ref 3.4–5.3)
PROT SERPL-MCNC: 7.1 G/DL (ref 6.8–8.8)
PROT/CREAT 24H UR: 0.11 MG/MG CR (ref 0–0.2)
RBC # BLD AUTO: 4.49 10E6/UL (ref 3.8–5.2)
SKIP: NORMAL
SODIUM SERPL-SCNC: 140 MMOL/L (ref 133–144)
SP GR UR STRIP: 1.01 (ref 1–1.03)
UROBILINOGEN UR STRIP-MCNC: NORMAL MG/DL
WBC # BLD AUTO: 5 10E3/UL (ref 4–11)

## 2023-04-14 PROCEDURE — 36415 COLL VENOUS BLD VENIPUNCTURE: CPT

## 2023-04-14 PROCEDURE — 86225 DNA ANTIBODY NATIVE: CPT

## 2023-04-14 PROCEDURE — 85025 COMPLETE CBC W/AUTO DIFF WBC: CPT

## 2023-04-14 PROCEDURE — 86140 C-REACTIVE PROTEIN: CPT

## 2023-04-14 PROCEDURE — 82550 ASSAY OF CK (CPK): CPT

## 2023-04-14 PROCEDURE — 80053 COMPREHEN METABOLIC PANEL: CPT

## 2023-04-14 PROCEDURE — 85652 RBC SED RATE AUTOMATED: CPT

## 2023-04-14 PROCEDURE — 84156 ASSAY OF PROTEIN URINE: CPT

## 2023-04-14 PROCEDURE — 86160 COMPLEMENT ANTIGEN: CPT | Mod: 59

## 2023-04-14 PROCEDURE — 81003 URINALYSIS AUTO W/O SCOPE: CPT

## 2023-04-14 PROCEDURE — 86160 COMPLEMENT ANTIGEN: CPT

## 2023-04-17 LAB
C3 SERPL-MCNC: 69 MG/DL (ref 81–157)
C4 SERPL-MCNC: 20 MG/DL (ref 13–39)
DSDNA AB SER-ACNC: 30 IU/ML

## 2023-04-28 ENCOUNTER — VIRTUAL VISIT (OUTPATIENT)
Dept: RHEUMATOLOGY | Facility: CLINIC | Age: 48
End: 2023-04-28
Payer: COMMERCIAL

## 2023-04-28 DIAGNOSIS — M32.9 SYSTEMIC LUPUS ERYTHEMATOSUS, UNSPECIFIED SLE TYPE, UNSPECIFIED ORGAN INVOLVEMENT STATUS (H): Primary | ICD-10-CM

## 2023-04-28 DIAGNOSIS — I73.00 RAYNAUD'S DISEASE WITHOUT GANGRENE: ICD-10-CM

## 2023-04-28 PROCEDURE — 99214 OFFICE O/P EST MOD 30 MIN: CPT | Mod: VID | Performed by: INTERNAL MEDICINE

## 2023-04-28 NOTE — PROGRESS NOTES
Natasha Lee  is a 48 year old year old who is being evaluated via a billable video visit.      How would you like to obtain your AVS? MyChart  If the video visit is dropped, the invitation should be resent by: Text to cell phone: [phone]   Will anyone else be joining your video visit? No

## 2023-04-28 NOTE — PROGRESS NOTES
Natasha Lee  is a 48 year old year old who is being evaluated via a billable video visit.      How would you like to obtain your AVS? MyChart  If the video visit is dropped, the invitation should be resent by: Text to cell phone: [phone]   Will anyone else be joining your video visit? No     Rheumatology Video Visit      Natasha Lee MRN# [id number]   YOB: 1975 Age: 48 year old      Date of visit: 4/28/23   PCP: Sandy Garcia    Chief Complaint   Patient presents with:  Systemic Lupus Erythematous    Assessment and Plan     1.  Systemic lupus erythematosus (dsDNA positive, RNP+, hypocomplementemia C3 & C4, malar rash, oral sores, skin lesions, photosensitivity, Raynaud's phenomenon, fatigue, seizure history [grand mal seizure as an infant, absence seizures multiple times from age of [age], no seizure since age of 19 years old]): Reportedly diagnosed at the age of 13 years old.  Established with me on 3/8/2018.  Reportedly treated with hydroxychloroquine when she was initially diagnosed at the age of 13 when she developed oral sores, weight loss, malar rash, and joint aches.  Also reportedly treated with azathioprine; she previously stated that she has not been on azathioprine since at least 2003.  Had been doing well without DMARD therapy for several years but then in 2019 she started to have more erythematous lesions on her arms so hydroxychloroquine was restarted with improvement of these lesions.  Also establish care with dermatology and is now on mycophenolate 1000 mg twice daily at the direction of dermatology.  Since last seen she had 2 episodes,  by 1 month, no diffuse finger swelling that was worse with increased Raynaud's phenomenon symptoms after cold exposure in February 2023.  No joint pain or swelling now and Raynaud's is controlled.  Unclear if the episode she had was Raynaud's phenomenon related or due to inflammatory arthritis, where Raynaud's could be treated  with amlodipine but the inflammatory arthritis may need additional immunosuppression.  Reevaluate in October, when it is starting to get cold again here in [place], but I asked that she make an appointment sooner if she has a flare again so that she may be evaluated in clinic.   Chronic illness  - Continue hydroxychloroquine 200 mg daily (normal eye exam on 2/3/2023 with Dr. Rodriges)  - Mycophenolate 1000 mg twice daily per dermatology  - Labs in 6 months: CBC, CMP, ESR, CRP, C3, C4, dsDNA, UA, Uprotein:creatinine    2.  Raynaud's phenomenon: Cold avoidance.  May consider calcium channel blocker in the future if needed.  See #1    3.  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    4. Chronic hepatitis B: Positive since birth, per patient.  Hepatitis B core antibody and surface antigen positive in the past.  Seen by hepatology 1/8/2021 who recommended that HBV ppx be started prior to systemic immunosuppression.  Currently on tenofovir.     5.  Elevated blood pressure: Natasha to follow up with Primary Care provider regarding elevated blood pressure.  If hypertension treatment is needed then I advise she consider using amlodipine that would also help with Raynaud's.    Total minutes spent in evaluation with patient, documentation, , and review of pertinent studies and chart notes: 20    Ms. Lee verbalized agreement with and understanding of the rational for the diagnosis and treatment plan.  All questions were answered to best of my ability and the patient's satisfaction. Ms. Lee was advised to contact the clinic with any questions that may arise after the clinic visit.      Thank you for involving me in the care of the patient    Return to clinic: 6 months      HPI   Natasha Lee is a 48 year old female with a past medical  history significant for chronic hepatitis B, heart murmur, history of cutting (she did this while ago and was associated with depression; had therapy and this resolved many years ago; scarring on her left arm) and SLE who is seen for follow-up of lupus.     11/17/2020, she reports that she is doing well on hydroxychloroquine.  No joint pain.  No morning stiffness.  No rash.  Overall happy with how well she is doing.  Notes that she is not doing anything to monitor the hepatitis B and would like to see hepatology.      11/3/2021: Patient canceled appointment.    2/23/2022: No-show to scheduled appointment    4/22/2022: Reports that she was lost to follow-up due to the COVID-19 pandemic.  Has been doing well.  No new symptoms.  Raynaud's phenomenon is controlled with cold avoidance and she does well without any loss of tissue on the ends of fingers where the Raynaud's is most active.  No black or bloody stools.  Skin disease is controlled; no new skin lesions she reports but does have chronic changes from the skin lesions on her arms.  On mycophenolate from dermatology.    Today, 4/28/2023: Currently doing well.  No joint pain or swelling.  Mild facial erythema that she associates with increased stress.  Since last seen she reports that she had diffuse swelling of her fingers during cold exposure in February and this occurred for about 1 week, 2 episodes  by about 1 month.  She reports having gone to an orthopedic urgent care where there was concern for inflammatory arthritis and she was given a Medrol Dosepak with improvement of her symptoms.  No joint pain or swelling at this time.  No morning stiffness.  She does note that the diffuse finger swelling correlated with cold exposure and increased Raynaud's phenomenon symptoms.    Denies fevers, chills, nausea, vomiting, constipation, diarrhea. No abdominal pain. No chest pain/pressure, palpitations, or shortness of breath. No LE swelling. No neck pain. No  oral or nasal sores currently.  Malar rash hx.    No sicca symptoms. No eye pain or redness. No history of inflammatory eye disease.  No history of DVT, pulmonary embolism, or miscarriage; one healthy child.        Tobacco: None  EtOH: None  Drugs: None  Occupation: Drug and alcohol counselor at [employer]    ROS   12 point review of system was completed and negative except as noted in the HPI     Active Problem List     Patient Active Problem List   Diagnosis     Chronic hepatitis B (H)     Lupus (systemic lupus erythematosus) (H)     CARDIOVASCULAR SCREENING; LDL GOAL LESS THAN 160     Health Care Home     Dysmenorrhea     Menorrhagia     Allergic rhinitis     Other nonspecific finding on examination of urine     Urinary frequency     Vitamin B12 deficiency (non anemic)     Heart murmur     S/P laparoscopic cholecystectomy     Cervical cancer screening     Cough due to bronchospasm     Past Medical History     Past Medical History:   Diagnosis Date     Diabetes (H)     Pre-Diabetic     Heart murmur 1997     Lupus (systemic lupus erythematosus) (H) 1988    Dr. Prather     Lupus (systemic lupus erythematosus) (H) 12/10/2009     Normal delivery 9/5/09    boy forceps     Seizures (H)     When a child     Past Surgical History     Past Surgical History:   Procedure Laterality Date     ANESTH,SKIN SURGERY,KNEE  1993, 1990    orthoscopic     CHOLECYSTECTOMY  2016     CL AFF SURGICAL PATHOLOGY  2007     LAPAROSCOPIC CHOLECYSTECTOMY N/A 5/18/2016    Procedure: LAPAROSCOPIC CHOLECYSTECTOMY;  Surgeon: Radha Cline MD;  Location:  OR     LAPAROSCOPIC TUBAL LIGATION  12/15/2011    Procedure:LAPAROSCOPIC TUBAL LIGATION; Laparoscopic tubal ligation; Surgeon:AMY WYNN; Location:[facility] OR     OSTEOTOMY FOOT Left 2/13/2019    Procedure: Left Lapidus with  Azael Osteotomy;  Surgeon: Brett Chavez MD;  Location:  OR     [facility] APPENDECTOMY  4/2008     Allergy     Allergies   Allergen Reactions      Carbamazepine And Analogs      Fever, rash     Corn-Containing Products      Current Medication List     Current Outpatient Medications   Medication Sig     albuterol (PROAIR HFA/PROVENTIL HFA/VENTOLIN HFA) 108 (90 Base) MCG/ACT inhaler Inhale 2 puffs into the lungs every 6 hours     augmented betamethasone dipropionate (DIPROLENE-AF) 0.05 % external cream Apply topically daily     Cranberry 1000 MG CAPS Take 1 capsule by mouth 4 times daily     DiphenhydrAMINE HCl (BENADRYL PO)      diphenhydrAMINE HCl (Sleep) 50 MG CAPS Take 50 mg by mouth At Bedtime     hydroxychloroquine (PLAQUENIL) 200 MG tablet Take 1 tablet (200 mg) by mouth daily     methylPREDNISolone (MEDROL DOSEPAK) 4 MG tablet therapy pack Follow package instructions     methylPREDNISolone (MEDROL DOSEPAK) 4 MG tablet therapy pack Follow package instructions     mycophenolate (GENERIC EQUIVALENT) 500 MG tablet Take 2 tablets (1,000 mg) by mouth 2 times daily     Spacer/Aero-Holding Chambers (SPACER/AERO-HOLD CHAMBER MASK) REMY 1 Device as needed     tacrolimus (PROTOPIC) 0.1 % external ointment Apply topically 2 times daily     Vitamin D, Cholecalciferol, 25 MCG (1000 UT) CAPS Take 1,000 Units by mouth daily     doxycycline hyclate (VIBRA-TABS) 100 MG tablet Take 1 tablet (100 mg) by mouth 2 times daily (Patient not taking: Reported on 2/23/2023)     norethindrone-ethinyl estradiol (ORTHO-NOVUM) 1-35 MG-MCG tablet Take 1 tablet by mouth daily (Patient not taking: Reported on 2/23/2023)     Prenatal MV-Min-Fe Fum-FA-DHA (PRENATAL MULTIVITAMIN + DHA PO)  (Patient not taking: Reported on 2/23/2023)     tenofovir (VIREAD) 300 MG tablet Take 1 tablet (300 mg) by mouth daily (Patient not taking: Reported on 2/23/2023)     No current facility-administered medications for this visit.         Social History   See HPI    Family History     Family History   Adopted: Yes   Family history unknown: Yes       Physical Exam     Temp Readings from Last 3 Encounters:  "  09/09/19 96.2  F (35.7  C) (Temporal)   08/09/19 99.7  F (37.6  C) (Temporal)   02/13/19 97.2  F (36.2  C) (Temporal)     BP Readings from Last 5 Encounters:   04/22/22 (!) 151/74   10/22/20 131/77   11/26/19 132/72   09/09/19 100/70   08/22/19 130/80     Pulse Readings from Last 1 Encounters:   04/22/22 93     Resp Readings from Last 1 Encounters:   02/13/19 16     Estimated body mass index is 29.63 kg/m  as calculated from the following:    Height as of 10/22/20: 1.575 m (5' 2\").    Weight as of 4/22/22: 73.5 kg (162 lb).      GEN: NAD. Healthy appearing adult.   HEENT: MMM.  Anicteric, noninjected sclera. No obvious external lesions of the ear and nose. Hearing intact.  PULM: No increased work of breathing  MSK:  Hands and wrists without swelling.   SKIN: No rash or jaundice seen  PSYCH: Alert. Appropriate.        Labs / Imaging (select studies)     RNP/Sm/SSA/SSB  Recent Labs   Lab Test 03/07/18  1548   RNPIGG 1.8*   SMIGG 0.2   SSAIGG >8.0*   SSBIGG <0.2   SCLIGG <0.2     dsDNA  Recent Labs   Lab Test 04/14/23  1338 04/19/22  1649 02/16/22  1652 11/10/20  1500 11/21/19  1536 06/07/18  1347 03/07/18  1548   DNA 30.0* 17.0* 17.0* 20* 12* 15* 19*     C3/C4  Recent Labs   Lab Test 04/14/23  1338 04/19/22  1649 02/16/22  1652 11/10/20  1500 11/21/19  1536 06/07/18  1347 03/07/18  1548   W2ECCGC 69* 74* 68* 64* 56* 59* 60*   L5VGEPC 20 19 17 17 16 16 16     Antiphospholipid Antibodies  Recent Labs   Lab Test 03/07/18  1548   B2GPG 2.9   B2GPM 1.5   CARDG 5.9   CARDM 0.3   LUPINT Negative     CBC  Recent Labs   Lab Test 04/14/23  1338 04/19/22  1649 02/16/22  1652 02/26/21  1431 01/07/21  1409 11/10/20  1500 11/21/19  1536   WBC 5.0 5.1 4.9 4.7   < > 4.5 4.3   RBC 4.49 4.17 4.24 4.28   < > 4.41 4.08   HGB 13.8 13.1 13.1 13.2   < > 13.6 12.6   HCT 40.2 38.6 39.1 38.6   < > 40.1 36.8   MCV 90 93 92 90   < > 91 90   RDW 12.4 12.6 12.7 12.1   < > 11.9 12.4    208 210 198   < > 185 200   MCH 30.7 31.4 30.9 30.8   " < > 30.8 30.9   MCHC 34.3 33.9 33.5 34.2   < > 33.9 34.2   NEUTROPHIL 64 57 62 66.3  --  56.1 59.1   LYMPH 21 28 26 24.3  --  31.0 28.3   MONOCYTE 11 12 10 8.0  --  10.5 10.5   EOSINOPHIL 2 2 2 0.8  --  2.0 1.4   BASOPHIL 1 0 0 0.4  --  0.2 0.5   ANEU  --   --   --  3.1  --  2.5 2.5   ALYM  --   --   --  1.2  --  1.4 1.2   CALLIE  --   --   --  0.4  --  0.5 0.5   AEOS  --   --   --  0.0  --  0.1 0.1   ABAS  --   --   --  0.0  --  0.0 0.0   ANEUTAUTO 3.2 2.9 3.0  --   --   --   --    ALYMPAUTO 1.1 1.4 1.3  --   --   --   --    AMONOAUTO 0.6 0.6 0.5  --   --   --   --    AEOSAUTO 0.1 0.1 0.1  --   --   --   --    ABSBASO 0.0 0.0 0.0  --   --   --   --     < > = values in this interval not displayed.     CMP  Recent Labs   Lab Test 04/14/23  1338 04/19/22  1649 02/16/22  1652 02/26/21  1431 01/07/21  1409 11/10/20  1500 11/21/19  1536    142 140 140 139 139 141   POTASSIUM 3.3* 3.6 3.5 3.7 4.0 3.8 3.6   CHLORIDE 112* 108 110* 109 109 108 112*   CO2 28 26 26 29 25 27 27   ANIONGAP <1* 8 4 2* 5 4 2*   GLC 91 105* 120* 109* 115* 103* 130*   BUN 9 10 14 13 11 13 10   CR 0.58 0.72 0.67 0.71 0.64 0.60 0.64   GFRESTIMATED >90 >90 >90 >90 >90 >90 >90   GFRESTBLACK  --   --   --  >90 >90 >90 >90   JUAN A 8.8 9.0 8.2* 8.8 8.6 8.4* 8.8   BILITOTAL 0.4 0.3 0.3 0.2 0.3 0.3 0.2   ALBUMIN 4.1 4.0 3.9 3.9 3.8 3.8 3.7   PROTTOTAL 7.1 7.1 7.1 6.9 6.9 7.2 6.8   ALKPHOS 84 77 77 81 62 80 85   AST 23 16 20 25 23 30 26   ALT 36 35 34 39 38 46 44     HgA1c  Recent Labs   Lab Test 02/15/16  0827   A1C 5.3     Calcium/VitaminD  Recent Labs   Lab Test 04/14/23  1338 04/19/22  1649 02/16/22  1652 05/13/16  1438 02/15/16  0827   JUAN A 8.8 9.0 8.2*   < > 8.0*   VITDT  --   --   --   --  32    < > = values in this interval not displayed.     ESR/CRP  Recent Labs   Lab Test 04/14/23  1338 04/19/22  1649 02/16/22  1652 11/10/20  1500 11/21/19  1536   SED 10  --   --  13 19   CRP 4.5 4.3 <2.9 3.1 5.4     CK/Aldolase  Recent Labs   Lab Test  04/14/23  1338 11/10/20  1500 06/07/18  1347   CKT 52 69 87     TSH/T4  Recent Labs   Lab Test 02/15/16  0827   TSH 1.51     Hepatitis B  Recent Labs   Lab Test 01/07/21  1409 11/16/18  1249 03/07/18  1548   AUSAB  --  1.76  --    HBCAB  --  Reactive*  --    HEPBANG  --  Reactive*  --    HBQLOG <1.3  --  2.1*     Hepatitis C  Recent Labs   Lab Test 11/16/18  1249   HCVAB Nonreactive     HIV Screening  Recent Labs   Lab Test 11/16/18  1249   HIAGAB Nonreactive     UA  Recent Labs   Lab Test 04/14/23  1338 04/19/22  1649 03/02/22  1648 02/16/22  1652 11/10/20  1501 02/19/19  1300 06/07/18  1347 03/07/18  1548   COLOR Light Yellow Light Yellow Straw Yellow   < >  --    < > Yellow   APPEARANCE Clear Clear Clear Clear   < >  --    < > Clear   URINEGLC Negative Negative Negative Negative   < >  --    < > Negative   URINEBILI Negative Negative Negative Negative   < >  --    < > Negative   SG 1.009 1.016 1.005 1.026   < >  --    < > 1.025   URINEPH 6.0 6.0 6.5 5.5   < >  --    < > 5.5   PROTEIN Negative Negative Negative Negative   < >  --    < > Negative   UROBILINOGEN  --   --   --   --   --   --   --  0.2   NITRITE Negative Negative Negative Negative   < >  --    < > Negative   UBLD Negative Negative Negative Negative   < >  --    < > Negative   LEUKEST Negative Negative Negative Negative   < >  --    < > Negative   WBCU  --  0-5 0-5 0-5  --  0 - 5  --   --    RBCU  --  0-2 0-2 0-2  --  O - 2  --   --    SQUAMOUSEPI  --   --   --   --   --  Few  --   --    BACTERIA  --   --  Few* Few*  --  Few*  --   --     < > = values in this interval not displayed.     Urine Microscopic  Recent Labs   Lab Test 04/19/22  1649 03/02/22  1648 02/16/22  1652 02/19/19  1300   WBCU 0-5 0-5 0-5 0 - 5   RBCU 0-2 0-2 0-2 O - 2   SQUAMOUSEPI  --   --   --  Few   BACTERIA  --  Few* Few* Few*     Urine Protein  GHUTP and UTP= Urine protein (random), GHUTPG and UTPG = urine protein:creatinine ratio (random), UCRR = urine creatinine  (random)  Recent Labs   Lab Test 04/14/23  1338 04/19/22  1649 02/16/22  1652 11/10/20  1501   GHUTP 7.0  --   --   --    UTP  --  0.12 0.13 <0.05   GHUTPG 0.11  --   --   --    UTPG  --  0.14 0.08 Unable to calculate due to low value   UCRR 65.9 88 161 10     Immunization History     Immunization History   Administered Date(s) Administered     COVID-19 Vaccine (Bindu) 06/04/2021     Influenza (H1N1) 12/04/2009     Influenza (IIV3) PF 12/18/2000, 09/22/2009, 11/17/2011, 11/01/2012, 09/22/2014     Influenza Vaccine >6 months (Alfuria,Fluzone) 09/26/2013, 09/30/2015, 10/01/2015, 11/02/2018          Chart documentation done in part with Dragon Voice recognition Software. Although reviewed after completion, some word and grammatical error may remain.      Video-Visit Details    Type of service:  Video Visit    Video Start Time: 7:42 AM    Video End Time: 7:52 AM    Originating Location (pt. Location):  [place]    Distant Location (provider location):  Off site [place]    Platform used for Video Visit: Meghan Zaman MD

## 2023-04-28 NOTE — PATIENT INSTRUCTIONS
RHEUMATOLOGY    Dr. Christopher Zaman    [facility]  [address] [place] [id number]  Phone number: [phone]  Fax number: [phone]      Thank you for choosing [facility]!

## 2023-05-01 ENCOUNTER — E-VISIT (OUTPATIENT)
Dept: FAMILY MEDICINE | Facility: CLINIC | Age: 48
End: 2023-05-01

## 2023-05-01 DIAGNOSIS — J01.90 ACUTE SINUSITIS WITH SYMPTOMS > 10 DAYS: ICD-10-CM

## 2023-05-01 PROCEDURE — 99421 OL DIG E/M SVC 5-10 MIN: CPT | Performed by: NURSE PRACTITIONER

## 2023-05-02 NOTE — PATIENT INSTRUCTIONS
Dear Natasha Lee    After reviewing your responses, I've been able to diagnose you with Acute sinusitis with symptoms > 10 days.      Based on your responses and diagnosis, I have prescribed   Orders Placed This Encounter     amoxicillin-clavulanate (AUGMENTIN) 875-125 MG tablet    to treat your symptoms. I have sent this to your pharmacy.?     It is also important to stay well hydrated, get lots of rest and take over-the-counter decongestants,?tylenol?or ibuprofen if you?are able to?take those medications per your primary care provider to help relieve discomfort.?     It is important that you take?all of?your prescribed medication even if your symptoms are improving after a few doses.? Taking?all of?your medicine helps prevent the symptoms from returning.?     If your symptoms worsen, you develop severe headache, vomiting, high fever (>102), or are not improving in 7 days, please contact your primary care provider for an appointment or visit any of our convenient Walk-in Care or Urgent Care Centers to be seen which can be found on our website?here.?     Thanks again for choosing?us?as your health care partner,?   ?  Sandy Garcia, ELIAS CNP?

## 2023-07-10 ENCOUNTER — MYC MEDICAL ADVICE (OUTPATIENT)
Dept: DERMATOLOGY | Facility: CLINIC | Age: 48
End: 2023-07-10
Payer: COMMERCIAL

## 2023-07-10 NOTE — TELEPHONE ENCOUNTER
Assessment & Plan:      1. Systemic and cutaneous lupus: overall stable with some increased activity and needing refills on mycophenolate; was off for a period of time but this had been controlling symptoms. Topicals also helpful, though a bit less so. Overall breakouts are less frequent but still occurring.  - restart mycophenolate 1000 mg BID  - check labs - CBC, CMP q3 months  - hydroxychloroquine 200 mg daily  - topicals     Procedures Performed:    None     Follow-up: 6-12 months

## 2023-07-13 ENCOUNTER — OFFICE VISIT (OUTPATIENT)
Dept: OBGYN | Facility: CLINIC | Age: 48
End: 2023-07-13
Payer: COMMERCIAL

## 2023-07-13 VITALS — DIASTOLIC BLOOD PRESSURE: 75 MMHG | WEIGHT: 152.2 LBS | SYSTOLIC BLOOD PRESSURE: 113 MMHG | BODY MASS INDEX: 27.84 KG/M2

## 2023-07-13 DIAGNOSIS — N92.0 MENORRHAGIA WITH REGULAR CYCLE: ICD-10-CM

## 2023-07-13 DIAGNOSIS — N94.6 DYSMENORRHEA: ICD-10-CM

## 2023-07-13 DIAGNOSIS — Z86.39 PERSONAL HISTORY OF DIABETES MELLITUS: ICD-10-CM

## 2023-07-13 DIAGNOSIS — Z12.11 SPECIAL SCREENING FOR MALIGNANT NEOPLASMS, COLON: ICD-10-CM

## 2023-07-13 DIAGNOSIS — Z13.220 SCREENING CHOLESTEROL LEVEL: ICD-10-CM

## 2023-07-13 DIAGNOSIS — Z00.00 ENCOUNTER FOR ANNUAL PHYSICAL EXAM: Primary | ICD-10-CM

## 2023-07-13 DIAGNOSIS — Z12.4 CERVICAL CANCER SCREENING: ICD-10-CM

## 2023-07-13 DIAGNOSIS — M32.9 SYSTEMIC LUPUS ERYTHEMATOSUS, UNSPECIFIED SLE TYPE, UNSPECIFIED ORGAN INVOLVEMENT STATUS (H): ICD-10-CM

## 2023-07-13 DIAGNOSIS — Z12.31 VISIT FOR SCREENING MAMMOGRAM: ICD-10-CM

## 2023-07-13 LAB
ALBUMIN SERPL BCG-MCNC: 4.1 G/DL (ref 3.5–5.2)
ALP SERPL-CCNC: 66 U/L (ref 35–104)
ALT SERPL W P-5'-P-CCNC: 34 U/L (ref 0–50)
ANION GAP SERPL CALCULATED.3IONS-SCNC: 9 MMOL/L (ref 7–15)
AST SERPL W P-5'-P-CCNC: 30 U/L (ref 0–45)
BASOPHILS # BLD AUTO: 0 10E3/UL (ref 0–0.2)
BASOPHILS NFR BLD AUTO: 1 %
BILIRUB SERPL-MCNC: 0.5 MG/DL
BUN SERPL-MCNC: 10 MG/DL (ref 6–20)
CALCIUM SERPL-MCNC: 8.6 MG/DL (ref 8.6–10)
CHLORIDE SERPL-SCNC: 108 MMOL/L (ref 98–107)
CHOLEST SERPL-MCNC: 173 MG/DL
CREAT SERPL-MCNC: 0.63 MG/DL (ref 0.51–0.95)
DEPRECATED HCO3 PLAS-SCNC: 24 MMOL/L (ref 22–29)
EOSINOPHIL # BLD AUTO: 0.1 10E3/UL (ref 0–0.7)
EOSINOPHIL NFR BLD AUTO: 2 %
ERYTHROCYTE [DISTWIDTH] IN BLOOD BY AUTOMATED COUNT: 13 % (ref 10–15)
GFR SERPL CREATININE-BSD FRML MDRD: >90 ML/MIN/1.73M2
GLUCOSE SERPL-MCNC: 100 MG/DL (ref 70–99)
HCT VFR BLD AUTO: 40.3 % (ref 35–47)
HDLC SERPL-MCNC: 59 MG/DL
HGB BLD-MCNC: 13.5 G/DL (ref 11.7–15.7)
IMM GRANULOCYTES # BLD: 0 10E3/UL
IMM GRANULOCYTES NFR BLD: 0 %
LDLC SERPL CALC-MCNC: 99 MG/DL
LYMPHOCYTES # BLD AUTO: 0.7 10E3/UL (ref 0.8–5.3)
LYMPHOCYTES NFR BLD AUTO: 17 %
MCH RBC QN AUTO: 30.5 PG (ref 26.5–33)
MCHC RBC AUTO-ENTMCNC: 33.5 G/DL (ref 31.5–36.5)
MCV RBC AUTO: 91 FL (ref 78–100)
MONOCYTES # BLD AUTO: 0.5 10E3/UL (ref 0–1.3)
MONOCYTES NFR BLD AUTO: 11 %
NEUTROPHILS # BLD AUTO: 3.1 10E3/UL (ref 1.6–8.3)
NEUTROPHILS NFR BLD AUTO: 69 %
NONHDLC SERPL-MCNC: 114 MG/DL
NRBC # BLD AUTO: 0 10E3/UL
NRBC BLD AUTO-RTO: 0 /100
PLATELET # BLD AUTO: 197 10E3/UL (ref 150–450)
POTASSIUM SERPL-SCNC: 3.8 MMOL/L (ref 3.4–5.3)
PROT SERPL-MCNC: 6.7 G/DL (ref 6.4–8.3)
RBC # BLD AUTO: 4.42 10E6/UL (ref 3.8–5.2)
SODIUM SERPL-SCNC: 141 MMOL/L (ref 136–145)
TRIGL SERPL-MCNC: 75 MG/DL
WBC # BLD AUTO: 4.4 10E3/UL (ref 4–11)

## 2023-07-13 PROCEDURE — 80061 LIPID PANEL: CPT | Performed by: OBSTETRICS & GYNECOLOGY

## 2023-07-13 PROCEDURE — 86225 DNA ANTIBODY NATIVE: CPT | Performed by: OBSTETRICS & GYNECOLOGY

## 2023-07-13 PROCEDURE — G0145 SCR C/V CYTO,THINLAYER,RESCR: HCPCS | Performed by: OBSTETRICS & GYNECOLOGY

## 2023-07-13 PROCEDURE — 36415 COLL VENOUS BLD VENIPUNCTURE: CPT | Performed by: OBSTETRICS & GYNECOLOGY

## 2023-07-13 PROCEDURE — 80053 COMPREHEN METABOLIC PANEL: CPT | Performed by: OBSTETRICS & GYNECOLOGY

## 2023-07-13 PROCEDURE — 85025 COMPLETE CBC W/AUTO DIFF WBC: CPT | Performed by: OBSTETRICS & GYNECOLOGY

## 2023-07-13 PROCEDURE — 86160 COMPLEMENT ANTIGEN: CPT | Performed by: OBSTETRICS & GYNECOLOGY

## 2023-07-13 PROCEDURE — 99396 PREV VISIT EST AGE 40-64: CPT | Performed by: OBSTETRICS & GYNECOLOGY

## 2023-07-13 PROCEDURE — 87624 HPV HI-RISK TYP POOLED RSLT: CPT | Performed by: OBSTETRICS & GYNECOLOGY

## 2023-07-13 NOTE — PROGRESS NOTES
Natasha Lee is a 48 year old female , who presents for annual exam.   No unusual bleeding, no incontinence, or unusual discharge.   She has always had some dysmenorrhea, that has been more noticeable since she has not been on the pill.  She is interested in restarting the pill.   Last cholesterol: Recent Labs   Lab Test 02/15/16  0827   CHOL 143   HDL 47*   LDL 82   TRIG 71     Past Medical History:   Diagnosis Date     Diabetes (H)     Pre-Diabetic     Heart murmur      Lupus (systemic lupus erythematosus) (H)     Dr. Prather     Lupus (systemic lupus erythematosus) (H) 12/10/2009     Normal delivery 09    boy forceps     Seizures (H)     When a child       Past Surgical History:   Procedure Laterality Date     ANESTH,SKIN SURGERY,KNEE  ,     orthoscopic     CHOLECYSTECTOMY       CL AFF SURGICAL PATHOLOGY       LAPAROSCOPIC CHOLECYSTECTOMY N/A 2016    Procedure: LAPAROSCOPIC CHOLECYSTECTOMY;  Surgeon: Radha Cline MD;  [facility]     LAPAROSCOPIC TUBAL LIGATION  12/15/2011    Procedure:LAPAROSCOPIC TUBAL LIGATION; Laparoscopic tubal ligation; Surgeon:AMY WYNN; Location:[facility]     OSTEOTOMY FOOT Left 2019    Procedure: Left Lapidus with  Azael Osteotomy;  Surgeon: Brett Chavez MD;  [facility]     ZZC APPENDECTOMY  2008       OB History    Para Term  AB Living   1 1 0 0 0 1   SAB IAB Ectopic Multiple Live Births   0 0 0 0 1      # Outcome Date GA Lbr Julio Cesar/2nd Weight Sex Delivery Anes PTL Lv   1 Para 09    M    PHILOMENA       Gyn History:  Gynecological History         Patient's last menstrual period was 2023.     No STD /No PID/No IUD      history of abnormal pap smear:  No  Last pap:   Lab Results   Component Value Date    PAP NIL 2019           Current Outpatient Medications   Medication Sig Dispense Refill     albuterol (PROAIR HFA/PROVENTIL HFA/VENTOLIN HFA) 108 (90 Base) MCG/ACT inhaler  Inhale 2 puffs into the lungs every 6 hours 18 g 1     Cranberry 1000 MG CAPS Take 1 capsule by mouth 4 times daily       DiphenhydrAMINE HCl (BENADRYL PO)        diphenhydrAMINE HCl (Sleep) 50 MG CAPS Take 50 mg by mouth At Bedtime       hydroxychloroquine (PLAQUENIL) 200 MG tablet Take 1 tablet (200 mg) by mouth daily 90 tablet 3     methylPREDNISolone (MEDROL DOSEPAK) 4 MG tablet therapy pack Follow package instructions 21 tablet 0     methylPREDNISolone (MEDROL DOSEPAK) 4 MG tablet therapy pack Follow package instructions 21 tablet 0     mycophenolate (GENERIC EQUIVALENT) 500 MG tablet Take 2 tablets (1,000 mg) by mouth 2 times daily 360 tablet 3     Spacer/Aero-Holding Chambers (SPACER/AERO-HOLD CHAMBER MASK) REMY 1 Device as needed 1 each 0     tacrolimus (PROTOPIC) 0.1 % external ointment Apply topically 2 times daily 100 g 3     tenofovir (VIREAD) 300 MG tablet Take 1 tablet (300 mg) by mouth daily 30 tablet 11     Vitamin D, Cholecalciferol, 25 MCG (1000 UT) CAPS Take 1,000 Units by mouth daily 90 capsule 2     amoxicillin-clavulanate (AUGMENTIN) 875-125 MG tablet Take 1 tablet by mouth 2 times daily (Patient not taking: Reported on 7/13/2023) 20 tablet 0     augmented betamethasone dipropionate (DIPROLENE-AF) 0.05 % external cream Apply topically daily (Patient not taking: Reported on 7/13/2023) 50 g 3     norethindrone-ethinyl estradiol (ORTHO-NOVUM) 1-35 MG-MCG tablet Take 1 tablet by mouth daily (Patient not taking: Reported on 2/23/2023) 84 tablet 4     Prenatal MV-Min-Fe Fum-FA-DHA (PRENATAL MULTIVITAMIN + DHA PO)  (Patient not taking: Reported on 2/23/2023)         Allergies   Allergen Reactions     Carbamazepine And Analogs      Fever, rash     Corn-Containing Products        Social History     Socioeconomic History     Marital status:      Spouse name: Not on file     Number of children: 1     Years of education: Not on file     Highest education level: Not on file   Occupational History      Occupation: addiction therapy     Employer: [employer] [place]   Tobacco Use     Smoking status: Never     Smokeless tobacco: Never   Vaping Use     Vaping Use: Never used   Substance and Sexual Activity     Alcohol use: Yes     Comment: Occasional     Drug use: No     Sexual activity: Yes     Partners: Male     Birth control/protection: Pill     Comment: tubal ligation, uses OCPs for menstrual and mood regulation   Other Topics Concern     Parent/sibling w/ CABG, MI or angioplasty before 65F 55M? 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     Hobby Hazards No     Sleep Concern No     Stress Concern No     Weight Concern No     Special Diet No     Back Care Not Asked     Exercise Yes     Bike Helmet Yes     Seat Belt Yes     Self-Exams Yes   Social History Narrative     Not on file     Social Determinants of Health     Financial Resource Strain: Not on file   Food Insecurity: Not on file   Transportation Needs: Not on file   Physical Activity: Not on file   Stress: Not on file   Social Connections: Not on file   Intimate Partner Violence: Not on file   Housing Stability: Not on file       Family History   Adopted: Yes   Family history unknown: Yes         ROS:  All negative except as above.      EXAM:  /75   Wt 69 kg (152 lb 3.2 oz)   LMP 07/04/2023   BMI 27.84 kg/m    General:  WNWD female, NAD  Alert  Oriented x 3  Gait:  Normal  Skin:  Normal skin turgor  Neurologic:  CN grossly intact, good sensation.    HEENT:  NC/AT, EOMI  Neck:  No masses palpated, symmetrical, carotids +2/4, no bruits heard  Heart:  RRR  Lungs:  Clear   Breasts:  Symmetrical, no dimpling noted, no masses palpated, no discharge expressed  Abdomen:  Non-tender, non-distended.  Vulva: No external lesions, normal hair distribution, no adenopathy  BUS:  Normal, no masses noted  Urethra:  No hypermobility noted  Urethral meatus:  No  masses noted  Vagina: Moist, pink, no abnormal discharge, well rugated, no lesions  Cervix: Smooth, pink, no visible lesions  Uterus: Normal size, anteverted, non-tender, mobile  Ovaries: No mass, non-tender, mobile  Perianal:  No masses noted.    Extremities:  No clubbing, cyanosis, or edema      ASSESSMENT/PLAN   Annual examination with pap smear   History of diabetes:  Hgb A1C and Lipid profile are ordered.   Mammogram and Colonoscopy ordered.   Prescription for the ON 1/35 written, for dysmenorrhea and menstrual regulation   Low fat diet, weight bearing exercises and self breast exams on a monthly basis have been recommended.  I have discussed with patient the risks, benefits, medications, treatment options and modalities.   I have instructed the patient to call or schedule a follow-up appointment if any problems.

## 2023-07-13 NOTE — LETTER
July 26, 2023      merari Lee  [address] [address]        Dear ,    We are writing to inform you of your test results.    Your lupus markers are still a bit elevated, but stable from April.     Resulted Orders   Comprehensive metabolic panel   Result Value Ref Range    Sodium 141 136 - 145 mmol/L    Potassium 3.8 3.4 - 5.3 mmol/L    Chloride 108 (H) 98 - 107 mmol/L    Carbon Dioxide (CO2) 24 22 - 29 mmol/L    Anion Gap 9 7 - 15 mmol/L    Urea Nitrogen 10.0 6.0 - 20.0 mg/dL    Creatinine 0.63 0.51 - 0.95 mg/dL    Calcium 8.6 8.6 - 10.0 mg/dL    Glucose 100 (H) 70 - 99 mg/dL    Alkaline Phosphatase 66 35 - 104 U/L    AST 30 0 - 45 U/L      Comment:      Reference intervals for this test were updated on 6/12/2023 to more accurately reflect our healthy population. There may be differences in the flagging of prior results with similar values performed with this method. Interpretation of those prior results can be made in the context of the updated reference intervals.    ALT 34 0 - 50 U/L      Comment:      Reference intervals for this test were updated on 6/12/2023 to more accurately reflect our healthy population. There may be differences in the flagging of prior results with similar values performed with this method. Interpretation of those prior results can be made in the context of the updated reference intervals.      Protein Total 6.7 6.4 - 8.3 g/dL    Albumin 4.1 3.5 - 5.2 g/dL    Bilirubin Total 0.5 <=1.2 mg/dL    GFR Estimate >90 >60 mL/min/1.73m2   DNA double stranded antibodies   Result Value Ref Range    DNA (ds) Antibody 31.0 (H) <10.0 IU/mL      Comment:      Positive    Narrative    Negative:  Less than 10  Equivocal: 10-15  Positive:  Greater than 15   Complement C3   Result Value Ref Range    C3 Complement 71 (L) 81 - 157 mg/dL   Complement C4   Result Value Ref Range    C4 Complement 21 13 - 39 mg/dL   CBC with platelets and differential   Result  Value Ref Range    WBC Count 4.4 4.0 - 11.0 10e3/uL    RBC Count 4.42 3.80 - 5.20 10e6/uL    Hemoglobin 13.5 11.7 - 15.7 g/dL    Hematocrit 40.3 35.0 - 47.0 %    MCV 91 78 - 100 fL    MCH 30.5 26.5 - 33.0 pg    MCHC 33.5 31.5 - 36.5 g/dL    RDW 13.0 10.0 - 15.0 %    Platelet Count 197 150 - 450 10e3/uL    % Neutrophils 69 %    % Lymphocytes 17 %    % Monocytes 11 %    % Eosinophils 2 %    % Basophils 1 %    % Immature Granulocytes 0 %    NRBCs per 100 WBC 0 <1 /100    Absolute Neutrophils 3.1 1.6 - 8.3 10e3/uL    Absolute Lymphocytes 0.7 (L) 0.8 - 5.3 10e3/uL    Absolute Monocytes 0.5 0.0 - 1.3 10e3/uL    Absolute Eosinophils 0.1 0.0 - 0.7 10e3/uL    Absolute Basophils 0.0 0.0 - 0.2 10e3/uL    Absolute Immature Granulocytes 0.0 <=0.4 10e3/uL    Absolute NRBCs 0.0 10e3/uL       If you have any questions or concerns, please call the clinic at the number listed above.       Sincerely,      Corey Terry MD

## 2023-07-14 LAB
C3 SERPL-MCNC: 71 MG/DL (ref 81–157)
C4 SERPL-MCNC: 21 MG/DL (ref 13–39)

## 2023-07-14 NOTE — TELEPHONE ENCOUNTER
ROBERTA asking the patient to call the clinic back to schedule an appointment    Napoleon BEYER CMA

## 2023-07-16 LAB — DSDNA AB SER-ACNC: 31 IU/ML

## 2023-07-18 LAB
BKR LAB AP GYN ADEQUACY: NORMAL
BKR LAB AP GYN INTERPRETATION: NORMAL
BKR LAB AP HPV REFLEX: NORMAL
BKR LAB AP LMP: NORMAL
BKR LAB AP PREVIOUS ABNORMAL: NORMAL
PATH REPORT.COMMENTS IMP SPEC: NORMAL
PATH REPORT.COMMENTS IMP SPEC: NORMAL
PATH REPORT.RELEVANT HX SPEC: NORMAL

## 2023-07-20 LAB
HUMAN PAPILLOMA VIRUS 16 DNA: NEGATIVE
HUMAN PAPILLOMA VIRUS 18 DNA: NEGATIVE
HUMAN PAPILLOMA VIRUS FINAL DIAGNOSIS: NORMAL
HUMAN PAPILLOMA VIRUS OTHER HR: NEGATIVE

## 2023-08-16 DIAGNOSIS — L93.0 LUPUS ERYTHEMATOSUS TUMIDUS: ICD-10-CM

## 2023-08-21 RX ORDER — BETAMETHASONE DIPROPIONATE 0.5 MG/G
CREAM TOPICAL DAILY
Qty: 50 G | Refills: 5 | Status: SHIPPED | OUTPATIENT
Start: 2023-08-21

## 2023-08-21 NOTE — TELEPHONE ENCOUNTER
Last Clinic Visit: 2/23/2023 [facility] Dermat[facility]    Refilled qty to 12 months from last visit (process #1/Derm protocol).

## 2023-08-24 ENCOUNTER — TELEPHONE (OUTPATIENT)
Dept: DERMATOLOGY | Facility: CLINIC | Age: 48
End: 2023-08-24
Payer: COMMERCIAL

## 2023-08-24 NOTE — TELEPHONE ENCOUNTER
Left Voicemail (1st Attempt) for the patient to call back and schedule the following:    Appointment type: Return  Provider: Dr. Terry  Return date: 1st available  Specialty phone number: [phone]

## 2023-08-31 ENCOUNTER — OFFICE VISIT (OUTPATIENT)
Dept: DERMATOLOGY | Facility: CLINIC | Age: 48
End: 2023-08-31
Payer: COMMERCIAL

## 2023-08-31 DIAGNOSIS — L93.2 CUTANEOUS LUPUS ERYTHEMATOSUS: ICD-10-CM

## 2023-08-31 DIAGNOSIS — M32.9 SYSTEMIC LUPUS ERYTHEMATOSUS, UNSPECIFIED SLE TYPE, UNSPECIFIED ORGAN INVOLVEMENT STATUS (H): Primary | ICD-10-CM

## 2023-08-31 DIAGNOSIS — D84.9 IMMUNOSUPPRESSION (H): ICD-10-CM

## 2023-08-31 PROCEDURE — 99214 OFFICE O/P EST MOD 30 MIN: CPT | Performed by: DERMATOLOGY

## 2023-08-31 RX ORDER — PREDNISONE 10 MG/1
TABLET ORAL
Qty: 75 TABLET | Refills: 1 | Status: SHIPPED | OUTPATIENT
Start: 2023-08-31

## 2023-08-31 ASSESSMENT — PAIN SCALES - GENERAL: PAINLEVEL: NO PAIN (0)

## 2023-08-31 NOTE — PROGRESS NOTES
[facility] Dermatology Note    Encounter Date: Aug 31, 2023    Dermatology Problem List:  1. SLE on hydroxychloroquine and intermittent prednisone, chronic hepatitis B  2. 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 Possible ocular rosacea.   - Biopsy 1/6/2020 consistent with cutaneous lupus erythematosus  - Current t/x: hydroxychloroquine (managed by rheum), Tacrolimus daily on face, augmented betamethasone 0.05% cream PRN, mycophenolate 1000 mg BID (started 2/2021 pending GI starting hep B treatment)  - Previous tx: triamcinolone 0.1% cream (helped some but not enough on face), tacrolimus 0.1% ointment (good at baseline but not helpful for flares), metronidazole 0.75% cream, oral ivermectin 15 mg monthly x3 months,  permethrin 5% cream, gentle skin care, doxycycline 100 mg BID (x3 months - improved substantially in first month then went back to baseline)  3. Maculopapular rash following URI 6/6/20 - triamcinolone 0.1% cream, bland emollients, OTC antihistamines       ______________________________________    Impression/Plan:  1. Lupus: flaring on skin with significant involvement on face and upper chest. We discussed risks/benefits of adding prednisone with gradual taper. If refractory or recurrent, would consider uptitration of mycophenolate.   - prednisone 26-97-28-10-5 mg x7 days each  - mycophenolate 1000 mg BID  - hydroxychloroquine 200 mg daily  - topicals  - check labs - C3/4, dsDNA, UA, CRP      Follow-up in 6 weeks.       Staff Involved:  Staff Only    Corey Terry MD   of Dermatology  Department of Dermatology  [facility]      CC:   Chief Complaint   Patient presents with    Derm Problem     SLE flare: face, chest, arms, sides of legs x 2 months        History of Present Illness:  Ms. Natasha Otero Jesus  is a 48 year old female who presents as a return patient.    Lupus flare - on/off for a few months  - significantly worse in last month  - a bit more stress  - on face, also on chest for first time  - a bit on arms but less  - using topicals consistently without resolution  - mycophenolate 1000 mg BID  - hydroxychloroquine 200 mg daily  - topicals: augmented betamethasone cream    Labs:  7/2023 CBC, CMP unremarkable; C3/4 slightly low, dsDNA elevated    Physical exam:  Vitals: LMP 07/04/2023   GEN: This is a well developed, well-nourished female in no acute distress, in a pleasant mood.    SKIN: Colon phototype IV  - Focused examination of the face, arms, upper chest was performed.  - erythematous scaly plaques on the face and upper chest with scant scaling  - erythematous/hyperpigmented patches on the dorsal hands with scant scaling  - No other lesions of concern on areas examined.     Past Medical History:   Past Medical History:   Diagnosis Date    Diabetes (H)     Pre-Diabetic    Heart murmur 1997    Lupus (systemic lupus erythematosus) (H) 1988    Dr. Prather    Lupus (systemic lupus erythematosus) (H) 12/10/2009    Normal delivery 9/5/09    boy forceps    Seizures (H)     When a child     Past Surgical History:   Procedure Laterality Date    ANESTH,SKIN SURGERY,KNEE  1993, 1990    orthoscopic    CHOLECYSTECTOMY  2016    CL AFF SURGICAL PATHOLOGY  2007    LAPAROSCOPIC CHOLECYSTECTOMY N/A 5/18/2016    Procedure: LAPAROSCOPIC CHOLECYSTECTOMY;  Surgeon: Radha Cline MD;  Location:  OR    LAPAROSCOPIC TUBAL LIGATION  12/15/2011    Procedure:LAPAROSCOPIC TUBAL LIGATION; Laparoscopic tubal ligation; Surgeon:AMY WYNN; Location:[facility] OR    OSTEOTOMY FOOT Left 2/13/2019    Procedure: Left Lapidus with  Azael Osteotomy;  Surgeon: Brett Chavez MD;  Location:  OR    ZC APPENDECTOMY  4/2008       Social History:   reports that she has never smoked. She has never used smokeless  tobacco. She reports current alcohol use. She reports that she does not use drugs.    Family History:  Family History   Adopted: Yes   Family history unknown: Yes       Medications:  Current Outpatient Medications   Medication Sig Dispense Refill    albuterol (PROAIR HFA/PROVENTIL HFA/VENTOLIN HFA) 108 (90 Base) MCG/ACT inhaler Inhale 2 puffs into the lungs every 6 hours 18 g 1    augmented betamethasone dipropionate (DIPROLENE AF) 0.05 % external cream Apply topically daily 50 g 5    Cranberry 1000 MG CAPS Take 1 capsule by mouth 4 times daily      DiphenhydrAMINE HCl (BENADRYL PO)       diphenhydrAMINE HCl (Sleep) 50 MG CAPS Take 50 mg by mouth At Bedtime      hydroxychloroquine (PLAQUENIL) 200 MG tablet Take 1 tablet (200 mg) by mouth daily 90 tablet 3    mycophenolate (GENERIC EQUIVALENT) 500 MG tablet Take 2 tablets (1,000 mg) by mouth 2 times daily 360 tablet 3    norethindrone-ethinyl estradiol (ORTHO-NOVUM) 1-35 MG-MCG tablet Take 1 tablet by mouth daily 84 tablet 4    Spacer/Aero-Holding Chambers (SPACER/AERO-HOLD CHAMBER MASK) REMY 1 Device as needed 1 each 0    tacrolimus (PROTOPIC) 0.1 % external ointment Apply topically 2 times daily 100 g 3    tenofovir (VIREAD) 300 MG tablet Take 1 tablet (300 mg) by mouth daily 30 tablet 11    Vitamin D, Cholecalciferol, 25 MCG (1000 UT) CAPS Take 1,000 Units by mouth daily 90 capsule 2    amoxicillin-clavulanate (AUGMENTIN) 875-125 MG tablet Take 1 tablet by mouth 2 times daily (Patient not taking: Reported on 7/13/2023) 20 tablet 0    methylPREDNISolone (MEDROL DOSEPAK) 4 MG tablet therapy pack Follow package instructions (Patient not taking: Reported on 8/31/2023) 21 tablet 0    methylPREDNISolone (MEDROL DOSEPAK) 4 MG tablet therapy pack Follow package instructions (Patient not taking: Reported on 8/31/2023) 21 tablet 0    Prenatal MV-Min-Fe Fum-FA-DHA (PRENATAL MULTIVITAMIN + DHA PO)  (Patient not taking: Reported on 8/31/2023)       Allergies   Allergen  Reactions    Carbamazepine And Analogs      Fever, rash    Corn-Containing Products

## 2023-08-31 NOTE — LETTER
8/31/2023       RE: Natasha Lee  [address] [address]     Dear Colleague,    Thank you for referring your patient, Natasha Lee, to the [facility]ERMAT[facility] [place] at [facility]. Please see a copy of my visit note below.    [facility] Dermatology Note    Encounter Date: Aug 31, 2023    Dermatology Problem List:  1. SLE on hydroxychloroquine and intermittent prednisone, chronic hepatitis B  2. 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 Possible ocular rosacea.   - Biopsy 1/6/2020 consistent with cutaneous lupus erythematosus  - Current t/x: hydroxychloroquine (managed by rheum), Tacrolimus daily on face, augmented betamethasone 0.05% cream PRN, mycophenolate 1000 mg BID (started 2/2021 pending GI starting hep B treatment)  - Previous tx: triamcinolone 0.1% cream (helped some but not enough on face), tacrolimus 0.1% ointment (good at baseline but not helpful for flares), metronidazole 0.75% cream, oral ivermectin 15 mg monthly x3 months,  permethrin 5% cream, gentle skin care, doxycycline 100 mg BID (x3 months - improved substantially in first month then went back to baseline)  3. Maculopapular rash following URI 6/6/20 - triamcinolone 0.1% cream, bland emollients, OTC antihistamines       ______________________________________    Impression/Plan:  1. Lupus: flaring on skin with significant involvement on face and upper chest. We discussed risks/benefits of adding prednisone with gradual taper. If refractory or recurrent, would consider uptitration of mycophenolate.   - prednisone 28-39-93-10-5 mg x7 days each  - mycophenolate 1000 mg BID  - hydroxychloroquine 200 mg daily  - topicals  - check labs - C3/4, dsDNA, UA, CRP      Follow-up in 6 weeks.       Staff  Involved:  Staff Only    Corey Terry MD   of Dermatology  Department of Dermatology  [facility]      CC:   Chief Complaint   Patient presents with    Derm Problem     SLE flare: face, chest, arms, sides of legs x 2 months        History of Present Illness:  Ms. Natasha Lee is a 48 year old female who presents as a return patient.    Lupus flare - on/off for a few months  - significantly worse in last month  - a bit more stress  - on face, also on chest for first time  - a bit on arms but less  - using topicals consistently without resolution  - mycophenolate 1000 mg BID  - hydroxychloroquine 200 mg daily  - topicals: augmented betamethasone cream    Labs:  7/2023 CBC, CMP unremarkable; C3/4 slightly low, dsDNA elevated    Physical exam:  Vitals: LMP 07/04/2023   GEN: This is a well developed, well-nourished female in no acute distress, in a pleasant mood.    SKIN: Colon phototype IV  - Focused examination of the face, arms, upper chest was performed.  - erythematous scaly plaques on the face and upper chest with scant scaling  - erythematous/hyperpigmented patches on the dorsal hands with scant scaling  - No other lesions of concern on areas examined.     Past Medical History:   Past Medical History:   Diagnosis Date    Diabetes (H)     Pre-Diabetic    Heart murmur 1997    Lupus (systemic lupus erythematosus) (H) 1988    Dr. Prather    Lupus (systemic lupus erythematosus) (H) 12/10/2009    Normal delivery 9/5/09    boy forceps    Seizures (H)     When a child     Past Surgical History:   Procedure Laterality Date    ANESTH,SKIN SURGERY,KNEE  1993, 1990    orthoscopic    CHOLECYSTECTOMY  2016    CL AFF SURGICAL PATHOLOGY  2007    LAPAROSCOPIC CHOLECYSTECTOMY N/A 5/18/2016    Procedure: LAPAROSCOPIC CHOLECYSTECTOMY;  Surgeon: Radha Cline MD;  [facility]    LAPAROSCOPIC TUBAL LIGATION  12/15/2011    Procedure:LAPAROSCOPIC TUBAL  LIGATION; Laparoscopic tubal ligation; Surgeon:AMY WYNN; Location:[facility] OR    OSTEOTOMY FOOT Left 2/13/2019    Procedure: Left Lapidus with  Azael Osteotomy;  Surgeon: Brett Chavez MD;  Location:  OR    [facility] APPENDECTOMY  4/2008       Social History:   reports that she has never smoked. She has never used smokeless tobacco. She reports current alcohol use. She reports that she does not use drugs.    Family History:  Family History   Adopted: Yes   Family history unknown: Yes       Medications:  Current Outpatient Medications   Medication Sig Dispense Refill    albuterol (PROAIR HFA/PROVENTIL HFA/VENTOLIN HFA) 108 (90 Base) MCG/ACT inhaler Inhale 2 puffs into the lungs every 6 hours 18 g 1    augmented betamethasone dipropionate (DIPROLENE AF) 0.05 % external cream Apply topically daily 50 g 5    Cranberry 1000 MG CAPS Take 1 capsule by mouth 4 times daily      DiphenhydrAMINE HCl (BENADRYL PO)       diphenhydrAMINE HCl (Sleep) 50 MG CAPS Take 50 mg by mouth At Bedtime      hydroxychloroquine (PLAQUENIL) 200 MG tablet Take 1 tablet (200 mg) by mouth daily 90 tablet 3    mycophenolate (GENERIC EQUIVALENT) 500 MG tablet Take 2 tablets (1,000 mg) by mouth 2 times daily 360 tablet 3    norethindrone-ethinyl estradiol (ORTHO-NOVUM) 1-35 MG-MCG tablet Take 1 tablet by mouth daily 84 tablet 4    Spacer/Aero-Holding Chambers (SPACER/AERO-HOLD CHAMBER MASK) REMY 1 Device as needed 1 each 0    tacrolimus (PROTOPIC) 0.1 % external ointment Apply topically 2 times daily 100 g 3    tenofovir (VIREAD) 300 MG tablet Take 1 tablet (300 mg) by mouth daily 30 tablet 11    Vitamin D, Cholecalciferol, 25 MCG (1000 UT) CAPS Take 1,000 Units by mouth daily 90 capsule 2    amoxicillin-clavulanate (AUGMENTIN) 875-125 MG tablet Take 1 tablet by mouth 2 times daily (Patient not taking: Reported on 7/13/2023) 20 tablet 0    methylPREDNISolone (MEDROL DOSEPAK) 4 MG tablet therapy pack Follow package instructions (Patient  not taking: Reported on 8/31/2023) 21 tablet 0    methylPREDNISolone (MEDROL DOSEPAK) 4 MG tablet therapy pack Follow package instructions (Patient not taking: Reported on 8/31/2023) 21 tablet 0    Prenatal MV-Min-Fe Fum-FA-DHA (PRENATAL MULTIVITAMIN + DHA PO)  (Patient not taking: Reported on 8/31/2023)       Allergies   Allergen Reactions    Carbamazepine And Analogs      Fever, rash    Corn-Containing Products

## 2023-08-31 NOTE — NURSING NOTE
Dermatology Rooming Note    Natasha Lee's goals for this visit include:   Chief Complaint   Patient presents with    Derm Problem     SLE flare: face, chest, arms, sides of legs x 2 months      Julia Juan LPN

## 2023-09-01 ENCOUNTER — LAB (OUTPATIENT)
Dept: LAB | Facility: CLINIC | Age: 48
End: 2023-09-01
Payer: COMMERCIAL

## 2023-09-01 DIAGNOSIS — M32.9 SYSTEMIC LUPUS ERYTHEMATOSUS, UNSPECIFIED SLE TYPE, UNSPECIFIED ORGAN INVOLVEMENT STATUS (H): ICD-10-CM

## 2023-09-01 LAB
ALBUMIN UR-MCNC: NEGATIVE MG/DL
APPEARANCE UR: CLEAR
BILIRUB UR QL STRIP: NEGATIVE
COLOR UR AUTO: NORMAL
CRP SERPL-MCNC: <3 MG/L
GLUCOSE UR STRIP-MCNC: NEGATIVE MG/DL
HGB UR QL STRIP: NEGATIVE
KETONES UR STRIP-MCNC: NEGATIVE MG/DL
LEUKOCYTE ESTERASE UR QL STRIP: NEGATIVE
NITRATE UR QL: NEGATIVE
PH UR STRIP: 7 [PH] (ref 5–7)
RBC #/AREA URNS AUTO: NORMAL /HPF
SP GR UR STRIP: 1.02 (ref 1–1.03)
UROBILINOGEN UR STRIP-MCNC: NORMAL MG/DL
WBC #/AREA URNS AUTO: NORMAL /HPF

## 2023-09-01 PROCEDURE — 86160 COMPLEMENT ANTIGEN: CPT

## 2023-09-01 PROCEDURE — 86140 C-REACTIVE PROTEIN: CPT

## 2023-09-01 PROCEDURE — 86225 DNA ANTIBODY NATIVE: CPT

## 2023-09-01 PROCEDURE — 81001 URINALYSIS AUTO W/SCOPE: CPT

## 2023-09-01 PROCEDURE — 36415 COLL VENOUS BLD VENIPUNCTURE: CPT

## 2023-09-05 LAB
C3 SERPL-MCNC: 74 MG/DL (ref 81–157)
C4 SERPL-MCNC: 20 MG/DL (ref 13–39)

## 2023-09-07 LAB — DSDNA AB SER-ACNC: 35 IU/ML

## 2023-10-26 ENCOUNTER — TELEPHONE (OUTPATIENT)
Dept: RHEUMATOLOGY | Facility: CLINIC | Age: 48
End: 2023-10-26
Payer: COMMERCIAL

## 2023-10-26 NOTE — TELEPHONE ENCOUNTER
Health Call Center    Phone Message    May a detailed message be left on voicemail: yes     Reason for Call: Pt is scheduled to see Dr. Zaman this upcoming Monday (10/30/23), however, the only way she would be able to keep this appt is if Dr. Zaman is ok with her doing a video visit instead of coming in-person? Otherwise, pt can be scheduled in Dr. Zaman's next available appt (around the end of January 2024), but pt feels like Dr. Zaman would not want her to go that long.   Please advise if ok to change pt's 10/30/23 appt with Dr. Zaman to a video visit?    Action Taken: Message routed to:  Other: [facility] RN POOL    Travel Screening: Not Applicable

## 2023-10-27 NOTE — TELEPHONE ENCOUNTER
Called pt and left vm that her appt was changed to video and to e check in    PEEWEE Black RN 10/27/2023 1:31 PM

## 2023-10-27 NOTE — TELEPHONE ENCOUNTER
Per Dr. Zaman. He is ok with video visit. I called patient, but there was no answer. Left voicemail for patient to call back.     Nina PEREZ RN, Specialty Clinic 10/27/23 8:30 AM

## 2023-10-30 ENCOUNTER — VIRTUAL VISIT (OUTPATIENT)
Dept: RHEUMATOLOGY | Facility: CLINIC | Age: 48
End: 2023-10-30
Payer: COMMERCIAL

## 2023-10-30 DIAGNOSIS — M32.9 SYSTEMIC LUPUS ERYTHEMATOSUS, UNSPECIFIED SLE TYPE, UNSPECIFIED ORGAN INVOLVEMENT STATUS (H): Primary | ICD-10-CM

## 2023-10-30 DIAGNOSIS — I73.00 RAYNAUD'S DISEASE WITHOUT GANGRENE: ICD-10-CM

## 2023-10-30 PROCEDURE — 99214 OFFICE O/P EST MOD 30 MIN: CPT | Mod: VID | Performed by: INTERNAL MEDICINE

## 2023-10-30 RX ORDER — AMLODIPINE BESYLATE 2.5 MG/1
2.5 TABLET ORAL DAILY
Qty: 30 TABLET | Refills: 2 | Status: SHIPPED | OUTPATIENT
Start: 2023-10-30 | End: 2024-04-09

## 2023-10-30 NOTE — PATIENT INSTRUCTIONS
RHEUMATOLOGY    [facility]  [address] [place] [address]  [place] [address]    Phone number: [phone]  Fax number: [phone]    If you need a medication refill, please contact us as you may need lab work and/or a follow up visit prior to your refill.      Thank you for choosing [facility]!    Randi Rico CMA Rheumatology

## 2023-10-30 NOTE — PROGRESS NOTES
Natasha Lee  is a 48 year old year old who is being evaluated via a billable video visit.      How would you like to obtain your AVS? MyChart  If the video visit is dropped, the invitation should be resent by: Text to cell phone: [phone]   Will anyone else be joining your video visit? No     Rheumatology Video Visit      Natasha Lee MRN# [id number]   YOB: 1975 Age: 48 year old      Date of visit: 10/30/23   PCP: Sandy Garcia    Chief Complaint   Patient presents with:  Systemic lupus erythematosus    Assessment and Plan     1.  Systemic lupus erythematosus (dsDNA positive, RNP+, hypocomplementemia C3 & C4, malar rash, oral sores, skin lesions, photosensitivity, Raynaud's phenomenon, fatigue, seizure history [grand mal seizure as an infant, absence seizures multiple times from age of [age], no seizure since age of 19 years old]): Reportedly diagnosed at the age of 13 years old.  Established with me on 3/8/2018.  Reportedly treated with hydroxychloroquine when she was initially diagnosed at the age of 13 when she developed oral sores, weight loss, malar rash, and joint aches.  Also reportedly treated with azathioprine; she previously stated that she has not been on azathioprine since at least 2003.  Had been doing well without DMARD therapy for several years but then in 2019 she started to have more erythematous lesions on her arms so hydroxychloroquine was restarted with improvement of these lesions.  Also establish care with dermatology and is now on mycophenolate 1000 mg twice daily at the direction of dermatology.  Since last seen she had 2 episodes,  by 1 month, no diffuse finger swelling that was worse with increased Raynaud's phenomenon symptoms after cold exposure in February 2023.  No joint pain or swelling now.  See #2 regarding Raynaud's phenomenon.   Chronic illness  - Continue hydroxychloroquine 200 mg daily (normal eye exam on 2/3/2023 with Dr. Rodriges)  -  Mycophenolate 1000 mg twice daily per dermatology  - Labs 1 week prior to January 2024 rheumatology follow-up appointment: CBC, CMP, ESR, CRP, C3, C4, dsDNA, UA, Uprotein:creatinine    2.  Raynaud's phenomenon: More symptomatic now.  Reviewed the diagnosis and rationale for treatment.  Discussed amlodipine and she is in agreement with starting.  If symptoms are not well controlled with amlodipine 2.5 mg daily and blood pressure is okay then may consider increasing amlodipine to 5 mg daily.  Risks and side effects of amlodipine were reviewed in detail today.  - Start amlodipine 2.5 mg daily    3.  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    4. Chronic hepatitis B: Positive since birth, per patient.  Hepatitis B core antibody and surface antigen positive in the past.  Seen by hepatology 1/8/2021 who recommended that HBV ppx be started prior to systemic immunosuppression.  Currently on tenofovir.     5.  Elevated blood pressure: Natasha to follow up with Primary Care provider regarding elevated blood pressure.  If hypertension treatment is needed then I advise she consider using amlodipine that would also help with Raynaud's.    Total minutes spent in evaluation with patient, documentation, , and review of pertinent studies and chart notes: 20    Ms. Lee verbalized agreement with and understanding of the rational for the diagnosis and treatment plan.  All questions were answered to best of my ability and the patient's satisfaction. Ms. Lee was advised to contact the clinic with any questions that may arise after the clinic visit.      Thank you for involving me in the care of the patient    Return to clinic: January 2024      HPI   Natasha Lee is a 48 year old female with a past medical history significant for chronic hepatitis  B, heart murmur, history of cutting (she did this while ago and was associated with depression; had therapy and this resolved many years ago; scarring on her left arm) and SLE who is seen for follow-up of lupus.     11/17/2020, she reports that she is doing well on hydroxychloroquine.  No joint pain.  No morning stiffness.  No rash.  Overall happy with how well she is doing.  Notes that she is not doing anything to monitor the hepatitis B and would like to see hepatology.      11/3/2021: Patient canceled appointment.    2/23/2022: No-show to scheduled appointment    4/22/2022: Reports that she was lost to follow-up due to the COVID-19 pandemic.  Has been doing well.  No new symptoms.  Raynaud's phenomenon is controlled with cold avoidance and she does well without any loss of tissue on the ends of fingers where the Raynaud's is most active.  No black or bloody stools.  Skin disease is controlled; no new skin lesions she reports but does have chronic changes from the skin lesions on her arms.  On mycophenolate from dermatology.    4/28/2023: Currently doing well.  No joint pain or swelling.  Mild facial erythema that she associates with increased stress.  Since last seen she reports that she had diffuse swelling of her fingers during cold exposure in February and this occurred for about 1 week, 2 episodes  by about 1 month.  She reports having gone to an orthopedic urgent care where there was concern for inflammatory arthritis and she was given a Medrol Dosepak with improvement of her symptoms.  No joint pain or swelling at this time.  No morning stiffness.  She does note that the diffuse finger swelling correlated with cold exposure and increased Raynaud's phenomenon symptoms.    Today, 10/30/2023: Reports that she is doing well except for Raynaud's phenomenon.  Red discoloration of her fingers whenever there is cold exposure.  Tries to stay warm the best she can.  No joint pain.  Otherwise stable.  Reports  that she is taking hydroxychloroquine and mycophenolate.    Denies fevers, chills, nausea, vomiting, constipation, diarrhea. No abdominal pain. No chest pain/pressure, palpitations, or shortness of breath. No LE swelling. No neck pain. No oral or nasal sores currently.  Malar rash hx.    No sicca symptoms. No eye pain or redness. No history of inflammatory eye disease.  No history of DVT, pulmonary embolism, or miscarriage; one healthy child.        Tobacco: None  EtOH: None  Drugs: None  Occupation: Drug and alcohol counselor at [employer]    ROS   12 point review of system was completed and negative except as noted in the HPI     Active Problem List     Patient Active Problem List   Diagnosis    Chronic hepatitis B (H)    Lupus (systemic lupus erythematosus) (H)    CARDIOVASCULAR SCREENING; LDL GOAL LESS THAN 160    Health Care Home    Dysmenorrhea    Menorrhagia    Allergic rhinitis    Other nonspecific finding on examination of urine    Urinary frequency    Vitamin B12 deficiency (non anemic)    Heart murmur    S/P laparoscopic cholecystectomy    Cervical cancer screening    Cough due to bronchospasm     Past Medical History     Past Medical History:   Diagnosis Date    Diabetes (H)     Pre-Diabetic    Heart murmur 1997    Lupus (systemic lupus erythematosus) (H) 1988    Dr. Prather    Lupus (systemic lupus erythematosus) (H) 12/10/2009    Normal delivery 9/5/09    boy forceps    Seizures (H)     When a child     Past Surgical History     Past Surgical History:   Procedure Laterality Date    ANESTH,SKIN SURGERY,KNEE  1993, 1990    orthoscopic    CHOLECYSTECTOMY  2016    CL AFF SURGICAL PATHOLOGY  2007    LAPAROSCOPIC CHOLECYSTECTOMY N/A 5/18/2016    Procedure: LAPAROSCOPIC CHOLECYSTECTOMY;  Surgeon: Radha Cline MD;  Location: [facility] OR    LAPAROSCOPIC TUBAL LIGATION  12/15/2011    Procedure:LAPAROSCOPIC TUBAL LIGATION; Laparoscopic tubal ligation; Surgeon:AMY WYNN; Location: OR     OSTEOTOMY FOOT Left 2/13/2019    Procedure: Left Lapidus with  Azael Osteotomy;  Surgeon: Brett Chavez MD;  Location:  OR    [facility] APPENDECTOMY  4/2008     Allergy     Allergies   Allergen Reactions    Carbamazepine And Analogs      Fever, rash    Corn-Containing Products      Current Medication List     Current Outpatient Medications   Medication Sig    albuterol (PROAIR HFA/PROVENTIL HFA/VENTOLIN HFA) 108 (90 Base) MCG/ACT inhaler Inhale 2 puffs into the lungs every 6 hours    augmented betamethasone dipropionate (DIPROLENE AF) 0.05 % external cream Apply topically daily    Cranberry 1000 MG CAPS Take 1 capsule by mouth 4 times daily    DiphenhydrAMINE HCl (BENADRYL PO)     diphenhydrAMINE HCl (Sleep) 50 MG CAPS Take 50 mg by mouth At Bedtime    hydroxychloroquine (PLAQUENIL) 200 MG tablet Take 1 tablet (200 mg) by mouth daily    mycophenolate (GENERIC EQUIVALENT) 500 MG tablet Take 2 tablets (1,000 mg) by mouth 2 times daily    norethindrone-ethinyl estradiol (ORTHO-NOVUM) 1-35 MG-MCG tablet Take 1 tablet by mouth daily    tacrolimus (PROTOPIC) 0.1 % external ointment Apply topically 2 times daily    tenofovir (VIREAD) 300 MG tablet Take 1 tablet (300 mg) by mouth daily    Vitamin D, Cholecalciferol, 25 MCG (1000 UT) CAPS Take 1,000 Units by mouth daily    amoxicillin-clavulanate (AUGMENTIN) 875-125 MG tablet Take 1 tablet by mouth 2 times daily (Patient not taking: Reported on 7/13/2023)    methylPREDNISolone (MEDROL DOSEPAK) 4 MG tablet therapy pack Follow package instructions (Patient not taking: Reported on 8/31/2023)    methylPREDNISolone (MEDROL DOSEPAK) 4 MG tablet therapy pack Follow package instructions (Patient not taking: Reported on 8/31/2023)    predniSONE (DELTASONE) 10 MG tablet 40mg x7 days, 30mg x7 days, 20mg x7 days, 10mg x7 days, 5mg x7 days    Prenatal MV-Min-Fe Fum-FA-DHA (PRENATAL MULTIVITAMIN + DHA PO)  (Patient not taking: Reported on 8/31/2023)    Spacer/Aero-Holding Chambers  "(SPACER/AERO-HOLD CHAMBER MASK) REMY 1 Device as needed     No current facility-administered medications for this visit.         Social History   See HPI    Family History     Family History   Adopted: Yes   Family history unknown: Yes       Physical Exam     Temp Readings from Last 3 Encounters:   09/09/19 96.2  F (35.7  C) (Temporal)   08/09/19 99.7  F (37.6  C) (Temporal)   02/13/19 97.2  F (36.2  C) (Temporal)     BP Readings from Last 5 Encounters:   07/13/23 113/75   04/22/22 (!) 151/74   10/22/20 131/77   11/26/19 132/72   09/09/19 100/70     Pulse Readings from Last 1 Encounters:   04/22/22 93     Resp Readings from Last 1 Encounters:   02/13/19 16     Estimated body mass index is 27.84 kg/m  as calculated from the following:    Height as of 10/22/20: 1.575 m (5' 2\").    Weight as of 7/13/23: 69 kg (152 lb 3.2 oz).      GEN: NAD.   HEENT: No obvious external lesions of the ear and nose. Hearing intact.  PULM: No increased work of breathing  SKIN: No rash or jaundice seen  PSYCH: Alert. Appropriate.   Overall poor video quality where the image appeared somewhat blurry and the patient says that it is due to her camera    Labs / Imaging (select studies)     RNP/Sm/SSA/SSB  Recent Labs   Lab Test 03/07/18  1548   RNPIGG 1.8*   SMIGG 0.2   SSAIGG >8.0*   SSBIGG <0.2   SCLIGG <0.2     dsDNA  Recent Labs   Lab Test 09/01/23  1449 07/13/23  1019 04/14/23  1338 04/19/22  1649 02/16/22  1652 11/10/20  1500 11/21/19  1536 06/07/18  1347 03/07/18  1548   DNA 35.0* 31.0* 30.0* 17.0* 17.0* 20* 12* 15* 19*     C3/C4  Recent Labs   Lab Test 09/01/23  1449 07/13/23  1019 04/14/23  1338 04/19/22  1649 02/16/22  1652 11/10/20  1500 11/21/19  1536 06/07/18  1347 03/07/18  1548   [id number] 74* 71* 69* 74* 68* 64* 56* 59* 60*   L1DLCLF 20 21 20 19 17 17 16 16 16     Antiphospholipid Antibodies  Recent Labs   Lab Test 03/07/18  1548   B2GPG 2.9   B2GPM 1.5   CARDG 5.9   CARDM 0.3   LUPINT Negative     CBC  Recent Labs   Lab Test " 07/13/23  1019 04/14/23  1338 04/19/22  1649 02/16/22  1652 02/26/21  1431 01/07/21  1409 11/10/20  1500 11/21/19  1536   WBC 4.4 5.0 5.1   < > 4.7   < > 4.5 4.3   RBC 4.42 4.49 4.17   < > 4.28   < > 4.41 4.08   HGB 13.5 13.8 13.1   < > 13.2   < > 13.6 12.6   HCT 40.3 40.2 38.6   < > 38.6   < > 40.1 36.8   MCV 91 90 93   < > 90   < > 91 90   RDW 13.0 12.4 12.6   < > 12.1   < > 11.9 12.4    209 208   < > 198   < > 185 200   MCH 30.5 30.7 31.4   < > 30.8   < > 30.8 30.9   MCHC 33.5 34.3 33.9   < > 34.2   < > 33.9 34.2   NEUTROPHIL 69 64 57   < > 66.3  --  56.1 59.1   LYMPH 17 21 28   < > 24.3  --  31.0 28.3   MONOCYTE 11 11 12   < > 8.0  --  10.5 10.5   EOSINOPHIL 2 2 2   < > 0.8  --  2.0 1.4   BASOPHIL 1 1 0   < > 0.4  --  0.2 0.5   ANEU  --   --   --   --  3.1  --  2.5 2.5   ALYM  --   --   --   --  1.2  --  1.4 1.2   CALLIE  --   --   --   --  0.4  --  0.5 0.5   AEOS  --   --   --   --  0.0  --  0.1 0.1   ABAS  --   --   --   --  0.0  --  0.0 0.0   ANEUTAUTO 3.1 3.2 2.9   < >  --   --   --   --    ALYMPAUTO 0.7* 1.1 1.4   < >  --   --   --   --    AMONOAUTO 0.5 0.6 0.6   < >  --   --   --   --    AEOSAUTO 0.1 0.1 0.1   < >  --   --   --   --    ABSBASO 0.0 0.0 0.0   < >  --   --   --   --     < > = values in this interval not displayed.     CMP  Recent Labs   Lab Test 07/13/23  1019 04/14/23  1338 04/19/22  1649 02/16/22  1652 02/26/21  1431 01/07/21  1409 11/10/20  1500 11/21/19  1536    140 142 140 140 139 139 141   POTASSIUM 3.8 3.3* 3.6 3.5 3.7 4.0 3.8 3.6   CHLORIDE 108* 112* 108 110* 109 109 108 112*   CO2 24 28 26 26 29 25 27 27   ANIONGAP 9 <1* 8 4 2* 5 4 2*   * 91 105* 120* 109* 115* 103* 130*   BUN 10.0 9 10 14 13 11 13 10   CR 0.63 0.58 0.72 0.67 0.71 0.64 0.60 0.64   GFRESTIMATED >90 >90 >90 >90 >90 >90 >90 >90   GFRESTBLACK  --   --   --   --  >90 >90 >90 >90   JUAN A 8.6 8.8 9.0 8.2* 8.8 8.6 8.4* 8.8   BILITOTAL 0.5 0.4 0.3 0.3 0.2 0.3 0.3 0.2   ALBUMIN 4.1 4.1 4.0 3.9 3.9 3.8 3.8 3.7    PROTTOTAL 6.7 7.1 7.1 7.1 6.9 6.9 7.2 6.8   ALKPHOS 66 84 77 77 81 62 80 85   AST 30 23 16 20 25 23 30 26   ALT 34 36 35 34 39 38 46 44     HgA1c  Recent Labs   Lab Test 02/15/16  0827   A1C 5.3     Iron Studies  Recent Labs   Lab Test 02/15/16  0827   AVELINA 234*     Calcium/VitaminD  Recent Labs   Lab Test 07/13/23  1019 04/14/23  1338 04/19/22  1649 05/13/16  1438 02/15/16  0827   JUAN A 8.6 8.8 9.0   < > 8.0*   VITDT  --   --   --   --  32    < > = values in this interval not displayed.     ESR/CRP  Recent Labs   Lab Test 09/01/23  1449 04/14/23  1338 04/19/22  1649 02/16/22  1652 11/10/20  1500 11/21/19  1536   SED  --  10  --   --  13 19   CRP  --  4.5 4.3 <2.9 3.1 5.4   CRPI <3.00  --   --   --   --   --      CK/Aldolase  Recent Labs   Lab Test 04/14/23  1338 11/10/20  1500 06/07/18  1347   CKT 52 69 87     TSH/T4  Recent Labs   Lab Test 02/15/16  0827   TSH 1.51     Lipid Panel  Recent Labs   Lab Test 07/13/23  1019 02/15/16  0827   CHOL 173 143   TRIG 75 71   HDL 59 47*   LDL 99 82   NHDL 114 96     Hepatitis B  Recent Labs   Lab Test 01/07/21  1409 11/16/18  1249 03/07/18  1548   AUSAB  --  1.76  --    HBCAB  --  Reactive*  --    HEPBANG  --  Reactive*  --    HBQLOG <1.3  --  2.1*     Hepatitis C  Recent Labs   Lab Test 11/16/18  1249   HCVAB Nonreactive     HIV Screening  Recent Labs   Lab Test 11/16/18  1249   HIAGAB Nonreactive     UA  Recent Labs   Lab Test 09/01/23  1449 04/14/23  1338 04/19/22  1649 03/02/22  1648 02/16/22  1652 11/10/20  1501 02/19/19  1300 06/07/18  1347 03/07/18  1548   COLOR Light Yellow Light Yellow Light Yellow Straw Yellow   < >  --    < > Yellow   APPEARANCE Clear Clear Clear Clear Clear   < >  --    < > Clear   URINEGLC Negative Negative Negative Negative Negative   < >  --    < > Negative   URINEBILI Negative Negative Negative Negative Negative   < >  --    < > Negative   SG 1.017 1.009 1.016 1.005 1.026   < >  --    < > 1.025   URINEPH 7.0 6.0 6.0 6.5 5.5   < >  --    < > 5.5    PROTEIN Negative Negative Negative Negative Negative   < >  --    < > Negative   UROBILINOGEN  --   --   --   --   --   --   --   --  0.2   NITRITE Negative Negative Negative Negative Negative   < >  --    < > Negative   UBLD Negative Negative Negative Negative Negative   < >  --    < > Negative   LEUKEST Negative Negative Negative Negative Negative   < >  --    < > Negative   WBCU 0-5  --  0-5 0-5 0-5   < > 0 - 5  --   --    RBCU None Seen  --  0-2 0-2 0-2   < > O - 2  --   --    SQUAMOUSEPI  --   --   --   --   --   --  Few  --   --    BACTERIA  --   --   --  Few* Few*  --  Few*  --   --     < > = values in this interval not displayed.     Urine Microscopic  Recent Labs   Lab Test 09/01/23  1449 04/19/22  1649 03/02/22  1648 02/16/22  1652 02/16/22  1652 02/19/19  1300   WBCU 0-5 0-5 0-5   < > 0-5 0 - 5   RBCU None Seen 0-2 0-2   < > 0-2 O - 2   SQUAMOUSEPI  --   --   --   --   --  Few   BACTERIA  --   --  Few*  --  Few* Few*    < > = values in this interval not displayed.     Urine Protein  GHUTP and UTP= Urine protein (random), GHUTPG and UTPG = urine protein:creatinine ratio (random), UCRR = urine creatinine (random)  Recent Labs   Lab Test 04/14/23  1338 04/19/22  1649 02/16/22  1652 11/10/20  1501   GHUTP 7.0  --   --   --    UTP  --  0.12 0.13 <0.05   GHUTPG 0.11  --   --   --    UTPG  --  0.14 0.08 Unable to calculate due to low value   UCRR 65.9 88 161 10     Immunization History     Immunization History   Administered Date(s) Administered    COVID-19 Vaccine ([email or web address]) 06/04/2021    Influenza (H1N1) 12/04/2009    Influenza (IIV3) PF 12/18/2000, 09/22/2009, 11/17/2011, 11/01/2012, 09/22/2014    Influenza Vaccine >6 months (Alfuria,Fluzone) 09/26/2013, 09/30/2015, 10/01/2015, 11/02/2018          Chart documentation done in part with Dragon Voice recognition Software. Although reviewed after completion, some word and grammatical error may remain.    Video-Visit Details    Type of service:  Video  Visit    Video Start Time: 7:32 AM    Video End Time: 7:46 AM    Originating Location (pt. Location): [place], [place]    Distant Location (provider location[facility]  [facility] [email or web address]    Platform used for Video Visit: Meghan Zaman MD

## 2023-11-08 ENCOUNTER — TELEPHONE (OUTPATIENT)
Dept: DERMATOLOGY | Facility: CLINIC | Age: 48
End: 2023-11-08
Payer: COMMERCIAL

## 2023-11-28 ENCOUNTER — E-VISIT (OUTPATIENT)
Dept: FAMILY MEDICINE | Facility: CLINIC | Age: 48
End: 2023-11-28
Payer: COMMERCIAL

## 2023-11-28 ENCOUNTER — LAB (OUTPATIENT)
Dept: LAB | Facility: CLINIC | Age: 48
End: 2023-11-28
Payer: COMMERCIAL

## 2023-11-28 DIAGNOSIS — R39.9 UTI SYMPTOMS: Primary | ICD-10-CM

## 2023-11-28 DIAGNOSIS — R39.9 UTI SYMPTOMS: ICD-10-CM

## 2023-11-28 LAB
ALBUMIN UR-MCNC: 30 MG/DL
APPEARANCE UR: CLEAR
BACTERIA #/AREA URNS HPF: ABNORMAL /HPF
BILIRUB UR QL STRIP: NEGATIVE
COLOR UR AUTO: YELLOW
GLUCOSE UR STRIP-MCNC: NEGATIVE MG/DL
HGB UR QL STRIP: NEGATIVE
KETONES UR STRIP-MCNC: NEGATIVE MG/DL
LEUKOCYTE ESTERASE UR QL STRIP: NEGATIVE
MUCOUS THREADS #/AREA URNS LPF: PRESENT /LPF
NITRATE UR QL: NEGATIVE
PH UR STRIP: 5.5 [PH] (ref 5–7)
RBC #/AREA URNS AUTO: ABNORMAL /HPF
SP GR UR STRIP: 1.03 (ref 1–1.03)
SQUAMOUS #/AREA URNS AUTO: ABNORMAL /LPF
UROBILINOGEN UR STRIP-MCNC: NORMAL MG/DL
WBC #/AREA URNS AUTO: ABNORMAL /HPF

## 2023-11-28 PROCEDURE — 99421 OL DIG E/M SVC 5-10 MIN: CPT | Performed by: NURSE PRACTITIONER

## 2023-11-28 PROCEDURE — 81001 URINALYSIS AUTO W/SCOPE: CPT

## 2023-11-29 RX ORDER — CEPHALEXIN 500 MG/1
500 CAPSULE ORAL 2 TIMES DAILY
Qty: 14 CAPSULE | Refills: 0 | Status: SHIPPED | OUTPATIENT
Start: 2023-11-29 | End: 2023-12-06

## 2023-11-30 ENCOUNTER — LAB (OUTPATIENT)
Dept: LAB | Facility: CLINIC | Age: 48
End: 2023-11-30
Payer: COMMERCIAL

## 2023-11-30 DIAGNOSIS — R39.9 UTI SYMPTOMS: ICD-10-CM

## 2023-11-30 PROCEDURE — 87086 URINE CULTURE/COLONY COUNT: CPT

## 2023-11-30 PROCEDURE — 87088 URINE BACTERIA CULTURE: CPT

## 2023-12-01 LAB
BACTERIA UR CULT: ABNORMAL
BACTERIA UR CULT: ABNORMAL

## 2023-12-02 NOTE — RESULT ENCOUNTER NOTE
Ky Lee,    Attached are your test results.  -Urine culture is abnormal and grew out bacteria that are sensitive to the antibiotic you have been given.  Complete the medication as prescribed and if you experience new, worsening or persistent symptoms, you should call or return for a recheck.   Please contact us if you have any questions.    Sandy Garcia, CNP

## 2023-12-12 ENCOUNTER — LAB (OUTPATIENT)
Dept: LAB | Facility: CLINIC | Age: 48
End: 2023-12-12
Payer: COMMERCIAL

## 2023-12-12 ENCOUNTER — MYC MEDICAL ADVICE (OUTPATIENT)
Dept: FAMILY MEDICINE | Facility: CLINIC | Age: 48
End: 2023-12-12

## 2023-12-12 DIAGNOSIS — R39.9 UTI SYMPTOMS: ICD-10-CM

## 2023-12-12 DIAGNOSIS — R39.9 UTI SYMPTOMS: Primary | ICD-10-CM

## 2023-12-12 LAB
ALBUMIN UR-MCNC: NEGATIVE MG/DL
APPEARANCE UR: CLEAR
BACTERIA #/AREA URNS HPF: ABNORMAL /HPF
BILIRUB UR QL STRIP: NEGATIVE
COLOR UR AUTO: COLORLESS
GLUCOSE UR STRIP-MCNC: NEGATIVE MG/DL
HGB UR QL STRIP: NEGATIVE
KETONES UR STRIP-MCNC: NEGATIVE MG/DL
LEUKOCYTE ESTERASE UR QL STRIP: NEGATIVE
NITRATE UR QL: NEGATIVE
PH UR STRIP: 6.5 [PH] (ref 5–7)
RBC #/AREA URNS AUTO: ABNORMAL /HPF
SP GR UR STRIP: 1.01 (ref 1–1.03)
SQUAMOUS #/AREA URNS AUTO: ABNORMAL /LPF
UROBILINOGEN UR STRIP-MCNC: NORMAL MG/DL
WBC #/AREA URNS AUTO: ABNORMAL /HPF

## 2023-12-12 PROCEDURE — 81001 URINALYSIS AUTO W/SCOPE: CPT

## 2023-12-12 PROCEDURE — 87086 URINE CULTURE/COLONY COUNT: CPT

## 2023-12-13 NOTE — RESULT ENCOUNTER NOTE
Ky Lee,    Attached are your test results.  -Urine is normal.  Added a urine culture just in case will wait for that as no sign of infection now    Please contact us if you have any questions.    Sandy Garcia, CNP

## 2023-12-14 LAB — BACTERIA UR CULT: NORMAL

## 2023-12-14 NOTE — RESULT ENCOUNTER NOTE
Ky Lee,    Attached are your test results.  -Urine culture is normal urogenital bacteria   There is no need for antibiotics at this point.     Please contact us if you have any questions.    Sandy Garcia, CNP

## 2024-03-16 ENCOUNTER — HEALTH MAINTENANCE LETTER (OUTPATIENT)
Age: 49
End: 2024-03-16

## 2024-04-04 ENCOUNTER — DOCUMENTATION ONLY (OUTPATIENT)
Dept: RHEUMATOLOGY | Facility: CLINIC | Age: 49
End: 2024-04-04
Payer: COMMERCIAL

## 2024-04-04 NOTE — PROGRESS NOTES
Natasha Lee has an upcoming lab appointment:    Future Appointments   Date Time Provider Department Center   2024  2:30 PM [facility] [place]   [facility]   2024  3:50 PM Corey Terry MD [facility]     Patient is scheduled for the following lab(s): Patient is requesting labs. Please order if necessary, thank you.  Orders are .          Yoli Mcconnell

## 2024-04-05 ENCOUNTER — LAB (OUTPATIENT)
Dept: LAB | Facility: CLINIC | Age: 49
End: 2024-04-05
Payer: COMMERCIAL

## 2024-04-05 DIAGNOSIS — M32.9 SYSTEMIC LUPUS ERYTHEMATOSUS, UNSPECIFIED SLE TYPE, UNSPECIFIED ORGAN INVOLVEMENT STATUS (H): ICD-10-CM

## 2024-04-05 LAB
ALBUMIN MFR UR ELPH: 9.8 MG/DL
ALBUMIN SERPL BCG-MCNC: 4 G/DL (ref 3.5–5.2)
ALBUMIN UR-MCNC: NEGATIVE MG/DL
ALP SERPL-CCNC: 61 U/L (ref 40–150)
ALT SERPL W P-5'-P-CCNC: 32 U/L (ref 0–50)
ANION GAP SERPL CALCULATED.3IONS-SCNC: 7 MMOL/L (ref 7–15)
APPEARANCE UR: CLEAR
AST SERPL W P-5'-P-CCNC: 27 U/L (ref 0–45)
BACTERIA #/AREA URNS HPF: ABNORMAL /HPF
BASOPHILS # BLD AUTO: 0 10E3/UL (ref 0–0.2)
BASOPHILS NFR BLD AUTO: 0 %
BILIRUB SERPL-MCNC: 0.2 MG/DL
BILIRUB UR QL STRIP: NEGATIVE
BUN SERPL-MCNC: 12.1 MG/DL (ref 6–20)
CALCIUM SERPL-MCNC: 8.5 MG/DL (ref 8.6–10)
CHLORIDE SERPL-SCNC: 108 MMOL/L (ref 98–107)
COLOR UR AUTO: ABNORMAL
CREAT SERPL-MCNC: 0.57 MG/DL (ref 0.51–0.95)
CREAT UR-MCNC: 96.1 MG/DL
CRP SERPL-MCNC: 5.3 MG/L
DEPRECATED HCO3 PLAS-SCNC: 25 MMOL/L (ref 22–29)
EGFRCR SERPLBLD CKD-EPI 2021: >90 ML/MIN/1.73M2
EOSINOPHIL # BLD AUTO: 0.1 10E3/UL (ref 0–0.7)
EOSINOPHIL NFR BLD AUTO: 2 %
ERYTHROCYTE [DISTWIDTH] IN BLOOD BY AUTOMATED COUNT: 12.3 % (ref 10–15)
ERYTHROCYTE [SEDIMENTATION RATE] IN BLOOD BY WESTERGREN METHOD: 13 MM/HR (ref 0–20)
GLUCOSE SERPL-MCNC: 111 MG/DL (ref 70–99)
GLUCOSE UR STRIP-MCNC: NEGATIVE MG/DL
HCT VFR BLD AUTO: 36.7 % (ref 35–47)
HGB BLD-MCNC: 12.5 G/DL (ref 11.7–15.7)
HGB UR QL STRIP: ABNORMAL
IMM GRANULOCYTES # BLD: 0 10E3/UL
IMM GRANULOCYTES NFR BLD: 1 %
KETONES UR STRIP-MCNC: NEGATIVE MG/DL
LEUKOCYTE ESTERASE UR QL STRIP: NEGATIVE
LYMPHOCYTES # BLD AUTO: 1.2 10E3/UL (ref 0.8–5.3)
LYMPHOCYTES NFR BLD AUTO: 29 %
MCH RBC QN AUTO: 31.1 PG (ref 26.5–33)
MCHC RBC AUTO-ENTMCNC: 34.1 G/DL (ref 31.5–36.5)
MCV RBC AUTO: 91 FL (ref 78–100)
MONOCYTES # BLD AUTO: 0.4 10E3/UL (ref 0–1.3)
MONOCYTES NFR BLD AUTO: 9 %
NEUTROPHILS # BLD AUTO: 2.6 10E3/UL (ref 1.6–8.3)
NEUTROPHILS NFR BLD AUTO: 60 %
NITRATE UR QL: NEGATIVE
NRBC # BLD AUTO: 0 10E3/UL
NRBC BLD AUTO-RTO: 0 /100
PH UR STRIP: 6.5 [PH] (ref 5–7)
PLATELET # BLD AUTO: 236 10E3/UL (ref 150–450)
POTASSIUM SERPL-SCNC: 3.6 MMOL/L (ref 3.4–5.3)
PROT SERPL-MCNC: 6.7 G/DL (ref 6.4–8.3)
PROT/CREAT 24H UR: 0.1 MG/MG CR (ref 0–0.2)
RBC # BLD AUTO: 4.02 10E6/UL (ref 3.8–5.2)
RBC #/AREA URNS AUTO: ABNORMAL /HPF
SKIP: ABNORMAL
SODIUM SERPL-SCNC: 140 MMOL/L (ref 135–145)
SP GR UR STRIP: 1.02 (ref 1–1.03)
SQUAMOUS #/AREA URNS AUTO: ABNORMAL /LPF
UROBILINOGEN UR STRIP-MCNC: NORMAL MG/DL
WBC # BLD AUTO: 4.3 10E3/UL (ref 4–11)
WBC #/AREA URNS AUTO: ABNORMAL /HPF

## 2024-04-05 PROCEDURE — 85025 COMPLETE CBC W/AUTO DIFF WBC: CPT

## 2024-04-05 PROCEDURE — 85652 RBC SED RATE AUTOMATED: CPT

## 2024-04-05 PROCEDURE — 86225 DNA ANTIBODY NATIVE: CPT

## 2024-04-05 PROCEDURE — 84156 ASSAY OF PROTEIN URINE: CPT

## 2024-04-05 PROCEDURE — 86140 C-REACTIVE PROTEIN: CPT

## 2024-04-05 PROCEDURE — 81001 URINALYSIS AUTO W/SCOPE: CPT

## 2024-04-05 PROCEDURE — 80053 COMPREHEN METABOLIC PANEL: CPT

## 2024-04-05 PROCEDURE — 36415 COLL VENOUS BLD VENIPUNCTURE: CPT

## 2024-04-05 PROCEDURE — 86160 COMPLEMENT ANTIGEN: CPT

## 2024-04-08 LAB
C3 SERPL-MCNC: 68 MG/DL (ref 81–157)
C4 SERPL-MCNC: 18 MG/DL (ref 13–39)

## 2024-04-09 ENCOUNTER — OFFICE VISIT (OUTPATIENT)
Dept: RHEUMATOLOGY | Facility: CLINIC | Age: 49
End: 2024-04-09
Payer: COMMERCIAL

## 2024-04-09 VITALS
HEART RATE: 88 BPM | OXYGEN SATURATION: 100 % | SYSTOLIC BLOOD PRESSURE: 150 MMHG | RESPIRATION RATE: 16 BRPM | DIASTOLIC BLOOD PRESSURE: 90 MMHG

## 2024-04-09 DIAGNOSIS — I73.00 RAYNAUD'S DISEASE WITHOUT GANGRENE: ICD-10-CM

## 2024-04-09 DIAGNOSIS — Z79.899 HIGH RISK MEDICATION USE: ICD-10-CM

## 2024-04-09 DIAGNOSIS — L93.0 LUPUS ERYTHEMATOSUS TUMIDUS: ICD-10-CM

## 2024-04-09 DIAGNOSIS — M32.9 SYSTEMIC LUPUS ERYTHEMATOSUS, UNSPECIFIED SLE TYPE, UNSPECIFIED ORGAN INVOLVEMENT STATUS (H): Primary | ICD-10-CM

## 2024-04-09 PROCEDURE — G2211 COMPLEX E/M VISIT ADD ON: HCPCS | Performed by: INTERNAL MEDICINE

## 2024-04-09 PROCEDURE — 99214 OFFICE O/P EST MOD 30 MIN: CPT | Performed by: INTERNAL MEDICINE

## 2024-04-09 RX ORDER — AMLODIPINE BESYLATE 2.5 MG/1
2.5 TABLET ORAL DAILY
Qty: 90 TABLET | Refills: 0 | Status: SHIPPED | OUTPATIENT
Start: 2024-04-09

## 2024-04-09 RX ORDER — MYCOPHENOLATE MOFETIL 500 MG/1
1000 TABLET ORAL 2 TIMES DAILY
Qty: 360 TABLET | Refills: 2 | Status: SHIPPED | OUTPATIENT
Start: 2024-04-09 | End: 2024-05-31

## 2024-04-09 RX ORDER — VITAMIN B COMPLEX
25 TABLET ORAL DAILY
Status: SHIPPED
Start: 2024-04-09

## 2024-04-09 RX ORDER — HYDROXYCHLOROQUINE SULFATE 200 MG/1
200 TABLET, FILM COATED ORAL DAILY
Qty: 90 TABLET | Refills: 3 | Status: SHIPPED | OUTPATIENT
Start: 2024-04-09

## 2024-04-09 NOTE — NURSING NOTE
RAPID3 (0-30) Cumulative Score  4.0          RAPID3 Weighted Score (divide #4 by 3 and that is the weighted score)  1.3

## 2024-04-09 NOTE — PROGRESS NOTES
Rheumatology Clinic Visit      Natasha Lee MRN# [id number]   YOB: 1975 Age: 49 year old      Date of visit: 4/09/24   PCP: Sandy Garcia    Chief Complaint   Patient presents with:  Systemic lupus erythematosus: Little more tired then usual    Assessment and Plan     1.  Systemic lupus erythematosus (dsDNA positive, RNP+, hypocomplementemia C3 & C4, malar rash, oral sores, skin lesions, photosensitivity, Raynaud's phenomenon, fatigue, seizure history [grand mal seizure as an infant, absence seizures multiple times from age of [age], no seizure since age of 19 years old]): Reportedly diagnosed at the age of 13 years old.  Established with me on 3/8/2018.  Reportedly treated with hydroxychloroquine when she was initially diagnosed at the age of 13 when she developed oral sores, weight loss, malar rash, and joint aches.  Also reportedly treated with azathioprine; she previously stated that she has not been on azathioprine since at least 2003.  Had been doing well without DMARD therapy for several years but then in 2019 she started to have more erythematous lesions on her arms so hydroxychloroquine was restarted with improvement of these lesions.  Also establish care with dermatology and is now on mycophenolate 1000 mg twice daily at the direction of dermatology.  Doing well at this time.  Needs refills of medication.  See #2 regarding Raynaud's phenomenon.   Chronic illness  - Continue hydroxychloroquine 200 mg daily (normal eye exam on 2/3/2023 with Dr. Rodriges)  - Mycophenolate 1000 mg twice daily  - Labs in 3 months: CBC, Creatinine, Hepatic Panel, ESR, CRP  - Labs in 6 mo: CBC, CMP, ESR, CRP, C3, C4, dsDNA, UA, Uprotein:creatinine    2.  Raynaud's phenomenon: Amlodipine 2.5 mg was used for a short period of time during colder weather months that was well-tolerated and effective.  She plans to use amlodipine only during colder weather  - Continue amlodipine 2.5 mg daily during colder  weather months, as needed for Raynaud's phenomenon    3.  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    4. Chronic hepatitis B: Positive since birth, per patient.  Hepatitis B core antibody and surface antigen positive in the past.  Seen by hepatology 1/8/2021 who recommended that HBV ppx be started prior to systemic immunosuppression.  Currently on tenofovir.     5.  Elevated blood pressure: merari to follow up with Primary Care provider regarding elevated blood pressure.     Total minutes spent in evaluation with patient, documentation, , and review of pertinent studies and chart notes: 14  The longitudinal plan of care for the rheumatology problem(s) were addressed during this visit.  Due to added complexity of care, we will continue to support the patient and the subsequent management of this condition with ongoing continuity of care.       Ms. Lee verbalized agreement with and understanding of the rational for the diagnosis and treatment plan.  All questions were answered to best of my ability and the patient's satisfaction. Ms. Lee was advised to contact the clinic with any questions that may arise after the clinic visit.      Thank you for involving me in the care of the patient    Return to clinic: 6 months    HPI   Merari Lee is a 49 year old female with a past medical history significant for chronic hepatitis B, heart murmur, history of cutting (she did this while ago and was associated with depression; had therapy and this resolved many years ago; scarring on her left arm) and SLE who is seen for follow-up of lupus.     11/17/2020, she reports that she is doing well on hydroxychloroquine.  No joint pain.  No morning stiffness.  No rash.  Overall happy with how well she is doing.  Notes that she is not doing  anything to monitor the hepatitis B and would like to see hepatology.      11/3/2021: Patient canceled appointment.    2/23/2022: No-show to scheduled appointment    4/22/2022: Reports that she was lost to follow-up due to the COVID-19 pandemic.  Has been doing well.  No new symptoms.  Raynaud's phenomenon is controlled with cold avoidance and she does well without any loss of tissue on the ends of fingers where the Raynaud's is most active.  No black or bloody stools.  Skin disease is controlled; no new skin lesions she reports but does have chronic changes from the skin lesions on her arms.  On mycophenolate from dermatology.    4/28/2023: Currently doing well.  No joint pain or swelling.  Mild facial erythema that she associates with increased stress.  Since last seen she reports that she had diffuse swelling of her fingers during cold exposure in February and this occurred for about 1 week, 2 episodes  by about 1 month.  She reports having gone to an orthopedic urgent care where there was concern for inflammatory arthritis and she was given a Medrol Dosepak with improvement of her symptoms.  No joint pain or swelling at this time.  No morning stiffness.  She does note that the diffuse finger swelling correlated with cold exposure and increased Raynaud's phenomenon symptoms.    10/30/2023: Reports that she is doing well except for Raynaud's phenomenon.  Red discoloration of her fingers whenever there is cold exposure.  Tries to stay warm the best she can.  No joint pain.  Otherwise stable.  Reports that she is taking hydroxychloroquine and mycophenolate.    Today, 4/9/2024: Raynaud's phenomenon controlled well on amlodipine 2.5 mg daily but she thinks that the warmer weather during this winter was also helpful.  Not taking amlodipine currently because it is warmer and her Raynaud's is controlled.  Facial rash and using topical steroids from dermatology.  Needs refills on mycophenolate and  hydroxychloroquine.  She notes that at 1 point she realized that she was taking only half the dose of mycophenolate so she is now taking 1000 mg twice daily as it is prescribed.    Denies fevers, chills, nausea, vomiting, constipation, diarrhea. No abdominal pain.  No black or bloody stools.  No chest pain/pressure, palpitations, or shortness of breath. No LE swelling. No neck pain. No oral or nasal sores currently.  Malar rash hx. mild dry eye during the winter months treated with artificial tears.  No dry mouth.. No eye pain or redness.       Tobacco: None  EtOH: None  Drugs: None  Occupation: Drug and alcohol counselor at [employer]    ROS   12 point review of system was completed and negative except as noted in the HPI     Active Problem List     Patient Active Problem List   Diagnosis    Chronic hepatitis B (H)    Lupus (systemic lupus erythematosus) (H)    CARDIOVASCULAR SCREENING; LDL GOAL LESS THAN 160    Health Care Home    Dysmenorrhea    Menorrhagia    Allergic rhinitis    Other nonspecific finding on examination of urine    Urinary frequency    Vitamin B12 deficiency (non anemic)    Heart murmur    S/P laparoscopic cholecystectomy    Cervical cancer screening    Cough due to bronchospasm     Past Medical History     Past Medical History:   Diagnosis Date    Diabetes (H)     Pre-Diabetic    Heart murmur 1997    Lupus (systemic lupus erythematosus) (H) 1988    Dr. Prather    Lupus (systemic lupus erythematosus) (H) 12/10/2009    Normal delivery 9/5/09    boy forceps    Seizures (H)     When a child     Past Surgical History     Past Surgical History:   Procedure Laterality Date    ANESTH,SKIN SURGERY,KNEE  1993, 1990    orthoscopic    CHOLECYSTECTOMY  2016    CL AFF SURGICAL PATHOLOGY  2007    LAPAROSCOPIC CHOLECYSTECTOMY N/A 5/18/2016    Procedure: LAPAROSCOPIC CHOLECYSTECTOMY;  Surgeon: Radha Cline MD;  Location: [facility] OR    LAPAROSCOPIC TUBAL LIGATION  12/15/2011    Procedure:LAPAROSCOPIC  TUBAL LIGATION; Laparoscopic tubal ligation; Surgeon:AMY WYNN; Location:[facility] OR    OSTEOTOMY FOOT Left 2/13/2019    Procedure: Left Lapidus with  Azael Osteotomy;  Surgeon: Brett Chavez MD;  Location:  OR    [facility] APPENDECTOMY  4/2008     Allergy     Allergies   Allergen Reactions    Carbamazepine And Analogs      Fever, rash    Corn-Containing Products      Current Medication List     Current Outpatient Medications   Medication Sig Dispense Refill    albuterol (PROAIR HFA/PROVENTIL HFA/VENTOLIN HFA) 108 (90 Base) MCG/ACT inhaler Inhale 2 puffs into the lungs every 6 hours 18 g 1    amLODIPine (NORVASC) 2.5 MG tablet Take 1 tablet (2.5 mg) by mouth daily for raynaud's. 30 tablet 2    augmented betamethasone dipropionate (DIPROLENE AF) 0.05 % external cream Apply topically daily 50 g 5    Cranberry 1000 MG CAPS Take 1 capsule by mouth 4 times daily      DiphenhydrAMINE HCl (BENADRYL PO)       diphenhydrAMINE HCl (Sleep) 50 MG CAPS Take 50 mg by mouth At Bedtime      hydroxychloroquine (PLAQUENIL) 200 MG tablet Take 1 tablet (200 mg) by mouth daily 90 tablet 3    mycophenolate (GENERIC EQUIVALENT) 500 MG tablet Take 2 tablets (1,000 mg) by mouth 2 times daily 360 tablet 3    norethindrone-ethinyl estradiol (ORTHO-NOVUM) 1-35 MG-MCG tablet Take 1 tablet by mouth daily 84 tablet 4    tacrolimus (PROTOPIC) 0.1 % external ointment Apply topically 2 times daily 100 g 3    tenofovir (VIREAD) 300 MG tablet Take 1 tablet (300 mg) by mouth daily 30 tablet 11    Vitamin D, Cholecalciferol, 25 MCG (1000 UT) CAPS Take 1,000 Units by mouth daily 90 capsule 2    predniSONE (DELTASONE) 10 MG tablet 40mg x7 days, 30mg x7 days, 20mg x7 days, 10mg x7 days, 5mg x7 days 75 tablet 1    Spacer/Aero-Holding Chambers (SPACER/AERO-HOLD CHAMBER MASK) REMY 1 Device as needed 1 each 0     No current facility-administered medications for this visit.         Social History   See HPI    Family History     Family History  "  Adopted: Yes   Family history unknown: Yes       Physical Exam     Temp Readings from Last 3 Encounters:   09/09/19 96.2  F (35.7  C) (Temporal)   08/09/19 99.7  F (37.6  C) (Temporal)   02/13/19 97.2  F (36.2  C) (Temporal)     BP Readings from Last 5 Encounters:   04/09/24 (!) 166/83   07/13/23 113/75   04/22/22 (!) 151/74   10/22/20 131/77   11/26/19 132/72     Pulse Readings from Last 1 Encounters:   04/09/24 88     Resp Readings from Last 1 Encounters:   04/09/24 16     Estimated body mass index is 27.84 kg/m  as calculated from the following:    Height as of 10/22/20: 1.575 m (5' 2\").    Weight as of 7/13/23: 69 kg (152 lb 3.2 oz).    GEN: NAD.  HEENT:  Anicteric, noninjected sclera. No obvious external lesions of the ear and nose. Hearing intact.  CV: S1, S2. RRR. No m/r/g  PULM: No increased work of breathing. CTA bilaterally   MSK: MCPs, PIPs, DIPs without swelling or tenderness to palpation.  Wrists without swelling or tenderness to palpation.  Elbows and shoulders without swelling or tenderness to palpation.   Knees, ankles, and MTPs without swelling or tenderness to palpation.    SKIN: Facial erythema going the cheeks, over the nose, including the nasolabial folds, and the chin.  No evidence of current or previous ischemic insults to the fingers by visual inspection.    PSYCH: Alert. Appropriate.      Labs / Imaging (select studies)       RNP/Sm/SSA/SSB  Recent Labs   Lab Test 03/07/18  1548   RNPIGG 1.8*   SMIGG 0.2   SSAIGG >8.0*   SSBIGG <0.2   SCLIGG <0.2     dsDNA  Recent Labs   Lab Test 09/01/23  1449 07/13/23  1019 04/14/23  1338 04/19/22  1649 02/16/22  1652 11/10/20  1500 11/21/19  1536 06/07/18  1347 03/07/18  1548   DNA 35.0* 31.0* 30.0* 17.0* 17.0* 20* 12* 15* 19*     C3/C4  Recent Labs   Lab Test 04/05/24  1431 09/01/23  1449 07/13/23  1019 04/14/23  1338 04/19/22  1649 02/16/22  1652 11/10/20  1500 11/21/19  1536 06/07/18  1347   Q0WKSAO 68* 74* 71* 69* 74* 68* 64* 56* 59*   R9PWTGS 18 20 " 21 20 19 17 17 16 16       Antiphospholipid Antibodies  Recent Labs   Lab Test 03/07/18  1548   B2GPG 2.9   B2GPM 1.5   CARDG 5.9   CARDM 0.3   LUPINT Negative     CBC  Recent Labs   Lab Test 04/05/24  1431 07/13/23  1019 04/14/23  1338 02/16/22  1652 02/26/21  1431 01/07/21  1409 11/10/20  1500 11/21/19  1536   WBC 4.3 4.4 5.0   < > 4.7   < > 4.5 4.3   RBC 4.02 4.42 4.49   < > 4.28   < > 4.41 4.08   HGB 12.5 13.5 13.8   < > 13.2   < > 13.6 12.6   HCT 36.7 40.3 40.2   < > 38.6   < > 40.1 36.8   MCV 91 91 90   < > 90   < > 91 90   RDW 12.3 13.0 12.4   < > 12.1   < > 11.9 12.4    197 209   < > 198   < > 185 200   MCH 31.1 30.5 30.7   < > 30.8   < > 30.8 30.9   MCHC 34.1 33.5 34.3   < > 34.2   < > 33.9 34.2   NEUTROPHIL 60 69 64   < > 66.3  --  56.1 59.1   LYMPH 29 17 21   < > 24.3  --  31.0 28.3   MONOCYTE 9 11 11   < > 8.0  --  10.5 10.5   EOSINOPHIL 2 2 2   < > 0.8  --  2.0 1.4   BASOPHIL 0 1 1   < > 0.4  --  0.2 0.5   ANEU  --   --   --   --  3.1  --  2.5 2.5   ALYM  --   --   --   --  1.2  --  1.4 1.2   CALLIE  --   --   --   --  0.4  --  0.5 0.5   AEOS  --   --   --   --  0.0  --  0.1 0.1   ABAS  --   --   --   --  0.0  --  0.0 0.0   ANEUTAUTO 2.6 3.1 3.2   < >  --   --   --   --    ALYMPAUTO 1.2 0.7* 1.1   < >  --   --   --   --    AMONOAUTO 0.4 0.5 0.6   < >  --   --   --   --    AEOSAUTO 0.1 0.1 0.1   < >  --   --   --   --    ABSBASO 0.0 0.0 0.0   < >  --   --   --   --     < > = values in this interval not displayed.     CMP  Recent Labs   Lab Test 04/05/24  1431 07/13/23  1019 04/14/23  1338 04/19/22  1649 02/16/22  1652 02/16/22  1652 02/26/21  1431 01/07/21  1409 11/10/20  1500 11/21/19  1536    141 140 142   < > 140 140 139 139 141   POTASSIUM 3.6 3.8 3.3* 3.6   < > 3.5 3.7 4.0 3.8 3.6   CHLORIDE 108* 108* 112* 108   < > 110* 109 109 108 112*   CO2 25 24 28 26   < > 26 29 25 27 27   ANIONGAP 7 9 <1* 8   < > 4 2* 5 4 2*   * 100* 91 105*  --  120* 109* 115* 103* 130*   BUN 12.1 10.0 9 10    < > 14 13 11 13 10   CR 0.57 0.63 0.58 0.72   < > 0.67 0.71 0.64 0.60 0.64   GFRESTIMATED >90 >90 >90 >90   < > >90 >90 >90 >90 >90   GFRESTBLACK  --   --   --   --   --   --  >90 >90 >90 >90   JUAN A 8.5* 8.6 8.8 9.0   < > 8.2* 8.8 8.6 8.4* 8.8   BILITOTAL 0.2 0.5 0.4 0.3   < > 0.3 0.2 0.3 0.3 0.2   ALBUMIN 4.0 4.1 4.1 4.0   < > 3.9 3.9 3.8 3.8 3.7   PROTTOTAL 6.7 6.7 7.1 7.1   < > 7.1 6.9 6.9 7.2 6.8   ALKPHOS 61 66 84 77   < > 77 81 62 80 85   AST 27 30 23 16   < > 20 25 23 30 26   ALT 32 34 36 35   < > 34 39 38 46 44    < > = values in this interval not displayed.     HgA1c  Recent Labs   Lab Test 02/15/16  0827   A1C 5.3     Calcium/VitaminD  Recent Labs   Lab Test 04/05/24  1431 07/13/23  1019 04/14/23  1338 05/13/16  1438 02/15/16  0827   JUAN A 8.5* 8.6 8.8   < > 8.0*   VITDT  --   --   --   --  32    < > = values in this interval not displayed.     ESR/CRP  Recent Labs   Lab Test 04/05/24  1431 09/01/23  1449 04/14/23  1338 04/19/22  1649 02/16/22  1652 11/10/20  1500   SED 13  --  10  --   --  13   CRP  --   --  4.5 4.3 <2.9 3.1   CRPI 5.30* <3.00  --   --   --   --      CK/Aldolase  Recent Labs   Lab Test 04/14/23  1338 11/10/20  1500 06/07/18  1347   CKT 52 69 87     TSH/T4  Recent Labs   Lab Test 02/15/16  0827   TSH 1.51     Lipid Panel  Recent Labs   Lab Test 07/13/23  1019 02/15/16  0827   CHOL 173 143   TRIG 75 71   HDL 59 47*   LDL 99 82   NHDL 114 96     Hepatitis B  Recent Labs   Lab Test 01/07/21  1409 11/16/18  1249 03/07/18  1548   AUSAB  --  1.76  --    HBCAB  --  Reactive*  --    HEPBANG  --  Reactive*  --    HBQLOG <1.3  --  2.1*     Hepatitis C  Recent Labs   Lab Test 11/16/18  1249   HCVAB Nonreactive     HIV Screening  Recent Labs   Lab Test 11/16/18  1249   HIAGAB Nonreactive     UA  Recent Labs   Lab Test 04/05/24  1431 12/12/23  1532 11/28/23  1155 11/10/20  1501 02/19/19  1300 06/07/18  1347 03/07/18  1548   COLOR Light Yellow Colorless Yellow   < >  --    < > Yellow   APPEARANCE Clear  Clear Clear   < >  --    < > Clear   URINEGLC Negative Negative Negative   < >  --    < > Negative   URINEBILI Negative Negative Negative   < >  --    < > Negative   SG 1.022 1.007 1.027   < >  --    < > 1.025   URINEPH 6.5 6.5 5.5   < >  --    < > 5.5   PROTEIN Negative Negative 30*   < >  --    < > Negative   UROBILINOGEN  --   --   --   --   --   --  0.2   NITRITE Negative Negative Negative   < >  --    < > Negative   UBLD Moderate* Negative Negative   < >  --    < > Negative   LEUKEST Negative Negative Negative   < >  --    < > Negative   WBCU 0-5 None Seen 0-5   < > 0 - 5  --   --    RBCU 2-5* None Seen None Seen   < > O - 2  --   --    SQUAMOUSEPI  --   --   --   --  Few  --   --    BACTERIA Moderate* Few* Few*   < > Few*  --   --     < > = values in this interval not displayed.     Urine Microscopic  Recent Labs   Lab Test 04/05/24  1431 12/12/23  1532 11/28/23  1155 02/16/22  1652 02/19/19  1300   WBCU 0-5 None Seen 0-5   < > 0 - 5   RBCU 2-5* None Seen None Seen   < > O - 2   SQUAMOUSEPI  --   --   --   --  Few   BACTERIA Moderate* Few* Few*   < > Few*    < > = values in this interval not displayed.     Urine Protein  GHUTP and UTP= Urine protein (random), GHUTPG and UTPG = urine protein:creatinine ratio (random), UCRR = urine creatinine (random)  Recent Labs   Lab Test 04/05/24  1431 04/14/23  1338 04/19/22  1649 02/16/22  1652 02/16/22  1652 11/10/20  1501   GHUTP 9.8 7.0  --   --   --   --    UTP  --   --  0.12  --  0.13 <0.05   GHUTPG 0.10 0.11  --   --   --   --    UTPG  --   --  0.14  --  0.08 Unable to calculate due to low value   UCRR 96.1 65.9 88   < > 161 10    < > = values in this interval not displayed.     Immunization History     Immunization History   Administered Date(s) Administered    COVID-19 Vaccine (Bindu) 06/04/2021    Influenza (H1N1) 12/04/2009    Influenza (IIV3) PF 12/18/2000, 09/22/2009, 11/17/2011, 11/01/2012, 09/22/2014    Influenza Vaccine >6 months,quad, PF 09/26/2013,  09/30/2015, 10/01/2015, 11/02/2018          Chart documentation done in part with Dragon Voice recognition Software. Although reviewed after completion, some word and grammatical error may remain.      Christopher Zaman MD

## 2024-04-09 NOTE — PATIENT INSTRUCTIONS
RHEUMATOLOGY    [facility]  [address] [place] [address]  [place] [address]    Phone number: [phone]  Fax number: [phone]    If you need a medication refill, please contact us as you may need lab work and/or a follow up visit prior to your refill.      Thank you for choosing [facility]!    Randi Rico CMA Rheumatology

## 2024-04-10 LAB — DSDNA AB SER-ACNC: 42 IU/ML

## 2024-04-14 ENCOUNTER — MYC MEDICAL ADVICE (OUTPATIENT)
Dept: FAMILY MEDICINE | Facility: CLINIC | Age: 49
End: 2024-04-14
Payer: COMMERCIAL

## 2024-04-15 NOTE — TELEPHONE ENCOUNTER
Attempted to call patient to assist with scheduling ED  follow up appointment. Unable to reach and left message to call clinic back at [phone].    If patient calls back,  Please assist with scheduling follow up.     CRISTEL Robledo  [facility]

## 2024-04-15 NOTE — TELEPHONE ENCOUNTER
Pt returning call to the clinic to set up ED follow up with PCP. Appointment scheduled for tomorrow 4/16/24.    Thank you - Yeimy Bryant, MARIA AN, RN

## 2024-04-16 ENCOUNTER — OFFICE VISIT (OUTPATIENT)
Dept: FAMILY MEDICINE | Facility: CLINIC | Age: 49
End: 2024-04-16
Payer: COMMERCIAL

## 2024-04-16 VITALS
DIASTOLIC BLOOD PRESSURE: 84 MMHG | HEART RATE: 91 BPM | BODY MASS INDEX: 27.84 KG/M2 | TEMPERATURE: 98.7 F | HEIGHT: 62 IN | SYSTOLIC BLOOD PRESSURE: 138 MMHG | OXYGEN SATURATION: 100 % | RESPIRATION RATE: 17 BRPM

## 2024-04-16 DIAGNOSIS — D84.9 IMMUNOSUPPRESSION (H): ICD-10-CM

## 2024-04-16 DIAGNOSIS — R07.9 CHEST PAIN, UNSPECIFIED TYPE: ICD-10-CM

## 2024-04-16 DIAGNOSIS — B18.1 CHRONIC HEPATITIS B (H): ICD-10-CM

## 2024-04-16 DIAGNOSIS — I28.8 ENLARGED PULMONARY ARTERY (H): Primary | ICD-10-CM

## 2024-04-16 PROCEDURE — 99214 OFFICE O/P EST MOD 30 MIN: CPT | Performed by: NURSE PRACTITIONER

## 2024-04-16 ASSESSMENT — PAIN SCALES - GENERAL: PAINLEVEL: NO PAIN (0)

## 2024-04-16 NOTE — PATIENT INSTRUCTIONS
PLAN:   1.   Symptomatic therapy suggested: .Continue current medications as prescribed.   Start on prilosec once a day   2.  Orders Placed This Encounter   Medications    omeprazole (PRILOSEC) 20 MG DR capsule     Sig: Take 1 capsule (20 mg) by mouth daily     Dispense:  30 capsule     Refill:  1     Orders Placed This Encounter   Procedures    REVIEW OF HEALTH MAINTENANCE PROTOCOL ORDERS    Echocardiogram Complete     3.  FURTHER TESTING:       - echocardiogram   Will follow up and/or notify patient of  results via My Chart to determine further need for followup   Patient needs to follow up in if no improvement,or sooner if worsening of symptoms or other symptoms develop.

## 2024-04-16 NOTE — PROGRESS NOTES
Subjective   merari is a 49 year old, presenting for the following health issues:  Hospital F/U (Follow up ED visit 04/13/24. Has been feeling okay)        4/16/2024     3:30 PM   Additional Questions   Roomed by Pinky MORRELL   Accompanied by Self         4/16/2024     3:30 PM   Patient Reported Additional Medications   Patient reports taking the following new medications None     Via the Health Maintenance questionnaire, the patient has reported the following services have been completed -Mammogram, this information has been sent to the abstraction team.  [facility]         Hospital Follow-up Visit:    Hospital/Nursing Home/IP Rehab Facility:  [facility]  Date of Admission: 04/13/24  Date of Discharge: 04/13/24  Reason(s) for Admission: Chest pain, shortness of breath  Was the patient in the ICU or did the patient experience delirium during hospitalization?  No  Do you have any other stressors you would like to discuss with your provider? No    Problems taking medications regularly:  None  Medication changes since discharge: None  Problems adhering to non-medication therapy:  None    Summary of hospitalization:  [facility] discharge summary reviewed  Diagnostic Tests/Treatments reviewed.  Follow up needed: noted enlarged pulmonary artery   Other Healthcare Providers Involved in Patient s Care:         None  Chest pain is mostly gone. Pain was like a pressure like a weight on her chest   Woke up on Saturday morning with back pain and then woke up with sharp pain in the chest   Had a hx of some GERD but has not been on medications for a long time     Update since discharge: improved.   MDM:   Merari Lee Renetta is a 49 y.o. female history of cutaneous lupus, Raynaud's phenomenon, hepatitis B, elevated blood pressure, presenting with ongoing chest pain and shortness of breath. Patient had woke up with the pain in the upper back this morning time, and this is evolved into a significant  anterior chest pain with shortness of breath throughout the day. Patient later counted some mild shortness of breath going on for the last 1 week. On my evaluation her initial blood pressure is elevated, and further measurements has been improved but still moderately elevated. This appears consistent to what has been recently noted after rheumatology visit and she has been referred to primary care to further look at this. Cardiopulmonary exam is reassuring.    Differential diagnosis includes ACS, dissection, PE, pericarditis/myocarditis, pericardial effusion, spontaneous pneumothorax, musculoskeletal pain, referred GI pain. IV established and labs sent. EKG shows no acute ischemic findings. IV morphine was given for pain. X-ray did not show any acute abnormality. Bedside cardiac ultrasound negative for any decreased ejection fraction or pericardial effusion. Initial laboratory studies reassuring with a not elevated high-sensitivity troponin. No acute leukocytosis, no acute anemia. Renal function is normal. D-dimer was obtained, given my concern for increased risk with history of autoimmune disease, and this was elevated. Proceeded with CT chest pulmonary angio, negative for PE or any evidence of dissection. Mildly enlarged main pulmonary artery was noted, nonspecific but potentially related to pulmonary arterial hypertension.    Patient was reassessed. Her symptoms have improved. She has been overall stable and comfortable appearing. Workup is reassuring. I discussed with her the mildly enlarged pulmonary artery and recommended further follow-up with primary care. At this time most worrisome causes of chest pain and shortness of breath have been adequately ruled out. Although no clear source of her symptoms have been identified, workup is reassuring and I feel she is stable and appropriate for discharge with close follow-up with primary care. Discussed ED return precautions, questions were answered and she was  comfortable with plan for discharge home.    DIAGNOSIS:    ICD-10-CM   1. Chest pain, unspecified type R07.9 POCUS CARDIAC   POCUS CARDIAC     2. SOB (shortness of breath) R06.02   Plan of care communicated with patient     Labs reviewed in EPIC  BP Readings from Last 3 Encounters:   04/16/24 138/84   04/09/24 (!) 150/90   07/13/23 113/75    Wt Readings from Last 3 Encounters:   07/13/23 69 kg (152 lb 3.2 oz)   04/22/22 73.5 kg (162 lb)   10/22/20 73.6 kg (162 lb 4.8 oz)                  Patient Active Problem List   Diagnosis    Chronic hepatitis B (H)    Lupus (systemic lupus erythematosus) (H)    CARDIOVASCULAR SCREENING; LDL GOAL LESS THAN 160    Health Care Home    Dysmenorrhea    Menorrhagia    Allergic rhinitis    Other nonspecific finding on examination of urine    Urinary frequency    Vitamin B12 deficiency (non anemic)    Heart murmur    S/P laparoscopic cholecystectomy    Cervical cancer screening    Cough due to bronchospasm     Past Surgical History:   Procedure Laterality Date    ANESTH,SKIN SURGERY,KNEE  1993, 1990    orthoscopic    CHOLECYSTECTOMY  2016    CL AFF SURGICAL PATHOLOGY  2007    LAPAROSCOPIC CHOLECYSTECTOMY N/A 5/18/2016    Procedure: LAPAROSCOPIC CHOLECYSTECTOMY;  Surgeon: Radha Cline MD;  Location: [facility] OR    LAPAROSCOPIC TUBAL LIGATION  12/15/2011    Procedure:LAPAROSCOPIC TUBAL LIGATION; Laparoscopic tubal ligation; Surgeon:AMY WYNN; Location:[facility] OR    OSTEOTOMY FOOT Left 2/13/2019    Procedure: Left Lapidus with  Azael Osteotomy;  Surgeon: Brett Chavez MD;  Location: [facility] OR    ZZC APPENDECTOMY  4/2008       Social History     Tobacco Use    Smoking status: Never    Smokeless tobacco: Never   Substance Use Topics    Alcohol use: Yes     Comment: Occasional     Family History   Adopted: Yes   Family history unknown: Yes         Current Outpatient Medications   Medication Sig Dispense Refill    omeprazole (PRILOSEC) 20 MG DR capsule Take 1 capsule (20 mg)  "by mouth daily 30 capsule 1    albuterol (PROAIR HFA/PROVENTIL HFA/VENTOLIN HFA) 108 (90 Base) MCG/ACT inhaler Inhale 2 puffs into the lungs every 6 hours 18 g 1    amLODIPine (NORVASC) 2.5 MG tablet Take 1 tablet (2.5 mg) by mouth daily for raynaud's.  Okay to only use during colder weather. 90 tablet 0    augmented betamethasone dipropionate (DIPROLENE AF) 0.05 % external cream Apply topically daily 50 g 5    Cranberry 1000 MG CAPS Take 1 capsule by mouth 4 times daily      DiphenhydrAMINE HCl (BENADRYL PO)       diphenhydrAMINE HCl (Sleep) 50 MG CAPS Take 50 mg by mouth At Bedtime      hydroxychloroquine (PLAQUENIL) 200 MG tablet Take 1 tablet (200 mg) by mouth daily 90 tablet 3    mycophenolate (GENERIC EQUIVALENT) 500 MG tablet Take 2 tablets (1,000 mg) by mouth 2 times daily 360 tablet 2    norethindrone-ethinyl estradiol (ORTHO-NOVUM) 1-35 MG-MCG tablet Take 1 tablet by mouth daily 84 tablet 4    predniSONE (DELTASONE) 10 MG tablet 40mg x7 days, 30mg x7 days, 20mg x7 days, 10mg x7 days, 5mg x7 days 75 tablet 1    Spacer/Aero-Holding Chambers (SPACER/AERO-HOLD CHAMBER MASK) REMY 1 Device as needed 1 each 0    tacrolimus (PROTOPIC) 0.1 % external ointment Apply topically 2 times daily 100 g 3    tenofovir (VIREAD) 300 MG tablet Take 1 tablet (300 mg) by mouth daily 30 tablet 11    Vitamin D, Cholecalciferol, 25 MCG (1000 UT) CAPS Take 1,000 Units by mouth daily 90 capsule 2    Vitamin D3 (CHOLECALCIFEROL) 25 mcg (1000 units) tablet Take 1 tablet (25 mcg) by mouth daily       Allergies   Allergen Reactions    Carbamazepine And Analogs      Fever, rash    Corn-Containing Products        Review of Systems  Constitutional, HEENT, cardiovascular, pulmonary, gi and gu systems are negative, except as otherwise noted.      Objective    /84   Pulse 91   Temp 98.7  F (37.1  C) (Temporal)   Resp 17   Ht 1.575 m (5' 2\")   SpO2 100%   BMI 27.84 kg/m    Body mass index is 27.84 kg/m .  Physical Exam   GENERAL: " Patient is well nourished, well developed,in no apparent distress, non-toxic, in no respiratory distress and acyanotic, alert, cooperative, and well hydrated  EYES: Eyes grossly normal to inspection and conjunctivae and sclerae normal  HENT:ear canals and TM's normal and nose and mouth without ulcers or lesions   NECK:normal, supple, and no adenopathy  CARDIAC:regular rates and rhythm, no murmur, click or rub, and no irregular beats  without LE edema bilaterally  RESP: normal respiratory rate and rhythm, lungs clear to auscultation  unlabored respirations, no intercostal retractions or accessory muscle use  ABD:soft, nontender  SKIN: Skin color, texture, turgor normal. No rashes or lesions.  MS: extremities normal- no gross deformities noted, gait normal, and normal muscle tone  NEURO: Normal strength and tone, sensory exam grossly normal, mentation intact, and speech normal  PSYCH: Alert, oriented, thought content appropriate,mentation appears normal., affect and mood normal    Pending orders and results     Assessment & Plan     Enlarged pulmonary artery (H)  Noted on scan from the ER will assess with echocardiogram   Will follow up and/or notify patient of  results via My Chart to determine further need for followup   - Echocardiogram Complete    Chest pain, unspecified type  Discussed that these symptoms is likely related to reflux or GERD. Discussed non-pharmacologic treatment, including elevating the head of bed, decrease food before bedtime and decreased caffeine and nicotine and alcohol.  Went over meds for reflux. Pt will try Prilosec . Recheck if not improving as expected.  Will Start:  - omeprazole (PRILOSEC) 20 MG DR capsule  Dispense: 30 capsule; Refill: 1    Immunosuppression (H24)  FOLLOW UP WITH SPECIALIST :Rheumatology      Chronic hepatitis B (H)  Stable at present       Review of prior external note(s) from - CareEverywhere information from [facility]  reviewed  Ordering of each unique  test  Prescription drug management   Time spent by me doing chart review, history and exam, documentation and further activities per the note    MED REC REQUIRED  Post Medication Reconciliation Status: discharge medications reconciled, continue medications without change    See Patient Instructions  Patient Instructions   PLAN:   1.   Symptomatic therapy suggested: .Continue current medications as prescribed.   Start on prilosec once a day   2.  Orders Placed This Encounter   Medications    omeprazole (PRILOSEC) 20 MG DR capsule     Sig: Take 1 capsule (20 mg) by mouth daily     Dispense:  30 capsule     Refill:  1     Orders Placed This Encounter   Procedures    REVIEW OF HEALTH MAINTENANCE PROTOCOL ORDERS    Echocardiogram Complete     3.  FURTHER TESTING:       - echocardiogram   Will follow up and/or notify patient of  results via My Chart to determine further need for followup   Patient needs to follow up in if no improvement,or sooner if worsening of symptoms or other symptoms develop.              Signed Electronically by: ELIAS Guerra CNP

## 2024-04-16 NOTE — LETTER
April 16, 2024      Natasha Lee  [address] [address]        To Whom It May Concern:    Natasha Lee  was seen on 4/16/2024  She has needed to be out of work 4/14/2024 and 4/15/2024 due to medical concerns   Will return 4/16/2024.        Sincerely,        ELIAS Guerra CNP

## 2024-04-30 PROBLEM — D84.9 IMMUNOSUPPRESSION (H): Status: ACTIVE | Noted: 2024-04-30

## 2024-05-02 ENCOUNTER — E-VISIT (OUTPATIENT)
Dept: FAMILY MEDICINE | Facility: CLINIC | Age: 49
End: 2024-05-02
Payer: COMMERCIAL

## 2024-05-02 DIAGNOSIS — J01.90 ACUTE SINUSITIS WITH SYMPTOMS > 10 DAYS: ICD-10-CM

## 2024-05-02 PROCEDURE — 99421 OL DIG E/M SVC 5-10 MIN: CPT | Performed by: NURSE PRACTITIONER

## 2024-05-13 ENCOUNTER — ANCILLARY PROCEDURE (OUTPATIENT)
Dept: CARDIOLOGY | Facility: CLINIC | Age: 49
End: 2024-05-13
Attending: NURSE PRACTITIONER
Payer: COMMERCIAL

## 2024-05-13 DIAGNOSIS — I28.8 ENLARGED PULMONARY ARTERY (H): ICD-10-CM

## 2024-05-13 LAB — LVEF ECHO: NORMAL

## 2024-05-13 PROCEDURE — 93306 TTE W/DOPPLER COMPLETE: CPT | Performed by: INTERNAL MEDICINE

## 2024-05-14 NOTE — RESULT ENCOUNTER NOTE
Ky Lee,    Attached are your test results.  Echocardiogram appears normal which is reassuring   Please contact us if you have any questions.    Sandy Garcia, CNP

## 2024-05-31 ENCOUNTER — VIRTUAL VISIT (OUTPATIENT)
Dept: DERMATOLOGY | Facility: CLINIC | Age: 49
End: 2024-05-31
Payer: COMMERCIAL

## 2024-05-31 DIAGNOSIS — D84.9 IMMUNOSUPPRESSION (H): ICD-10-CM

## 2024-05-31 DIAGNOSIS — M32.9 SYSTEMIC LUPUS ERYTHEMATOSUS, UNSPECIFIED SLE TYPE, UNSPECIFIED ORGAN INVOLVEMENT STATUS (H): Primary | ICD-10-CM

## 2024-05-31 DIAGNOSIS — L93.0 LUPUS ERYTHEMATOSUS TUMIDUS: ICD-10-CM

## 2024-05-31 PROCEDURE — 99214 OFFICE O/P EST MOD 30 MIN: CPT | Mod: 95 | Performed by: DERMATOLOGY

## 2024-05-31 RX ORDER — MYCOPHENOLATE MOFETIL 500 MG/1
TABLET ORAL
Qty: 450 TABLET | Refills: 3 | Status: SHIPPED | OUTPATIENT
Start: 2024-05-31

## 2024-05-31 ASSESSMENT — PAIN SCALES - GENERAL: PAINLEVEL: NO PAIN (0)

## 2024-05-31 NOTE — PROGRESS NOTES
[facility] Dermatology Note    Encounter Date: May 31, 2024    ***NOT SEEN YET**     Dermatology Problem List:  1. SLE on hydroxychloroquine and intermittent prednisone, chronic hepatitis B  2. 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 Possible ocular rosacea.   - Biopsy 1/6/2020 consistent with cutaneous lupus erythematosus  - Current t/x: hydroxychloroquine (managed by rheum), Tacrolimus daily on face, augmented betamethasone 0.05% cream PRN, mycophenolate 1000 mg BID (started 2/2021 pending GI starting hep B treatment)  - Previous tx: triamcinolone 0.1% cream (helped some but not enough on face), tacrolimus 0.1% ointment (good at baseline but not helpful for flares), metronidazole 0.75% cream, oral ivermectin 15 mg monthly x3 months,  permethrin 5% cream, gentle skin care, doxycycline 100 mg BID (x3 months - improved substantially in first month then went back to baseline)  - Last Labs: 9/1/2023 (C3/4, dsDNA, UA, CRP  3. Maculopapular rash following URI 6/6/20 - triamcinolone 0.1% cream, bland emollients, OTC antihistamines    ______________________________________    Impression/Plan:     Lupus: flaring on skin with significant involvement on face and upper chest. We discussed risks/benefits of adding prednisone with gradual taper. If refractory or recurrent, would consider uptitration of mycophenolate.   - prednisone 38-29-59-10-5 mg x7 days each  - mycophenolate 1000 mg BID  - hydroxychloroquine 200 mg daily  - topicals      1. {Diagnosesderm:766262}  - {rfplan:397880}      Follow-up in ***.       Staff Involved:  Staff Only    Corey Terry MD   of Dermatology  Department of Dermatology  [facility] [employer] [facility]    Scribe Disclosure:   I, Siena Rawls, am serving as a scribe; to document services personally performed by  Corey Terry MD -based on data collection and the provider's statements to me.     Provider Disclosure:  I agree with above History, Review of Systems, Physical exam and Plan.  I have reviewed the content of the documentation and have edited it as needed. I have personally performed the services documented here and the documentation accurately represents those services and the decisions I have made.      Electronically signed by:  ***       CC:   No chief complaint on file.      History of Present Illness:  Ms. Natasha Lee is a 49 year old female who presents as a new***return patient.  Last seen by me in clinic on 8/31/2023    ***    Labs:  ***    Physical exam:  Vitals: There were no vitals taken for this visit.  GEN: {RFGeneralAppearance:198607}   SKIN: Colon phototype ***  - {Skin Exam:656290}  - {Skin Exam Derm:639362}  - No other lesions of concern on areas examined.     Past Medical History:   Past Medical History:   Diagnosis Date    Diabetes (H)     Pre-Diabetic    Heart murmur 1997    Lupus (systemic lupus erythematosus) (H) 1988    Dr. Prather    Lupus (systemic lupus erythematosus) (H) 12/10/2009    Normal delivery 9/5/09    boy forceps    Seizures (H)     When a child     Past Surgical History:   Procedure Laterality Date    ANESTH,SKIN SURGERY,KNEE  1993, 1990    orthoscopic    CHOLECYSTECTOMY  2016    CL AFF SURGICAL PATHOLOGY  2007    LAPAROSCOPIC CHOLECYSTECTOMY N/A 5/18/2016    Procedure: LAPAROSCOPIC CHOLECYSTECTOMY;  Surgeon: Radha Cline MD;  Location:  OR    LAPAROSCOPIC TUBAL LIGATION  12/15/2011    Procedure:LAPAROSCOPIC TUBAL LIGATION; Laparoscopic tubal ligation; Surgeon:AMY WYNN; Location:[facility] OR    OSTEOTOMY FOOT Left 2/13/2019    Procedure: Left Lapidus with  Azael Osteotomy;  Surgeon: Brett Chavez MD;  Location:  OR    ZZC APPENDECTOMY  4/2008       Social History:   reports that she has never smoked. She has never used smokeless  tobacco. She reports current alcohol use. She reports that she does not use drugs.    Family History:  Family History   Adopted: Yes   Family history unknown: Yes       Medications:  Current Outpatient Medications   Medication Sig Dispense Refill    albuterol (PROAIR HFA/PROVENTIL HFA/VENTOLIN HFA) 108 (90 Base) MCG/ACT inhaler Inhale 2 puffs into the lungs every 6 hours 18 g 1    amLODIPine (NORVASC) 2.5 MG tablet Take 1 tablet (2.5 mg) by mouth daily for raynaud's.  Okay to only use during colder weather. 90 tablet 0    amoxicillin-clavulanate (AUGMENTIN) 875-125 MG tablet Take 1 tablet by mouth 2 times daily 20 tablet 0    augmented betamethasone dipropionate (DIPROLENE AF) 0.05 % external cream Apply topically daily 50 g 5    Cranberry 1000 MG CAPS Take 1 capsule by mouth 4 times daily      DiphenhydrAMINE HCl (BENADRYL PO)       diphenhydrAMINE HCl (Sleep) 50 MG CAPS Take 50 mg by mouth At Bedtime      hydroxychloroquine (PLAQUENIL) 200 MG tablet Take 1 tablet (200 mg) by mouth daily 90 tablet 3    mycophenolate (GENERIC EQUIVALENT) 500 MG tablet Take 2 tablets (1,000 mg) by mouth 2 times daily 360 tablet 2    norethindrone-ethinyl estradiol (ORTHO-NOVUM) 1-35 MG-MCG tablet Take 1 tablet by mouth daily 84 tablet 4    omeprazole (PRILOSEC) 20 MG DR capsule Take 1 capsule (20 mg) by mouth daily 30 capsule 1    predniSONE (DELTASONE) 10 MG tablet 40mg x7 days, 30mg x7 days, 20mg x7 days, 10mg x7 days, 5mg x7 days 75 tablet 1    Spacer/Aero-Holding Chambers (SPACER/AERO-HOLD CHAMBER MASK) REMY 1 Device as needed 1 each 0    tacrolimus (PROTOPIC) 0.1 % external ointment Apply topically 2 times daily 100 g 3    tenofovir (VIREAD) 300 MG tablet Take 1 tablet (300 mg) by mouth daily 30 tablet 11    Vitamin D, Cholecalciferol, 25 MCG (1000 UT) CAPS Take 1,000 Units by mouth daily 90 capsule 2    Vitamin D3 (CHOLECALCIFEROL) 25 mcg (1000 units) tablet Take 1 tablet (25 mcg) by mouth daily       Allergies   Allergen  Reactions    Carbamazepine And Analogs      Fever, rash    Corn-Containing Products

## 2024-05-31 NOTE — LETTER
5/31/2024       RE: Natasha Lee  [address] [address]     Dear Colleague,    Thank you for referring your patient, Natasha Lee, to the [facility] [place] at [facility]. Please see a copy of my visit note below.    [facility] Dermatology Note  Encounter Date: May 31, 2024  Store-and-Forward and Video (invitation sent by Snaptrip). Location of teledermatologist: [facility] [place]. Date of images: 05/31/24. Start time: 8:26. End time: 8:37.    Dermatology Problem List:  1. 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 Possible ocular rosacea.   - Biopsy 1/6/2020 consistent with cutaneous lupus erythematosus  - Current t/x: hydroxychloroquine (managed by rheum), Tacrolimus daily on face, augmented betamethasone 0.05% cream PRN, mycophenolate 1500/1000 mg (started 2/2021 pending GI starting hep B treatment)  - Previous tx: triamcinolone 0.1% cream (helped some but not enough on face), tacrolimus 0.1% ointment (good at baseline but not helpful for flares), metronidazole 0.75% cream, oral ivermectin 15 mg monthly x3 months,  permethrin 5% cream, gentle skin care, doxycycline 100 mg BID (x3 months - improved substantially in first month then went back to baseline)  3. Maculopapular rash following URI 6/6/20 - triamcinolone 0.1% cream, bland emollients, OTC antihistamines       ____________________________________________    Assessment & Plan:     Cutaneous lupus in setting of SLE: more active recently on the face, neck, and torso, with widespread erythematous papules. We discussed risks/benefits of next steps in treatment including increasing  mycophenolate or hydroxychloroquine, anifrolumab, and clinical trial. Will proceed with dose escalation of mycophenolate.  - increase mycophenolate to 1500/1000 mg daily x6 weeks; if not improved, increase to 1500 mg BID  - check labs 2 weeks after dose increase - CBC, CMP  - continue hydroxychloroquine 200 mg daily  - continue topicals    Procedures Performed:    None    Follow-up: 3-4 months    Staff:     Corey Terry MD, FAAD   of Dermatology  Department of Dermatology  [facility]    ____________________________________________    CC: Derm Problem (Natasha has been flaring under her chin and neck area )    HPI:  Ms. Natasha Lee is a(n) 49 year old female who presents today as a return patient for lupus    Breaking out under nose, chin, neck, chest, sides  - having some blood pressure issues recently - seems to trigger flares  - mycophenolate 1000 mg BID, hydroxychloroquine 200 mg  - augmented betamethasone ointment    Patient is otherwise feeling well, without additional skin concerns.    Labs Reviewed:  4/2024 CBC, CMP unremarkable    Physical Exam:  Vitals: There were no vitals taken for this visit.  SKIN: Teledermatology photos were reviewed; image quality and interpretability: acceptable. Image date: 5/29/24.  - erythematous papules on the chest  - No other lesions of concern on areas examined.     Medications:  Current Outpatient Medications   Medication Sig Dispense Refill    albuterol (PROAIR HFA/PROVENTIL HFA/VENTOLIN HFA) 108 (90 Base) MCG/ACT inhaler Inhale 2 puffs into the lungs every 6 hours 18 g 1    amLODIPine (NORVASC) 2.5 MG tablet Take 1 tablet (2.5 mg) by mouth daily for raynaud's.  Okay to only use during colder weather. 90 tablet 0    amoxicillin-clavulanate (AUGMENTIN) 875-125 MG tablet Take 1 tablet by mouth 2 times daily 20 tablet 0    augmented betamethasone dipropionate (DIPROLENE AF) 0.05 % external cream Apply topically daily  50 g 5    Cranberry 1000 MG CAPS Take 1 capsule by mouth 4 times daily      DiphenhydrAMINE HCl (BENADRYL PO)       diphenhydrAMINE HCl (Sleep) 50 MG CAPS Take 50 mg by mouth At Bedtime      hydroxychloroquine (PLAQUENIL) 200 MG tablet Take 1 tablet (200 mg) by mouth daily 90 tablet 3    mycophenolate (GENERIC EQUIVALENT) 500 MG tablet Take 2 tablets (1,000 mg) by mouth 2 times daily 360 tablet 2    norethindrone-ethinyl estradiol (ORTHO-NOVUM) 1-35 MG-MCG tablet Take 1 tablet by mouth daily 84 tablet 4    omeprazole (PRILOSEC) 20 MG DR capsule Take 1 capsule (20 mg) by mouth daily 30 capsule 1    predniSONE (DELTASONE) 10 MG tablet 40mg x7 days, 30mg x7 days, 20mg x7 days, 10mg x7 days, 5mg x7 days 75 tablet 1    Spacer/Aero-Holding Chambers (SPACER/AERO-HOLD CHAMBER MASK) REMY 1 Device as needed 1 each 0    tacrolimus (PROTOPIC) 0.1 % external ointment Apply topically 2 times daily 100 g 3    tenofovir (VIREAD) 300 MG tablet Take 1 tablet (300 mg) by mouth daily 30 tablet 11    Vitamin D, Cholecalciferol, 25 MCG (1000 UT) CAPS Take 1,000 Units by mouth daily 90 capsule 2    Vitamin D3 (CHOLECALCIFEROL) 25 mcg (1000 units) tablet Take 1 tablet (25 mcg) by mouth daily       No current facility-administered medications for this visit.      Past Medical/Surgical History:   Patient Active Problem List   Diagnosis    Chronic hepatitis B (H)    Lupus (systemic lupus erythematosus) (H)    CARDIOVASCULAR SCREENING; LDL GOAL LESS THAN 160    Health Care Home    Dysmenorrhea    Menorrhagia    Allergic rhinitis    Other nonspecific finding on examination of urine    Urinary frequency    Vitamin B12 deficiency (non anemic)    Heart murmur    S/P laparoscopic cholecystectomy    Cervical cancer screening    Cough due to bronchospasm    Enlarged pulmonary artery (H)    Immunosuppression (H24)     Past Medical History:   Diagnosis Date    Diabetes (H)     Pre-Diabetic    Heart murmur 1997    Lupus (systemic lupus erythematosus)  (H) 1988    Dr. Prather    Lupus (systemic lupus erythematosus) (H) 12/10/2009    Normal delivery 9/5/09    boy forceps    Seizures (H)     When a child       CC Referred Self,

## 2024-05-31 NOTE — PROGRESS NOTES
[facility] Dermatology Note  Encounter Date: May 31, 2024  Store-and-Forward and Video (invitation sent by [employer]). Location of teledermatologist: [facility] [place]. Date of images: 05/31/24. Start time: 8:26. End time: 8:37.    Dermatology Problem List:  1. 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 Possible ocular rosacea.   - Biopsy 1/6/2020 consistent with cutaneous lupus erythematosus  - Current t/x: hydroxychloroquine (managed by rheum), Tacrolimus daily on face, augmented betamethasone 0.05% cream PRN, mycophenolate 1500/1000 mg (started 2/2021 pending GI starting hep B treatment)  - Previous tx: triamcinolone 0.1% cream (helped some but not enough on face), tacrolimus 0.1% ointment (good at baseline but not helpful for flares), metronidazole 0.75% cream, oral ivermectin 15 mg monthly x3 months,  permethrin 5% cream, gentle skin care, doxycycline 100 mg BID (x3 months - improved substantially in first month then went back to baseline)  3. Maculopapular rash following URI 6/6/20 - triamcinolone 0.1% cream, bland emollients, OTC antihistamines       ____________________________________________    Assessment & Plan:     Cutaneous lupus in setting of SLE: more active recently on the face, neck, and torso, with widespread erythematous papules. We discussed risks/benefits of next steps in treatment including increasing mycophenolate or hydroxychloroquine, anifrolumab, and clinical trial. Will proceed with dose escalation of mycophenolate.  - increase mycophenolate to 1500/1000 mg daily x6 weeks; if not improved, increase to 1500 mg BID  - check labs 2 weeks after dose increase - CBC, CMP  - continue hydroxychloroquine 200 mg daily  - continue  topicals    Procedures Performed:    None    Follow-up: 3-4 months    Staff:     Corey Terry MD, FAAD   of Dermatology  Department of Dermatology  [facility]    ____________________________________________    CC: Derm Problem (Natasha has been flaring under her chin and neck area )    HPI:  Ms. Natasha Lee is a(n) 49 year old female who presents today as a return patient for lupus    Breaking out under nose, chin, neck, chest, sides  - having some blood pressure issues recently - seems to trigger flares  - mycophenolate 1000 mg BID, hydroxychloroquine 200 mg  - augmented betamethasone ointment    Patient is otherwise feeling well, without additional skin concerns.    Labs Reviewed:  4/2024 CBC, CMP unremarkable    Physical Exam:  Vitals: There were no vitals taken for this visit.  SKIN: Teledermatology photos were reviewed; image quality and interpretability: acceptable. Image date: 5/29/24.  - erythematous papules on the chest  - No other lesions of concern on areas examined.     Medications:  Current Outpatient Medications   Medication Sig Dispense Refill    albuterol (PROAIR HFA/PROVENTIL HFA/VENTOLIN HFA) 108 (90 Base) MCG/ACT inhaler Inhale 2 puffs into the lungs every 6 hours 18 g 1    amLODIPine (NORVASC) 2.5 MG tablet Take 1 tablet (2.5 mg) by mouth daily for raynaud's.  Okay to only use during colder weather. 90 tablet 0    amoxicillin-clavulanate (AUGMENTIN) 875-125 MG tablet Take 1 tablet by mouth 2 times daily 20 tablet 0    augmented betamethasone dipropionate (DIPROLENE AF) 0.05 % external cream Apply topically daily 50 g 5    Cranberry 1000 MG CAPS Take 1 capsule by mouth 4 times daily      DiphenhydrAMINE HCl (BENADRYL PO)       diphenhydrAMINE HCl (Sleep) 50 MG CAPS Take 50 mg by mouth At Bedtime      hydroxychloroquine (PLAQUENIL) 200 MG tablet Take 1 tablet (200 mg) by mouth daily 90 tablet 3    mycophenolate (GENERIC EQUIVALENT) 500  MG tablet Take 2 tablets (1,000 mg) by mouth 2 times daily 360 tablet 2    norethindrone-ethinyl estradiol (ORTHO-NOVUM) 1-35 MG-MCG tablet Take 1 tablet by mouth daily 84 tablet 4    omeprazole (PRILOSEC) 20 MG DR capsule Take 1 capsule (20 mg) by mouth daily 30 capsule 1    predniSONE (DELTASONE) 10 MG tablet 40mg x7 days, 30mg x7 days, 20mg x7 days, 10mg x7 days, 5mg x7 days 75 tablet 1    Spacer/Aero-Holding Chambers (SPACER/AERO-HOLD CHAMBER MASK) REMY 1 Device as needed 1 each 0    tacrolimus (PROTOPIC) 0.1 % external ointment Apply topically 2 times daily 100 g 3    tenofovir (VIREAD) 300 MG tablet Take 1 tablet (300 mg) by mouth daily 30 tablet 11    Vitamin D, Cholecalciferol, 25 MCG (1000 UT) CAPS Take 1,000 Units by mouth daily 90 capsule 2    Vitamin D3 (CHOLECALCIFEROL) 25 mcg (1000 units) tablet Take 1 tablet (25 mcg) by mouth daily       No current facility-administered medications for this visit.      Past Medical/Surgical History:   Patient Active Problem List   Diagnosis    Chronic hepatitis B (H)    Lupus (systemic lupus erythematosus) (H)    CARDIOVASCULAR SCREENING; LDL GOAL LESS THAN 160    Health Care Home    Dysmenorrhea    Menorrhagia    Allergic rhinitis    Other nonspecific finding on examination of urine    Urinary frequency    Vitamin B12 deficiency (non anemic)    Heart murmur    S/P laparoscopic cholecystectomy    Cervical cancer screening    Cough due to bronchospasm    Enlarged pulmonary artery (H)    Immunosuppression (H24)     Past Medical History:   Diagnosis Date    Diabetes (H)     Pre-Diabetic    Heart murmur 1997    Lupus (systemic lupus erythematosus) (H) 1988    Dr. Prather    Lupus (systemic lupus erythematosus) (H) 12/10/2009    Normal delivery 9/5/09    boy forceps    Seizures (H)     When a child       CC Referred Self, MD  No address on file on close of this encounter.

## 2024-05-31 NOTE — NURSING NOTE
Dermatology Rooming Note    Natasha Lee's goals for this visit include:   Chief Complaint   Patient presents with    Derm Problem     Natasha has been flaring under her chin and neck area      Napoleon BEYER CMA

## 2024-06-11 ENCOUNTER — TELEPHONE (OUTPATIENT)
Dept: DERMATOLOGY | Facility: CLINIC | Age: 49
End: 2024-06-11
Payer: COMMERCIAL

## 2024-06-11 NOTE — TELEPHONE ENCOUNTER
Left Voicemail (1st Attempt) for the patient to call back and schedule the following:    Appointment type: FOLLOW UP    Provider: Mara    Return date: 09/2024     Specialty phone number: [phone]    Additonal Notes: na

## 2024-06-12 ENCOUNTER — E-VISIT (OUTPATIENT)
Dept: FAMILY MEDICINE | Facility: CLINIC | Age: 49
End: 2024-06-12
Payer: COMMERCIAL

## 2024-06-12 DIAGNOSIS — J20.9 ACUTE BRONCHITIS WITH SYMPTOMS > 10 DAYS: Primary | ICD-10-CM

## 2024-06-12 PROCEDURE — 99421 OL DIG E/M SVC 5-10 MIN: CPT | Performed by: NURSE PRACTITIONER

## 2024-06-13 ENCOUNTER — TELEPHONE (OUTPATIENT)
Dept: DERMATOLOGY | Facility: CLINIC | Age: 49
End: 2024-06-13
Payer: COMMERCIAL

## 2024-06-13 ENCOUNTER — PATIENT OUTREACH (OUTPATIENT)
Dept: CARE COORDINATION | Facility: CLINIC | Age: 49
End: 2024-06-13
Payer: COMMERCIAL

## 2024-06-13 RX ORDER — DOXYCYCLINE HYCLATE 100 MG
100 TABLET ORAL 2 TIMES DAILY
Qty: 14 TABLET | Refills: 0 | Status: SHIPPED | OUTPATIENT
Start: 2024-06-13

## 2024-06-20 ENCOUNTER — TELEPHONE (OUTPATIENT)
Dept: FAMILY MEDICINE | Facility: CLINIC | Age: 49
End: 2024-06-20
Payer: COMMERCIAL

## 2024-06-20 NOTE — TELEPHONE ENCOUNTER
Patient Quality Outreach    Patient is due for the following:   Colon Cancer Screening      Topic Date Due    Pneumococcal Vaccine (1 of 2 - PCV) Never done    Zoster (Shingles) Vaccine (1 of 2) Never done    Hepatitis A Vaccine (1 of 2 - Risk 2-dose series) Never done    Diptheria Tetanus Pertussis (DTAP/TDAP/TD) Vaccine (1 - Tdap) Never done    COVID-19 Vaccine (2 - Bindu risk series) 07/02/2021       Next Steps:   Schedule a nurse only visit for vaccines  Schedule a colonoscopy    Type of outreach:    Sent [employer] message.    Next Steps:  Reach out within 90 days via [employer].    Max number of attempts reached: No. Will try again in 90 days if patient still on fail list.    Questions for provider review:    None           Diane L. Schoenherr, RN

## 2024-06-27 ENCOUNTER — PATIENT OUTREACH (OUTPATIENT)
Dept: CARE COORDINATION | Facility: CLINIC | Age: 49
End: 2024-06-27
Payer: COMMERCIAL

## 2024-09-24 ENCOUNTER — DOCUMENTATION ONLY (OUTPATIENT)
Dept: RHEUMATOLOGY | Facility: CLINIC | Age: 49
End: 2024-09-24

## 2024-09-24 NOTE — PROGRESS NOTES
Natasha Lee has an upcoming lab appointment:    Future Appointments   Date Time Provider Department Center   10/2/2024  3:00 PM LAB FIRST FLOOR [facility] [place]   10/9/2024 10:20 AM Christopher Zaman MD [facility]   10/11/2024 11:40 AM Corey Terry MD [facility]     Patient is scheduled for the following lab(s): Pt is requesting labs for 10/2/24 from Zaman with unavailable orders in chart.     There is no order available. Please review and place either future orders or HMPO (Review of Health Maintenance Protocol Orders), as appropriate.    Health Maintenance Due   Topic    LIPID      Thank you, Carmenza Melton

## 2024-10-12 ENCOUNTER — HEALTH MAINTENANCE LETTER (OUTPATIENT)
Age: 49
End: 2024-10-12

## 2024-11-06 ENCOUNTER — LAB (OUTPATIENT)
Dept: LAB | Facility: CLINIC | Age: 49
End: 2024-11-06
Payer: COMMERCIAL

## 2024-11-06 DIAGNOSIS — M32.9 SYSTEMIC LUPUS ERYTHEMATOSUS, UNSPECIFIED SLE TYPE, UNSPECIFIED ORGAN INVOLVEMENT STATUS (H): ICD-10-CM

## 2024-11-06 LAB
ALBUMIN MFR UR ELPH: <6 MG/DL
ALBUMIN SERPL BCG-MCNC: 4.3 G/DL (ref 3.5–5.2)
ALBUMIN UR-MCNC: NEGATIVE MG/DL
ALP SERPL-CCNC: 76 U/L (ref 40–150)
ALT SERPL W P-5'-P-CCNC: 50 U/L (ref 0–50)
ANION GAP SERPL CALCULATED.3IONS-SCNC: 13 MMOL/L (ref 7–15)
APPEARANCE UR: CLEAR
AST SERPL W P-5'-P-CCNC: 51 U/L (ref 0–45)
BASOPHILS # BLD AUTO: 0 10E3/UL (ref 0–0.2)
BASOPHILS NFR BLD AUTO: 0 %
BILIRUB SERPL-MCNC: 0.3 MG/DL
BILIRUB UR QL STRIP: NEGATIVE
BUN SERPL-MCNC: 12.5 MG/DL (ref 6–20)
CALCIUM SERPL-MCNC: 9 MG/DL (ref 8.8–10.4)
CHLORIDE SERPL-SCNC: 104 MMOL/L (ref 98–107)
COLOR UR AUTO: NORMAL
CREAT SERPL-MCNC: 0.71 MG/DL (ref 0.51–0.95)
CREAT UR-MCNC: 28.9 MG/DL
CRP SERPL-MCNC: 6.84 MG/L
EGFRCR SERPLBLD CKD-EPI 2021: >90 ML/MIN/1.73M2
EOSINOPHIL # BLD AUTO: 0.1 10E3/UL (ref 0–0.7)
EOSINOPHIL NFR BLD AUTO: 2 %
ERYTHROCYTE [DISTWIDTH] IN BLOOD BY AUTOMATED COUNT: 12.7 % (ref 10–15)
ERYTHROCYTE [SEDIMENTATION RATE] IN BLOOD BY WESTERGREN METHOD: 12 MM/HR (ref 0–20)
GLUCOSE SERPL-MCNC: 111 MG/DL (ref 70–99)
GLUCOSE UR STRIP-MCNC: NEGATIVE MG/DL
HCO3 SERPL-SCNC: 24 MMOL/L (ref 22–29)
HCT VFR BLD AUTO: 41 % (ref 35–47)
HGB BLD-MCNC: 14 G/DL (ref 11.7–15.7)
HGB UR QL STRIP: NEGATIVE
IMM GRANULOCYTES # BLD: 0 10E3/UL
IMM GRANULOCYTES NFR BLD: 0 %
KETONES UR STRIP-MCNC: NEGATIVE MG/DL
LEUKOCYTE ESTERASE UR QL STRIP: NEGATIVE
LYMPHOCYTES # BLD AUTO: 1.3 10E3/UL (ref 0.8–5.3)
LYMPHOCYTES NFR BLD AUTO: 22 %
MCH RBC QN AUTO: 31.3 PG (ref 26.5–33)
MCHC RBC AUTO-ENTMCNC: 34.1 G/DL (ref 31.5–36.5)
MCV RBC AUTO: 92 FL (ref 78–100)
MONOCYTES # BLD AUTO: 0.4 10E3/UL (ref 0–1.3)
MONOCYTES NFR BLD AUTO: 7 %
NEUTROPHILS # BLD AUTO: 4 10E3/UL (ref 1.6–8.3)
NEUTROPHILS NFR BLD AUTO: 70 %
NITRATE UR QL: NEGATIVE
NRBC # BLD AUTO: 0 10E3/UL
NRBC BLD AUTO-RTO: 0 /100
PH UR STRIP: 6.5 [PH] (ref 5–7)
PLATELET # BLD AUTO: 221 10E3/UL (ref 150–450)
POTASSIUM SERPL-SCNC: 3 MMOL/L (ref 3.4–5.3)
PROT SERPL-MCNC: 7.5 G/DL (ref 6.4–8.3)
PROT/CREAT 24H UR: NORMAL MG/G{CREAT}
RBC # BLD AUTO: 4.47 10E6/UL (ref 3.8–5.2)
SKIP: NORMAL
SODIUM SERPL-SCNC: 141 MMOL/L (ref 135–145)
SP GR UR STRIP: 1.01 (ref 1–1.03)
UROBILINOGEN UR STRIP-MCNC: NORMAL MG/DL
WBC # BLD AUTO: 5.8 10E3/UL (ref 4–11)

## 2024-11-06 PROCEDURE — 86140 C-REACTIVE PROTEIN: CPT

## 2024-11-06 PROCEDURE — 80053 COMPREHEN METABOLIC PANEL: CPT

## 2024-11-06 PROCEDURE — 86160 COMPLEMENT ANTIGEN: CPT

## 2024-11-06 PROCEDURE — 81003 URINALYSIS AUTO W/O SCOPE: CPT

## 2024-11-06 PROCEDURE — 86225 DNA ANTIBODY NATIVE: CPT

## 2024-11-06 PROCEDURE — 85025 COMPLETE CBC W/AUTO DIFF WBC: CPT

## 2024-11-06 PROCEDURE — 85652 RBC SED RATE AUTOMATED: CPT

## 2024-11-06 PROCEDURE — 82306 VITAMIN D 25 HYDROXY: CPT

## 2024-11-06 PROCEDURE — 84156 ASSAY OF PROTEIN URINE: CPT

## 2024-11-06 PROCEDURE — 36415 COLL VENOUS BLD VENIPUNCTURE: CPT

## 2024-11-07 LAB
C3 SERPL-MCNC: 76 MG/DL (ref 81–157)
C4 SERPL-MCNC: 19 MG/DL (ref 13–39)
VIT D+METAB SERPL-MCNC: 25 NG/ML (ref 20–50)

## 2024-11-08 ENCOUNTER — OFFICE VISIT (OUTPATIENT)
Dept: DERMATOLOGY | Facility: CLINIC | Age: 49
End: 2024-11-08
Payer: COMMERCIAL

## 2024-11-08 DIAGNOSIS — M32.9 SYSTEMIC LUPUS ERYTHEMATOSUS, UNSPECIFIED SLE TYPE, UNSPECIFIED ORGAN INVOLVEMENT STATUS (H): Primary | ICD-10-CM

## 2024-11-08 DIAGNOSIS — B18.1 CHRONIC HEPATITIS B (H): ICD-10-CM

## 2024-11-08 DIAGNOSIS — D84.9 IMMUNOSUPPRESSION (H): ICD-10-CM

## 2024-11-08 DIAGNOSIS — M32.19 OTHER SYSTEMIC LUPUS ERYTHEMATOSUS WITH OTHER ORGAN INVOLVEMENT (H): ICD-10-CM

## 2024-11-08 DIAGNOSIS — L93.0 LUPUS ERYTHEMATOSUS TUMIDUS: ICD-10-CM

## 2024-11-08 PROCEDURE — 99214 OFFICE O/P EST MOD 30 MIN: CPT | Performed by: DERMATOLOGY

## 2024-11-08 RX ORDER — DIPHENHYDRAMINE HCL 25 MG
25 CAPSULE ORAL ONCE
OUTPATIENT
Start: 2024-11-22

## 2024-11-08 RX ORDER — TACROLIMUS 1 MG/G
OINTMENT TOPICAL 2 TIMES DAILY
Qty: 100 G | Refills: 3 | Status: SHIPPED | OUTPATIENT
Start: 2024-11-08

## 2024-11-08 RX ORDER — DIPHENHYDRAMINE HYDROCHLORIDE 50 MG/ML
25 INJECTION INTRAMUSCULAR; INTRAVENOUS
Start: 2024-11-22

## 2024-11-08 RX ORDER — DIPHENHYDRAMINE HCL 25 MG
25 CAPSULE ORAL
OUTPATIENT
Start: 2024-11-22

## 2024-11-08 RX ORDER — ALBUTEROL SULFATE 0.83 MG/ML
2.5 SOLUTION RESPIRATORY (INHALATION)
OUTPATIENT
Start: 2024-11-22

## 2024-11-08 RX ORDER — METHYLPREDNISOLONE SODIUM SUCCINATE 125 MG/2ML
125 INJECTION INTRAMUSCULAR; INTRAVENOUS
OUTPATIENT
Start: 2024-11-22

## 2024-11-08 RX ORDER — ALBUTEROL SULFATE 90 UG/1
1-2 INHALANT RESPIRATORY (INHALATION)
Start: 2024-11-22

## 2024-11-08 RX ORDER — METHYLPREDNISOLONE SODIUM SUCCINATE 40 MG/ML
40 INJECTION INTRAMUSCULAR; INTRAVENOUS
Start: 2024-11-22

## 2024-11-08 RX ORDER — ACETAMINOPHEN 325 MG/1
650 TABLET ORAL
OUTPATIENT
Start: 2024-11-22

## 2024-11-08 RX ORDER — ACETAMINOPHEN 325 MG/1
650 TABLET ORAL ONCE
OUTPATIENT
Start: 2024-11-22

## 2024-11-08 RX ORDER — HEPARIN SODIUM,PORCINE 10 UNIT/ML
5-20 VIAL (ML) INTRAVENOUS DAILY PRN
OUTPATIENT
Start: 2024-11-22

## 2024-11-08 RX ORDER — BETAMETHASONE DIPROPIONATE 0.5 MG/G
CREAM TOPICAL DAILY
Qty: 50 G | Refills: 5 | Status: SHIPPED | OUTPATIENT
Start: 2024-11-08

## 2024-11-08 RX ORDER — DIPHENHYDRAMINE HYDROCHLORIDE 50 MG/ML
50 INJECTION INTRAMUSCULAR; INTRAVENOUS
Start: 2024-11-22

## 2024-11-08 RX ORDER — MEPERIDINE HYDROCHLORIDE 25 MG/ML
25 INJECTION INTRAMUSCULAR; INTRAVENOUS; SUBCUTANEOUS
OUTPATIENT
Start: 2024-11-22

## 2024-11-08 RX ORDER — EPINEPHRINE 1 MG/ML
0.3 INJECTION, SOLUTION, CONCENTRATE INTRAVENOUS EVERY 5 MIN PRN
OUTPATIENT
Start: 2024-11-22

## 2024-11-08 RX ORDER — HEPARIN SODIUM (PORCINE) LOCK FLUSH IV SOLN 100 UNIT/ML 100 UNIT/ML
5 SOLUTION INTRAVENOUS
OUTPATIENT
Start: 2024-11-22

## 2024-11-08 RX ORDER — MYCOPHENOLATE MOFETIL 500 MG/1
TABLET ORAL
Qty: 450 TABLET | Refills: 3 | Status: SHIPPED | OUTPATIENT
Start: 2024-11-08

## 2024-11-08 RX ORDER — METHYLPREDNISOLONE SODIUM SUCCINATE 40 MG/ML
40 INJECTION INTRAMUSCULAR; INTRAVENOUS ONCE
OUTPATIENT
Start: 2024-11-22

## 2024-11-08 ASSESSMENT — PAIN SCALES - GENERAL: PAINLEVEL_OUTOF10: NO PAIN (0)

## 2024-11-08 NOTE — PROGRESS NOTES
[facility] Dermatology Note    Encounter Date: Nov 8, 2024    Dermatology Problem List:  1. 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 Possible ocular rosacea.   - Biopsy 1/6/2020 consistent with cutaneous lupus erythematosus  - Current t/x: hydroxychloroquine (managed by rheum), Tacrolimus daily on face, augmented betamethasone 0.05% cream PRN, mycophenolate 1500/1000 mg (started 2/2021 pending GI starting hep B treatment)  - Previous tx: triamcinolone 0.1% cream (helped some but not enough on face), tacrolimus 0.1% ointment (good at baseline but not helpful for flares), metronidazole 0.75% cream, oral ivermectin 15 mg monthly x3 months,  permethrin 5% cream, gentle skin care, doxycycline 100 mg BID (x3 months - improved substantially in first month then went back to baseline)  3. Maculopapular rash following URI 6/6/20 - triamcinolone 0.1% cream, bland emollients, OTC antihistamines       ______________________________________    Impression/Plan:  Cutaneous lupus in setting of SLE: more active recently on the face, neck, and torso, with widespread erythematous papules. We discussed risks/benefits of next steps in treatment including increasing mycophenolate or hydroxychloroquine, methotrexate  anifrolumab. Joints have been more active recently as well.  - Plan to start anifrolumab    - refer to GI in context of chronic hepatitis B and recheck hep B DNA  - continue mycophenolate to 1500/1000 mg daily  - continue hydroxychloroquine 200 mg daily  - continue topicals (augmented betamethasone cream and tacrolimus)  - photos taken today of the chest and neck      Follow-up in 3 months    Staff Involved:  Staff and Scribe    Scribe Disclosure:   Siena CARABALLO, am serving as a scribe; to document  services personally performed by Corey Terry MD -based on data collection and the provider's statements to me.     Provider Disclosure:   The documentation recorded by the scribe accurately reflects the services I personally performed and the decisions made by me.    Corey Terry MD   of Dermatology  Department of Dermatology  [facility]      CC:   Chief Complaint   Patient presents with    Derm Problem     Lupus - Natasha states she has been flaring the last three months       History of Present Illness:  Ms. Natasha Lee is a 49 year old female who presents as a return patient.    Here for Lupus follow up. Today, she states that for the last 3 months she has been flaring on the arms, legs and the chest area. She is taking the mycophenolate  bid, and started the prednisone taper when she was flaring, which did help, but after stopping the prednisone, she would break out about 2-3 weeks later.     Labs:  N/a    Physical exam:  Vitals: There were no vitals taken for this visit.  GEN: This is a well developed, well-nourished female in no acute distress, in a pleasant mood.    SKIN: Colon phototype IV  - Focused examination of the face, neck, chest, back, arms and hands was performed.  - numerous erythematous papules and plaques on the chest, neck, > face, neck and arms.  - No other lesions of concern on areas examined.     Past Medical History:   Past Medical History:   Diagnosis Date    Diabetes (H)     Pre-Diabetic    Heart murmur 1997    Lupus (systemic lupus erythematosus) (H) 1988    Dr. Prather    Lupus (systemic lupus erythematosus) (H) 12/10/2009    Normal delivery 9/5/09    boy forceps    Seizures (H)     When a child     Past Surgical History:   Procedure Laterality Date    ANESTH,SKIN SURGERY,KNEE  1993, 1990    orthoscopic    CHOLECYSTECTOMY  2016    CL AFF SURGICAL PATHOLOGY  2007    LAPAROSCOPIC CHOLECYSTECTOMY N/A 5/18/2016     Procedure: LAPAROSCOPIC CHOLECYSTECTOMY;  Surgeon: Radha Cline MD;  [facility]: [facility]    LAPAROSCOPIC TUBAL LIGATION  12/15/2011    Procedure:LAPAROSCOPIC TUBAL LIGATION; Laparoscopic tubal ligation; Surgeon:AMY WYNN; Location:[facility] OR    OSTEOTOMY FOOT Left 2/13/2019    Procedure: Left Lapidus with  Azael Osteotomy;  Surgeon: Brett Chavez MD;  Location: [facility] OR    ZZC APPENDECTOMY  4/2008       Social History:   reports that she has never smoked. She has never used smokeless tobacco. She reports current alcohol use. She reports that she does not use drugs.    Family History:  Family History   Adopted: Yes   Family history unknown: Yes       Medications:  Current Outpatient Medications   Medication Sig Dispense Refill    albuterol (PROAIR HFA/PROVENTIL HFA/VENTOLIN HFA) 108 (90 Base) MCG/ACT inhaler Inhale 2 puffs into the lungs every 6 hours 18 g 1    amLODIPine (NORVASC) 2.5 MG tablet Take 1 tablet (2.5 mg) by mouth daily for raynaud's.  Okay to only use during colder weather. 90 tablet 0    amoxicillin-clavulanate (AUGMENTIN) 875-125 MG tablet Take 1 tablet by mouth 2 times daily 20 tablet 0    augmented betamethasone dipropionate (DIPROLENE AF) 0.05 % external cream Apply topically daily 50 g 5    Cranberry 1000 MG CAPS Take 1 capsule by mouth 4 times daily      DiphenhydrAMINE HCl (BENADRYL PO)       diphenhydrAMINE HCl (Sleep) 50 MG CAPS Take 50 mg by mouth At Bedtime      doxycycline hyclate (VIBRA-TABS) 100 MG tablet Take 1 tablet (100 mg) by mouth 2 times daily 14 tablet 0    hydroxychloroquine (PLAQUENIL) 200 MG tablet Take 1 tablet (200 mg) by mouth daily 90 tablet 3    mycophenolate (GENERIC EQUIVALENT) 500 MG tablet Take 1500 mg (3 pills) in the morning and 1000 mg (2 pills) in the evening. 450 tablet 3    norethindrone-ethinyl estradiol (ORTHO-NOVUM) 1-35 MG-MCG tablet Take 1 tablet by mouth daily 84 tablet 4    omeprazole (PRILOSEC) 20 MG DR capsule Take 1 capsule (20 mg) by  mouth daily 30 capsule 1    predniSONE (DELTASONE) 10 MG tablet 40mg x7 days, 30mg x7 days, 20mg x7 days, 10mg x7 days, 5mg x7 days 75 tablet 1    Spacer/Aero-Holding Chambers (SPACER/AERO-HOLD CHAMBER MASK) REMY 1 Device as needed 1 each 0    tacrolimus (PROTOPIC) 0.1 % external ointment Apply topically 2 times daily 100 g 3    tenofovir (VIREAD) 300 MG tablet Take 1 tablet (300 mg) by mouth daily 30 tablet 11    Vitamin D, Cholecalciferol, 25 MCG (1000 UT) CAPS Take 1,000 Units by mouth daily 90 capsule 2    Vitamin D3 (CHOLECALCIFEROL) 25 mcg (1000 units) tablet Take 1 tablet (25 mcg) by mouth daily       Allergies   Allergen Reactions    Carbamazepine And Analogs      Fever, rash    Corn-Containing Products

## 2024-11-08 NOTE — LETTER
11/8/2024       RE: Natasha Lee  [address] [id number]     Dear Colleague,    Thank you for referring your patient, Natasha Lee, to the [facility] [place] at [facility]. Please see a copy of my visit note below.    [facility] Dermatology Note    Encounter Date: Nov 8, 2024    Dermatology Problem List:  1. 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 Possible ocular rosacea.   - Biopsy 1/6/2020 consistent with cutaneous lupus erythematosus  - Current t/x: hydroxychloroquine (managed by rheum), Tacrolimus daily on face, augmented betamethasone 0.05% cream PRN, mycophenolate 1500/1000 mg (started 2/2021 pending GI starting hep B treatment)  - Previous tx: triamcinolone 0.1% cream (helped some but not enough on face), tacrolimus 0.1% ointment (good at baseline but not helpful for flares), metronidazole 0.75% cream, oral ivermectin 15 mg monthly x3 months,  permethrin 5% cream, gentle skin care, doxycycline 100 mg BID (x3 months - improved substantially in first month then went back to baseline)  3. Maculopapular rash following URI 6/6/20 - triamcinolone 0.1% cream, bland emollients, OTC antihistamines       ______________________________________    Impression/Plan:  Cutaneous lupus in setting of SLE: more active recently on the face, neck, and torso, with widespread erythematous papules. We discussed risks/benefits of next steps in treatment including increasing mycophenolate or hydroxychloroquine, methotrexate  anifrolumab. Joints have been more active recently as well.  - Plan to start anifrolumab    - refer to GI in context of chronic hepatitis B and recheck hep B DNA  -  continue mycophenolate to 1500/1000 mg daily  - continue hydroxychloroquine 200 mg daily  - continue topicals (augmented betamethasone cream and tacrolimus)  - photos taken today of the chest and neck      Follow-up in 3 months    Staff Involved:  Staff and Scribe    Scribe Disclosure:   I, Siena Rawls, am serving as a scribe; to document services personally performed by Corey Terry MD -based on data collection and the provider's statements to me.     Provider Disclosure:   The documentation recorded by the scribe accurately reflects the services I personally performed and the decisions made by me.    Corey Terry MD   of Dermatology  Department of Dermatology  [facility]      CC:   Chief Complaint   Patient presents with     Derm Problem     Lupus - Natasha states she has been flaring the last three months       History of Present Illness:  Ms. Natasha Lee is a 49 year old female who presents as a return patient.    Here for Lupus follow up. Today, she states that for the last 3 months she has been flaring on the arms, legs and the chest area. She is taking the mycophenolate  bid, and started the prednisone taper when she was flaring, which did help, but after stopping the prednisone, she would break out about 2-3 weeks later.     Labs:  N/a    Physical exam:  Vitals: There were no vitals taken for this visit.  GEN: This is a well developed, well-nourished female in no acute distress, in a pleasant mood.    SKIN: Colon phototype IV  - Focused examination of the face, neck, chest, back, arms and hands was performed.  - numerous erythematous papules and plaques on the chest, neck, > face, neck and arms.  - No other lesions of concern on areas examined.     Past Medical History:   Past Medical History:   Diagnosis Date     Diabetes (H)     Pre-Diabetic     Heart murmur 1997     Lupus (systemic lupus erythematosus) (H) 1988    Dr. Prather      Lupus (systemic lupus erythematosus) (H) 12/10/2009     Normal delivery 9/5/09    boy forceps     Seizures (H)     When a child     Past Surgical History:   Procedure Laterality Date     ANESTH,SKIN SURGERY,KNEE  1993, 1990    orthoscopic     CHOLECYSTECTOMY  2016     CL AFF SURGICAL PATHOLOGY  2007     LAPAROSCOPIC CHOLECYSTECTOMY N/A 5/18/2016    Procedure: LAPAROSCOPIC CHOLECYSTECTOMY;  Surgeon: Radha Cline MD;  [facility]     LAPAROSCOPIC TUBAL LIGATION  12/15/2011    Procedure:LAPAROSCOPIC TUBAL LIGATION; Laparoscopic tubal ligation; Surgeon:AMY WYNN; Location:[facility]     OSTEOTOMY FOOT Left 2/13/2019    Procedure: Left Lapidus with  Azael Osteotomy;  Surgeon: Brett Chavez MD;  Location: [facility] OR     ZZC APPENDECTOMY  4/2008       Social History:   reports that she has never smoked. She has never used smokeless tobacco. She reports current alcohol use. She reports that she does not use drugs.    Family History:  Family History   Adopted: Yes   Family history unknown: Yes       Medications:  Current Outpatient Medications   Medication Sig Dispense Refill     albuterol (PROAIR HFA/PROVENTIL HFA/VENTOLIN HFA) 108 (90 Base) MCG/ACT inhaler Inhale 2 puffs into the lungs every 6 hours 18 g 1     amLODIPine (NORVASC) 2.5 MG tablet Take 1 tablet (2.5 mg) by mouth daily for raynaud's.  Okay to only use during colder weather. 90 tablet 0     amoxicillin-clavulanate (AUGMENTIN) 875-125 MG tablet Take 1 tablet by mouth 2 times daily 20 tablet 0     augmented betamethasone dipropionate (DIPROLENE AF) 0.05 % external cream Apply topically daily 50 g 5     Cranberry 1000 MG CAPS Take 1 capsule by mouth 4 times daily       DiphenhydrAMINE HCl (BENADRYL PO)        diphenhydrAMINE HCl (Sleep) 50 MG CAPS Take 50 mg by mouth At Bedtime       doxycycline hyclate (VIBRA-TABS) 100 MG tablet Take 1 tablet (100 mg) by mouth 2 times daily 14 tablet 0     hydroxychloroquine (PLAQUENIL) 200 MG tablet Take  1 tablet (200 mg) by mouth daily 90 tablet 3     mycophenolate (GENERIC EQUIVALENT) 500 MG tablet Take 1500 mg (3 pills) in the morning and 1000 mg (2 pills) in the evening. 450 tablet 3     norethindrone-ethinyl estradiol (ORTHO-NOVUM) 1-35 MG-MCG tablet Take 1 tablet by mouth daily 84 tablet 4     omeprazole (PRILOSEC) 20 MG DR capsule Take 1 capsule (20 mg) by mouth daily 30 capsule 1     predniSONE (DELTASONE) 10 MG tablet 40mg x7 days, 30mg x7 days, 20mg x7 days, 10mg x7 days, 5mg x7 days 75 tablet 1     Spacer/Aero-Holding Chambers (SPACER/AERO-HOLD CHAMBER MASK) REMY 1 Device as needed 1 each 0     tacrolimus (PROTOPIC) 0.1 % external ointment Apply topically 2 times daily 100 g 3     tenofovir (VIREAD) 300 MG tablet Take 1 tablet (300 mg) by mouth daily 30 tablet 11     Vitamin D, Cholecalciferol, 25 MCG (1000 UT) CAPS Take 1,000 Units by mouth daily 90 capsule 2     Vitamin D3 (CHOLECALCIFEROL) 25 mcg (1000 units) tablet Take 1 tablet (25 mcg) by mouth daily       Allergies   Allergen Reactions     Carbamazepine And Analogs      Fever, rash     Corn-Containing Products                     Again, thank you for allowing me to participate in the care of your patient.      Sincerely,    Corey Terry MD

## 2024-11-08 NOTE — NURSING NOTE
Dermatology Rooming Note    Natasha Lee's goals for this visit include:   Chief Complaint   Patient presents with    Derm Problem     Lupus - Natasha states she has been flaring the last three months     Napoleon BEYER CMA

## 2024-11-11 LAB — DSDNA AB SER-ACNC: 50 IU/ML

## 2024-11-14 ENCOUNTER — LAB (OUTPATIENT)
Dept: LAB | Facility: CLINIC | Age: 49
End: 2024-11-14
Payer: COMMERCIAL

## 2024-11-14 DIAGNOSIS — Z13.220 SCREENING FOR HYPERLIPIDEMIA: Primary | ICD-10-CM

## 2024-11-18 ENCOUNTER — LAB (OUTPATIENT)
Dept: LAB | Facility: CLINIC | Age: 49
End: 2024-11-18
Payer: COMMERCIAL

## 2024-11-18 ENCOUNTER — OFFICE VISIT (OUTPATIENT)
Dept: RHEUMATOLOGY | Facility: CLINIC | Age: 49
End: 2024-11-18
Payer: COMMERCIAL

## 2024-11-18 VITALS
HEART RATE: 101 BPM | SYSTOLIC BLOOD PRESSURE: 132 MMHG | BODY MASS INDEX: 30 KG/M2 | OXYGEN SATURATION: 100 % | RESPIRATION RATE: 16 BRPM | DIASTOLIC BLOOD PRESSURE: 84 MMHG | HEIGHT: 62 IN | WEIGHT: 163 LBS

## 2024-11-18 DIAGNOSIS — Z79.899 HIGH RISK MEDICATION USE: ICD-10-CM

## 2024-11-18 DIAGNOSIS — M32.9 SYSTEMIC LUPUS ERYTHEMATOSUS, UNSPECIFIED SLE TYPE, UNSPECIFIED ORGAN INVOLVEMENT STATUS (H): Primary | ICD-10-CM

## 2024-11-18 DIAGNOSIS — Z13.220 SCREENING FOR HYPERLIPIDEMIA: ICD-10-CM

## 2024-11-18 DIAGNOSIS — B18.1 CHRONIC HEPATITIS B (H): ICD-10-CM

## 2024-11-18 DIAGNOSIS — Z79.899 LONG-TERM USE OF HYDROXYCHLOROQUINE: ICD-10-CM

## 2024-11-18 DIAGNOSIS — E87.6 LOW SERUM POTASSIUM: ICD-10-CM

## 2024-11-18 DIAGNOSIS — I73.00 RAYNAUD'S DISEASE WITHOUT GANGRENE: ICD-10-CM

## 2024-11-18 PROCEDURE — 87517 HEPATITIS B DNA QUANT: CPT

## 2024-11-18 PROCEDURE — G2211 COMPLEX E/M VISIT ADD ON: HCPCS | Performed by: INTERNAL MEDICINE

## 2024-11-18 PROCEDURE — 99214 OFFICE O/P EST MOD 30 MIN: CPT | Performed by: INTERNAL MEDICINE

## 2024-11-18 PROCEDURE — 84132 ASSAY OF SERUM POTASSIUM: CPT

## 2024-11-18 PROCEDURE — 80061 LIPID PANEL: CPT

## 2024-11-18 RX ORDER — HYDROXYCHLOROQUINE SULFATE 200 MG/1
200 TABLET, FILM COATED ORAL DAILY
Qty: 90 TABLET | Refills: 2 | Status: SHIPPED | OUTPATIENT
Start: 2024-11-18

## 2024-11-18 RX ORDER — AMLODIPINE BESYLATE 2.5 MG/1
2.5 TABLET ORAL DAILY
Qty: 90 TABLET | Refills: 2 | Status: SHIPPED | OUTPATIENT
Start: 2024-11-18

## 2024-11-18 ASSESSMENT — PAIN SCALES - GENERAL: PAINLEVEL_OUTOF10: NO PAIN (0)

## 2024-11-18 NOTE — PATIENT INSTRUCTIONS
RHEUMATOLOGY    [facility]  [address] [place] [address]  [place] [address]    Phone number: [phone]  Fax number: [phone]    If you need a medication refill, please contact us as you may need lab work and/or a follow up visit prior to your refill.      Thank you for choosing [facility]!    Randi Rico CMA Rheumatology

## 2024-11-18 NOTE — NURSING NOTE
RAPID3 (0-30) Cumulative Score  1.0          RAPID3 Weighted Score (divide #4 by 3 and that is the weighted score)  0.33

## 2024-11-18 NOTE — PROGRESS NOTES
Rheumatology Clinic Visit      Natasha Lee MRN# [id number]   YOB: 1975 Age: 49 year old      Date of visit: 11/18/24   PCP: Sandy Garcia    Chief Complaint   Patient presents with:  Systemic lupus erythematosus  Flare: Hives- arms and chest x months ago and getting worse     Assessment and Plan     1.  Systemic lupus erythematosus (dsDNA positive, RNP+, hypocomplementemia C3 & C4, malar rash, oral sores, skin lesions, photosensitivity, Raynaud's phenomenon, fatigue, seizure history [grand mal seizure as an infant, absence seizures multiple times from age of [age], no seizure since age of 19 years old]): Reportedly diagnosed at the age of 13 years old.  Established with me on 3/8/2018.  Reportedly treated with hydroxychloroquine when she was initially diagnosed at the age of 13 when she developed oral sores, weight loss, malar rash, and joint aches.  Also reportedly treated with azathioprine; she previously stated that she has not been on azathioprine since at least 2003.  Had been doing well without DMARD therapy for several years but then in 2019 she started to have more erythematous lesions on her arms so hydroxychloroquine was restarted with improvement of these lesions.  Also establish care with dermatology and is now on mycophenolate 1000 mg twice daily at the direction of dermatology with plans to start anifrolumab.  Doing well at this time.  See #2 regarding Raynaud's phenomenon.   Chronic illness  - Continue hydroxychloroquine 200 mg daily (normal eye exam on 2/3/2023 with Dr. Rodriges; yearly eye exam required)  - Mycophenolate 1500 mg in the AM and 1000 mg in the evening (dermatology)  - Planning to start anifrolumab per dermatology   - Labs in 3 months: CBC, Creatinine, Hepatic Panel, ESR, CRP  - Labs in 6 mo: CBC, CMP, ESR, CRP, C3, C4, dsDNA, UA, Uprotein:creatinine    2.  Raynaud's phenomenon: Amlodipine 2.5 mg was used for a short period of time during colder weather months  that was well-tolerated and effective.  She plans to use amlodipine only during colder weather  - Continue amlodipine 2.5 mg daily during colder weather months, as needed for Raynaud's phenomenon    3.  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  Following with Dr. Terry, dermatology.    4. Chronic hepatitis B: Positive since birth, per patient.  Hepatitis B core antibody and surface antigen positive in the past.  Seen by hepatology 1/8/2021 who recommended that HBV ppx be started prior to systemic immunosuppression.  Currently on tenofovir.    5.  Low potassium: Recheck lab today.  If still low she will need to discuss with her PCP  - Lab today: Potassium    Total minutes spent in evaluation with patient, documentation, , and review of pertinent studies and chart notes: 14  The longitudinal plan of care for the rheumatology problem(s) were addressed during this visit.  Due to added complexity of care, we will continue to support the patient and the subsequent management of this condition with ongoing continuity of care.       Ms. Lee verbalized agreement with and understanding of the rational for the diagnosis and treatment plan.  All questions were answered to best of my ability and the patient's satisfaction. Ms. Lee was advised to contact the clinic with any questions that may arise after the clinic visit.      Thank you for involving me in the care of the patient    Return to clinic: 6 months    HPI   Natasha Lee is a 49 year old female with a past medical history significant for chronic hepatitis B, heart murmur, history of cutting (she did this while ago and was associated with depression; had therapy and this resolved many years ago; scarring on her left arm) and SLE who is seen for follow-up of lupus.     11/17/2020, she reports that she is doing well on hydroxychloroquine.  No joint pain.  No morning stiffness.  No rash.  Overall happy  with how well she is doing.  Notes that she is not doing anything to monitor the hepatitis B and would like to see hepatology.      11/3/2021: Patient canceled appointment.    2/23/2022: No-show to scheduled appointment    4/22/2022: Reports that she was lost to follow-up due to the COVID-19 pandemic.  Has been doing well.  No new symptoms.  Raynaud's phenomenon is controlled with cold avoidance and she does well without any loss of tissue on the ends of fingers where the Raynaud's is most active.  No black or bloody stools.  Skin disease is controlled; no new skin lesions she reports but does have chronic changes from the skin lesions on her arms.  On mycophenolate from dermatology.    4/28/2023: Currently doing well.  No joint pain or swelling.  Mild facial erythema that she associates with increased stress.  Since last seen she reports that she had diffuse swelling of her fingers during cold exposure in February and this occurred for about 1 week, 2 episodes  by about 1 month.  She reports having gone to an orthopedic urgent care where there was concern for inflammatory arthritis and she was given a Medrol Dosepak with improvement of her symptoms.  No joint pain or swelling at this time.  No morning stiffness.  She does note that the diffuse finger swelling correlated with cold exposure and increased Raynaud's phenomenon symptoms.    10/30/2023: Reports that she is doing well except for Raynaud's phenomenon.  Red discoloration of her fingers whenever there is cold exposure.  Tries to stay warm the best she can.  No joint pain.  Otherwise stable.  Reports that she is taking hydroxychloroquine and mycophenolate.    4/9/2024: Raynaud's phenomenon controlled well on amlodipine 2.5 mg daily but she thinks that the warmer weather during this winter was also helpful.  Not taking amlodipine currently because it is warmer and her Raynaud's is controlled.  Facial rash and using topical steroids from dermatology.   Needs refills on mycophenolate and hydroxychloroquine.  She notes that at 1 point she realized that she was taking only half the dose of mycophenolate so she is now taking 1000 mg twice daily as it is prescribed.    Today, 11/18/2024: Currently noted phenomenon is controlled.  Has amlodipine for Raynaud's if needed.  Worsening rash on the arms, face, and chest; following with dermatology and planning to start anifrolumab.      Denies fevers, chills, nausea, vomiting, constipation, diarrhea. No abdominal pain.  No black or bloody stools.  No chest pain/pressure, palpitations, or shortness of breath. No LE swelling. No neck pain. No oral or nasal sores currently.  Malar rash hx. mild dry eye during the winter months treated with artificial tears.  No dry mouth.. No eye pain or redness.       Tobacco: None  EtOH: None  Drugs: None  Occupation: Drug and alcohol counselor at [employer]    ROS   12 point review of system was completed and negative except as noted in the HPI     Active Problem List     Patient Active Problem List   Diagnosis    Chronic hepatitis B (H)    Other forms of systemic lupus erythematosus (H)    CARDIOVASCULAR SCREENING; LDL GOAL LESS THAN 160    Dysmenorrhea    Menorrhagia    Allergic rhinitis    Other nonspecific finding on examination of urine    Urinary frequency    Vitamin B12 deficiency (non anemic)    Heart murmur    S/P laparoscopic cholecystectomy    Cervical cancer screening    Cough due to bronchospasm    Enlarged pulmonary artery (H)    Immunosuppression (H)     Past Medical History     Past Medical History:   Diagnosis Date    Diabetes (H)     Pre-Diabetic    Heart murmur 1997    Lupus (systemic lupus erythematosus) (H) 1988    Dr. Prather    Lupus (systemic lupus erythematosus) (H) 12/10/2009    Normal delivery 9/5/09    boy forceps    Seizures (H)     When a child     Past Surgical History     Past Surgical History:   Procedure Laterality Date    ANESTH,SKIN SURGERY,KNEE  1993,  1990    orthoscopic    CHOLECYSTECTOMY  2016    CL AFF SURGICAL PATHOLOGY  2007    LAPAROSCOPIC CHOLECYSTECTOMY N/A 5/18/2016    Procedure: LAPAROSCOPIC CHOLECYSTECTOMY;  Surgeon: Radha Cline MD;  Location: [facility] OR    LAPAROSCOPIC TUBAL LIGATION  12/15/2011    Procedure:LAPAROSCOPIC TUBAL LIGATION; Laparoscopic tubal ligation; Surgeon:AMY WYNN; Location:[facility] OR    OSTEOTOMY FOOT Left 2/13/2019    Procedure: Left Lapidus with  Azael Osteotomy;  Surgeon: Brett Chavez MD;  Location: [facility] OR    ZZC APPENDECTOMY  4/2008     Allergy     Allergies   Allergen Reactions    Carbamazepine And Analogs      Fever, rash    Corn-Containing Products      Current Medication List     Current Outpatient Medications   Medication Sig Dispense Refill    albuterol (PROAIR HFA/PROVENTIL HFA/VENTOLIN HFA) 108 (90 Base) MCG/ACT inhaler Inhale 2 puffs into the lungs every 6 hours 18 g 1    amLODIPine (NORVASC) 2.5 MG tablet Take 1 tablet (2.5 mg) by mouth daily for raynaud's.  Okay to only use during colder weather. 90 tablet 0    amoxicillin-clavulanate (AUGMENTIN) 875-125 MG tablet Take 1 tablet by mouth 2 times daily 20 tablet 0    augmented betamethasone dipropionate (DIPROLENE AF) 0.05 % external cream Apply topically daily. 50 g 5    Cranberry 1000 MG CAPS Take 1 capsule by mouth 4 times daily      DiphenhydrAMINE HCl (BENADRYL PO)       diphenhydrAMINE HCl (Sleep) 50 MG CAPS Take 50 mg by mouth At Bedtime      doxycycline hyclate (VIBRA-TABS) 100 MG tablet Take 1 tablet (100 mg) by mouth 2 times daily 14 tablet 0    hydroxychloroquine (PLAQUENIL) 200 MG tablet Take 1 tablet (200 mg) by mouth daily 90 tablet 3    mycophenolate (GENERIC EQUIVALENT) 500 MG tablet Take 1500 mg (3 pills) in the morning and 1000 mg (2 pills) in the evening. 450 tablet 3    norethindrone-ethinyl estradiol (ORTHO-NOVUM) 1-35 MG-MCG tablet Take 1 tablet by mouth daily 84 tablet 4    omeprazole (PRILOSEC) 20 MG DR capsule Take 1  "capsule (20 mg) by mouth daily 30 capsule 1    predniSONE (DELTASONE) 10 MG tablet 40mg x7 days, 30mg x7 days, 20mg x7 days, 10mg x7 days, 5mg x7 days 75 tablet 1    Spacer/Aero-Holding Chambers (SPACER/AERO-HOLD CHAMBER MASK) REMY 1 Device as needed 1 each 0    tacrolimus (PROTOPIC) 0.1 % external ointment Apply topically 2 times daily. 100 g 3    tenofovir (VIREAD) 300 MG tablet Take 1 tablet (300 mg) by mouth daily 30 tablet 11    Vitamin D, Cholecalciferol, 25 MCG (1000 UT) CAPS Take 1,000 Units by mouth daily 90 capsule 2    Vitamin D3 (CHOLECALCIFEROL) 25 mcg (1000 units) tablet Take 1 tablet (25 mcg) by mouth daily       No current facility-administered medications for this visit.         Social History   See HPI    Family History     Family History   Adopted: Yes   Family history unknown: Yes       Physical Exam     Temp Readings from Last 3 Encounters:   04/16/24 98.7  F (37.1  C) (Temporal)   09/09/19 96.2  F (35.7  C) (Temporal)   08/09/19 99.7  F (37.6  C) (Temporal)     BP Readings from Last 5 Encounters:   04/16/24 138/84   04/09/24 (!) 150/90   07/13/23 113/75   04/22/22 (!) 151/74   10/22/20 131/77     Pulse Readings from Last 1 Encounters:   04/16/24 91     Resp Readings from Last 1 Encounters:   04/16/24 17     Estimated body mass index is 27.84 kg/m  as calculated from the following:    Height as of 4/16/24: 1.575 m (5' 2\").    Weight as of 7/13/23: 69 kg (152 lb 3.2 oz).    GEN: NAD.  HEENT:  Anicteric, noninjected sclera. No obvious external lesions of the ear and nose. Hearing intact.  CV: S1, S2. RRR. No m/r/g  PULM: No increased work of breathing. CTA bilaterally   MSK: MCPs, PIPs, DIPs without swelling or tenderness to palpation.  Wrists without swelling or tenderness to palpation.  Elbows and shoulders without swelling or tenderness to palpation.   Knees, ankles, and MTPs without swelling or tenderness to palpation.    SKIN: Facial erythema going the cheeks, over the nose, including the " nasolabial folds, and the chin.  No evidence of current or previous ischemic insults to the fingers by visual inspection.    PSYCH: Alert. Appropriate.      Labs / Imaging (select studies)     RNP/Sm/SSA/SSB  Recent Labs   Lab Test 03/07/18  1548   RNPIGG 1.8*   SMIGG 0.2   SSAIGG >8.0*   SSBIGG <0.2   SCLIGG <0.2     dsDNA  Recent Labs   Lab Test 11/06/24  1648 04/05/24  1431 09/01/23  1449 07/13/23  1019 04/14/23  1338 04/19/22  1649 02/16/22  1652 11/10/20  1500 11/21/19  1536   DNA 50.0* 42.0* 35.0* 31.0* 30.0* 17.0* 17.0* 20* 12*     C3/C4  Recent Labs   Lab Test 11/06/24  1648 04/05/24  1431 09/01/23  1449 07/13/23  1019 04/14/23  1338 04/19/22  1649 02/16/22  1652 11/10/20  1500 11/21/19  1536   N1XCYGD 76* 68* 74* 71* 69* 74* 68* 64* 56*   [id number] 19 18 20 21 20 19 17 17 16     Antiphospholipid Antibodies  Recent Labs   Lab Test 03/07/18  1548   B2GPG 2.9   B2GPM 1.5   CARDG 5.9   CARDM 0.3   LUPINT Negative     CBC  Recent Labs   Lab Test 11/06/24  1648 04/05/24  1431 07/13/23  1019 02/16/22  1652 02/26/21  1431 01/07/21  1409 11/10/20  1500 11/21/19  1536   WBC 5.8 4.3 4.4   < > 4.7   < > 4.5 4.3   RBC 4.47 4.02 4.42   < > 4.28   < > 4.41 4.08   HGB 14.0 12.5 13.5   < > 13.2   < > 13.6 12.6   HCT 41.0 36.7 40.3   < > 38.6   < > 40.1 36.8   MCV 92 91 91   < > 90   < > 91 90   RDW 12.7 12.3 13.0   < > 12.1   < > 11.9 12.4    236 197   < > 198   < > 185 200   MCH 31.3 31.1 30.5   < > 30.8   < > 30.8 30.9   MCHC 34.1 34.1 33.5   < > 34.2   < > 33.9 34.2   NEUTROPHIL 70 60 69   < > 66.3  --  56.1 59.1   LYMPH 22 29 17   < > 24.3  --  31.0 28.3   MONOCYTE 7 9 11   < > 8.0  --  10.5 10.5   EOSINOPHIL 2 2 2   < > 0.8  --  2.0 1.4   BASOPHIL 0 0 1   < > 0.4  --  0.2 0.5   ANEU  --   --   --   --  3.1  --  2.5 2.5   ALYM  --   --   --   --  1.2  --  1.4 1.2   CALLIE  --   --   --   --  0.4  --  0.5 0.5   AEOS  --   --   --   --  0.0  --  0.1 0.1   ABAS  --   --   --   --  0.0  --  0.0 0.0   ANEUTAUTO 4.0 2.6  3.1   < >  --   --   --   --    ALYMPAUTO 1.3 1.2 0.7*   < >  --   --   --   --    AMONOAUTO 0.4 0.4 0.5   < >  --   --   --   --    AEOSAUTO 0.1 0.1 0.1   < >  --   --   --   --    ABSBASO 0.0 0.0 0.0   < >  --   --   --   --     < > = values in this interval not displayed.     CMP  Recent Labs   Lab Test 11/06/24  1648 04/05/24  1431 07/13/23  1019 04/14/23  1338 04/19/22  1649 02/16/22  1652 02/16/22  1652 02/26/21  1431 01/07/21  1409 11/10/20  1500 11/21/19  1536    140 141 140 142   < > 140 140 139 139 141   POTASSIUM 3.0* 3.6 3.8 3.3* 3.6   < > 3.5 3.7 4.0 3.8 3.6   CHLORIDE 104 108* 108* 112* 108   < > 110* 109 109 108 112*   CO2 24 25 24 28 26   < > 26 29 25 27 27   ANIONGAP 13 7 9 <1* 8   < > 4 2* 5 4 2*   * 111* 100* 91 105*  --  120* 109* 115* 103* 130*   BUN 12.5 12.1 10.0 9 10   < > 14 13 11 13 10   CR 0.71 0.57 0.63 0.58 0.72   < > 0.67 0.71 0.64 0.60 0.64   GFRESTIMATED >90 >90 >90 >90 >90   < > >90 >90 >90 >90 >90   GFRESTBLACK  --   --   --   --   --   --   --  >90 >90 >90 >90   JUAN A 9.0 8.5* 8.6 8.8 9.0   < > 8.2* 8.8 8.6 8.4* 8.8   BILITOTAL 0.3 0.2 0.5 0.4 0.3   < > 0.3 0.2 0.3 0.3 0.2   ALBUMIN 4.3 4.0 4.1 4.1 4.0   < > 3.9 3.9 3.8 3.8 3.7   PROTTOTAL 7.5 6.7 6.7 7.1 7.1   < > 7.1 6.9 6.9 7.2 6.8   ALKPHOS 76 61 66 84 77   < > 77 81 62 80 85   AST 51* 27 30 23 16   < > 20 25 23 30 26   ALT 50 32 34 36 35   < > 34 39 38 46 44    < > = values in this interval not displayed.     Calcium/VitaminD  Recent Labs   Lab Test 11/06/24  1648 04/05/24  1431 07/13/23  1019   JUAN A 9.0 8.5* 8.6   VITDT 25  --   --      ESR/CRP  Recent Labs   Lab Test 11/06/24  1648 04/05/24  1431 09/01/23  1449 04/14/23  1338 04/19/22  1649 02/16/22  1652   SED 12 13  --  10  --   --    CRP  --   --   --  4.5 4.3 <2.9   CRPI 6.84* 5.30* <3.00  --   --   --      CK/Aldolase  Recent Labs   Lab Test 04/14/23  1338 11/10/20  1500 06/07/18  1347   CKT 52 69 87     Lipid Panel  Recent Labs   Lab Test 07/13/23  1019    CHOL 173   TRIG 75   HDL 59   LDL 99   NHDL 114     Hepatitis B  Recent Labs   Lab Test 01/07/21  1409 11/16/18  1249 03/07/18  1548   AUSAB  --  1.76  --    HBCAB  --  Reactive*  --    HEPBANG  --  Reactive*  --    HBQLOG <1.3  --  2.1*     Hepatitis C  Recent Labs   Lab Test 11/16/18  1249   HCVAB Nonreactive     HIV Screening  Recent Labs   Lab Test 11/16/18  1249   HIAGAB Nonreactive     UA  Recent Labs   Lab Test 11/06/24  1640 04/05/24  1431 12/12/23  1532 11/28/23  1155 11/10/20  1501 02/19/19  1300 06/07/18  1347 03/07/18  1548   COLOR Light Yellow Light Yellow Colorless Yellow   < >  --    < > Yellow   APPEARANCE Clear Clear Clear Clear   < >  --    < > Clear   URINEGLC Negative Negative Negative Negative   < >  --    < > Negative   URINEBILI Negative Negative Negative Negative   < >  --    < > Negative   SG 1.006 1.022 1.007 1.027   < >  --    < > 1.025   URINEPH 6.5 6.5 6.5 5.5   < >  --    < > 5.5   PROTEIN Negative Negative Negative 30*   < >  --    < > Negative   UROBILINOGEN  --   --   --   --   --   --   --  0.2   NITRITE Negative Negative Negative Negative   < >  --    < > Negative   UBLD Negative Moderate* Negative Negative   < >  --    < > Negative   LEUKEST Negative Negative Negative Negative   < >  --    < > Negative   WBCU  --  0-5 None Seen 0-5   < > 0 - 5  --   --    RBCU  --  2-5* None Seen None Seen   < > O - 2  --   --    SQUAMOUSEPI  --   --   --   --   --  Few  --   --    BACTERIA  --  Moderate* Few* Few*   < > Few*  --   --     < > = values in this interval not displayed.     Urine Microscopic  Recent Labs   Lab Test 04/05/24  1431 12/12/23  1532 11/28/23  1155 02/16/22  1652 02/19/19  1300   WBCU 0-5 None Seen 0-5   < > 0 - 5   RBCU 2-5* None Seen None Seen   < > O - 2   SQUAMOUSEPI  --   --   --   --  Few   BACTERIA Moderate* Few* Few*   < > Few*    < > = values in this interval not displayed.     Urine Protein  GHUTP and UTP= Urine protein (random), GHUTPG and UTPG = urine  protein:creatinine ratio (random), UCRR = urine creatinine (random)  Recent Labs   Lab Test 11/06/24  1640 04/05/24  1431 04/14/23  1338 04/19/22  1649 02/16/22  1652 02/16/22  1652 11/10/20  1501   GHUTP <6.0 9.8 7.0  --   --   --   --    UTP  --   --   --  0.12  --  0.13 <0.05   GHUTPG  --  0.10 0.11  --   --   --   --    UTPG  --   --   --  0.14  --  0.08 Unable to calculate due to low value   UCRR 28.9 96.1 65.9 88   < > 161 10    < > = values in this interval not displayed.     Immunization History     Immunization History   Administered Date(s) Administered    COVID-19 Vaccine (Bindu) 06/04/2021    Influenza (H1N1) 12/04/2009    Influenza (IIV3) PF 12/18/2000, 09/22/2009, 11/17/2011, 11/01/2012, 09/22/2014    Influenza Vaccine >6 months,quad, PF 09/13/2013, 09/26/2013, 09/25/2014, 09/30/2015, 10/01/2015, 10/24/2017, 11/02/2018, 10/03/2019, 10/14/2020, 10/06/2021, 10/19/2022, 10/26/2023    Influenza Vaccine, 6+MO IM (QUADRIVALENT W/PRESERVATIVES) 09/29/2015, 10/07/2016    Varicella 02/08/2013, 04/11/2013          Chart documentation done in part with Dragon Voice recognition Software. Although reviewed after completion, some word and grammatical error may remain.      Christopher Zaman MD

## 2024-11-19 LAB
CHOLEST SERPL-MCNC: 181 MG/DL
FASTING STATUS PATIENT QL REPORTED: NO
HDLC SERPL-MCNC: 50 MG/DL
LDLC SERPL CALC-MCNC: 106 MG/DL
NONHDLC SERPL-MCNC: 131 MG/DL
POTASSIUM SERPL-SCNC: 3.6 MMOL/L (ref 3.4–5.3)
TRIGL SERPL-MCNC: 126 MG/DL

## 2024-11-20 LAB
HBV DNA SERPL NAA+PROBE-ACNC: 59 IU/ML
HBV DNA SERPL NAA+PROBE-LOG IU: 1.8 {LOG_IU}/ML

## 2024-11-21 NOTE — RESULT ENCOUNTER NOTE
Ky Lee,    Attached are your test results.  -LDL(bad) cholesterol level is elevated which can increase your heart disease risk.  A diet high in fat and simple carbohydrates, genetics and being overweight can contribute to this. ADVISE: exercising 150 minutes of aerobic exercise per week (30 minutes for 5 days per week or 50 minutes for 3 days per week are options) and eating a low saturated fat/low carbohydrate diet are helpful to improve this. In 12 months, you should recheck your fasting cholesterol panel by scheduling a lab-only appointment.   Please contact us if you have any questions.    Sandy Garcia, CNP

## 2024-11-27 ENCOUNTER — E-VISIT (OUTPATIENT)
Dept: DERMATOLOGY | Facility: CLINIC | Age: 49
End: 2024-11-27
Payer: COMMERCIAL

## 2024-11-27 DIAGNOSIS — L93.2 CUTANEOUS LUPUS ERYTHEMATOSUS: Primary | ICD-10-CM

## 2024-11-27 RX ORDER — PREDNISONE 10 MG/1
20 TABLET ORAL DAILY
Qty: 60 TABLET | Refills: 3 | Status: SHIPPED | OUTPATIENT
Start: 2024-11-27

## 2024-11-27 NOTE — TELEPHONE ENCOUNTER
Provider E-Visit time total (minutes): 10    S:   Question 11/27/2024 10:34 AM CST - Filed by Patient   Which of the following do you think you may be experiencing? New rash of concern   When did this start/when did you notice it? For one to four weeks   Where is it located? Face    Scalp    Arms    Chest    Neck/shoulders   What symptoms does it have if any? Other   If other, please list: Tender and hyper sensitive to touch    New symtpom:  small blister clusters that come and go on the arms (was not able to get a photo)         O: widespread erythematous scaly patches and plaques on the face, chest, and neck    A/P: Cutaneous lupus flare, will start prednisone while awaiting switch to anifrolumab.

## 2025-01-09 ENCOUNTER — PATIENT OUTREACH (OUTPATIENT)
Dept: CARE COORDINATION | Facility: CLINIC | Age: 50
End: 2025-01-09
Payer: COMMERCIAL

## 2025-01-23 DIAGNOSIS — B18.1 CHRONIC HEPATITIS B (H): Primary | ICD-10-CM

## 2025-02-06 PROBLEM — J98.01 COUGH DUE TO BRONCHOSPASM: Status: RESOLVED | Noted: 2022-09-26 | Resolved: 2025-02-06

## 2025-02-12 NOTE — CONFIDENTIAL NOTE
DIAGNOSIS:  Hepatitis B infection without delta agent without hepatic coma, unspecified chronicity    Appt Date:  03.26.2025     NOTES STATUS DETAILS   OFFICE NOTE from referring provider Internal 11.22.2024 Corey Terry MD    OFFICE NOTES from other specialists Internal 04.16.2024 Sandy Garcia APRN CNP    MEDICATION LIST Internal    LABS     BASIC METABOLIC PANEL Internal 01.07.2021   COMPLETE METABOLIC PANEL Internal 11.06.2024   COMPLETE BLOOD COUNT (CBC) Internal 11.06.2024    INTERNATIONAL NORMALIZED RATIO (INR) Internal 01.07.2021

## 2025-02-27 ENCOUNTER — LAB (OUTPATIENT)
Dept: LAB | Facility: CLINIC | Age: 50
End: 2025-02-27
Payer: COMMERCIAL

## 2025-02-27 DIAGNOSIS — B18.1 CHRONIC HEPATITIS B (H): ICD-10-CM

## 2025-02-27 DIAGNOSIS — M32.9 SYSTEMIC LUPUS ERYTHEMATOSUS, UNSPECIFIED SLE TYPE, UNSPECIFIED ORGAN INVOLVEMENT STATUS (H): ICD-10-CM

## 2025-02-27 LAB
AFP SERPL-MCNC: 4 NG/ML
ALBUMIN SERPL BCG-MCNC: 4.2 G/DL (ref 3.5–5.2)
ALP SERPL-CCNC: 77 U/L (ref 40–150)
ALT SERPL W P-5'-P-CCNC: 69 U/L (ref 0–50)
ANION GAP SERPL CALCULATED.3IONS-SCNC: 11 MMOL/L (ref 7–15)
AST SERPL W P-5'-P-CCNC: 44 U/L (ref 0–45)
BASOPHILS # BLD AUTO: 0 10E3/UL (ref 0–0.2)
BASOPHILS NFR BLD AUTO: 0 %
BILIRUB DIRECT SERPL-MCNC: 0.16 MG/DL (ref 0–0.3)
BILIRUB SERPL-MCNC: 0.3 MG/DL
BUN SERPL-MCNC: 10.7 MG/DL (ref 6–20)
CALCIUM SERPL-MCNC: 9.5 MG/DL (ref 8.8–10.4)
CHLORIDE SERPL-SCNC: 102 MMOL/L (ref 98–107)
CREAT SERPL-MCNC: 0.59 MG/DL (ref 0.51–0.95)
EGFRCR SERPLBLD CKD-EPI 2021: >90 ML/MIN/1.73M2
EOSINOPHIL # BLD AUTO: 0 10E3/UL (ref 0–0.7)
EOSINOPHIL NFR BLD AUTO: 0 %
ERYTHROCYTE [DISTWIDTH] IN BLOOD BY AUTOMATED COUNT: 12.3 % (ref 10–15)
GLUCOSE SERPL-MCNC: 157 MG/DL (ref 70–99)
HBV SURFACE AB SERPL IA-ACNC: <3.5 M[IU]/ML
HBV SURFACE AB SERPL IA-ACNC: NONREACTIVE M[IU]/ML
HCO3 SERPL-SCNC: 26 MMOL/L (ref 22–29)
HCT VFR BLD AUTO: 41.3 % (ref 35–47)
HGB BLD-MCNC: 14.1 G/DL (ref 11.7–15.7)
IMM GRANULOCYTES # BLD: 0.1 10E3/UL
IMM GRANULOCYTES NFR BLD: 1 %
INR PPP: 0.87 (ref 0.85–1.15)
LYMPHOCYTES # BLD AUTO: 0.9 10E3/UL (ref 0.8–5.3)
LYMPHOCYTES NFR BLD AUTO: 9 %
MCH RBC QN AUTO: 30.8 PG (ref 26.5–33)
MCHC RBC AUTO-ENTMCNC: 34.1 G/DL (ref 31.5–36.5)
MCV RBC AUTO: 90 FL (ref 78–100)
MONOCYTES # BLD AUTO: 0.2 10E3/UL (ref 0–1.3)
MONOCYTES NFR BLD AUTO: 2 %
NEUTROPHILS # BLD AUTO: 8.7 10E3/UL (ref 1.6–8.3)
NEUTROPHILS NFR BLD AUTO: 88 %
NRBC # BLD AUTO: 0 10E3/UL
NRBC BLD AUTO-RTO: 0 /100
PLATELET # BLD AUTO: 225 10E3/UL (ref 150–450)
POTASSIUM SERPL-SCNC: 4 MMOL/L (ref 3.4–5.3)
PROT SERPL-MCNC: 7.2 G/DL (ref 6.4–8.3)
RBC # BLD AUTO: 4.58 10E6/UL (ref 3.8–5.2)
SODIUM SERPL-SCNC: 139 MMOL/L (ref 135–145)
WBC # BLD AUTO: 9.8 10E3/UL (ref 4–11)

## 2025-02-28 NOTE — LETTER
11/2/2021         RE: Natasha Lee  [address] [facility] [id number]        Dear Colleague,    Thank you for referring your patient, Natasha Lee, to [facility]. Please see a copy of my visit note below.    Chief Complaint   Patient presents with     Blurred Vision Evaluation     Blurred Vision Evaluation       HPI      Blurred Vision Evaluation      Laterality:  left eye     Onset:  gradual     Onset:  2 weeks ago     Quality:  blurred vision     Severity:  moderate     Frequency:  constantly     Associated symptoms:  double vision, redness, trauma, and foreign body sensation     Treatments tried:  eye drops     Response to treatment:  mild improvement         Comments      Patient was poked in os eye with make up brush 2 weeks ago and went to urgent care and was given eye drops. Patient having double vision in os eye.                  Was prescribed ofloxacin 4 x day and patanol.  Using antibiotic but Patanol irritated the eye so not using.  Patient does  Not wear contacts       Medical, surgical and family histories reviewed and updated 11/2/2021.       OBJECTIVE: See Ophthalmology exam    ASSESSMENT:    ICD-10-CM    1. Blurred vision  H53.8    2. Microcystic edema of left cornea  H18.20 loteprednol (LOTEMAX) 0.5 % ophthalmic suspension      PLAN:     Patient Instructions   Discontinue all other eye drops.    Loteprednol or substitute- 1 drop left eye 4 x day for 7 days then 3 x day for 7 days.    Sometimes vision gets a little worse before it gets better.    Return in 2 weeks for recheck or sooner if any new changes or sudden blurred vision.    Mauricio Carrizales, DAYSI           Again, thank you for allowing me to participate in the care of your patient.        Sincerely,        Mauricio Carrizales, OD     ----- Message from Sha sent at 2/28/2025  1:06 PM CST -----  Regarding: Alan  Type:  Patient Returning Call Who Called: Alan Who Left Message for Patient: Lexus Solis Does the patient know what this is regarding?: Yes Would the patient rather a call back or a response via [facility]? Callback Best Call Back Number:.[phone] Additional Information:

## 2025-03-02 LAB
HBV E AB SERPL QL IA: POSITIVE
HBV E AG SERPL QL IA: NEGATIVE

## 2025-03-04 ENCOUNTER — PATIENT OUTREACH (OUTPATIENT)
Dept: CARE COORDINATION | Facility: CLINIC | Age: 50
End: 2025-03-04
Payer: COMMERCIAL

## 2025-03-11 DIAGNOSIS — B18.1 CHRONIC HEPATITIS B (H): Primary | ICD-10-CM

## 2025-03-22 ENCOUNTER — HEALTH MAINTENANCE LETTER (OUTPATIENT)
Age: 50
End: 2025-03-22

## 2025-03-26 ENCOUNTER — PRE VISIT (OUTPATIENT)
Dept: GASTROENTEROLOGY | Facility: CLINIC | Age: 50
End: 2025-03-26

## 2025-03-26 ENCOUNTER — VIRTUAL VISIT (OUTPATIENT)
Dept: GASTROENTEROLOGY | Facility: CLINIC | Age: 50
End: 2025-03-26
Attending: DERMATOLOGY
Payer: COMMERCIAL

## 2025-03-26 VITALS — BODY MASS INDEX: 30.36 KG/M2 | HEIGHT: 62 IN | WEIGHT: 165 LBS

## 2025-03-26 DIAGNOSIS — B18.1 CHRONIC HEPATITIS B (H): ICD-10-CM

## 2025-03-26 DIAGNOSIS — D84.9 IMMUNOSUPPRESSION: Primary | ICD-10-CM

## 2025-03-26 RX ORDER — TENOFOVIR DISOPROXIL FUMARATE 300 MG/1
300 TABLET, FILM COATED ORAL DAILY
Qty: 90 TABLET | Refills: 3 | Status: SHIPPED | OUTPATIENT
Start: 2025-03-26

## 2025-03-26 ASSESSMENT — PAIN SCALES - GENERAL: PAINLEVEL_OUTOF10: NO PAIN (0)

## 2025-03-26 NOTE — Clinical Note
Please reach out to patient to schedule ultrasound in near future, repeat in six months, labs/clinic/ulttrasound in one year.   Thanks, GG

## 2025-03-26 NOTE — LETTER
3/26/2025      Natasha Lee  [address] [facility] [address] [facility] [address]      Dear Colleague,    Thank you for referring your patient, Natasha Lee, to the [facility] [place]. Please see a copy of my visit note below.    Virtual Visit Details    Type of service:  Video Visit   Joined the call at 3/26/2025, 9:05:45 am.  Left the call at 3/26/2025, 9:20:23 am.  Originating Location (pt. Location): Home  Distant Location (provider location):  Off-site  Platform used for Video Visit: [facility]    Hepatology Clinic note  Natasha Lee   Date of Birth 1975         Assessment/plan:   Natasha Lee is a 50 year old female  chronic hepatitis B, e antigen negative and e antibody positive who has been started on hepatitis B reactivation prophylaxis due to immunosuppression for active cutaneous SLE.  Suspect recent increase in transaminases due to overlapping metabolic steatosis, given need for high doses of prednisone   HBV DNA: Historically low  ALT/AST: ALT 50s to 60s and AST 40s to 50s   Fibrosis assessment:   Liver Function:   Normal    # Chronic Hepatis B, suppressed:  - Continue tenofovir disoproxil 300 mg daily for reactivation prophylaxis in the setting of immunosuppression for SLE    # Elevated transaminases, mildly elevated:  # Suspect overlapping metabolic associated fatty liver disease (MALFD):   - Low  suspicion for autoimmune hepatitis given she is currently on 20 mg of prednisone and high doses of mycophenolate, which would be be treatment of AIH.  Regardless, will monitor for other overlapping processes of chronic hepatobiliary disease.  Low suspicion of drug-induced liver injury.     - Patient risk factors making them high likelihood of over-lapping metabolic fatty liver disease. (Risk factors: obesity, visceral adiposity, elevated blood pressure readings, increased blood glucose levels)   - Will obtain a fibrosis scan to evaluate any fibrosis in  future  - Recommend slow gradual weight loss.   - Maintain good control of cholesterol (No contraindication to starting a statin with LFT elevations)   - Recommend more aggressive optimization blood glucose/insulin resistance as needed. Primarily care can consider GLP-1 agonist (semliglutide or liraglutide) for both insulin resistance and help with weight loss or pioglitizone   - Improve nutrition: continue limiting carbohydrates, especially simple carbohydrates, and following Mediterranean eating patten  - Increase physical activity as able, ideally 150 minutes weekly  - Limit alcohol intake to not more than 3 drinks a week (she does not drink alcohol)     # HCC screening, due:  - US abdomen in in the near future   - FibroScan in the near future to assess baseline fibrosis     #Cutaneous lupus, increased activity:  - No contraindications to moving forward with anifrolumab from a hepatology standpoint.  Monitor transaminases as typically recommended.     # Follow-up in clinic in 6 months    Jazmyn Urrutia PA-C   [facility] Hepat[facility]    Total time for E/M services performed on the date of the encounter 30 minutes.  This included review of previous: clinic visits, hospital records, lab results, imaging studies, and procedural documentation. Time also includes patient visit, documentation and discussion with other providers.  The findings from this review are summarized in the above note.   The longitudinal plan of care for the diagnosis(es)/condition(s) as documented were addressed during this visit. Due to the added complexity in care, I will continue to support Natasha Lee in the subsequent management and with ongoing continuity of care.    -----------------------------------------------------       HPI:   Natasha Lee is a 50 year old female who presents to clinic today for the following health issues:    Hepatitis B   -E antigen negative  -E antibody positive  -Diagnosed:  infancy  -History: Likely MTC  -Prior biopsy: No   -Prior treatments: Started tenofovir TDF within the past month for reactivation prophylaxis in the setting of increased immunosuppression for SLE. (High-dose prednisone prolonged, mycophenolate and potential biologic)    Patient was last seen by me on 1/8/2021.     For SLE, she has been maintained on CellCept daily and Plaquenil for quite a while.  Reports that cutaneous lupus has been more active and her dermatologist is considering switching to one of the infusions.  20 mg prednisone daily since November.      Started tenofovir disoproxil 300 mg daily for the past few monthsNo problems with side effect.     Appetite is okay.  Weight is relatively stable, fluctuates up and down a little bit.  No big shifts at this time.  BMI 30.  Reports that more increase in stress lately, tends to stress eat with carbs.  She is having regular bowel movements.     Patient denies jaundice, lower extremity edema, abdominal distension or confusion.    Patient also denies melena, hematochezia or hematemesis.  Patient denies weight loss, fevers, sweats or chills.    PMH:    has a past medical history of Diabetes (H), Heart murmur (1997), Lupus (systemic lupus erythematosus) (H) (1988), Lupus (systemic lupus erythematosus) (H) (12/10/2009), Normal delivery (9/5/09), and Seizures (H).  PSH:   has a past surgical history that includes SURGICAL PATHOLOGY (2007); anesth,skin surgery,knee (1993, 1990); APPENDECTOMY (4/2008); Laparoscopic tubal ligation (12/15/2011); Laparoscopic cholecystectomy (N/A, 5/18/2016); Cholecystectomy (2016); and Osteotomy foot (Left, 2/13/2019).       She is not drinking alcohol.  Hepatitis B:   Lab Results   Component Value Date    HEPBANG Reactive (AA) 11/16/2018    HBCAB Reactive (AA) 11/16/2018    AUSAB <3.50 02/27/2025    HCVAB Nonreactive 11/16/2018     Lab Results   Component Value Date    HBQRESINST 70 (H) 02/27/2025    HBQRESINST 59 (H) 11/18/2024     "HBQRES <20 (A) 01/07/2021    HBQRES 120 (A) 03/07/2018     Lab Results   Component Value Date    HBEABY Positive (A) 02/27/2025    HBEAGN Negative 02/27/2025       Recent Labs   Lab Test 02/27/25  1324 11/06/24  1648 04/05/24  1431 07/13/23  1019 04/14/23  1338 04/19/22  1649 02/16/22  1652 02/26/21  1431 01/07/21  1409 11/10/20  1500   ALKPHOS 77 76 61 66 84 77 77 81 62 80   ALT 69* 50 32 34 36 35 34 39 38 46   AST 44 51* 27 30 23 16 20 25 23 30        Medical hx Surgical hx   Past Medical History:   Diagnosis Date     Diabetes (H)      Heart murmur 1997     Lupus (systemic lupus erythematosus) (H) 1988     Lupus (systemic lupus erythematosus) (H) 12/10/2009     Normal delivery 9/5/09     Seizures (H)     Past Surgical History:   Procedure Laterality Date     ANESTH,SKIN SURGERY,KNEE  1993, 1990    orthoscopic     CHOLECYSTECTOMY  2016     CL AFF SURGICAL PATHOLOGY  2007     LAPAROSCOPIC CHOLECYSTECTOMY N/A 5/18/2016    Procedure: LAPAROSCOPIC CHOLECYSTECTOMY;  Surgeon: Radha Cline MD;  Location: [facility] OR     LAPAROSCOPIC TUBAL LIGATION  12/15/2011    Procedure:LAPAROSCOPIC TUBAL LIGATION; Laparoscopic tubal ligation; Surgeon:AMY WYNN; Location:[facility] OR     OSTEOTOMY FOOT Left 2/13/2019    Procedure: Left Lapidus with  Azael Osteotomy;  Surgeon: Brett Chavez MD;  Location:  OR     ZZC APPENDECTOMY  4/2008               Physical Exam:   Ht 1.575 m (5' 2\")   Wt 74.8 kg (165 lb)   BMI 30.18 kg/m      GENERAL: healthy, alert and no distress  EYES: Eyes grossly normal to inspection, conjunctivae and sclerae normal  RESP: no audible wheeze, cough, or visible cyanosis.  No visible retractions or increased work of breathing.  Able to speak fully in complete sentences  NEURO: Cranial nerves grossly intact, mentation intact and speech normal  PSYCH: mentation appears normal, affect normal/bright, judgement and insight intact, normal speech and appearance well-groomed         Data:   Reviewed " in person and significant for:    Lab Results   Component Value Date     02/27/2025     02/26/2021      Lab Results   Component Value Date    POTASSIUM 4.0 02/27/2025    POTASSIUM 3.3 04/14/2023    POTASSIUM 3.7 02/26/2021     Lab Results   Component Value Date    CHLORIDE 102 02/27/2025    CHLORIDE 112 04/14/2023    CHLORIDE 109 02/26/2021     Lab Results   Component Value Date    CO2 26 02/27/2025    CO2 28 04/14/2023    CO2 29 02/26/2021     Lab Results   Component Value Date    BUN 10.7 02/27/2025    BUN 9 04/14/2023    BUN 13 02/26/2021     Lab Results   Component Value Date    CR 0.59 02/27/2025    CR 0.71 02/26/2021       Lab Results   Component Value Date    WBC 9.8 02/27/2025    WBC 4.7 02/26/2021     Lab Results   Component Value Date    HGB 14.1 02/27/2025    HGB 13.2 02/26/2021     Lab Results   Component Value Date    HCT 41.3 02/27/2025    HCT 38.6 02/26/2021     Lab Results   Component Value Date    MCV 90 02/27/2025    MCV 90 02/26/2021     Lab Results   Component Value Date     02/27/2025     02/26/2021       Lab Results   Component Value Date    AST 44 02/27/2025    AST 25 02/26/2021     Lab Results   Component Value Date    ALT 69 02/27/2025    ALT 39 02/26/2021     Lab Results   Component Value Date    BILICONJ 0.0 09/22/2009      Lab Results   Component Value Date    BILITOTAL 0.3 02/27/2025    BILITOTAL 0.2 02/26/2021       Lab Results   Component Value Date    ALBUMIN 4.2 02/27/2025    ALBUMIN 4.1 04/14/2023    ALBUMIN 3.9 02/26/2021     Lab Results   Component Value Date    PROTTOTAL 7.2 02/27/2025    PROTTOTAL 6.9 02/26/2021      Lab Results   Component Value Date    ALKPHOS 77 02/27/2025    ALKPHOS 81 02/26/2021       Lab Results   Component Value Date    INR 0.87 02/27/2025    INR 0.87 01/07/2021       SSION:  1.  No pulmonary embolism or other acute abnormality in the chest.  2.  Mildly enlarged main pulmonary artery, nonspecific but may be seen in the setting of  pulmonary arterial hypertension.    REPORT SIGNED BY DR. Quinton Strong  Narrative    EXAM:  CT CHEST PULMONARY ANGIO    DATE:  4/13/2024 10:03 PM    CLINICAL DATA:  R07.9 Chest pain, unspecified    ADDITIONAL CLINICAL DATA:  Chest Pain    COMPARISON: None available    TECHNIQUE:  Helical CT angiography obtained through the chest and pulmonary arterial phase after the administration of intravenous contrast. Multiplanar reformats including reformatted MIP images were obtained.    CONTRAST:  IOHEXOL 350 MGI/ML    Dose Given: 80 mL    FINDINGS:    CHEST:    Vasculature: Adequate opacification of the pulmonary arteries. Somewhat limited evaluation of the subsegmental pulmonary arteries due to motion artifact. No pulmonary embolism identified. Mildly enlarged main pulmonary artery measuring up to 3.3 cm. No CT evidence of right heart strain.    Cardiovascular: Normal heart size, without pericardial effusion. Aorta is normal in caliber. No evidence of aortic dissection. Patent great vessel origins. Diminutive left vertebral artery.    Lungs and Pleura: Dependent atelectasis. No focal consolidation. No suspicious pulmonary nodules. No pleural effusion or pneumothorax.    Airways: Trachea and central airways are clear.    Mediastinum: No mediastinal or hilar lymphadenopathy.    Lower neck and chest wall: Within normal limits. Borderline enlarged bilateral axillary lymph nodes, nonspecific but likely reactive.    UPPER ABDOMEN: Limited evaluation the upper abdomen demonstrates no acute abnormality. Cholecystectomy.    MUSCULOSKELETAL: No acute osseous abnormality. No suspicious osseous lesion.  Exam End: 04/13/24 10:12 PM    Specimen Collected: 04/13/24 10:20 PM Last Resulted: 04/13/24 10:27 PM         Again, thank you for allowing me to participate in the care of your patient.        Sincerely,        Jazmyn Urrutia PA-C    Electronically signed

## 2025-03-26 NOTE — PROGRESS NOTES
Virtual Visit Details    Type of service:  Video Visit   Joined the call at 3/26/2025, 9:05:45 am.  Left the call at 3/26/2025, 9:20:23 am.  Originating Location (pt. Location): Home  Distant Location (provider location):  Off-site  Platform used for Video Visit: [facility]    Hepatology Clinic note  Natasha Lee   Date of Birth 1975         Assessment/plan:   Natasha eLe is a 50 year old female  chronic hepatitis B, e antigen negative and e antibody positive who has been started on hepatitis B reactivation prophylaxis due to immunosuppression for active cutaneous SLE.  Suspect recent increase in transaminases due to overlapping metabolic steatosis, given need for high doses of prednisone   HBV DNA: Historically low  ALT/AST: ALT 50s to 60s and AST 40s to 50s   Fibrosis assessment:   Liver Function:   Normal    # Chronic Hepatis B, suppressed:  - Continue tenofovir disoproxil 300 mg daily for reactivation prophylaxis in the setting of immunosuppression for SLE    # Elevated transaminases, mildly elevated:  # Suspect overlapping metabolic associated fatty liver disease (MALFD):   - Low  suspicion for autoimmune hepatitis given she is currently on 20 mg of prednisone and high doses of mycophenolate, which would be be treatment of AIH.  Regardless, will monitor for other overlapping processes of chronic hepatobiliary disease.  Low suspicion of drug-induced liver injury.     - Patient risk factors making them high likelihood of over-lapping metabolic fatty liver disease. (Risk factors: obesity, visceral adiposity, elevated blood pressure readings, increased blood glucose levels)   - Will obtain a fibrosis scan to evaluate any fibrosis in future  - Recommend slow gradual weight loss.   - Maintain good control of cholesterol (No contraindication to starting a statin with LFT elevations)   - Recommend more aggressive optimization blood glucose/insulin resistance as needed. Primarily care can consider GLP-1  agonist (semliglutide or liraglutide) for both insulin resistance and help with weight loss or pioglitizone   - Improve nutrition: continue limiting carbohydrates, especially simple carbohydrates, and following Mediterranean eating patten  - Increase physical activity as able, ideally 150 minutes weekly  - Limit alcohol intake to not more than 3 drinks a week (she does not drink alcohol)     # HCC screening, due:  - US abdomen in in the near future   - FibroScan in the near future to assess baseline fibrosis     #Cutaneous lupus, increased activity:  - No contraindications to moving forward with anifrolumab from a hepatology standpoint.  Monitor transaminases as typically recommended.     # Follow-up in clinic in 6 months    Jazmyn Urrutia PA-C   [facility] Hepatology clinic    Total time for E/M services performed on the date of the encounter 30 minutes.  This included review of previous: clinic visits, hospital records, lab results, imaging studies, and procedural documentation. Time also includes patient visit, documentation and discussion with other providers.  The findings from this review are summarized in the above note.   The longitudinal plan of care for the diagnosis(es)/condition(s) as documented were addressed during this visit. Due to the added complexity in care, I will continue to support Natasha Lee in the subsequent management and with ongoing continuity of care.    -----------------------------------------------------       HPI:   Natasha Lee is a 50 year old female who presents to clinic today for the following health issues:    Hepatitis B   -E antigen negative  -E antibody positive  -Diagnosed: infancy  -History: Likely MTC  -Prior biopsy: No   -Prior treatments: Started tenofovir TDF within the past month for reactivation prophylaxis in the setting of increased immunosuppression for SLE. (High-dose prednisone prolonged, mycophenolate and potential biologic)    Patient  was last seen by me on 1/8/2021.     For SLE, she has been maintained on CellCept daily and Plaquenil for quite a while.  Reports that cutaneous lupus has been more active and her dermatologist is considering switching to one of the infusions.  20 mg prednisone daily since November.      Started tenofovir disoproxil 300 mg daily for the past few monthsNo problems with side effect.     Appetite is okay.  Weight is relatively stable, fluctuates up and down a little bit.  No big shifts at this time.  BMI 30.  Reports that more increase in stress lately, tends to stress eat with carbs.  She is having regular bowel movements.     Patient denies jaundice, lower extremity edema, abdominal distension or confusion.    Patient also denies melena, hematochezia or hematemesis.  Patient denies weight loss, fevers, sweats or chills.    PMH:    has a past medical history of Diabetes (H), Heart murmur (1997), Lupus (systemic lupus erythematosus) (H) (1988), Lupus (systemic lupus erythematosus) (H) (12/10/2009), Normal delivery (9/5/09), and Seizures (H).  PSH:   has a past surgical history that includes SURGICAL PATHOLOGY (2007); anesth,skin surgery,knee (1993, 1990); APPENDECTOMY (4/2008); Laparoscopic tubal ligation (12/15/2011); Laparoscopic cholecystectomy (N/A, 5/18/2016); Cholecystectomy (2016); and Osteotomy foot (Left, 2/13/2019).       She is not drinking alcohol.  Hepatitis B:   Lab Results   Component Value Date    HEPBANG Reactive (AA) 11/16/2018    HBCAB Reactive (AA) 11/16/2018    AUSAB <3.50 02/27/2025    HCVAB Nonreactive 11/16/2018     Lab Results   Component Value Date    HBQRESINST 70 (H) 02/27/2025    HBQRESINST 59 (H) 11/18/2024    HBQRES <20 (A) 01/07/2021    HBQRES 120 (A) 03/07/2018     Lab Results   Component Value Date    HBEABY Positive (A) 02/27/2025    HBEAGN Negative 02/27/2025       Recent Labs   Lab Test 02/27/25  1324 11/06/24  1648 04/05/24  1431 07/13/23  1019 04/14/23  1338 04/19/22  6799  "02/16/22  1652 02/26/21  1431 01/07/21  1409 11/10/20  1500   ALKPHOS 77 76 61 66 84 77 77 81 62 80   ALT 69* 50 32 34 36 35 34 39 38 46   AST 44 51* 27 30 23 16 20 25 23 30        Medical hx Surgical hx   Past Medical History:   Diagnosis Date    Diabetes (H)     Heart murmur 1997    Lupus (systemic lupus erythematosus) (H) 1988    Lupus (systemic lupus erythematosus) (H) 12/10/2009    Normal delivery 9/5/09    Seizures (H)     Past Surgical History:   Procedure Laterality Date    ANESTH,SKIN SURGERY,KNEE  1993, 1990    orthoscopic    CHOLECYSTECTOMY  2016    CL AFF SURGICAL PATHOLOGY  2007    LAPAROSCOPIC CHOLECYSTECTOMY N/A 5/18/2016    Procedure: LAPAROSCOPIC CHOLECYSTECTOMY;  Surgeon: Radha Cline MD;  Location:  OR    LAPAROSCOPIC TUBAL LIGATION  12/15/2011    Procedure:LAPAROSCOPIC TUBAL LIGATION; Laparoscopic tubal ligation; Surgeon:AMY WYNN; Location:[facility] OR    OSTEOTOMY FOOT Left 2/13/2019    Procedure: Left Lapidus with  Azael Osteotomy;  Surgeon: Brett Chavez MD;  Location:  OR    Z APPENDECTOMY  4/2008               Physical Exam:   Ht 1.575 m (5' 2\")   Wt 74.8 kg (165 lb)   BMI 30.18 kg/m      GENERAL: healthy, alert and no distress  EYES: Eyes grossly normal to inspection, conjunctivae and sclerae normal  RESP: no audible wheeze, cough, or visible cyanosis.  No visible retractions or increased work of breathing.  Able to speak fully in complete sentences  NEURO: Cranial nerves grossly intact, mentation intact and speech normal  PSYCH: mentation appears normal, affect normal/bright, judgement and insight intact, normal speech and appearance well-groomed         Data:   Reviewed in person and significant for:    Lab Results   Component Value Date     02/27/2025     02/26/2021      Lab Results   Component Value Date    POTASSIUM 4.0 02/27/2025    POTASSIUM 3.3 04/14/2023    POTASSIUM 3.7 02/26/2021     Lab Results   Component Value Date    CHLORIDE 102 " 02/27/2025    CHLORIDE 112 04/14/2023    CHLORIDE 109 02/26/2021     Lab Results   Component Value Date    CO2 26 02/27/2025    CO2 28 04/14/2023    CO2 29 02/26/2021     Lab Results   Component Value Date    BUN 10.7 02/27/2025    BUN 9 04/14/2023    BUN 13 02/26/2021     Lab Results   Component Value Date    CR 0.59 02/27/2025    CR 0.71 02/26/2021       Lab Results   Component Value Date    WBC 9.8 02/27/2025    WBC 4.7 02/26/2021     Lab Results   Component Value Date    HGB 14.1 02/27/2025    HGB 13.2 02/26/2021     Lab Results   Component Value Date    HCT 41.3 02/27/2025    HCT 38.6 02/26/2021     Lab Results   Component Value Date    MCV 90 02/27/2025    MCV 90 02/26/2021     Lab Results   Component Value Date     02/27/2025     02/26/2021       Lab Results   Component Value Date    AST 44 02/27/2025    AST 25 02/26/2021     Lab Results   Component Value Date    ALT 69 02/27/2025    ALT 39 02/26/2021     Lab Results   Component Value Date    BILICONJ 0.0 09/22/2009      Lab Results   Component Value Date    BILITOTAL 0.3 02/27/2025    BILITOTAL 0.2 02/26/2021       Lab Results   Component Value Date    ALBUMIN 4.2 02/27/2025    ALBUMIN 4.1 04/14/2023    ALBUMIN 3.9 02/26/2021     Lab Results   Component Value Date    PROTTOTAL 7.2 02/27/2025    PROTTOTAL 6.9 02/26/2021      Lab Results   Component Value Date    ALKPHOS 77 02/27/2025    ALKPHOS 81 02/26/2021       Lab Results   Component Value Date    INR 0.87 02/27/2025    INR 0.87 01/07/2021       SSION:  1.  No pulmonary embolism or other acute abnormality in the chest.  2.  Mildly enlarged main pulmonary artery, nonspecific but may be seen in the setting of pulmonary arterial hypertension.    REPORT SIGNED BY DR. Quinton Strong  Narrative    EXAM:  CT CHEST PULMONARY ANGIO    DATE:  4/13/2024 10:03 PM    CLINICAL DATA:  R07.9 Chest pain, unspecified    ADDITIONAL CLINICAL DATA:  Chest Pain    COMPARISON: None available    TECHNIQUE:   Helical CT angiography obtained through the chest and pulmonary arterial phase after the administration of intravenous contrast. Multiplanar reformats including reformatted MIP images were obtained.    CONTRAST:  IOHEXOL 350 MGI/ML    Dose Given: 80 mL    FINDINGS:    CHEST:    Vasculature: Adequate opacification of the pulmonary arteries. Somewhat limited evaluation of the subsegmental pulmonary arteries due to motion artifact. No pulmonary embolism identified. Mildly enlarged main pulmonary artery measuring up to 3.3 cm. No CT evidence of right heart strain.    Cardiovascular: Normal heart size, without pericardial effusion. Aorta is normal in caliber. No evidence of aortic dissection. Patent great vessel origins. Diminutive left vertebral artery.    Lungs and Pleura: Dependent atelectasis. No focal consolidation. No suspicious pulmonary nodules. No pleural effusion or pneumothorax.    Airways: Trachea and central airways are clear.    Mediastinum: No mediastinal or hilar lymphadenopathy.    Lower neck and chest wall: Within normal limits. Borderline enlarged bilateral axillary lymph nodes, nonspecific but likely reactive.    UPPER ABDOMEN: Limited evaluation the upper abdomen demonstrates no acute abnormality. Cholecystectomy.    MUSCULOSKELETAL: No acute osseous abnormality. No suspicious osseous lesion.  Exam End: 04/13/24 10:12 PM    Specimen Collected: 04/13/24 10:20 PM Last Resulted: 04/13/24 10:27 PM

## 2025-03-26 NOTE — NURSING NOTE
Current patient location: [address] [place] [id number]    Is the patient currently in the state of [place]? YES    Visit mode: VIDEO    If the visit is dropped, the patient can be reconnected by:VIDEO VISIT: Text to cell phone:   Telephone Information:   Mobile [phone]       Will anyone else be joining the visit? NO  (If patient encounters technical issues they should call [phone]    Are changes needed to the allergy or medication list? No    Are refills needed on medications prescribed by this physician? NO    Rooming Documentation:  Questionnaire(s) completed    Reason for visit: Consult    José Antonio NAVARRO

## 2025-04-03 NOTE — PATIENT INSTRUCTIONS
If you have any questions regarding your visit, Please contact your care team.    To Schedule an Appointment 24/7  Call: [phone]  [facility]  TELEPHONE NUMBER   Alberto Duran M.D.    [place]-Medical Assistant    [place]-Surgery Scheduler  Lana- Tuesday-[place]                   7:30 a.m.-3:30 p.m.  Wednesday-[place]             8:00 a.m.-4:30 p.m.  Thursday-[place]     8:00 a.m.-4:00 p.m.  Friday-[place]                       7:30 a.m.-1:00 p.m. [facility]  [id number] [place] [address].  [place] [address] [phone] Phone  [phone] Fax    Imaging Scheduling all locations  [phone]      [facility] Labor and Delivery  [address]  [place], [place] [address] [phone]    [facility]  [address] [place] [address] [phone] ask for Women's Clinic     **Surgeries** Our Surgery Schedulers will contact you to schedule. If you do not receive a call within 3 business days, please call [phone].    Urgent Care locations:  [facility] Monday-Friday                 10 am - 8 pm  Saturday and Sunday        9 am - 5 pm   [phone]   If you need a medication refill, please contact your pharmacy. Please allow 3 business days for your refill to be completed.  As always, Thank you for trusting us with your healthcare needs!  If you have any questions regarding your visit, Please contact your care team.    [facility]: [phone]    To Schedule an Appointment 24/7  Call: [phone]    see additional instructions from your care team below

## 2025-04-10 ENCOUNTER — OFFICE VISIT (OUTPATIENT)
Dept: OBGYN | Facility: CLINIC | Age: 50
End: 2025-04-10
Payer: COMMERCIAL

## 2025-04-10 VITALS
OXYGEN SATURATION: 100 % | WEIGHT: 168.5 LBS | BODY MASS INDEX: 31.01 KG/M2 | SYSTOLIC BLOOD PRESSURE: 128 MMHG | HEIGHT: 62 IN | DIASTOLIC BLOOD PRESSURE: 83 MMHG | HEART RATE: 78 BPM

## 2025-04-10 DIAGNOSIS — N92.0 MENORRHAGIA WITH REGULAR CYCLE: ICD-10-CM

## 2025-04-10 DIAGNOSIS — Z12.11 SPECIAL SCREENING FOR MALIGNANT NEOPLASMS, COLON: Primary | ICD-10-CM

## 2025-04-10 DIAGNOSIS — Z12.31 VISIT FOR SCREENING MAMMOGRAM: ICD-10-CM

## 2025-04-10 DIAGNOSIS — N94.6 DYSMENORRHEA: ICD-10-CM

## 2025-04-10 NOTE — PROGRESS NOTES
Natasha Lee is a 50 year old female , who presents for annual exam.   No unusual bleeding, no incontinence, or unusual discharge.   She is currently using OCPs for perimenopausal symptoms.  She attempted to go off the OCPs but the bleeding was heavier and some mood changes.  She would like to continue with the OCPs.  She does not have any apparent contraindications to use.  She has not had any apparent complications with it's use.  Last cholesterol:   Recent Labs   Lab Test 24  1405 23  1019   CHOL 181 173   HDL 50 59   * 99   TRIG 126 75     Past Medical History:   Diagnosis Date    Diabetes (H)     Pre-Diabetic    Heart murmur     Lupus (systemic lupus erythematosus) (H)     Dr. Prather    Lupus (systemic lupus erythematosus) (H) 12/10/2009    Normal delivery 09    boy forceps    Seizures (H)     When a child       Past Surgical History:   Procedure Laterality Date    ANESTH,SKIN SURGERY,KNEE  ,     orthoscopic    CHOLECYSTECTOMY      CL AFF SURGICAL PATHOLOGY      LAPAROSCOPIC CHOLECYSTECTOMY N/A 2016    Procedure: LAPAROSCOPIC CHOLECYSTECTOMY;  Surgeon: Radha Cline MD;  [facility]    LAPAROSCOPIC TUBAL LIGATION  12/15/2011    Procedure:LAPAROSCOPIC TUBAL LIGATION; Laparoscopic tubal ligation; Surgeon:AMY WYNN; Location:[facility] OR    OSTEOTOMY FOOT Left 2019    Procedure: Left Lapidus with  Azael Osteotomy;  Surgeon: Brett Chavez MD;  [facility]    ZZC APPENDECTOMY  2008       OB History    Para Term  AB Living   1 1 0 0 0 1   SAB IAB Ectopic Multiple Live Births   0 0 0 0 1      # Outcome Date GA Lbr Julio Cesar/2nd Weight Sex Type Anes PTL Lv   1 Para 09    M    PHILOMENA       Gyn History:  Gynecological History            Patient's last menstrual period was 2025 (approximate).         history of abnormal pap smear:    Last pap:   Lab Results   Component Value Date    PAP and HPV NIL  08/22/2023           Current Outpatient Medications   Medication Sig Dispense Refill    albuterol (PROAIR HFA/PROVENTIL HFA/VENTOLIN HFA) 108 (90 Base) MCG/ACT inhaler Inhale 2 puffs into the lungs every 6 hours 18 g 1    amLODIPine (NORVASC) 2.5 MG tablet Take 1 tablet (2.5 mg) by mouth daily. for raynaud's.  Okay to only use during colder weather. 90 tablet 2    amoxicillin-clavulanate (AUGMENTIN) 875-125 MG tablet Take 1 tablet by mouth 2 times daily (Patient not taking: Reported on 4/10/2025) 20 tablet 0    augmented betamethasone dipropionate (DIPROLENE AF) 0.05 % external cream Apply topically daily. 50 g 5    Cranberry 1000 MG CAPS Take 1 capsule by mouth 4 times daily      DiphenhydrAMINE HCl (BENADRYL PO)       diphenhydrAMINE HCl (Sleep) 50 MG CAPS Take 50 mg by mouth At Bedtime      doxycycline hyclate (VIBRA-TABS) 100 MG tablet Take 1 tablet (100 mg) by mouth 2 times daily (Patient not taking: Reported on 4/10/2025) 14 tablet 0    hydroxychloroquine (PLAQUENIL) 200 MG tablet Take 1 tablet (200 mg) by mouth daily. 90 tablet 2    mycophenolate (GENERIC EQUIVALENT) 500 MG tablet Take 1500 mg (3 pills) in the morning and 1000 mg (2 pills) in the evening. 450 tablet 3    norethindrone-ethinyl estradiol (ORTHO-NOVUM) 1-35 MG-MCG tablet Take 1 tablet by mouth daily 84 tablet 4    omeprazole (PRILOSEC) 20 MG DR capsule Take 1 capsule (20 mg) by mouth daily 30 capsule 1    predniSONE (DELTASONE) 10 MG tablet Take 2 tablets (20 mg) by mouth daily. 60 tablet 3    Spacer/Aero-Holding Chambers (SPACER/AERO-HOLD CHAMBER MASK) REMY 1 Device as needed 1 each 0    tacrolimus (PROTOPIC) 0.1 % external ointment Apply topically 2 times daily. (Patient not taking: Reported on 4/10/2025) 100 g 3    tenofovir (VIREAD) 300 MG tablet Take 1 tablet (300 mg) by mouth daily. 90 tablet 3    Vitamin D, Cholecalciferol, 25 MCG (1000 UT) CAPS Take 1,000 Units by mouth daily 90 capsule 2       Allergies   Allergen Reactions     Carbamazepine, Eslicarbazepine, And Oxcarbazepine      Fever, rash    Corn-Containing Products        Social History     Socioeconomic History    Marital status:      Spouse name: Not on file    Number of children: 1    Years of education: Not on file    Highest education level: Not on file   Occupational History    Occupation: addiction therapy     Employer[place] [employer] [place]   Tobacco Use    Smoking status: Never    Smokeless tobacco: Never   Vaping Use    Vaping status: Never Used   Substance and Sexual Activity    Alcohol use: Yes     Comment: Occasional    Drug use: No    Sexual activity: Yes     Partners: Male     Birth control/protection: Pill     Comment: tubal ligation, uses OCPs for menstrual and mood regulation   Other Topics Concern    Parent/sibling w/ CABG, MI or angioplasty before 65F 55M? No     Service No    Blood Transfusions No    Caffeine Concern No    Occupational Exposure Yes     Comment: chemical dependency counselor in a [employer]    Hobby Hazards No    Sleep Concern No    Stress Concern No    Weight Concern No    Special Diet No    Back Care Not Asked    Exercise Yes    Bike Helmet Yes    Seat Belt Yes    Self-Exams Yes   Social History Narrative    Not on file     Social Drivers of Health     Financial Resource Strain: Not on file   Food Insecurity: Not on file   Transportation Needs: Not on file   Physical Activity: Not on file   Stress: Not on file   Social Connections: Not on file   Interpersonal Safety: Low Risk  (4/16/2024)    Interpersonal Safety     Do you feel physically and emotionally safe where you currently live?: Yes     Within the past 12 months, have you been hit, slapped, kicked or otherwise physically hurt by someone?: No     Within the past 12 months, have you been humiliated or emotionally abused in other ways by your partner or ex-partner?: No   Housing Stability: Not on file       Family History   Adopted: Yes   Family history unknown: Yes         ROS:  All negative  "except as above.      EXAM:  /83 (BP Location: Right arm, Cuff Size: Adult Large)   Pulse 78   Ht 1.575 m (5' 2\")   Wt 76.4 kg (168 lb 8 oz)   LMP 03/12/2025 (Approximate)   SpO2 100%   BMI 30.82 kg/m    General:  WNWD female, NAD  Alert  Oriented x 3  Gait:  Normal  Skin:  Normal skin turgor  Neurologic:  CN grossly intact, good sensation.    HEENT:  NC/AT, EOMI  Neck:  No masses palpated, symmetrical, carotids +2/4, no bruits heard  Heart:  RRR  Lungs:  Clear   Breasts:  Symmetrical, no dimpling noted, no masses palpated, no discharge expressed  Abdomen:  Non-tender, non-distended.  Vulva: No external lesions, normal hair distribution, no adenopathy  BUS:  Normal, no masses noted  Urethra:  No hypermobility noted  Urethral meatus:  No masses noted  Vagina: Moist, pink, no abnormal discharge, well rugated, no lesions  Cervix: Smooth, pink, no visible lesions  Uterus: Normal size, anteverted, non-tender, mobile  Ovaries: No mass, non-tender, mobile  Perianal:  No masses noted.   Extremities:  No clubbing, cyanosis, or edema      ASSESSMENT/PLAN   Annual examination   Screen mammogram is ordered  Screen colonoscopy is ordered.   OCP prescription for menstrual regulation and perimenopausal symptoms.   Low fat diet, weight bearing exercises and self breast exams on a monthly basis have been recommended.  I have discussed with patient the risks, benefits, medications, treatment options and modalities.   I have instructed the patient to call or schedule a follow-up appointment if any problems.    "

## 2025-05-01 ENCOUNTER — OFFICE VISIT (OUTPATIENT)
Dept: DERMATOLOGY | Facility: CLINIC | Age: 50
End: 2025-05-01
Attending: DERMATOLOGY
Payer: COMMERCIAL

## 2025-05-01 DIAGNOSIS — D84.9 IMMUNOSUPPRESSION: ICD-10-CM

## 2025-05-01 DIAGNOSIS — L93.2 CUTANEOUS LUPUS ERYTHEMATOSUS: ICD-10-CM

## 2025-05-01 DIAGNOSIS — M32.9 SYSTEMIC LUPUS ERYTHEMATOSUS, UNSPECIFIED SLE TYPE, UNSPECIFIED ORGAN INVOLVEMENT STATUS (H): Primary | ICD-10-CM

## 2025-05-01 RX ORDER — PREDNISONE 2.5 MG/1
TABLET ORAL
Qty: 70 TABLET | Refills: 0 | Status: SHIPPED | OUTPATIENT
Start: 2025-05-01 | End: 2025-05-29

## 2025-05-01 ASSESSMENT — PAIN SCALES - GENERAL: PAINLEVEL_OUTOF10: NO PAIN (0)

## 2025-05-01 NOTE — LETTER
5/1/2025       RE: Natasha Lee  [address] [address]     Dear Colleague,    Thank you for referring your patient, Natasha Lee, to the [facility] DERMAT[facility] [place] [facility]. Please see a copy of my visit note below.    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 Plan for taper start after initiation of anifrolumab as above 05/2024.   --- prednisone taper plan 10 mg > 7.5 mg > 5 mg > 2.5 mg. Order placed at visit 5/1/2025.    #. 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   - Plan to start anifrolumab early May, 2025 (first infusion appointment is for 05/05/2025.  - continue mycophenolate to 1500/1000 mg daily  - continue hydroxychloroquine 200 mg daily  - continue topicals (augmented betamethasone cream and tacrolimus)  --- prednisone taper plan 10 mg > 7.5 mg > 5 mg > 2.5 mg. Order placed at visit 5/1/2025.      #. Chronic hepatitis B- follows with GI    -Return to clinic in 3-4 months.     Staff and Resident:     Resident:   Celena Crain MD  PGY-5, Internal Medicine-Dermatology  Pager: *[phone]     Staff: Dr. Terry    Staff Physician Comments:   I saw and evaluated the patient with the resident and I edited the assessment and plan as documented in the note. I was present for the examination.    Corey Terry MD  Associate  Professor of Dermatology  Department of Dermatology  [facility] [employer]          CC:   Chief Complaint   Patient presents with     Derm Problem     SLE follow up. Better since last appointment.        History of Present Illness:  Ms. Natasha Lee is a 50 year old female who presents as a return patient.    Presents for lupus follow up. Scheduled to start anifrolumab infusions on Monday.   Doing well on 10mg of prednisone. Asks about a plan for taper.   Uses topicals occasionally.     Otherwise well. Denies systemic symptoms.      Labs:  N/a    Physical exam:  Vitals: LMP 03/12/2025 (Approximate)   GEN: This is a well developed, well-nourished female in no acute distress, in a pleasant mood.    SKIN: Colon phototype IV  - Focused examination of the face, neck, chest, back, arms and hands was performed.  - Scattered hyperpigmented macules in areas of prior involvement   - On the face and neck, faint annular pink plaques, improve from prior comparison of photographs.   - No other lesions of concern on areas examined.     Past Medical History:   Past Medical History:   Diagnosis Date     Diabetes (H)     Pre-Diabetic     Heart murmur 1997     Lupus (systemic lupus erythematosus) (H) 1988    Dr. Prather     Lupus (systemic lupus erythematosus) (H) 12/10/2009     Normal delivery 9/5/09    boy forceps     Seizures (H)     When a child     Past Surgical History:   Procedure Laterality Date     ANESTH,SKIN SURGERY,KNEE  1993, 1990    orthoscopic     CHOLECYSTECTOMY  2016     CL AFF SURGICAL PATHOLOGY  2007     LAPAROSCOPIC CHOLECYSTECTOMY N/A 5/18/2016    Procedure: LAPAROSCOPIC CHOLECYSTECTOMY;  Surgeon: Radha Cline MD;  [facility]     LAPAROSCOPIC TUBAL LIGATION  12/15/2011    Procedure:LAPAROSCOPIC TUBAL LIGATION; Laparoscopic tubal ligation; Surgeon:AMY WYNN; Location:[facility] OR     OSTEOTOMY FOOT Left 2/13/2019    Procedure: Left Lapidus with  Akin Osteotomy;   Surgeon: Brett Chavez MD;  Location:  OR     [facility] APPENDECTOMY  4/2008       Social History:   reports that she has never smoked. She has never used smokeless tobacco. She reports current alcohol use. She reports that she does not use drugs.    Family History:  Family History   Adopted: Yes   Family history unknown: Yes       Medications:  Current Outpatient Medications   Medication Sig Dispense Refill     albuterol (PROAIR HFA/PROVENTIL HFA/VENTOLIN HFA) 108 (90 Base) MCG/ACT inhaler Inhale 2 puffs into the lungs every 6 hours 18 g 1     amLODIPine (NORVASC) 2.5 MG tablet Take 1 tablet (2.5 mg) by mouth daily. for raynaud's.  Okay to only use during colder weather. 90 tablet 2     augmented betamethasone dipropionate (DIPROLENE AF) 0.05 % external cream Apply topically daily. 50 g 5     Cranberry 1000 MG CAPS Take 1 capsule by mouth 4 times daily       DiphenhydrAMINE HCl (BENADRYL PO)        diphenhydrAMINE HCl (Sleep) 50 MG CAPS Take 50 mg by mouth At Bedtime       hydroxychloroquine (PLAQUENIL) 200 MG tablet Take 1 tablet (200 mg) by mouth daily. 90 tablet 2     mycophenolate (GENERIC EQUIVALENT) 500 MG tablet Take 1500 mg (3 pills) in the morning and 1000 mg (2 pills) in the evening. 450 tablet 3     norethindrone-ethinyl estradiol (ORTHO-NOVUM) 1-35 MG-MCG tablet Take 1 tablet by mouth daily. 84 tablet 4     omeprazole (PRILOSEC) 20 MG DR capsule Take 1 capsule (20 mg) by mouth daily 30 capsule 1     predniSONE (DELTASONE) 10 MG tablet Take 2 tablets (20 mg) by mouth daily. 60 tablet 3     Spacer/Aero-Holding Chambers (SPACER/AERO-HOLD CHAMBER MASK) REMY 1 Device as needed 1 each 0     tenofovir (VIREAD) 300 MG tablet Take 1 tablet (300 mg) by mouth daily. 90 tablet 3     Vitamin D, Cholecalciferol, 25 MCG (1000 UT) CAPS Take 1,000 Units by mouth daily 90 capsule 2     Allergies   Allergen Reactions     Carbamazepine, Eslicarbazepine, And Oxcarbazepine      Fever, rash     Corn-Containing Products                      Again, thank you for allowing me to participate in the care of your patient.      Sincerely,    Corey Terry MD

## 2025-05-01 NOTE — PROGRESS NOTES
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 Plan for taper start after initiation of anifrolumab as above 05/2024.   --- prednisone taper plan 10 mg > 7.5 mg > 5 mg > 2.5 mg. Order placed at visit 5/1/2025.    #. 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   - Plan to start anifrolumab early May, 2025 (first infusion appointment is for 05/05/2025.  - continue mycophenolate to 1500/1000 mg daily  - continue hydroxychloroquine 200 mg daily  - continue topicals (augmented betamethasone cream and tacrolimus)  --- prednisone taper plan 10 mg > 7.5 mg > 5 mg > 2.5 mg. Order placed at visit 5/1/2025.      #. Chronic hepatitis B- follows with GI    -Return to clinic in 3-4 months.     Staff and Resident:     Resident:   Celena Crain MD  PGY-5, Internal Medicine-Dermatology  Pager: *[phone]     Staff: Dr. Terry    Staff Physician Comments:   I saw and evaluated the patient with the resident and I edited the assessment and plan as documented in the note. I was present for the examination.    Corey Terry MD   of Dermatology  Department of Dermatology  [facility] [employer]          CC:   Chief Complaint   Patient presents with    Derm Problem     SLE follow up. Better since last appointment.        History of Present Illness:  Ms. Natasha Lee is a 50 year old female who presents as a return  patient.    Presents for lupus follow up. Scheduled to start anifrolumab infusions on Monday.   Doing well on 10mg of prednisone. Asks about a plan for taper.   Uses topicals occasionally.     Otherwise well. Denies systemic symptoms.      Labs:  N/a    Physical exam:  Vitals: LMP 03/12/2025 (Approximate)   GEN: This is a well developed, well-nourished female in no acute distress, in a pleasant mood.    SKIN: Colon phototype IV  - Focused examination of the face, neck, chest, back, arms and hands was performed.  - Scattered hyperpigmented macules in areas of prior involvement   - On the face and neck, faint annular pink plaques, improve from prior comparison of photographs.   - No other lesions of concern on areas examined.     Past Medical History:   Past Medical History:   Diagnosis Date    Diabetes (H)     Pre-Diabetic    Heart murmur 1997    Lupus (systemic lupus erythematosus) (H) 1988    Dr. Prather    Lupus (systemic lupus erythematosus) (H) 12/10/2009    Normal delivery 9/5/09    boy forceps    Seizures (H)     When a child     Past Surgical History:   Procedure Laterality Date    ANESTH,SKIN SURGERY,KNEE  1993, 1990    orthoscopic    CHOLECYSTECTOMY  2016    CL AFF SURGICAL PATHOLOGY  2007    LAPAROSCOPIC CHOLECYSTECTOMY N/A 5/18/2016    Procedure: LAPAROSCOPIC CHOLECYSTECTOMY;  Surgeon: Radha Cline MD;  Location:  OR    LAPAROSCOPIC TUBAL LIGATION  12/15/2011    Procedure:LAPAROSCOPIC TUBAL LIGATION; Laparoscopic tubal ligation; Surgeon:AMY WYNN; Location:[facility] OR    OSTEOTOMY FOOT Left 2/13/2019    Procedure: Left Lapidus with  Azael Osteotomy;  Surgeon: Brett Chavez MD;  Location:  OR    [facility] APPENDECTOMY  4/2008       Social History:   reports that she has never smoked. She has never used smokeless tobacco. She reports current alcohol use. She reports that she does not use drugs.    Family History:  Family History   Adopted: Yes   Family history unknown: Yes        Medications:  Current Outpatient Medications   Medication Sig Dispense Refill    albuterol (PROAIR HFA/PROVENTIL HFA/VENTOLIN HFA) 108 (90 Base) MCG/ACT inhaler Inhale 2 puffs into the lungs every 6 hours 18 g 1    amLODIPine (NORVASC) 2.5 MG tablet Take 1 tablet (2.5 mg) by mouth daily. for raynaud's.  Okay to only use during colder weather. 90 tablet 2    augmented betamethasone dipropionate (DIPROLENE AF) 0.05 % external cream Apply topically daily. 50 g 5    Cranberry 1000 MG CAPS Take 1 capsule by mouth 4 times daily      DiphenhydrAMINE HCl (BENADRYL PO)       diphenhydrAMINE HCl (Sleep) 50 MG CAPS Take 50 mg by mouth At Bedtime      hydroxychloroquine (PLAQUENIL) 200 MG tablet Take 1 tablet (200 mg) by mouth daily. 90 tablet 2    mycophenolate (GENERIC EQUIVALENT) 500 MG tablet Take 1500 mg (3 pills) in the morning and 1000 mg (2 pills) in the evening. 450 tablet 3    norethindrone-ethinyl estradiol (ORTHO-NOVUM) 1-35 MG-MCG tablet Take 1 tablet by mouth daily. 84 tablet 4    omeprazole (PRILOSEC) 20 MG DR capsule Take 1 capsule (20 mg) by mouth daily 30 capsule 1    predniSONE (DELTASONE) 10 MG tablet Take 2 tablets (20 mg) by mouth daily. 60 tablet 3    Spacer/Aero-Holding Chambers (SPACER/AERO-HOLD CHAMBER MASK) REMY 1 Device as needed 1 each 0    tenofovir (VIREAD) 300 MG tablet Take 1 tablet (300 mg) by mouth daily. 90 tablet 3    Vitamin D, Cholecalciferol, 25 MCG (1000 UT) CAPS Take 1,000 Units by mouth daily 90 capsule 2     Allergies   Allergen Reactions    Carbamazepine, Eslicarbazepine, And Oxcarbazepine      Fever, rash    Corn-Containing Products

## 2025-05-01 NOTE — NURSING NOTE
Dermatology Rooming Note    Natasha Lee's goals for this visit include:   Chief Complaint   Patient presents with    Derm Problem     SLE follow up. Better since last appointment.      Abby Gonzalez RN

## 2025-05-01 NOTE — PATIENT INSTRUCTIONS
Your new prednisone prescription has been sent to the pharmacy.     Please let us know if you experience any worsening or flare as you continue on with the taper.     Come back to see us in 3-4 months.

## 2025-05-05 ENCOUNTER — INFUSION THERAPY VISIT (OUTPATIENT)
Dept: INFUSION THERAPY | Facility: CLINIC | Age: 50
End: 2025-05-05
Attending: NURSE PRACTITIONER
Payer: COMMERCIAL

## 2025-05-05 VITALS
BODY MASS INDEX: 31.51 KG/M2 | HEART RATE: 89 BPM | SYSTOLIC BLOOD PRESSURE: 159 MMHG | TEMPERATURE: 98.2 F | OXYGEN SATURATION: 98 % | WEIGHT: 172.3 LBS | DIASTOLIC BLOOD PRESSURE: 89 MMHG

## 2025-05-05 DIAGNOSIS — M32.9 SYSTEMIC LUPUS ERYTHEMATOSUS, UNSPECIFIED SLE TYPE, UNSPECIFIED ORGAN INVOLVEMENT STATUS (H): Primary | ICD-10-CM

## 2025-05-05 PROCEDURE — 99207 PR NO CHARGE LOS: CPT

## 2025-05-05 PROCEDURE — 96375 TX/PRO/DX INJ NEW DRUG ADDON: CPT

## 2025-05-05 PROCEDURE — 258N000003 HC RX IP 258 OP 636: Performed by: DERMATOLOGY

## 2025-05-05 PROCEDURE — 250N000013 HC RX MED GY IP 250 OP 250 PS 637: Performed by: DERMATOLOGY

## 2025-05-05 PROCEDURE — 96365 THER/PROPH/DIAG IV INF INIT: CPT

## 2025-05-05 PROCEDURE — 250N000011 HC RX IP 250 OP 636: Mod: JZ | Performed by: DERMATOLOGY

## 2025-05-05 RX ORDER — METHYLPREDNISOLONE SODIUM SUCCINATE 40 MG/ML
40 INJECTION INTRAMUSCULAR; INTRAVENOUS ONCE
Status: CANCELLED | OUTPATIENT
Start: 2025-06-02

## 2025-05-05 RX ORDER — ACETAMINOPHEN 325 MG/1
650 TABLET ORAL ONCE
Status: CANCELLED | OUTPATIENT
Start: 2025-06-02

## 2025-05-05 RX ORDER — METHYLPREDNISOLONE SODIUM SUCCINATE 40 MG/ML
40 INJECTION INTRAMUSCULAR; INTRAVENOUS
Start: 2025-06-02

## 2025-05-05 RX ORDER — DIPHENHYDRAMINE HCL 25 MG
25 CAPSULE ORAL ONCE
Status: COMPLETED | OUTPATIENT
Start: 2025-05-05 | End: 2025-05-05

## 2025-05-05 RX ORDER — METHYLPREDNISOLONE SODIUM SUCCINATE 40 MG/ML
40 INJECTION INTRAMUSCULAR; INTRAVENOUS ONCE
Status: COMPLETED | OUTPATIENT
Start: 2025-05-05 | End: 2025-05-05

## 2025-05-05 RX ORDER — ACETAMINOPHEN 325 MG/1
650 TABLET ORAL
OUTPATIENT
Start: 2025-06-02

## 2025-05-05 RX ORDER — METHYLPREDNISOLONE SODIUM SUCCINATE 125 MG/2ML
125 INJECTION INTRAMUSCULAR; INTRAVENOUS
OUTPATIENT
Start: 2025-06-02

## 2025-05-05 RX ORDER — HEPARIN SODIUM,PORCINE 10 UNIT/ML
5-20 VIAL (ML) INTRAVENOUS DAILY PRN
OUTPATIENT
Start: 2025-06-02

## 2025-05-05 RX ORDER — DIPHENHYDRAMINE HCL 25 MG
25 CAPSULE ORAL ONCE
Status: CANCELLED | OUTPATIENT
Start: 2025-06-02

## 2025-05-05 RX ORDER — HEPARIN SODIUM (PORCINE) LOCK FLUSH IV SOLN 100 UNIT/ML 100 UNIT/ML
5 SOLUTION INTRAVENOUS
OUTPATIENT
Start: 2025-06-02

## 2025-05-05 RX ORDER — ALBUTEROL SULFATE 90 UG/1
1-2 INHALANT RESPIRATORY (INHALATION)
Start: 2025-06-02

## 2025-05-05 RX ORDER — THERA TABS 400 MCG
1 TAB ORAL DAILY
COMMUNITY

## 2025-05-05 RX ORDER — MEPERIDINE HYDROCHLORIDE 25 MG/ML
25 INJECTION INTRAMUSCULAR; INTRAVENOUS; SUBCUTANEOUS
OUTPATIENT
Start: 2025-06-02

## 2025-05-05 RX ORDER — DIPHENHYDRAMINE HCL 25 MG
25 CAPSULE ORAL
OUTPATIENT
Start: 2025-06-02

## 2025-05-05 RX ORDER — ALBUTEROL SULFATE 0.83 MG/ML
2.5 SOLUTION RESPIRATORY (INHALATION)
OUTPATIENT
Start: 2025-06-02

## 2025-05-05 RX ORDER — ACETAMINOPHEN 325 MG/1
650 TABLET ORAL ONCE
Status: COMPLETED | OUTPATIENT
Start: 2025-05-05 | End: 2025-05-05

## 2025-05-05 RX ORDER — EPINEPHRINE 1 MG/ML
0.3 INJECTION, SOLUTION INTRAMUSCULAR; SUBCUTANEOUS EVERY 5 MIN PRN
OUTPATIENT
Start: 2025-06-02

## 2025-05-05 RX ORDER — DIPHENHYDRAMINE HYDROCHLORIDE 50 MG/ML
50 INJECTION, SOLUTION INTRAMUSCULAR; INTRAVENOUS
Start: 2025-06-02

## 2025-05-05 RX ORDER — DIPHENHYDRAMINE HYDROCHLORIDE 50 MG/ML
25 INJECTION, SOLUTION INTRAMUSCULAR; INTRAVENOUS
Start: 2025-06-02

## 2025-05-05 RX ADMIN — ACETAMINOPHEN 650 MG: 325 TABLET ORAL at 15:51

## 2025-05-05 RX ADMIN — SODIUM CHLORIDE 300 MG: 9 INJECTION, SOLUTION INTRAVENOUS at 16:10

## 2025-05-05 RX ADMIN — DIPHENHYDRAMINE HYDROCHLORIDE 25 MG: 25 CAPSULE ORAL at 15:51

## 2025-05-05 RX ADMIN — METHYLPREDNISOLONE SODIUM SUCCINATE 40 MG: 40 INJECTION, POWDER, FOR SOLUTION INTRAMUSCULAR; INTRAVENOUS at 16:05

## 2025-05-05 RX ADMIN — SODIUM CHLORIDE 250 ML: 0.9 INJECTION, SOLUTION INTRAVENOUS at 16:03

## 2025-05-05 ASSESSMENT — PAIN SCALES - GENERAL: PAINLEVEL_OUTOF10: NO PAIN (0)

## 2025-05-05 NOTE — PROGRESS NOTES
"Infusion Nursing Note:  Natasha Lee presents today for Saphnelo.    Patient seen by provider today: No   present during visit today: Not Applicable.    Note: Pt new to [facility] center, oriented to call light and reviewed saphnelo, questions answered to pts satisfaction.  Pt denies any medical complaints, states she has her \"normal\" lupus concerns, see flow sheet for assessment.      Intravenous Access:  Peripheral IV placed.    Treatment Conditions:  Biological Infusion Checklist:  ~~~ NOTE: If the patient answers yes to any of the questions below, hold the infusion and contact ordering provider or on-call provider.    Have you recently had an elevated temperature, fever, chills, productive cough, coughing for 3 weeks or longer or hemoptysis,  abnormal vital signs, night sweats,  chest pain or have you noticed a decrease in your appetite, unexplained weight loss or fatigue? No  Do you have any open wounds or new incisions? No  Do you have any upcoming hospitalizations or surgeries? Does not include esophagogastroduodenoscopy, colonoscopy, endoscopic retrograde cholangiopancreatography (ERCP), endoscopic ultrasound (EUS), dental procedures or joint aspiration/steroid injections No  Do you currently have any signs of illness or infection or are you on any antibiotics? No  Have you had any new, sudden or worsening abdominal pain? No  Have you or anyone in your household received a live vaccination in the past 4 weeks? Please note: No live vaccines while on biologic/chemotherapy until 6 months after the last treatment. Patient can receive the flu vaccine (shot only), pneumovax and the Covid vaccine. It is optimal for the patient to get these vaccines mid cycle, but they can be given at any time as long as it is not on the day of the infusion. No  Have you recently been diagnosed with any new nervous system diseases (ie. Multiple sclerosis, Guillain Cressey, seizures, neurological changes) or " cancer diagnosis? Are you on any form of radiation or chemotherapy? No  Are you pregnant or breast feeding or do you have plans of pregnancy in the future? No  Have you been having any signs of worsening depression or suicidal ideations?  (benlysta only) No  Have there been any other new onset medical symptoms? No  Have you had any new blood clots? (IVIG only) No      Post Infusion Assessment:  Patient tolerated infusion without incident.  Blood return noted pre and post infusion.  Site patent and intact, free from redness, edema or discomfort.  No evidence of extravasations.  Access discontinued per protocol.       Discharge Plan:   Patient discharged in stable condition accompanied by: self.  Departure Mode: Ambulatory.  Pt will RTC 6/2/25 for Saphnelo. Appts verified and pt aware.      Ritesh Solorzano RN

## 2025-05-19 ENCOUNTER — OFFICE VISIT (OUTPATIENT)
Dept: RHEUMATOLOGY | Facility: CLINIC | Age: 50
End: 2025-05-19
Payer: COMMERCIAL

## 2025-05-19 VITALS
HEART RATE: 90 BPM | OXYGEN SATURATION: 97 % | SYSTOLIC BLOOD PRESSURE: 138 MMHG | BODY MASS INDEX: 31.64 KG/M2 | WEIGHT: 173 LBS | DIASTOLIC BLOOD PRESSURE: 86 MMHG

## 2025-05-19 DIAGNOSIS — I73.00 RAYNAUD'S DISEASE WITHOUT GANGRENE: ICD-10-CM

## 2025-05-19 DIAGNOSIS — R74.01 ELEVATED ALT MEASUREMENT: ICD-10-CM

## 2025-05-19 DIAGNOSIS — M32.9 SYSTEMIC LUPUS ERYTHEMATOSUS, UNSPECIFIED SLE TYPE, UNSPECIFIED ORGAN INVOLVEMENT STATUS (H): Primary | ICD-10-CM

## 2025-05-19 DIAGNOSIS — R73.9 HYPERGLYCEMIA: ICD-10-CM

## 2025-05-19 LAB
ALT SERPL W P-5'-P-CCNC: 58 U/L (ref 0–50)
EST. AVERAGE GLUCOSE BLD GHB EST-MCNC: 143 MG/DL
FASTING STATUS PATIENT QL REPORTED: NO
GLUCOSE SERPL-MCNC: 252 MG/DL (ref 70–99)
HBA1C MFR BLD: 6.6 % (ref 0–5.6)

## 2025-05-19 PROCEDURE — 99214 OFFICE O/P EST MOD 30 MIN: CPT | Performed by: INTERNAL MEDICINE

## 2025-05-19 PROCEDURE — 3079F DIAST BP 80-89 MM HG: CPT | Performed by: INTERNAL MEDICINE

## 2025-05-19 PROCEDURE — G2211 COMPLEX E/M VISIT ADD ON: HCPCS | Performed by: INTERNAL MEDICINE

## 2025-05-19 PROCEDURE — 84460 ALANINE AMINO (ALT) (SGPT): CPT | Performed by: INTERNAL MEDICINE

## 2025-05-19 PROCEDURE — 36415 COLL VENOUS BLD VENIPUNCTURE: CPT | Performed by: INTERNAL MEDICINE

## 2025-05-19 PROCEDURE — 83036 HEMOGLOBIN GLYCOSYLATED A1C: CPT | Performed by: INTERNAL MEDICINE

## 2025-05-19 PROCEDURE — 3075F SYST BP GE 130 - 139MM HG: CPT | Performed by: INTERNAL MEDICINE

## 2025-05-19 PROCEDURE — 82947 ASSAY GLUCOSE BLOOD QUANT: CPT | Performed by: INTERNAL MEDICINE

## 2025-05-19 RX ORDER — HYDROXYCHLOROQUINE SULFATE 200 MG/1
200 TABLET, FILM COATED ORAL DAILY
Qty: 90 TABLET | Refills: 2 | Status: SHIPPED | OUTPATIENT
Start: 2025-05-19

## 2025-05-19 NOTE — PROGRESS NOTES
Rheumatology Clinic Visit      Natasha Lee MRN# [id number]   YOB: 1975 Age: 50 year old      Date of visit: 5/19/25   PCP: Sandy Garcia    Chief Complaint   Patient presents with:  RECHECK: Lupus  No concerns    Assessment and Plan     1.  Systemic lupus erythematosus (dsDNA positive, RNP+, hypocomplementemia C3 & C4, malar rash, oral sores, skin lesions, photosensitivity, Raynaud's phenomenon, fatigue, seizure history [grand mal seizure as an infant, absence seizures multiple times from age of [age], no seizure since age of 19 years old]): Reportedly diagnosed at the age of 13 years old.  Established with me on 3/8/2018.  Reportedly treated with hydroxychloroquine when she was initially diagnosed at the age of 13 when she developed oral sores, weight loss, malar rash, and joint aches.  Also reportedly treated with azathioprine; she previously stated that she has not been on azathioprine since at least 2003.  Had been doing well without DMARD therapy for several years but then in 2019 she started to have more erythematous lesions on her arms so hydroxychloroquine was restarted with improvement of these lesions.  Also establish care with dermatology and is on mycophenolate, prednisone 10 mg daily, and anifolumab (started May 2025) at the direction of dermatology 1000 mg twice daily at the direction of dermatology with plans to start anifrolumab.  Doing well at this time.  See #2 regarding Raynaud's phenomenon.   Chronic illness  - Continue hydroxychloroquine 200 mg daily (normal eye exam on 2/3/2023 with Dr. Rodriges; yearly eye exam required)  - Mycophenolate 1500 mg in the AM and 1000 mg in the evening (dermatology)  - Anifrolumab per dermatology (started 5/5/2025)  - Advised eye exam for hydroxychloroquine toxicity monitoring (this was scheduled but canceled due to a death in the family)  - Labs in 3 months: CBC, Creatinine, Hepatic Panel, ESR, CRP  - Labs in 6 mo: CBC, CMP, ESR, CRP, C3,  C4, dsDNA, UA, Uprotein:creatinine    2.  Raynaud's phenomenon: Amlodipine 2.5 mg was used for a short period of time during colder weather months that was well-tolerated and effective.  She plans to use amlodipine only during colder weather  - Continue amlodipine 2.5 mg daily during colder weather months, as needed for Raynaud's phenomenon    3.  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  Following with Dr. Terry, dermatology.    4. Chronic hepatitis B: Positive since birth, per patient.  Hepatitis B core antibody and surface antigen positive in the past.  Seen by hepatology 1/8/2021 who recommended that HBV ppx be started prior to systemic immunosuppression.  Currently on tenofovir.    6.  Hyperglycemia: Recheck today and check hemoglobin A1c.  Prednisone likely contributing    Total minutes spent in evaluation with patient, documentation, , and review of pertinent studies and chart notes: 16  The longitudinal plan of care for the rheumatology problem(s) were addressed during this visit.  Due to added complexity of care, we will continue to support the patient and the subsequent management of this condition with ongoing continuity of care.       Ms. Lee verbalized agreement with and understanding of the rational for the diagnosis and treatment plan.  All questions were answered to best of my ability and the patient's satisfaction. Ms. Lee was advised to contact the clinic with any questions that may arise after the clinic visit.      Thank you for involving me in the care of the patient    Return to clinic: 6 months    HPI   Natasha Lee is a 50 year old female with a past medical history significant for chronic hepatitis B, heart murmur, history of cutting (she did this while ago and was associated with depression; had therapy and this resolved many years ago; scarring on her left arm) and SLE who is seen for follow-up of lupus.     11/17/2020,  she reports that she is doing well on hydroxychloroquine.  No joint pain.  No morning stiffness.  No rash.  Overall happy with how well she is doing.  Notes that she is not doing anything to monitor the hepatitis B and would like to see hepatology.      11/3/2021: Patient canceled appointment.    2/23/2022: No-show to scheduled appointment    4/22/2022: Reports that she was lost to follow-up due to the COVID-19 pandemic.  Has been doing well.  No new symptoms.  Raynaud's phenomenon is controlled with cold avoidance and she does well without any loss of tissue on the ends of fingers where the Raynaud's is most active.  No black or bloody stools.  Skin disease is controlled; no new skin lesions she reports but does have chronic changes from the skin lesions on her arms.  On mycophenolate from dermatology.    4/28/2023: Currently doing well.  No joint pain or swelling.  Mild facial erythema that she associates with increased stress.  Since last seen she reports that she had diffuse swelling of her fingers during cold exposure in February and this occurred for about 1 week, 2 episodes  by about 1 month.  She reports having gone to an orthopedic urgent care where there was concern for inflammatory arthritis and she was given a Medrol Dosepak with improvement of her symptoms.  No joint pain or swelling at this time.  No morning stiffness.  She does note that the diffuse finger swelling correlated with cold exposure and increased Raynaud's phenomenon symptoms.    10/30/2023: Reports that she is doing well except for Raynaud's phenomenon.  Red discoloration of her fingers whenever there is cold exposure.  Tries to stay warm the best she can.  No joint pain.  Otherwise stable.  Reports that she is taking hydroxychloroquine and mycophenolate.    4/9/2024: Raynaud's phenomenon controlled well on amlodipine 2.5 mg daily but she thinks that the warmer weather during this winter was also helpful.  Not taking amlodipine  currently because it is warmer and her Raynaud's is controlled.  Facial rash and using topical steroids from dermatology.  Needs refills on mycophenolate and hydroxychloroquine.  She notes that at 1 point she realized that she was taking only half the dose of mycophenolate so she is now taking 1000 mg twice daily as it is prescribed.    11/18/2024: Currently noted phenomenon is controlled.  Has amlodipine for Raynaud's if needed.  Worsening rash on the arms, face, and chest; following with dermatology and planning to start anifrolumab.      Today, 5/19/2025: Has received 1 infusion of anifrolumab with no improvement yet but understands that it may take longer to see a response; she continues on prednisone 10 mg daily and mycophenolate at the direction of dermatology.  She is hopeful to be able to reduce prednisone in the future.  She had to cancel her eye appointment for hydroxychloroquine toxicity monitoring due to an illness and death in the family; she plans to reschedule.  Joint pains in her MCPs, PIPs, and DIPs with weather changes but if the weather is stable then she has no joint pain.  During the time of weather changes she says she also has some difficulty making a complete fist due to pain at these joints.  Pain typically does not last for more than 1-2 days.    Denies fevers, chills, nausea, vomiting, constipation, diarrhea. No abdominal pain.  No black or bloody stools.  No chest pain/pressure, palpitations, or shortness of breath. No LE swelling. No neck pain. No oral or nasal sores currently.  Malar rash hx. mild dry eye during the winter months treated with artificial tears.  No dry mouth.. No eye pain or redness.       Tobacco: None  EtOH: None  Drugs: None  Occupation: Drug and alcohol counselor at [employer]    ROS   12 point review of system was completed and negative except as noted in the HPI     Active Problem List     Patient Active Problem List   Diagnosis    Chronic hepatitis B (H)     Other forms of systemic lupus erythematosus (H)    CARDIOVASCULAR SCREENING; LDL GOAL LESS THAN 160    Allergic rhinitis    Other nonspecific finding on examination of urine    Urinary frequency    Vitamin B12 deficiency (non anemic)    Heart murmur    S/P laparoscopic cholecystectomy    Cervical cancer screening    Enlarged pulmonary artery (H)    Immunosuppression     Past Medical History     Past Medical History:   Diagnosis Date    Diabetes (H)     Pre-Diabetic    Heart murmur 1997    Lupus (systemic lupus erythematosus) (H) 1988    Dr. Prather    Lupus (systemic lupus erythematosus) (H) 12/10/2009    Normal delivery 9/5/09    boy forceps    Seizures (H)     When a child     Past Surgical History     Past Surgical History:   Procedure Laterality Date    ANESTH,SKIN SURGERY,KNEE  1993, 1990    orthoscopic    CHOLECYSTECTOMY  2016    CL AFF SURGICAL PATHOLOGY  2007    LAPAROSCOPIC CHOLECYSTECTOMY N/A 5/18/2016    Procedure: LAPAROSCOPIC CHOLECYSTECTOMY;  Surgeon: Radha Cline MD;  Location: [facility] OR    LAPAROSCOPIC TUBAL LIGATION  12/15/2011    Procedure:LAPAROSCOPIC TUBAL LIGATION; Laparoscopic tubal ligation; Surgeon:AMY WYNN; Location:[facility] OR    OSTEOTOMY FOOT Left 2/13/2019    Procedure: Left Lapidus with  Azael Osteotomy;  Surgeon: Brett Chavez MD;  Location: [facility] OR    ZZC APPENDECTOMY  4/2008     Allergy     Allergies   Allergen Reactions    Carbamazepine, Eslicarbazepine, And Oxcarbazepine      Fever, rash    Corn-Containing Products      Current Medication List     Current Outpatient Medications   Medication Sig Dispense Refill    albuterol (PROAIR HFA/PROVENTIL HFA/VENTOLIN HFA) 108 (90 Base) MCG/ACT inhaler Inhale 2 puffs into the lungs every 6 hours 18 g 1    augmented betamethasone dipropionate (DIPROLENE AF) 0.05 % external cream Apply topically daily. 50 g 5    Cranberry 1000 MG CAPS Take 1 capsule by mouth 4 times daily      DiphenhydrAMINE HCl (BENADRYL PO)        hydroxychloroquine (PLAQUENIL) 200 MG tablet Take 1 tablet (200 mg) by mouth daily. 90 tablet 2    multivitamin, therapeutic (THERA-VIT) TABS tablet Take 1 tablet by mouth daily.      mycophenolate (GENERIC EQUIVALENT) 500 MG tablet Take 1500 mg (3 pills) in the morning and 1000 mg (2 pills) in the evening. 450 tablet 3    norethindrone-ethinyl estradiol (ORTHO-NOVUM) 1-35 MG-MCG tablet Take 1 tablet by mouth daily. 84 tablet 4    omeprazole (PRILOSEC) 20 MG DR capsule Take 1 capsule (20 mg) by mouth daily (Patient taking differently: Take 20 mg by mouth daily. As needed) 30 capsule 1    predniSONE (DELTASONE) 2.5 MG tablet Take 4 tablets (10 mg) by mouth daily for 7 days, THEN 3 tablets (7.5 mg) daily for 7 days, THEN 2 tablets (5 mg) daily for 7 days, THEN 1 tablet (2.5 mg) daily for 7 days. 70 tablet 0    Spacer/Aero-Holding Chambers (SPACER/AERO-HOLD CHAMBER MASK) REMY 1 Device as needed 1 each 0    tenofovir (VIREAD) 300 MG tablet Take 1 tablet (300 mg) by mouth daily. 90 tablet 3    Vitamin D, Cholecalciferol, 25 MCG (1000 UT) CAPS Take 1,000 Units by mouth daily 90 capsule 2    amLODIPine (NORVASC) 2.5 MG tablet Take 1 tablet (2.5 mg) by mouth daily. for raynaud's.  Okay to only use during colder weather. (Patient not taking: Reported on 5/19/2025) 90 tablet 2    diphenhydrAMINE HCl (Sleep) 50 MG CAPS Take 50 mg by mouth At Bedtime (Patient not taking: Reported on 5/19/2025)       No current facility-administered medications for this visit.         Social History   See HPI    Family History     Family History   Adopted: Yes   Family history unknown: Yes       Physical Exam     Temp Readings from Last 3 Encounters:   05/05/25 98.2  F (36.8  C) (Oral)   04/16/24 98.7  F (37.1  C) (Temporal)   09/09/19 96.2  F (35.7  C) (Temporal)     BP Readings from Last 5 Encounters:   05/19/25 (!) 147/84   05/05/25 (!) 159/89   04/10/25 128/83   11/18/24 132/84   04/16/24 138/84     Pulse Readings from Last 1 Encounters:  "  05/19/25 90     Resp Readings from Last 1 Encounters:   11/18/24 16     Estimated body mass index is 31.64 kg/m  as calculated from the following:    Height as of 4/10/25: 1.575 m (5' 2\").    Weight as of this encounter: 78.5 kg (173 lb).    GEN: NAD.  HEENT:  Anicteric, noninjected sclera. No obvious external lesions of the ear and nose. Hearing intact.  CV: S1, S2. RRR. No m/r/g  PULM: No increased work of breathing. CTA bilaterally   MSK: MCPs, PIPs, DIPs without swelling or tenderness to palpation.  Wrists without swelling or tenderness to palpation.  Elbows and shoulders without swelling or tenderness to palpation.   Knees, ankles, and MTPs without swelling or tenderness to palpation.      PSYCH: Alert. Appropriate.      Labs / Imaging (select studies)     RNP/Sm/SSA/SSB  Recent Labs   Lab Test 03/07/18  1548   RNPIGG 1.8*   SMIGG 0.2   SSAIGG >8.0*   SSBIGG <0.2   SCLIGG <0.2     dsDNA  Recent Labs   Lab Test 11/06/24  1648 04/05/24  1431 09/01/23  1449 07/13/23  1019 04/14/23  1338 04/19/22  1649 02/16/22  1652 11/10/20  1500 11/21/19  1536   DNA 50.0* 42.0* 35.0* 31.0* 30.0* 17.0* 17.0* 20* 12*     C3/C4  Recent Labs   Lab Test 11/06/24  1648 04/05/24  1431 09/01/23  1449 07/13/23  1019 04/14/23  1338 04/19/22  1649 02/16/22  1652 11/10/20  1500 11/21/19  1536   D1SCFBW 76* 68* 74* 71* 69* 74* 68* 64* 56*   P5WCDFX 19 18 20 21 20 19 17 17 16     Antiphospholipid Antibodies  Recent Labs   Lab Test 03/07/18  1548   B2GPG 2.9   B2GPM 1.5   CARDG 5.9   CARDM 0.3   LUPINT Negative     CBC  Recent Labs   Lab Test 02/27/25  1324 11/06/24  1648 04/05/24  1431   WBC 9.8 5.8 4.3   RBC 4.58 4.47 4.02   HGB 14.1 14.0 12.5   HCT 41.3 41.0 36.7   MCV 90 92 91   RDW 12.3 12.7 12.3    221 236   MCH 30.8 31.3 31.1   MCHC 34.1 34.1 34.1   NEUTROPHIL 88 70 60   LYMPH 9 22 29   MONOCYTE 2 7 9   EOSINOPHIL 0 2 2   BASOPHIL 0 0 0   ANEU 8.7* 4.0 2.6   ALYM 0.9 1.3 1.2   CALLIE 0.2 0.4 0.4   AEOS 0.0 0.1 0.1   ABAS 0.0 0.0 " 0.0     CMP  Recent Labs   Lab Test 02/27/25  1324 11/18/24  1405 11/06/24  1648 04/05/24  1431 07/13/23  1019 04/14/23  1338 04/14/23  1338 04/19/22  1649 02/16/22  1652 02/26/21  1431 01/07/21  1409 11/10/20  1500 11/21/19  1536     --  141 140 141  --  140 142   < > 140 139 139 141   POTASSIUM 4.0 3.6 3.0* 3.6 3.8   < > 3.3* 3.6   < > 3.7 4.0 3.8 3.6   CHLORIDE 102  --  104 108* 108*  --  112* 108   < > 109 109 108 112*   CO2 26  --  24 25 24  --  28 26   < > 29 25 27 27   ANIONGAP 11  --  13 7 9  --  <1* 8   < > 2* 5 4 2*   *  --  111* 111* 100*  --  91 105*   < > 109* 115* 103* 130*   BUN 10.7  --  12.5 12.1 10.0  --  9 10   < > 13 11 13 10   CR 0.59  --  0.71 0.57 0.63  --  0.58 0.72   < > 0.71 0.64 0.60 0.64   GFRESTIMATED >90  --  >90 >90 >90  --  >90 >90   < > >90 >90 >90 >90   GFRESTBLACK  --   --   --   --   --   --   --   --   --  >90 >90 >90 >90   JUAN A 9.5  --  9.0 8.5* 8.6  --  8.8 9.0   < > 8.8 8.6 8.4* 8.8   BILITOTAL 0.3  --  0.3 0.2 0.5  --  0.4 0.3   < > 0.2 0.3 0.3 0.2   ALBUMIN 4.2  --  4.3 4.0 4.1  --  4.1 4.0   < > 3.9 3.8 3.8 3.7   PROTTOTAL 7.2  --  7.5 6.7 6.7  --  7.1 7.1   < > 6.9 6.9 7.2 6.8   ALKPHOS 77  --  76 61 66  --  84 77   < > 81 62 80 85   AST 44  --  51* 27 30  --  23 16   < > 25 23 30 26   ALT 69*  --  50 32 34  --  36 35   < > 39 38 46 44    < > = values in this interval not displayed.     Calcium/VitaminD  Recent Labs   Lab Test 02/27/25  1324 11/06/24  1648 04/05/24  1431   JUAN A 9.5 9.0 8.5*   VITDT  --  25  --      ESR/CRP  Recent Labs   Lab Test 11/06/24  1648 04/05/24  1431 09/01/23  1449 04/14/23  1338 04/19/22  1649 02/16/22  1652   SED 12 13  --  10  --   --    CRP  --   --   --  4.5 4.3 <2.9   CRPI 6.84* 5.30* <3.00  --   --   --      CK/Aldolase  Recent Labs   Lab Test 04/14/23  1338 11/10/20  1500 06/07/18  1347   CKT 52 69 87     Lipid Panel  Recent Labs   Lab Test 11/18/24  1405 07/13/23  1019   CHOL 181 173   TRIG 126 75   HDL 50 59   * 99    NHDL 131* 114     Hepatitis B  Recent Labs   Lab Test 02/27/25  1324 11/18/24  1405 01/07/21  1409 11/16/18  1249   AUSAB <3.50  --   --  1.76   HBCAB  --   --   --  Reactive*   HEPBANG  --   --   --  Reactive*   HBQLOG 1.8 1.8 <1.3  --    HBQRESINST 70* 59*  --   --      Hepatitis C  Recent Labs   Lab Test 11/16/18  1249   HCVAB Nonreactive     HIV Screening  Recent Labs   Lab Test 11/16/18  1249   HIAGAB Nonreactive     UA  Recent Labs   Lab Test 11/06/24  1640 04/05/24  1431 12/12/23  1532 11/28/23  1155 11/10/20  1501 02/19/19  1300 06/07/18  1347 03/07/18  1548   COLOR Light Yellow Light Yellow Colorless Yellow   < >  --    < > Yellow   APPEARANCE Clear Clear Clear Clear   < >  --    < > Clear   URINEGLC Negative Negative Negative Negative   < >  --    < > Negative   URINEBILI Negative Negative Negative Negative   < >  --    < > Negative   SG 1.006 1.022 1.007 1.027   < >  --    < > 1.025   URINEPH 6.5 6.5 6.5 5.5   < >  --    < > 5.5   PROTEIN Negative Negative Negative 30*   < >  --    < > Negative   UROBILINOGEN  --   --   --   --   --   --   --  0.2   NITRITE Negative Negative Negative Negative   < >  --    < > Negative   UBLD Negative Moderate* Negative Negative   < >  --    < > Negative   LEUKEST Negative Negative Negative Negative   < >  --    < > Negative   WBCU  --  0-5 None Seen 0-5   < > 0 - 5  --   --    RBCU  --  2-5* None Seen None Seen   < > O - 2  --   --    SQUAMOUSEPI  --   --   --   --   --  Few  --   --    BACTERIA  --  Moderate* Few* Few*   < > Few*  --   --     < > = values in this interval not displayed.     Urine Microscopic  Recent Labs   Lab Test 04/05/24  1431 12/12/23  1532 11/28/23  1155 02/16/22  1652 02/19/19  1300   WBCU 0-5 None Seen 0-5   < > 0 - 5   RBCU 2-5* None Seen None Seen   < > O - 2   SQUAMOUSEPI  --   --   --   --  Few   BACTERIA Moderate* Few* Few*   < > Few*    < > = values in this interval not displayed.     Urine Protein  GHUTP and UTP= Urine protein (random),  GHUTPG and UTPG = urine protein:creatinine ratio (random), UCRR = urine creatinine (random)  Recent Labs   Lab Test 11/06/24  1640 04/05/24  1431 04/14/23  1338 04/19/22  1649 02/16/22  1652 02/16/22  1652 11/10/20  1501   GHUTP <6.0 9.8 7.0  --   --   --   --    UTP  --   --   --  0.12  --  0.13 <0.05   GHUTPG  --  0.10 0.11  --   --   --   --    UTPG  --   --   --  0.14  --  0.08 Unable to calculate due to low value   UCRR 28.9 96.1 65.9 88   < > 161 10    < > = values in this interval not displayed.     Immunization History     Immunization History   Administered Date(s) Administered    COVID-19 Vaccine (Bindu) 06/04/2021    Influenza (H1N1) 12/04/2009    Influenza (IIV3) PF 12/18/2000, 09/22/2009, 11/17/2011, 11/01/2012, 09/22/2014    Influenza Vaccine >6 months,quad, PF 09/13/2013, 09/26/2013, 09/25/2014, 09/30/2015, 10/01/2015, 10/24/2017, 11/02/2018, 10/03/2019, 10/14/2020, 10/06/2021, 10/19/2022, 10/26/2023    Influenza Vaccine, 6+MO IM (QUADRIVALENT W/PRESERVATIVES) 09/29/2015, 10/07/2016    Influenza, Split Virus, Trivalent, Pf (Fluzone\Fluarix) 10/09/2024    Varicella (Varivax) 02/08/2013, 04/11/2013          Chart documentation done in part with Dragon Voice recognition Software. Although reviewed after completion, some word and grammatical error may remain.    Christopher Zaman MD

## 2025-05-21 ENCOUNTER — RESULTS FOLLOW-UP (OUTPATIENT)
Dept: RHEUMATOLOGY | Facility: CLINIC | Age: 50
End: 2025-05-21
Payer: COMMERCIAL

## 2025-05-21 NOTE — TELEPHONE ENCOUNTER
Attempt # 1  Left message for patient to call clinic at: [phone].    If patient calls back, please relay provider's message:

## 2025-05-21 NOTE — TELEPHONE ENCOUNTER
Patient returning call to clinic. Notified per Sandy Garcia NP:    Please reach out to patient to make appointment with me related to message from Dr Zaman new diagnosis of diabetes   OK to do as virtual or OK to use a same day slot   Thanks   Sandy Garcia NP, APRN CNP     Appointment scheduled. Patient verbalized understanding and agrees with plan. Advised to call with questions or concerns. Izaiah Funes RN, BSN, PHN

## 2025-05-21 NOTE — RESULT ENCOUNTER NOTE
"Sandy Garcia: Will you please schedule an appointment with Natasha to manage the hyperglycemia.  I would anticipate this may improve with prednisone dose reductions.     Threadflip message sent:  \"Natasha,    The nonfasting glucose is 252.  The hemoglobin A1c is 6.6.  These values are consistent with diabetes.  Please follow-up with your primary care provider for management of the elevated glucose.  I have CC'd Sandy Garcia and Dr. Terry on these results.     The liver enzyme ALT is improved at 58 and can be monitored over time.    Sincerely,  Christopher Zaman MD\"  "

## 2025-05-30 ENCOUNTER — VIRTUAL VISIT (OUTPATIENT)
Dept: FAMILY MEDICINE | Facility: CLINIC | Age: 50
End: 2025-05-30
Payer: COMMERCIAL

## 2025-05-30 DIAGNOSIS — I28.8 ENLARGED PULMONARY ARTERY (H): ICD-10-CM

## 2025-05-30 DIAGNOSIS — E11.69 TYPE 2 DIABETES MELLITUS WITH OTHER SPECIFIED COMPLICATION, WITHOUT LONG-TERM CURRENT USE OF INSULIN (H): Primary | ICD-10-CM

## 2025-05-30 PROCEDURE — 98006 SYNCH AUDIO-VIDEO EST MOD 30: CPT | Performed by: NURSE PRACTITIONER

## 2025-05-30 RX ORDER — METFORMIN HYDROCHLORIDE 500 MG/1
500 TABLET, EXTENDED RELEASE ORAL
Qty: 30 TABLET | Refills: 3 | Status: SHIPPED | OUTPATIENT
Start: 2025-05-30

## 2025-05-30 NOTE — PROGRESS NOTES
"  If patient has telephone visit, have they been educated on video visit as preferred visit method and offered to change to video visit? N/A        Instructions Relayed to Patient by Virtual Roomer:     Patient is active on [email or web address]:   Relayed following to patient: \"It looks like you are active on [email or web address], are you able to join the visit this way? If not, do you need us to send you a link now or would you like your provider to send a link via text or email when they are ready to initiate the visit?\"      Patient Confirmed they will join visit via: Text Link to Cell Phone  Reminded patient to ensure they were logged on to virtual visit by arrival time listed.   Asked if patient has flexibility to initiate visit sooner than arrival time: patient is unable to initiate visit earlier than arrival time     If pediatric virtual visit, ensured pediatric patient along with parent/guardian will be present for video visit.     Patient offered the website [email or web address] and/or phone number to [email or web address] Help line: [phone]    Natasha is a 50 year old who is being evaluated via a billable video visit.    How would you like to obtain your AVS? [employer]  If the video visit is dropped, the invitation should be resent by: Text to cell phone: [phone]  Will anyone else be joining your video visit? No          Subjective   Natasha is a 50 year old, presenting for the following health issues:  Diabetes        5/30/2025     1:37 PM   Additional Questions   Roomed by Alma CAMACHO   Accompanied by Self         5/30/2025     1:37 PM   Patient Reported Additional Medications   Patient reports taking the following new medications None     History of Present Illness       Reason for visit:  Diabeties diagnosis   She is taking medications regularly.        Diabetes Follow-up    How often are you checking your blood sugar? Not at all  What concerns do you have today about your diabetes? None   Do you have any of these symptoms? " (Select all that apply)  No numbness or tingling in feet.  No redness, sores or blisters on feet.  No complaints of excessive thirst.  No reports of blurry vision.  No significant changes to weight.  Have you had a diabetic eye exam in the last 12 months? No  Just found that is now in the diabetes       BP Readings from Last 2 Encounters:   05/19/25 138/86   05/05/25 (!) 159/89     Hemoglobin A1C (%)   Date Value   05/19/2025 6.6 (H)   02/15/2016 5.3   10/17/2014 5.4     LDL Cholesterol Calculated (mg/dL)   Date Value   11/18/2024 106 (H)   07/13/2023 99   02/15/2016 82   02/11/2014 74       Lab work is in process  Labs reviewed in EPIC  BP Readings from Last 3 Encounters:   05/19/25 138/86   05/05/25 (!) 159/89   04/10/25 128/83    Wt Readings from Last 3 Encounters:   05/19/25 78.5 kg (173 lb)   05/05/25 78.2 kg (172 lb 4.8 oz)   04/10/25 76.4 kg (168 lb 8 oz)                  Patient Active Problem List   Diagnosis    Chronic hepatitis B (H)    Other forms of systemic lupus erythematosus (H)    CARDIOVASCULAR SCREENING; LDL GOAL LESS THAN 160    Allergic rhinitis    Other nonspecific finding on examination of urine    Urinary frequency    Vitamin B12 deficiency (non anemic)    Heart murmur    S/P laparoscopic cholecystectomy    Cervical cancer screening    Enlarged pulmonary artery (H)    Immunosuppression     Past Surgical History:   Procedure Laterality Date    ANESTH,SKIN SURGERY,KNEE  1993, 1990    orthoscopic    CHOLECYSTECTOMY  2016    CL AFF SURGICAL PATHOLOGY  2007    LAPAROSCOPIC CHOLECYSTECTOMY N/A 5/18/2016    Procedure: LAPAROSCOPIC CHOLECYSTECTOMY;  Surgeon: Radha Cline MD;  Location:  OR    LAPAROSCOPIC TUBAL LIGATION  12/15/2011    Procedure:LAPAROSCOPIC TUBAL LIGATION; Laparoscopic tubal ligation; Surgeon:AMY WYNN; Location: OR    OSTEOTOMY FOOT Left 2/13/2019    Procedure: Left Lapidus with  Azael Osteotomy;  Surgeon: Brett Chavez MD;  Location:  OR    [facility]  APPENDECTOMY  4/2008       Social History     Tobacco Use    Smoking status: Never    Smokeless tobacco: Never   Substance Use Topics    Alcohol use: Yes     Comment: Occasional     Family History   Adopted: Yes   Family history unknown: Yes         Current Outpatient Medications   Medication Sig Dispense Refill    albuterol (PROAIR HFA/PROVENTIL HFA/VENTOLIN HFA) 108 (90 Base) MCG/ACT inhaler Inhale 2 puffs into the lungs every 6 hours 18 g 1    augmented betamethasone dipropionate (DIPROLENE AF) 0.05 % external cream Apply topically daily. 50 g 5    Cranberry 1000 MG CAPS Take 1 capsule by mouth 4 times daily      DiphenhydrAMINE HCl (BENADRYL PO)       hydroxychloroquine (PLAQUENIL) 200 MG tablet Take 1 tablet (200 mg) by mouth daily. 90 tablet 2    metFORMIN (GLUCOPHAGE XR) 500 MG 24 hr tablet Take 1 tablet (500 mg) by mouth daily (with dinner). 30 tablet 3    multivitamin, therapeutic (THERA-VIT) TABS tablet Take 1 tablet by mouth daily.      mycophenolate (GENERIC EQUIVALENT) 500 MG tablet Take 1500 mg (3 pills) in the morning and 1000 mg (2 pills) in the evening. 450 tablet 3    norethindrone-ethinyl estradiol (ORTHO-NOVUM) 1-35 MG-MCG tablet Take 1 tablet by mouth daily. 84 tablet 4    Spacer/Aero-Holding Chambers (SPACER/AERO-HOLD CHAMBER MASK) REMY 1 Device as needed 1 each 0    tenofovir (VIREAD) 300 MG tablet Take 1 tablet (300 mg) by mouth daily. 90 tablet 3    Vitamin D, Cholecalciferol, 25 MCG (1000 UT) CAPS Take 1,000 Units by mouth daily 90 capsule 2    amLODIPine (NORVASC) 2.5 MG tablet Take 1 tablet (2.5 mg) by mouth daily. for raynaud's.  Okay to only use during colder weather. (Patient not taking: Reported on 5/5/2025) 90 tablet 2    diphenhydrAMINE HCl (Sleep) 50 MG CAPS Take 50 mg by mouth At Bedtime (Patient not taking: No sig reported)      omeprazole (PRILOSEC) 20 MG DR capsule Take 1 capsule (20 mg) by mouth daily (Patient not taking: No sig reported) 30 capsule 1     Allergies   Allergen  "Reactions    Carbamazepine, Eslicarbazepine, And Oxcarbazepine      Fever, rash    Corn-Containing Products      Recent Labs   Lab Test 05/19/25  1514 02/27/25  1324 11/18/24  1405 11/06/24  1648 04/05/24  1431 07/13/23  1019 02/16/22  1652 02/26/21  1431 01/07/21  1409   A1C 6.6*  --   --   --   --   --   --   --   --    LDL  --   --  106*  --   --  99  --   --   --    HDL  --   --  50  --   --  59  --   --   --    TRIG  --   --  126  --   --  75  --   --   --    ALT 58* 69*  --  50   < > 34   < > 39 38   CR  --  0.59  --  0.71   < > 0.63   < > 0.71 0.64   GFRESTIMATED  --  >90  --  >90   < > >90   < > >90 >90   GFRESTBLACK  --   --   --   --   --   --   --  >90 >90   POTASSIUM  --  4.0 3.6 3.0*   < > 3.8   < > 3.7 4.0    < > = values in this interval not displayed.              Review of Systems  Constitutional, HEENT, cardiovascular, pulmonary, gi and gu systems are negative, except as otherwise noted.      Objective           Vitals:  No vitals were obtained today due to virtual visit.  BP Readings from Last 1 Encounters:   05/19/25 138/86     Pulse Readings from Last 1 Encounters:   05/19/25 90     Wt Readings from Last 1 Encounters:   05/19/25 78.5 kg (173 lb)     Ht Readings from Last 1 Encounters:   04/10/25 1.575 m (5' 2\")     Estimated body mass index is 31.64 kg/m  as calculated from the following:    Height as of 4/10/25: 1.575 m (5' 2\").    Weight as of 5/19/25: 78.5 kg (173 lb).    Temp Readings from Last 1 Encounters:   05/05/25 98.2  F (36.8  C) (Oral)     Estimated body mass index is 31.46 kg/m  as calculated from the following:    Height as of 4/10/25: 1.575 m (5' 2\").    Weight as of 6/2/25: 78 kg (172 lb).   Physical Exam   GENERAL: alert and no distress  EYES: Eyes grossly normal to inspection and conjunctivae and sclerae normal  HENT: normal cephalic/atraumatic and oral mucous membranes moist  RESP: No audible wheeze, cough, or visible cyanosis.    MS: No gross musculoskeletal defects noted.  " Normal range of motion.  No visible edema.  SKIN: Visible skin clear. No significant rash, abnormal pigmentation or lesions.  NEURO: Normal strength and tone and mentation intact  PSYCH: Appropriate affect, tone, and pace of words    Office Visit on 05/19/2025   Component Date Value Ref Range Status    ALT 05/19/2025 58 (H)  0 - 50 U/L Final    Glucose 05/19/2025 252 (H)  70 - 99 mg/dL Final    Patient Fasting > 8hrs? 05/19/2025 No   Final    Estimated Average Glucose 05/19/2025 143 (H)  <117 mg/dL Final    Hemoglobin A1C 05/19/2025 6.6 (H)  0.0 - 5.6 % Final    Normal <5.7%   Prediabetes 5.7-6.4%    Diabetes 6.5% or higher     Note: Adopted from ADA consensus guidelines.     Assessment & Plan     Type 2 diabetes mellitus with other specified complication, without long-term current use of insulin (H)  referral to Diabetic Education department, foot care discussed and Podiatry visits discussed, annual eye examinations at Ophthalmology discussed, glycohemoglobin monitoring discussed, and long term diabetic complications discussed  Attempt to improve diet  Weight loss advised  Increased exercise advised  Medication changes as follows: Prescribing Metformin  500 mg  daily  Referral to Diabetic educator   Recheck in 3 months, sooner should new symptoms or   problems arise.   - Adult Diabetes Education  Referral  - metFORMIN (GLUCOPHAGE XR) 500 MG 24 hr tablet  Dispense: 30 tablet; Refill: 3  - Hemoglobin A1c  - Basic metabolic panel  - PRIMARY CARE FOLLOW-UP SCHEDULING    Enlarged pulmonary artery (H)  Noted on last hospitalization   CT CHEST PULMONARY EMBOLISM W CONTRAST  Order: [id number]  Impression    IMPRESSION:  1.  No pulmonary embolism or other acute abnormality in the chest.  2.  Mildly enlarged main pulmonary artery, nonspecific but may be seen in the setting of pulmonary arterial hypertension.    REPORT SIGNED BY DR. Quinton Strong      BMI  Estimated body mass index is 31.64 kg/m  as calculated from  "the following:    Height as of 4/10/25: 1.575 m (5' 2\").    Weight as of 5/19/25: 78.5 kg (173 lb).   Weight management plan: Discussed healthy diet and exercise guidelines    See Patient Instructions  Patient Instructions   PLAN:   1.   Symptomatic therapy suggested: will start on metformin daily    2.  Orders Placed This Encounter   Medications    metFORMIN (GLUCOPHAGE XR) 500 MG 24 hr tablet     Sig: Take 1 tablet (500 mg) by mouth daily (with dinner).     Dispense:  30 tablet     Refill:  3     Orders Placed This Encounter   Procedures    REVIEW OF HEALTH MAINTENANCE PROTOCOL ORDERS    Hemoglobin A1c    Basic metabolic panel    Adult Diabetes Education  Referral       3.  FUTURE LABS:       - Schedule non-fasting labs in 3 months  Work on weight loss  Regular exercise  Will follow up and/or notify patient of  results via My Chart to determine further need for followup    Referral to diabetes educator   Patient needs to follow up in if no improvement,or sooner if worsening of symptoms or other symptoms develop.                 Video-Visit Details    Type of service:  Video Visit   Originating Location (pt. Location): Home    Distant Location (provider location):  On-site  Platform used for Video Visit: Meghan  Signed Electronically by: ELIAS Guerra CNP    "

## 2025-05-30 NOTE — PATIENT INSTRUCTIONS
PLAN:   1.   Symptomatic therapy suggested: will start on metformin daily    2.  Orders Placed This Encounter   Medications    metFORMIN (GLUCOPHAGE XR) 500 MG 24 hr tablet     Sig: Take 1 tablet (500 mg) by mouth daily (with dinner).     Dispense:  30 tablet     Refill:  3     Orders Placed This Encounter   Procedures    REVIEW OF HEALTH MAINTENANCE PROTOCOL ORDERS    Hemoglobin A1c    Basic metabolic panel    Adult Diabetes Education  Referral       3.  FUTURE LABS:       - Schedule non-fasting labs in 3 months  Work on weight loss  Regular exercise  Will follow up and/or notify patient of  results via My Chart to determine further need for followup    Referral to diabetes educator   Patient needs to follow up in if no improvement,or sooner if worsening of symptoms or other symptoms develop.

## 2025-06-02 ENCOUNTER — INFUSION THERAPY VISIT (OUTPATIENT)
Dept: INFUSION THERAPY | Facility: CLINIC | Age: 50
End: 2025-06-02
Attending: NURSE PRACTITIONER
Payer: COMMERCIAL

## 2025-06-02 ENCOUNTER — PATIENT OUTREACH (OUTPATIENT)
Dept: CARE COORDINATION | Facility: CLINIC | Age: 50
End: 2025-06-02
Payer: COMMERCIAL

## 2025-06-02 VITALS
DIASTOLIC BLOOD PRESSURE: 85 MMHG | WEIGHT: 172 LBS | BODY MASS INDEX: 31.46 KG/M2 | HEART RATE: 76 BPM | SYSTOLIC BLOOD PRESSURE: 165 MMHG | OXYGEN SATURATION: 98 % | TEMPERATURE: 98.6 F | RESPIRATION RATE: 16 BRPM

## 2025-06-02 DIAGNOSIS — M32.9 SYSTEMIC LUPUS ERYTHEMATOSUS, UNSPECIFIED SLE TYPE, UNSPECIFIED ORGAN INVOLVEMENT STATUS (H): Primary | ICD-10-CM

## 2025-06-02 PROCEDURE — 99207 PR NO CHARGE LOS: CPT

## 2025-06-02 PROCEDURE — 96365 THER/PROPH/DIAG IV INF INIT: CPT

## 2025-06-02 PROCEDURE — 250N000011 HC RX IP 250 OP 636: Mod: JZ | Performed by: DERMATOLOGY

## 2025-06-02 PROCEDURE — 258N000003 HC RX IP 258 OP 636: Performed by: DERMATOLOGY

## 2025-06-02 RX ORDER — DIPHENHYDRAMINE HYDROCHLORIDE 50 MG/ML
25 INJECTION, SOLUTION INTRAMUSCULAR; INTRAVENOUS
Start: 2025-06-30

## 2025-06-02 RX ORDER — METHYLPREDNISOLONE SODIUM SUCCINATE 125 MG/2ML
125 INJECTION INTRAMUSCULAR; INTRAVENOUS
OUTPATIENT
Start: 2025-06-30

## 2025-06-02 RX ORDER — DIPHENHYDRAMINE HCL 25 MG
25 CAPSULE ORAL
OUTPATIENT
Start: 2025-06-30

## 2025-06-02 RX ORDER — DIPHENHYDRAMINE HYDROCHLORIDE 50 MG/ML
50 INJECTION, SOLUTION INTRAMUSCULAR; INTRAVENOUS
Start: 2025-06-30

## 2025-06-02 RX ORDER — MEPERIDINE HYDROCHLORIDE 25 MG/ML
25 INJECTION INTRAMUSCULAR; INTRAVENOUS; SUBCUTANEOUS
OUTPATIENT
Start: 2025-06-30

## 2025-06-02 RX ORDER — HEPARIN SODIUM,PORCINE 10 UNIT/ML
5-20 VIAL (ML) INTRAVENOUS DAILY PRN
OUTPATIENT
Start: 2025-06-30

## 2025-06-02 RX ORDER — ALBUTEROL SULFATE 90 UG/1
1-2 INHALANT RESPIRATORY (INHALATION)
Start: 2025-06-30

## 2025-06-02 RX ORDER — EPINEPHRINE 1 MG/ML
0.3 INJECTION, SOLUTION INTRAMUSCULAR; SUBCUTANEOUS EVERY 5 MIN PRN
OUTPATIENT
Start: 2025-06-30

## 2025-06-02 RX ORDER — ALBUTEROL SULFATE 0.83 MG/ML
2.5 SOLUTION RESPIRATORY (INHALATION)
OUTPATIENT
Start: 2025-06-30

## 2025-06-02 RX ORDER — HEPARIN SODIUM (PORCINE) LOCK FLUSH IV SOLN 100 UNIT/ML 100 UNIT/ML
5 SOLUTION INTRAVENOUS
OUTPATIENT
Start: 2025-06-30

## 2025-06-02 RX ORDER — METHYLPREDNISOLONE SODIUM SUCCINATE 40 MG/ML
40 INJECTION INTRAMUSCULAR; INTRAVENOUS
Start: 2025-06-30

## 2025-06-02 RX ORDER — ACETAMINOPHEN 325 MG/1
650 TABLET ORAL
OUTPATIENT
Start: 2025-06-30

## 2025-06-02 RX ADMIN — SODIUM CHLORIDE 300 MG: 9 INJECTION, SOLUTION INTRAVENOUS at 15:51

## 2025-06-02 RX ADMIN — SODIUM CHLORIDE 250 ML: 0.9 INJECTION, SOLUTION INTRAVENOUS at 15:45

## 2025-06-02 NOTE — PROGRESS NOTES
Infusion Nursing Note:  Natasha Lee presents today for Saphnelo.    Patient seen by provider today: No   present during visit today: Not Applicable.    Note: Patient reports tolerating dose #1 well. Sates that she has noticed her skin behaving more since starting the Saphnelo. No acne type rash on her face.      Intravenous Access:  Peripheral IV placed.     Treatment Conditions:  Biological Infusion Checklist:  ~~~ NOTE: If the patient answers yes to any of the questions below, hold the infusion and contact ordering provider or on-call provider.    Have you recently had an elevated temperature, fever, chills, productive cough, coughing for 3 weeks or longer or hemoptysis,  abnormal vital signs, night sweats,  chest pain or have you noticed a decrease in your appetite, unexplained weight loss or fatigue? No  Do you have any open wounds or new incisions? No  Do you have any upcoming hospitalizations or surgeries? Does not include esophagogastroduodenoscopy, colonoscopy, endoscopic retrograde cholangiopancreatography (ERCP), endoscopic ultrasound (EUS), dental procedures or joint aspiration/steroid injections No  Do you currently have any signs of illness or infection or are you on any antibiotics? No  Have you had any new, sudden or worsening abdominal pain? No  Have you or anyone in your household received a live vaccination in the past 4 weeks? Please note: No live vaccines while on biologic/chemotherapy until 6 months after the last treatment. Patient can receive the flu vaccine (shot only), pneumovax and the Covid vaccine. It is optimal for the patient to get these vaccines mid cycle, but they can be given at any time as long as it is not on the day of the infusion. No  Have you recently been diagnosed with any new nervous system diseases (ie. Multiple sclerosis, Guillain [place], seizures, neurological changes) or cancer diagnosis? Are you on any form of radiation or chemotherapy? No  Are you  pregnant or breast feeding or do you have plans of pregnancy in the future? No  Have you been having any signs of worsening depression or suicidal ideations?  (benlysta only) N/A  Have there been any other new onset medical symptoms? No  Have you had any new blood clots? (IVIG only) N/A      Post Infusion Assessment:  Patient tolerated infusion without incident.  Blood return noted pre and post infusion.  No evidence of extravasations.  Access discontinued per protocol.  Biologic Infusion Post Education: Call the triage nurse at your clinic or seek medical attention if you have chills and/or temperature greater than or equal to 100.5, uncontrolled nausea/vomiting, diarrhea, constipation, dizziness, shortness of breath, chest pain, heart palpitations, weakness or any other new or concerning symptoms, questions or concerns.  You cannot have any live virus vaccines prior to or during treatment or up to 6 months post infusion.  If you have an upcoming surgery, medical procedure or dental procedure during treatment, this should be discussed with your ordering physician and your surgeon/dentist.  If you are having any concerning symptom, if you are unsure if you should get your next infusion or wish to speak to a provider before your next infusion, please call your care coordinator or triage nurse at your clinic to notify them so we can adequately serve you.       Discharge Plan:   Discharge instructions reviewed with: Patient.  Patient and/or family verbalized understanding of discharge instructions and all questions answered.  Patient discharged in stable condition accompanied by: self.   Departure Mode: Ambulatory.      June Arce RN

## 2025-06-04 ENCOUNTER — PATIENT OUTREACH (OUTPATIENT)
Dept: CARE COORDINATION | Facility: CLINIC | Age: 50
End: 2025-06-04
Payer: COMMERCIAL

## 2025-06-12 ENCOUNTER — E-VISIT (OUTPATIENT)
Dept: FAMILY MEDICINE | Facility: CLINIC | Age: 50
End: 2025-06-12
Payer: COMMERCIAL

## 2025-06-12 DIAGNOSIS — J01.90 ACUTE SINUSITIS WITH SYMPTOMS > 10 DAYS: ICD-10-CM

## 2025-06-30 ENCOUNTER — INFUSION THERAPY VISIT (OUTPATIENT)
Dept: INFUSION THERAPY | Facility: CLINIC | Age: 50
End: 2025-06-30
Attending: NURSE PRACTITIONER
Payer: COMMERCIAL

## 2025-06-30 VITALS
TEMPERATURE: 98.7 F | WEIGHT: 174.3 LBS | RESPIRATION RATE: 18 BRPM | SYSTOLIC BLOOD PRESSURE: 131 MMHG | BODY MASS INDEX: 31.88 KG/M2 | DIASTOLIC BLOOD PRESSURE: 84 MMHG | HEART RATE: 81 BPM | OXYGEN SATURATION: 98 %

## 2025-06-30 DIAGNOSIS — M32.9 SYSTEMIC LUPUS ERYTHEMATOSUS, UNSPECIFIED SLE TYPE, UNSPECIFIED ORGAN INVOLVEMENT STATUS (H): Primary | ICD-10-CM

## 2025-06-30 PROCEDURE — 96365 THER/PROPH/DIAG IV INF INIT: CPT

## 2025-06-30 PROCEDURE — 99207 PR NO CHARGE LOS: CPT

## 2025-06-30 PROCEDURE — 258N000003 HC RX IP 258 OP 636: Performed by: DERMATOLOGY

## 2025-06-30 PROCEDURE — 250N000011 HC RX IP 250 OP 636: Mod: JZ | Performed by: DERMATOLOGY

## 2025-06-30 RX ORDER — DIPHENHYDRAMINE HYDROCHLORIDE 50 MG/ML
25 INJECTION, SOLUTION INTRAMUSCULAR; INTRAVENOUS
Start: 2025-07-28

## 2025-06-30 RX ORDER — HEPARIN SODIUM (PORCINE) LOCK FLUSH IV SOLN 100 UNIT/ML 100 UNIT/ML
5 SOLUTION INTRAVENOUS
OUTPATIENT
Start: 2025-07-28

## 2025-06-30 RX ORDER — ALBUTEROL SULFATE 0.83 MG/ML
2.5 SOLUTION RESPIRATORY (INHALATION)
OUTPATIENT
Start: 2025-07-28

## 2025-06-30 RX ORDER — DIPHENHYDRAMINE HYDROCHLORIDE 50 MG/ML
50 INJECTION, SOLUTION INTRAMUSCULAR; INTRAVENOUS
Start: 2025-07-28

## 2025-06-30 RX ORDER — MEPERIDINE HYDROCHLORIDE 25 MG/ML
25 INJECTION INTRAMUSCULAR; INTRAVENOUS; SUBCUTANEOUS
OUTPATIENT
Start: 2025-07-28

## 2025-06-30 RX ORDER — EPINEPHRINE 1 MG/ML
0.3 INJECTION, SOLUTION INTRAMUSCULAR; SUBCUTANEOUS EVERY 5 MIN PRN
OUTPATIENT
Start: 2025-07-28

## 2025-06-30 RX ORDER — DIPHENHYDRAMINE HCL 25 MG
25 CAPSULE ORAL
OUTPATIENT
Start: 2025-07-28

## 2025-06-30 RX ORDER — ACETAMINOPHEN 325 MG/1
650 TABLET ORAL
OUTPATIENT
Start: 2025-07-28

## 2025-06-30 RX ORDER — HEPARIN SODIUM,PORCINE 10 UNIT/ML
5-20 VIAL (ML) INTRAVENOUS DAILY PRN
OUTPATIENT
Start: 2025-07-28

## 2025-06-30 RX ORDER — METHYLPREDNISOLONE SODIUM SUCCINATE 125 MG/2ML
125 INJECTION INTRAMUSCULAR; INTRAVENOUS
OUTPATIENT
Start: 2025-07-28

## 2025-06-30 RX ORDER — METHYLPREDNISOLONE SODIUM SUCCINATE 40 MG/ML
40 INJECTION INTRAMUSCULAR; INTRAVENOUS
Start: 2025-07-28

## 2025-06-30 RX ORDER — ALBUTEROL SULFATE 90 UG/1
1-2 INHALANT RESPIRATORY (INHALATION)
Start: 2025-07-28

## 2025-06-30 RX ADMIN — SODIUM CHLORIDE 250 ML: 0.9 INJECTION, SOLUTION INTRAVENOUS at 16:05

## 2025-06-30 RX ADMIN — SODIUM CHLORIDE 300 MG: 9 INJECTION, SOLUTION INTRAVENOUS at 16:06

## 2025-06-30 NOTE — PROGRESS NOTES
Infusion Nursing Note:  Natasha Lee presents today for Saphnelo.    Patient seen by provider today: No   present during visit today: Not Applicable.    Note: Patient states that she is on amoxicillin currently for a sinus infection, but no other new complaints. Secure chat sent to Dr. Terry and ok to infuse today. Tolerated 2nd infusion last month without any premedications she states.      Intravenous Access:  Peripheral IV placed.    Treatment Conditions:  Biological Infusion Checklist:  ~~~ NOTE: If the patient answers yes to any of the questions below, hold the infusion and contact ordering provider or on-call provider.    Have you recently had an elevated temperature, fever, chills, productive cough, coughing for 3 weeks or longer or hemoptysis,  abnormal vital signs, night sweats,  chest pain or have you noticed a decrease in your appetite, unexplained weight loss or fatigue? No  Do you have any open wounds or new incisions? No  Do you have any upcoming hospitalizations or surgeries? Does not include esophagogastroduodenoscopy, colonoscopy, endoscopic retrograde cholangiopancreatography (ERCP), endoscopic ultrasound (EUS), dental procedures or joint aspiration/steroid injections No  Do you currently have any signs of illness or infection or are you on any antibiotics? Yes; for sinus infection   Have you had any new, sudden or worsening abdominal pain? No  Have you or anyone in your household received a live vaccination in the past 4 weeks? Please note: No live vaccines while on biologic/chemotherapy until 6 months after the last treatment. Patient can receive the flu vaccine (shot only), pneumovax and the Covid vaccine. It is optimal for the patient to get these vaccines mid cycle, but they can be given at any time as long as it is not on the day of the infusion. No  Have you recently been diagnosed with any new nervous system diseases (ie. Multiple sclerosis, Guillain [place], seizures,  neurological changes) or cancer diagnosis? Are you on any form of radiation or chemotherapy? No  Are you pregnant or breast feeding or do you have plans of pregnancy in the future? No  Have you been having any signs of worsening depression or suicidal ideations?  (benlysta only) No  Have there been any other new onset medical symptoms? No  Have you had any new blood clots? (IVIG only) No      Post Infusion Assessment:  Patient tolerated infusion without incident.  Blood return noted pre and post infusion.  Site patent and intact, free from redness, edema or discomfort.  No evidence of extravasations.  Access discontinued per protocol.       Discharge Plan:   AVS to patient via MYCHART.  Patient will return 7/28/25 for next appointment.   Patient discharged in stable condition accompanied by: self.  Departure Mode: Ambulatory.      Elana Paige RN

## 2025-07-28 ENCOUNTER — INFUSION THERAPY VISIT (OUTPATIENT)
Dept: INFUSION THERAPY | Facility: CLINIC | Age: 50
End: 2025-07-28
Attending: DERMATOLOGY
Payer: COMMERCIAL

## 2025-07-28 VITALS
BODY MASS INDEX: 31.5 KG/M2 | SYSTOLIC BLOOD PRESSURE: 145 MMHG | DIASTOLIC BLOOD PRESSURE: 83 MMHG | WEIGHT: 172.2 LBS | OXYGEN SATURATION: 100 % | TEMPERATURE: 97.6 F | HEART RATE: 75 BPM

## 2025-07-28 DIAGNOSIS — M32.9 SYSTEMIC LUPUS ERYTHEMATOSUS, UNSPECIFIED SLE TYPE, UNSPECIFIED ORGAN INVOLVEMENT STATUS (H): Primary | ICD-10-CM

## 2025-07-28 PROCEDURE — 96365 THER/PROPH/DIAG IV INF INIT: CPT

## 2025-07-28 PROCEDURE — 250N000011 HC RX IP 250 OP 636: Mod: JZ | Performed by: DERMATOLOGY

## 2025-07-28 PROCEDURE — 99207 PR NO CHARGE LOS: CPT

## 2025-07-28 PROCEDURE — 258N000003 HC RX IP 258 OP 636: Performed by: DERMATOLOGY

## 2025-07-28 RX ORDER — METHYLPREDNISOLONE SODIUM SUCCINATE 125 MG/2ML
125 INJECTION INTRAMUSCULAR; INTRAVENOUS
OUTPATIENT
Start: 2025-08-25

## 2025-07-28 RX ORDER — ACETAMINOPHEN 325 MG/1
650 TABLET ORAL
Status: DISCONTINUED | OUTPATIENT
Start: 2025-07-28 | End: 2025-07-28

## 2025-07-28 RX ORDER — HEPARIN SODIUM (PORCINE) LOCK FLUSH IV SOLN 100 UNIT/ML 100 UNIT/ML
5 SOLUTION INTRAVENOUS
OUTPATIENT
Start: 2025-08-25

## 2025-07-28 RX ORDER — MEPERIDINE HYDROCHLORIDE 25 MG/ML
25 INJECTION INTRAMUSCULAR; INTRAVENOUS; SUBCUTANEOUS
OUTPATIENT
Start: 2025-08-25

## 2025-07-28 RX ORDER — DIPHENHYDRAMINE HCL 25 MG
25 CAPSULE ORAL
OUTPATIENT
Start: 2025-08-25

## 2025-07-28 RX ORDER — HEPARIN SODIUM,PORCINE 10 UNIT/ML
5-20 VIAL (ML) INTRAVENOUS DAILY PRN
OUTPATIENT
Start: 2025-08-25

## 2025-07-28 RX ORDER — ALBUTEROL SULFATE 0.83 MG/ML
2.5 SOLUTION RESPIRATORY (INHALATION)
OUTPATIENT
Start: 2025-08-25

## 2025-07-28 RX ORDER — ACETAMINOPHEN 325 MG/1
650 TABLET ORAL
OUTPATIENT
Start: 2025-08-25

## 2025-07-28 RX ORDER — DIPHENHYDRAMINE HYDROCHLORIDE 50 MG/ML
25 INJECTION, SOLUTION INTRAMUSCULAR; INTRAVENOUS
Start: 2025-08-25

## 2025-07-28 RX ORDER — DIPHENHYDRAMINE HYDROCHLORIDE 50 MG/ML
50 INJECTION, SOLUTION INTRAMUSCULAR; INTRAVENOUS
Start: 2025-08-25

## 2025-07-28 RX ORDER — METHYLPREDNISOLONE SODIUM SUCCINATE 40 MG/ML
40 INJECTION INTRAMUSCULAR; INTRAVENOUS
Start: 2025-08-25

## 2025-07-28 RX ORDER — METHYLPREDNISOLONE SODIUM SUCCINATE 125 MG/2ML
125 INJECTION INTRAMUSCULAR; INTRAVENOUS
Status: DISCONTINUED | OUTPATIENT
Start: 2025-07-28 | End: 2025-07-28

## 2025-07-28 RX ORDER — EPINEPHRINE 1 MG/ML
0.3 INJECTION, SOLUTION INTRAMUSCULAR; SUBCUTANEOUS EVERY 5 MIN PRN
OUTPATIENT
Start: 2025-08-25

## 2025-07-28 RX ORDER — DIPHENHYDRAMINE HCL 25 MG
25 CAPSULE ORAL
Status: DISCONTINUED | OUTPATIENT
Start: 2025-07-28 | End: 2025-07-28

## 2025-07-28 RX ORDER — ALBUTEROL SULFATE 90 UG/1
1-2 INHALANT RESPIRATORY (INHALATION)
Start: 2025-08-25

## 2025-07-28 RX ADMIN — SODIUM CHLORIDE 300 MG: 9 INJECTION, SOLUTION INTRAVENOUS at 15:19

## 2025-07-28 RX ADMIN — SODIUM CHLORIDE 250 ML: 0.9 INJECTION, SOLUTION INTRAVENOUS at 15:18

## 2025-07-28 ASSESSMENT — PAIN SCALES - GENERAL: PAINLEVEL_OUTOF10: NO PAIN (0)

## 2025-07-28 NOTE — PROGRESS NOTES
Infusion Nursing Note:  Natasha Lee presents today for Saphnelo .    Patient seen by provider today: No   present during visit today: Not Applicable.    Note: Pt denies any new medical concerns, see flow sheet for assessment.      Intravenous Access:  Peripheral IV placed.    Treatment Conditions:  Biological Infusion Checklist:  ~~~ NOTE: If the patient answers yes to any of the questions below, hold the infusion and contact ordering provider or on-call provider.    Have you recently had an elevated temperature, fever, chills, productive cough, coughing for 3 weeks or longer or hemoptysis,  abnormal vital signs, night sweats,  chest pain or have you noticed a decrease in your appetite, unexplained weight loss or fatigue? No  Do you have any open wounds or new incisions? No  Do you have any upcoming hospitalizations or surgeries? Does not include esophagogastroduodenoscopy, colonoscopy, endoscopic retrograde cholangiopancreatography (ERCP), endoscopic ultrasound (EUS), dental procedures or joint aspiration/steroid injections No  Do you currently have any signs of illness or infection or are you on any antibiotics? No  Have you had any new, sudden or worsening abdominal pain? No  Have you or anyone in your household received a live vaccination in the past 4 weeks? Please note: No live vaccines while on biologic/chemotherapy until 6 months after the last treatment. Patient can receive the flu vaccine (shot only), pneumovax and the Covid vaccine. It is optimal for the patient to get these vaccines mid cycle, but they can be given at any time as long as it is not on the day of the infusion. No  Have you recently been diagnosed with any new nervous system diseases (ie. Multiple sclerosis, Guillain [place], seizures, neurological changes) or cancer diagnosis? Are you on any form of radiation or chemotherapy? No  Are you pregnant or breast feeding or do you have plans of pregnancy in the future? No  Have  you been having any signs of worsening depression or suicidal ideations?  (benlysta only) No  Have there been any other new onset medical symptoms? No  Have you had any new blood clots? (IVIG only) No      Post Infusion Assessment:  Patient tolerated infusion without incident.  Blood return noted pre and post infusion.  Site patent and intact, free from redness, edema or discomfort.  No evidence of extravasations.  Access discontinued per protocol.       Discharge Plan:   Patient discharged in stable condition accompanied by: self.  Departure Mode: Ambulatory.  Pt will RTC 8/25/25 for Saphnelo. Appts verified and pt aware.      Ritesh Solorzano RN

## 2025-08-25 ENCOUNTER — INFUSION THERAPY VISIT (OUTPATIENT)
Dept: INFUSION THERAPY | Facility: CLINIC | Age: 50
End: 2025-08-25
Attending: DERMATOLOGY
Payer: COMMERCIAL

## 2025-08-25 VITALS
DIASTOLIC BLOOD PRESSURE: 85 MMHG | TEMPERATURE: 98.1 F | RESPIRATION RATE: 18 BRPM | WEIGHT: 169 LBS | BODY MASS INDEX: 30.91 KG/M2 | OXYGEN SATURATION: 97 % | SYSTOLIC BLOOD PRESSURE: 149 MMHG | HEART RATE: 84 BPM

## 2025-08-25 DIAGNOSIS — M32.9 SYSTEMIC LUPUS ERYTHEMATOSUS, UNSPECIFIED SLE TYPE, UNSPECIFIED ORGAN INVOLVEMENT STATUS (H): Primary | ICD-10-CM

## 2025-08-25 PROCEDURE — 258N000003 HC RX IP 258 OP 636: Performed by: DERMATOLOGY

## 2025-08-25 PROCEDURE — 99207 PR NO CHARGE LOS: CPT

## 2025-08-25 PROCEDURE — 96365 THER/PROPH/DIAG IV INF INIT: CPT

## 2025-08-25 PROCEDURE — 250N000011 HC RX IP 250 OP 636: Mod: JZ | Performed by: DERMATOLOGY

## 2025-08-25 RX ORDER — ALBUTEROL SULFATE 90 UG/1
1-2 INHALANT RESPIRATORY (INHALATION)
Start: 2025-09-22

## 2025-08-25 RX ORDER — ALBUTEROL SULFATE 0.83 MG/ML
2.5 SOLUTION RESPIRATORY (INHALATION)
OUTPATIENT
Start: 2025-09-22

## 2025-08-25 RX ORDER — HEPARIN SODIUM,PORCINE 10 UNIT/ML
5-20 VIAL (ML) INTRAVENOUS DAILY PRN
OUTPATIENT
Start: 2025-09-22

## 2025-08-25 RX ORDER — DIPHENHYDRAMINE HYDROCHLORIDE 50 MG/ML
50 INJECTION, SOLUTION INTRAMUSCULAR; INTRAVENOUS
Start: 2025-09-22

## 2025-08-25 RX ORDER — DIPHENHYDRAMINE HCL 25 MG
25 CAPSULE ORAL
OUTPATIENT
Start: 2025-09-22

## 2025-08-25 RX ORDER — EPINEPHRINE 1 MG/ML
0.3 INJECTION, SOLUTION INTRAMUSCULAR; SUBCUTANEOUS EVERY 5 MIN PRN
OUTPATIENT
Start: 2025-09-22

## 2025-08-25 RX ORDER — METHYLPREDNISOLONE SODIUM SUCCINATE 40 MG/ML
40 INJECTION INTRAMUSCULAR; INTRAVENOUS
Start: 2025-09-22

## 2025-08-25 RX ORDER — MEPERIDINE HYDROCHLORIDE 25 MG/ML
25 INJECTION INTRAMUSCULAR; INTRAVENOUS; SUBCUTANEOUS
OUTPATIENT
Start: 2025-09-22

## 2025-08-25 RX ORDER — METHYLPREDNISOLONE SODIUM SUCCINATE 125 MG/2ML
125 INJECTION INTRAMUSCULAR; INTRAVENOUS
OUTPATIENT
Start: 2025-09-22

## 2025-08-25 RX ORDER — HEPARIN SODIUM (PORCINE) LOCK FLUSH IV SOLN 100 UNIT/ML 100 UNIT/ML
5 SOLUTION INTRAVENOUS
OUTPATIENT
Start: 2025-09-22

## 2025-08-25 RX ORDER — DIPHENHYDRAMINE HYDROCHLORIDE 50 MG/ML
25 INJECTION, SOLUTION INTRAMUSCULAR; INTRAVENOUS
Start: 2025-09-22

## 2025-08-25 RX ORDER — ACETAMINOPHEN 325 MG/1
650 TABLET ORAL
OUTPATIENT
Start: 2025-09-22

## 2025-08-25 RX ADMIN — SODIUM CHLORIDE 300 MG: 9 INJECTION, SOLUTION INTRAVENOUS at 15:00

## 2025-08-25 RX ADMIN — SODIUM CHLORIDE 250 ML: 0.9 INJECTION, SOLUTION INTRAVENOUS at 15:00

## (undated) DEVICE — Device

## (undated) DEVICE — SUCTION MANIFOLD NEPTUNE 2 SYS 1 PORT 702-025-000

## (undated) DEVICE — GLOVE PROTEXIS BLUE W/NEU-THERA 8.0  2D73EB80

## (undated) DEVICE — SU ETHILON 3-0 PS-1 18" 1663G

## (undated) DEVICE — IMP SCR ZIM CANC HEX 4.0X40X14MM PT
Type: IMPLANTABLE DEVICE | Site: FOOT | Status: NON-FUNCTIONAL
Removed: 2019-02-13

## (undated) DEVICE — ESU GROUND PAD ADULT W/CORD E7507

## (undated) DEVICE — BLADE KNIFE SURG 10 371110

## (undated) DEVICE — LINEN ORTHO PACK 5446

## (undated) DEVICE — GLOVE PROTEXIS POWDER FREE SMT 7.5  2D72PT75X

## (undated) DEVICE — LINEN GOWN XLG 5407

## (undated) DEVICE — SU VICRYL 2-0 CT-2 27" UND J269H

## (undated) DEVICE — GOWN XLG DISP 9545

## (undated) DEVICE — GLOVE PROTEXIS MICRO 6.5  2D73PM65

## (undated) DEVICE — PACK LOWER EXTREMITY CUSTOM ASC

## (undated) DEVICE — SOL NACL 0.9% IRRIG 1000ML BOTTLE 2F7124

## (undated) DEVICE — IMM LEG ELEVATOR 79-90191

## (undated) RX ORDER — DEXAMETHASONE SODIUM PHOSPHATE 4 MG/ML
INJECTION, SOLUTION INTRA-ARTICULAR; INTRALESIONAL; INTRAMUSCULAR; INTRAVENOUS; SOFT TISSUE
Status: DISPENSED
Start: 2019-02-13

## (undated) RX ORDER — PROPOFOL 10 MG/ML
INJECTION, EMULSION INTRAVENOUS
Status: DISPENSED
Start: 2019-02-13

## (undated) RX ORDER — ACETAMINOPHEN 325 MG/1
TABLET ORAL
Status: DISPENSED
Start: 2019-02-13

## (undated) RX ORDER — GABAPENTIN 300 MG/1
CAPSULE ORAL
Status: DISPENSED
Start: 2019-02-13

## (undated) RX ORDER — LIDOCAINE HYDROCHLORIDE 20 MG/ML
INJECTION, SOLUTION EPIDURAL; INFILTRATION; INTRACAUDAL; PERINEURAL
Status: DISPENSED
Start: 2019-02-13

## (undated) RX ORDER — ONDANSETRON 2 MG/ML
INJECTION INTRAMUSCULAR; INTRAVENOUS
Status: DISPENSED
Start: 2019-02-13

## (undated) RX ORDER — FENTANYL CITRATE 50 UG/ML
INJECTION, SOLUTION INTRAMUSCULAR; INTRAVENOUS
Status: DISPENSED
Start: 2019-02-13

## (undated) RX ORDER — KETOROLAC TROMETHAMINE 30 MG/ML
INJECTION, SOLUTION INTRAMUSCULAR; INTRAVENOUS
Status: DISPENSED
Start: 2019-02-13

## (undated) RX ORDER — BUPIVACAINE HYDROCHLORIDE 5 MG/ML
INJECTION, SOLUTION EPIDURAL; INTRACAUDAL
Status: DISPENSED
Start: 2019-02-13

## (undated) RX ORDER — CEFAZOLIN SODIUM 2 G/50ML
SOLUTION INTRAVENOUS
Status: DISPENSED
Start: 2019-02-13